# Patient Record
Sex: FEMALE | Race: WHITE | Employment: FULL TIME | URBAN - METROPOLITAN AREA
[De-identification: names, ages, dates, MRNs, and addresses within clinical notes are randomized per-mention and may not be internally consistent; named-entity substitution may affect disease eponyms.]

---

## 2017-03-04 ENCOUNTER — OFFICE VISIT (OUTPATIENT)
Dept: FAMILY MEDICINE CLINIC | Age: 67
End: 2017-03-04

## 2017-03-04 VITALS
HEART RATE: 76 BPM | BODY MASS INDEX: 22.66 KG/M2 | TEMPERATURE: 97.8 F | RESPIRATION RATE: 16 BRPM | HEIGHT: 61 IN | WEIGHT: 120 LBS | DIASTOLIC BLOOD PRESSURE: 78 MMHG | SYSTOLIC BLOOD PRESSURE: 126 MMHG

## 2017-03-04 DIAGNOSIS — I10 ESSENTIAL HYPERTENSION: Primary | ICD-10-CM

## 2017-03-04 DIAGNOSIS — E78.00 PURE HYPERCHOLESTEROLEMIA: ICD-10-CM

## 2017-03-04 DIAGNOSIS — Z12.31 ENCOUNTER FOR SCREENING MAMMOGRAM FOR BREAST CANCER: ICD-10-CM

## 2017-03-04 PROCEDURE — 99213 OFFICE O/P EST LOW 20 MIN: CPT | Performed by: FAMILY MEDICINE

## 2017-03-04 ASSESSMENT — PATIENT HEALTH QUESTIONNAIRE - PHQ9
SUM OF ALL RESPONSES TO PHQ9 QUESTIONS 1 & 2: 0
SUM OF ALL RESPONSES TO PHQ QUESTIONS 1-9: 0
2. FEELING DOWN, DEPRESSED OR HOPELESS: 0
1. LITTLE INTEREST OR PLEASURE IN DOING THINGS: 0

## 2017-04-08 ENCOUNTER — HOSPITAL ENCOUNTER (OUTPATIENT)
Dept: WOMENS IMAGING | Age: 67
Discharge: HOME | End: 2017-04-08
Payer: MEDICARE

## 2017-04-08 DIAGNOSIS — Z12.31 ENCOUNTER FOR SCREENING MAMMOGRAM FOR BREAST CANCER: ICD-10-CM

## 2017-04-08 PROCEDURE — G0202 SCR MAMMO BI INCL CAD: HCPCS

## 2017-06-10 DIAGNOSIS — M81.0 OSTEOPOROSIS: ICD-10-CM

## 2017-06-10 DIAGNOSIS — E78.00 PURE HYPERCHOLESTEROLEMIA: ICD-10-CM

## 2017-06-10 DIAGNOSIS — I10 ESSENTIAL HYPERTENSION: ICD-10-CM

## 2017-06-10 LAB
ALBUMIN SERPL-MCNC: 4 G/DL (ref 3.9–4.9)
ALP BLD-CCNC: 76 U/L (ref 40–130)
ALT SERPL-CCNC: 24 U/L (ref 0–33)
ANION GAP SERPL CALCULATED.3IONS-SCNC: 12 MEQ/L (ref 7–13)
AST SERPL-CCNC: 24 U/L (ref 0–35)
BASOPHILS ABSOLUTE: 0.1 K/UL (ref 0–0.2)
BASOPHILS RELATIVE PERCENT: 1.2 %
BILIRUB SERPL-MCNC: 0.3 MG/DL (ref 0–1.2)
BUN BLDV-MCNC: 8 MG/DL (ref 8–23)
CALCIUM SERPL-MCNC: 8.9 MG/DL (ref 8.6–10.2)
CHLORIDE BLD-SCNC: 90 MEQ/L (ref 98–107)
CHOLESTEROL, TOTAL: 194 MG/DL (ref 0–199)
CO2: 28 MEQ/L (ref 22–29)
CREAT SERPL-MCNC: 0.64 MG/DL (ref 0.5–0.9)
EOSINOPHILS ABSOLUTE: 0.1 K/UL (ref 0–0.7)
EOSINOPHILS RELATIVE PERCENT: 1.5 %
GFR AFRICAN AMERICAN: >60
GFR NON-AFRICAN AMERICAN: >60
GLOBULIN: 2.4 G/DL (ref 2.3–3.5)
GLUCOSE BLD-MCNC: 84 MG/DL (ref 74–109)
HCT VFR BLD CALC: 43.9 % (ref 37–47)
HDLC SERPL-MCNC: 65 MG/DL (ref 40–59)
HEMOGLOBIN: 14.9 G/DL (ref 12–16)
LDL CHOLESTEROL CALCULATED: 93 MG/DL (ref 0–129)
LYMPHOCYTES ABSOLUTE: 1.3 K/UL (ref 1–4.8)
LYMPHOCYTES RELATIVE PERCENT: 29.2 %
MCH RBC QN AUTO: 35.1 PG (ref 27–31.3)
MCHC RBC AUTO-ENTMCNC: 34 % (ref 33–37)
MCV RBC AUTO: 103.2 FL (ref 82–100)
MONOCYTES ABSOLUTE: 0.6 K/UL (ref 0.2–0.8)
MONOCYTES RELATIVE PERCENT: 12.8 %
NEUTROPHILS ABSOLUTE: 2.4 K/UL (ref 1.4–6.5)
NEUTROPHILS RELATIVE PERCENT: 55.3 %
PDW BLD-RTO: 13.1 % (ref 11.5–14.5)
PLATELET # BLD: 371 K/UL (ref 130–400)
POTASSIUM SERPL-SCNC: 4.4 MEQ/L (ref 3.5–5.1)
RBC # BLD: 4.25 M/UL (ref 4.2–5.4)
SODIUM BLD-SCNC: 130 MEQ/L (ref 132–144)
TOTAL PROTEIN: 6.4 G/DL (ref 6.4–8.1)
TRIGL SERPL-MCNC: 181 MG/DL (ref 0–200)
VITAMIN D 25-HYDROXY: 42.9 NG/ML (ref 30–100)
WBC # BLD: 4.4 K/UL (ref 4.8–10.8)

## 2017-06-18 DIAGNOSIS — I10 ESSENTIAL HYPERTENSION: ICD-10-CM

## 2017-06-18 RX ORDER — LISINOPRIL AND HYDROCHLOROTHIAZIDE 25; 20 MG/1; MG/1
TABLET ORAL
Qty: 90 TABLET | Refills: 3 | Status: SHIPPED | OUTPATIENT
Start: 2017-06-18 | End: 2018-06-15 | Stop reason: SDUPTHER

## 2017-07-03 ENCOUNTER — OFFICE VISIT (OUTPATIENT)
Dept: FAMILY MEDICINE CLINIC | Age: 67
End: 2017-07-03

## 2017-07-03 VITALS
RESPIRATION RATE: 12 BRPM | HEIGHT: 61 IN | BODY MASS INDEX: 22.84 KG/M2 | TEMPERATURE: 98.1 F | SYSTOLIC BLOOD PRESSURE: 124 MMHG | HEART RATE: 84 BPM | DIASTOLIC BLOOD PRESSURE: 72 MMHG | WEIGHT: 121 LBS

## 2017-07-03 DIAGNOSIS — M81.0 OSTEOPOROSIS: ICD-10-CM

## 2017-07-03 DIAGNOSIS — I10 ESSENTIAL HYPERTENSION: Primary | ICD-10-CM

## 2017-07-03 DIAGNOSIS — Z23 NEED FOR VACCINATION WITH 13-POLYVALENT PNEUMOCOCCAL CONJUGATE VACCINE: ICD-10-CM

## 2017-07-03 DIAGNOSIS — E78.00 PURE HYPERCHOLESTEROLEMIA: ICD-10-CM

## 2017-07-03 DIAGNOSIS — Z12.11 SCREEN FOR COLON CANCER: ICD-10-CM

## 2017-07-03 PROCEDURE — 90471 IMMUNIZATION ADMIN: CPT | Performed by: FAMILY MEDICINE

## 2017-07-03 PROCEDURE — 90670 PCV13 VACCINE IM: CPT | Performed by: FAMILY MEDICINE

## 2017-07-03 PROCEDURE — 99213 OFFICE O/P EST LOW 20 MIN: CPT | Performed by: FAMILY MEDICINE

## 2017-07-22 ENCOUNTER — OFFICE VISIT (OUTPATIENT)
Dept: FAMILY MEDICINE CLINIC | Age: 67
End: 2017-07-22

## 2017-07-22 VITALS
WEIGHT: 123.25 LBS | TEMPERATURE: 97.2 F | BODY MASS INDEX: 23.27 KG/M2 | OXYGEN SATURATION: 92 % | HEIGHT: 61 IN | HEART RATE: 112 BPM | RESPIRATION RATE: 12 BRPM | SYSTOLIC BLOOD PRESSURE: 124 MMHG | DIASTOLIC BLOOD PRESSURE: 80 MMHG

## 2017-07-22 DIAGNOSIS — L23.7 POISON IVY DERMATITIS: Primary | ICD-10-CM

## 2017-07-22 PROCEDURE — 99212 OFFICE O/P EST SF 10 MIN: CPT | Performed by: NURSE PRACTITIONER

## 2017-07-22 PROCEDURE — 96372 THER/PROPH/DIAG INJ SC/IM: CPT | Performed by: NURSE PRACTITIONER

## 2017-07-22 RX ORDER — TRIAMCINOLONE ACETONIDE 40 MG/ML
80 INJECTION, SUSPENSION INTRA-ARTICULAR; INTRAMUSCULAR ONCE
Status: COMPLETED | OUTPATIENT
Start: 2017-07-22 | End: 2017-07-22

## 2017-07-22 RX ADMIN — TRIAMCINOLONE ACETONIDE 80 MG: 40 INJECTION, SUSPENSION INTRA-ARTICULAR; INTRAMUSCULAR at 14:37

## 2017-07-22 ASSESSMENT — ENCOUNTER SYMPTOMS
VOMITING: 0
SORE THROAT: 0
DIARRHEA: 0
RHINORRHEA: 0
NAIL CHANGES: 0
EYE PAIN: 0
COUGH: 0
SHORTNESS OF BREATH: 0

## 2017-12-06 DIAGNOSIS — Z12.11 SCREEN FOR COLON CANCER: ICD-10-CM

## 2017-12-06 LAB
CONTROL: NORMAL
HEMOCCULT STL QL: NEGATIVE

## 2017-12-06 PROCEDURE — 82274 ASSAY TEST FOR BLOOD FECAL: CPT | Performed by: FAMILY MEDICINE

## 2018-01-06 DIAGNOSIS — E78.00 PURE HYPERCHOLESTEROLEMIA: ICD-10-CM

## 2018-01-06 DIAGNOSIS — M81.0 OSTEOPOROSIS: ICD-10-CM

## 2018-01-06 DIAGNOSIS — I10 ESSENTIAL HYPERTENSION: ICD-10-CM

## 2018-01-06 LAB
ALBUMIN SERPL-MCNC: 4.4 G/DL (ref 3.9–4.9)
ALP BLD-CCNC: 70 U/L (ref 40–130)
ALT SERPL-CCNC: 47 U/L (ref 0–33)
ANION GAP SERPL CALCULATED.3IONS-SCNC: 16 MEQ/L (ref 7–13)
ANISOCYTOSIS: ABNORMAL
AST SERPL-CCNC: 79 U/L (ref 0–35)
ATYPICAL LYMPHOCYTE RELATIVE PERCENT: 6 %
BASOPHILS ABSOLUTE: 0 K/UL (ref 0–0.2)
BASOPHILS RELATIVE PERCENT: 1.1 %
BILIRUB SERPL-MCNC: 0.6 MG/DL (ref 0–1.2)
BUN BLDV-MCNC: 8 MG/DL (ref 8–23)
BURR CELLS: ABNORMAL
CALCIUM SERPL-MCNC: 9.1 MG/DL (ref 8.6–10.2)
CHLORIDE BLD-SCNC: 90 MEQ/L (ref 98–107)
CHOLESTEROL, TOTAL: 277 MG/DL (ref 0–199)
CO2: 29 MEQ/L (ref 22–29)
CREAT SERPL-MCNC: 0.53 MG/DL (ref 0.5–0.9)
EOSINOPHILS ABSOLUTE: 0.2 K/UL (ref 0–0.7)
EOSINOPHILS RELATIVE PERCENT: 5 %
GFR AFRICAN AMERICAN: >60
GFR NON-AFRICAN AMERICAN: >60
GLOBULIN: 2.5 G/DL (ref 2.3–3.5)
GLUCOSE BLD-MCNC: 74 MG/DL (ref 74–109)
HCT VFR BLD CALC: 49.8 % (ref 37–47)
HDLC SERPL-MCNC: 169 MG/DL (ref 40–59)
HEMOGLOBIN: 16.5 G/DL (ref 12–16)
LDL CHOLESTEROL CALCULATED: 88 MG/DL (ref 0–129)
LYMPHOCYTES ABSOLUTE: 1.3 K/UL (ref 1–4.8)
LYMPHOCYTES RELATIVE PERCENT: 28 %
MACROCYTES: ABNORMAL
MCH RBC QN AUTO: 36.7 PG (ref 27–31.3)
MCHC RBC AUTO-ENTMCNC: 33.2 % (ref 33–37)
MCV RBC AUTO: 110.5 FL (ref 82–100)
MONOCYTES ABSOLUTE: 0.2 K/UL (ref 0.2–0.8)
MONOCYTES RELATIVE PERCENT: 5.8 %
NEUTROPHILS ABSOLUTE: 2.1 K/UL (ref 1.4–6.5)
NEUTROPHILS RELATIVE PERCENT: 55 %
PDW BLD-RTO: 14.1 % (ref 11.5–14.5)
PLATELET # BLD: 237 K/UL (ref 130–400)
POTASSIUM SERPL-SCNC: 4 MEQ/L (ref 3.5–5.1)
RBC # BLD: 4.51 M/UL (ref 4.2–5.4)
SMUDGE CELLS: 6.8
SODIUM BLD-SCNC: 135 MEQ/L (ref 132–144)
TOTAL PROTEIN: 6.9 G/DL (ref 6.4–8.1)
TRIGL SERPL-MCNC: 98 MG/DL (ref 0–200)
VITAMIN D 25-HYDROXY: 41.2 NG/ML (ref 30–100)
WBC # BLD: 3.8 K/UL (ref 4.8–10.8)

## 2018-01-15 ENCOUNTER — OFFICE VISIT (OUTPATIENT)
Dept: FAMILY MEDICINE CLINIC | Age: 68
End: 2018-01-15

## 2018-01-15 VITALS
DIASTOLIC BLOOD PRESSURE: 82 MMHG | BODY MASS INDEX: 21.9 KG/M2 | SYSTOLIC BLOOD PRESSURE: 136 MMHG | RESPIRATION RATE: 12 BRPM | HEART RATE: 102 BPM | HEIGHT: 61 IN | TEMPERATURE: 98.6 F | WEIGHT: 116 LBS

## 2018-01-15 DIAGNOSIS — E78.00 PURE HYPERCHOLESTEROLEMIA: ICD-10-CM

## 2018-01-15 DIAGNOSIS — D75.1 POLYCYTHEMIA: ICD-10-CM

## 2018-01-15 DIAGNOSIS — M81.0 AGE-RELATED OSTEOPOROSIS WITHOUT CURRENT PATHOLOGICAL FRACTURE: ICD-10-CM

## 2018-01-15 DIAGNOSIS — I10 ESSENTIAL HYPERTENSION: Primary | ICD-10-CM

## 2018-01-15 DIAGNOSIS — F17.210 NICOTINE DEPENDENCE, CIGARETTES, UNCOMPLICATED: ICD-10-CM

## 2018-01-15 PROCEDURE — G0296 VISIT TO DETERM LDCT ELIG: HCPCS | Performed by: FAMILY MEDICINE

## 2018-01-15 PROCEDURE — 99214 OFFICE O/P EST MOD 30 MIN: CPT | Performed by: FAMILY MEDICINE

## 2018-01-15 NOTE — PROGRESS NOTES
Subjective:   Marshall Mccrary is here for follow-up of elevated blood pressure. She is exercising and is not adherent to a low-salt diet. Blood pressure is well controlled at home. History of target organ damage: none. Hypercholesterolemia has been well-controlled although the HDL went from the low 60s to 169 in the last 6 months if lab is to be believed. Polycythemia is present and worse than it was last time. She states she feels wobbly sometimes. This is a reaction to the lisinopril and is not too bad and it comes and goes. Social history, patient has a 45+ pack year smoking history. ROS: This patient reports no chest pains or pressure. There is no shortness of breath or chest wall soreness. The patient reports no nausea or vomiting. There is no heartburn or indigestion. There is no diarrhea or constipation. No black, bloody, mucusy or tarry stool noticed. The patient reports no bloating and no change in appetite. EXAM:  Constitutional Blood pressure 136/82, pulse 102, temperature 98.6 °F (37 °C), temperature source Temporal, resp. rate 12, height 5' 1\" (1.549 m), weight 116 lb (52.6 kg), not currently breastfeeding. Physical Exam   Constitutional: She is oriented to person, place, and time. She appears well-developed and well-nourished. Neck: Normal range of motion. Carotid bruit is not present. No thyromegaly present. Cardiovascular: Normal rate, regular rhythm and normal heart sounds. Pulmonary/Chest: Effort normal and breath sounds normal.   Abdominal: Soft. There is no tenderness. There is no rebound and no guarding. Musculoskeletal:   There is no costovertebral angle tenderness. Lumbar spine and sacroiliac joints are non tender. There is no edema in the four extremities. Pulses palpable at both posterior tibial and radial arteries. Neurological: She is alert and oriented to person, place, and time. Psychiatric: She has a normal mood and affect.  Her behavior is normal.     DIAGNOSIS:

## 2018-01-15 NOTE — PATIENT INSTRUCTIONS
Take the lisinopril-hydrochlorthiazide 1/2 tablet twice a day. What is lung cancer screening? Lung cancer screening is a way in which doctors check the lungs for early signs of cancer in people who have no symptoms of lung cancer. A low-dose CT scan uses much less radiation than a normal CT scan and shows a more detailed image of the lungs than a standard X-ray. The goal of lung cancer screening is to find cancer early, before it has a chance to grow, spread, or cause problems. One large study found that smokers who were screened with low-dose CT scans were less likely to die of lung cancer than those who were screened with standard X-ray. Below is a summary of the things you need to know regarding screening for lung cancer with low-dose computed tomography (LDCT). This is a screening program that involves routine annual screening with LDCT studies of the lung. The LDCTs are done using low-dose radiation that is not thought to increase your cancer risk. If you have other serious medical conditions (other cancers, congestive heart failure) that limit your life expectancy to less than 10 years, you should not undergo lung cancer screening with LDCT. The chance is 20%-60% that the LDCT result will show abnormalities. This would require additional testing which could include repeat imaging or even invasive procedures. Most (about 95%) of \"abnormal\" LDCT results are false in the sense that no lung cancer is ultimately found. Additionally, some (about 10%) of the cancers found would not affect your life expectancy, even if undetected and untreated. If you are still smoking, the single most important thing that you can do to reduce your risk of dying of lung cancer is to quit. For this screening to be covered by Medicare and most other insurers, strict criteria must be met.   If you do not meet these criteria, but still wish to undergo LDCT testing, you will be required to sign a waiver indicating your willingness to pay for the scan.

## 2018-06-15 DIAGNOSIS — I10 ESSENTIAL HYPERTENSION: ICD-10-CM

## 2018-06-18 RX ORDER — LISINOPRIL AND HYDROCHLOROTHIAZIDE 25; 20 MG/1; MG/1
TABLET ORAL
Qty: 90 TABLET | Refills: 3 | Status: SHIPPED | OUTPATIENT
Start: 2018-06-18

## 2018-07-14 DIAGNOSIS — I10 ESSENTIAL HYPERTENSION: ICD-10-CM

## 2018-07-14 DIAGNOSIS — D75.1 POLYCYTHEMIA: ICD-10-CM

## 2018-07-14 DIAGNOSIS — E78.00 PURE HYPERCHOLESTEROLEMIA: ICD-10-CM

## 2018-07-14 LAB
ALBUMIN SERPL-MCNC: 4.1 G/DL (ref 3.9–4.9)
ALP BLD-CCNC: 64 U/L (ref 40–130)
ALT SERPL-CCNC: 29 U/L (ref 0–33)
ANION GAP SERPL CALCULATED.3IONS-SCNC: 16 MEQ/L (ref 7–13)
AST SERPL-CCNC: 45 U/L (ref 0–35)
BASOPHILS ABSOLUTE: 0.1 K/UL (ref 0–0.2)
BASOPHILS RELATIVE PERCENT: 1.8 %
BILIRUB SERPL-MCNC: 0.3 MG/DL (ref 0–1.2)
BUN BLDV-MCNC: 10 MG/DL (ref 8–23)
CALCIUM SERPL-MCNC: 9.1 MG/DL (ref 8.6–10.2)
CHLORIDE BLD-SCNC: 95 MEQ/L (ref 98–107)
CHOLESTEROL, TOTAL: 228 MG/DL (ref 0–199)
CO2: 25 MEQ/L (ref 22–29)
CREAT SERPL-MCNC: 0.58 MG/DL (ref 0.5–0.9)
EOSINOPHILS ABSOLUTE: 0.1 K/UL (ref 0–0.7)
EOSINOPHILS RELATIVE PERCENT: 1.9 %
GFR AFRICAN AMERICAN: >60
GFR NON-AFRICAN AMERICAN: >60
GLOBULIN: 2.5 G/DL (ref 2.3–3.5)
GLUCOSE BLD-MCNC: 66 MG/DL (ref 74–109)
HCT VFR BLD CALC: 37.3 % (ref 37–47)
HDLC SERPL-MCNC: 108 MG/DL (ref 40–59)
HEMOGLOBIN: 12.9 G/DL (ref 12–16)
LDL CHOLESTEROL CALCULATED: 97 MG/DL (ref 0–129)
LYMPHOCYTES ABSOLUTE: 1.2 K/UL (ref 1–4.8)
LYMPHOCYTES RELATIVE PERCENT: 29.1 %
MACROCYTES: ABNORMAL
MCH RBC QN AUTO: 36.6 PG (ref 27–31.3)
MCHC RBC AUTO-ENTMCNC: 34.5 % (ref 33–37)
MCV RBC AUTO: 106.2 FL (ref 82–100)
MONOCYTES ABSOLUTE: 0.4 K/UL (ref 0.2–0.8)
MONOCYTES RELATIVE PERCENT: 8.9 %
NEUTROPHILS ABSOLUTE: 2.3 K/UL (ref 1.4–6.5)
NEUTROPHILS RELATIVE PERCENT: 58.3 %
PDW BLD-RTO: 13.5 % (ref 11.5–14.5)
PLATELET # BLD: 284 K/UL (ref 130–400)
POTASSIUM SERPL-SCNC: 3.8 MEQ/L (ref 3.5–5.1)
RBC # BLD: 3.51 M/UL (ref 4.2–5.4)
SLIDE REVIEW: ABNORMAL
SODIUM BLD-SCNC: 136 MEQ/L (ref 132–144)
TOTAL PROTEIN: 6.6 G/DL (ref 6.4–8.1)
TRIGL SERPL-MCNC: 113 MG/DL (ref 0–200)
WBC # BLD: 4 K/UL (ref 4.8–10.8)

## 2019-01-31 ENCOUNTER — OFFICE VISIT (OUTPATIENT)
Dept: FAMILY MEDICINE CLINIC | Age: 69
End: 2019-01-31
Payer: COMMERCIAL

## 2019-01-31 VITALS
HEART RATE: 123 BPM | RESPIRATION RATE: 14 BRPM | DIASTOLIC BLOOD PRESSURE: 86 MMHG | SYSTOLIC BLOOD PRESSURE: 150 MMHG | WEIGHT: 120.2 LBS | TEMPERATURE: 100.8 F | BODY MASS INDEX: 22.69 KG/M2 | HEIGHT: 61 IN | OXYGEN SATURATION: 97 %

## 2019-01-31 DIAGNOSIS — J20.8 ACUTE BACTERIAL BRONCHITIS: ICD-10-CM

## 2019-01-31 DIAGNOSIS — R68.89 FLU-LIKE SYMPTOMS: Primary | ICD-10-CM

## 2019-01-31 DIAGNOSIS — B96.89 ACUTE BACTERIAL BRONCHITIS: ICD-10-CM

## 2019-01-31 DIAGNOSIS — I10 ESSENTIAL HYPERTENSION: ICD-10-CM

## 2019-01-31 LAB
INFLUENZA A ANTIBODY: NEGATIVE
INFLUENZA B ANTIBODY: NEGATIVE

## 2019-01-31 PROCEDURE — 87804 INFLUENZA ASSAY W/OPTIC: CPT | Performed by: FAMILY MEDICINE

## 2019-01-31 PROCEDURE — 99213 OFFICE O/P EST LOW 20 MIN: CPT | Performed by: FAMILY MEDICINE

## 2019-01-31 RX ORDER — AZITHROMYCIN 250 MG/1
TABLET, FILM COATED ORAL
Qty: 6 TABLET | Refills: 0 | Status: SHIPPED | OUTPATIENT
Start: 2019-01-31 | End: 2019-02-10

## 2019-01-31 ASSESSMENT — PATIENT HEALTH QUESTIONNAIRE - PHQ9
SUM OF ALL RESPONSES TO PHQ9 QUESTIONS 1 & 2: 0
2. FEELING DOWN, DEPRESSED OR HOPELESS: 0
SUM OF ALL RESPONSES TO PHQ QUESTIONS 1-9: 0
SUM OF ALL RESPONSES TO PHQ QUESTIONS 1-9: 0
1. LITTLE INTEREST OR PLEASURE IN DOING THINGS: 0

## 2019-02-21 ENCOUNTER — TELEPHONE (OUTPATIENT)
Dept: FAMILY MEDICINE CLINIC | Age: 69
End: 2019-02-21

## 2019-02-22 ENCOUNTER — TELEPHONE (OUTPATIENT)
Dept: FAMILY MEDICINE CLINIC | Age: 69
End: 2019-02-22

## 2019-03-13 ENCOUNTER — OFFICE VISIT (OUTPATIENT)
Dept: FAMILY MEDICINE CLINIC | Age: 69
End: 2019-03-13
Payer: COMMERCIAL

## 2019-03-13 VITALS
SYSTOLIC BLOOD PRESSURE: 142 MMHG | OXYGEN SATURATION: 96 % | HEART RATE: 77 BPM | HEIGHT: 61 IN | BODY MASS INDEX: 23.3 KG/M2 | DIASTOLIC BLOOD PRESSURE: 80 MMHG | WEIGHT: 123.4 LBS | TEMPERATURE: 98.7 F | RESPIRATION RATE: 14 BRPM

## 2019-03-13 DIAGNOSIS — Z00.00 ROUTINE GENERAL MEDICAL EXAMINATION AT A HEALTH CARE FACILITY: Primary | ICD-10-CM

## 2019-03-13 DIAGNOSIS — I10 ESSENTIAL HYPERTENSION: ICD-10-CM

## 2019-03-13 PROCEDURE — G0438 PPPS, INITIAL VISIT: HCPCS | Performed by: FAMILY MEDICINE

## 2019-03-13 ASSESSMENT — LIFESTYLE VARIABLES
AUDIT-C TOTAL SCORE: 4
HOW OFTEN DURING THE LAST YEAR HAVE YOU FOUND THAT YOU WERE NOT ABLE TO STOP DRINKING ONCE YOU HAD STARTED: 0
HOW OFTEN DO YOU HAVE A DRINK CONTAINING ALCOHOL: 4
HOW OFTEN DURING THE LAST YEAR HAVE YOU BEEN UNABLE TO REMEMBER WHAT HAPPENED THE NIGHT BEFORE BECAUSE YOU HAD BEEN DRINKING: 0
HAVE YOU OR SOMEONE ELSE BEEN INJURED AS A RESULT OF YOUR DRINKING: 0
HOW OFTEN DO YOU HAVE SIX OR MORE DRINKS ON ONE OCCASION: 0
HOW OFTEN DURING THE LAST YEAR HAVE YOU NEEDED AN ALCOHOLIC DRINK FIRST THING IN THE MORNING TO GET YOURSELF GOING AFTER A NIGHT OF HEAVY DRINKING: 0
HOW OFTEN DURING THE LAST YEAR HAVE YOU FAILED TO DO WHAT WAS NORMALLY EXPECTED FROM YOU BECAUSE OF DRINKING: 0
HOW MANY STANDARD DRINKS CONTAINING ALCOHOL DO YOU HAVE ON A TYPICAL DAY: 0
HOW OFTEN DURING THE LAST YEAR HAVE YOU HAD A FEELING OF GUILT OR REMORSE AFTER DRINKING: 0
HAS A RELATIVE, FRIEND, DOCTOR, OR ANOTHER HEALTH PROFESSIONAL EXPRESSED CONCERN ABOUT YOUR DRINKING OR SUGGESTED YOU CUT DOWN: 0

## 2019-03-13 ASSESSMENT — PATIENT HEALTH QUESTIONNAIRE - PHQ9
SUM OF ALL RESPONSES TO PHQ QUESTIONS 1-9: 1
SUM OF ALL RESPONSES TO PHQ QUESTIONS 1-9: 1

## 2019-03-13 ASSESSMENT — ANXIETY QUESTIONNAIRES: GAD7 TOTAL SCORE: 2

## 2019-03-18 ENCOUNTER — TELEPHONE (OUTPATIENT)
Dept: FAMILY MEDICINE CLINIC | Age: 69
End: 2019-03-18

## 2019-03-25 ENCOUNTER — TELEPHONE (OUTPATIENT)
Dept: FAMILY MEDICINE CLINIC | Age: 69
End: 2019-03-25

## 2023-07-26 LAB
ALANINE AMINOTRANSFERASE (SGPT) (U/L) IN SER/PLAS: 67 U/L (ref 7–45)
ALBUMIN (G/DL) IN SER/PLAS: 4.2 G/DL (ref 3.4–5)
ALKALINE PHOSPHATASE (U/L) IN SER/PLAS: 109 U/L (ref 33–136)
ANION GAP IN SER/PLAS: 12 MMOL/L (ref 10–20)
ASPARTATE AMINOTRANSFERASE (SGOT) (U/L) IN SER/PLAS: 92 U/L (ref 9–39)
BASOPHILS (10*3/UL) IN BLOOD BY AUTOMATED COUNT: 0.06 X10E9/L (ref 0–0.1)
BASOPHILS/100 LEUKOCYTES IN BLOOD BY AUTOMATED COUNT: 0.8 % (ref 0–2)
BILIRUBIN TOTAL (MG/DL) IN SER/PLAS: 0.6 MG/DL (ref 0–1.2)
CALCIUM (MG/DL) IN SER/PLAS: 9.4 MG/DL (ref 8.6–10.3)
CARBON DIOXIDE, TOTAL (MMOL/L) IN SER/PLAS: 30 MMOL/L (ref 21–32)
CHLORIDE (MMOL/L) IN SER/PLAS: 99 MMOL/L (ref 98–107)
CHOLESTEROL (MG/DL) IN SER/PLAS: 182 MG/DL (ref 0–199)
CHOLESTEROL IN HDL (MG/DL) IN SER/PLAS: 100 MG/DL
CHOLESTEROL/HDL RATIO: 1.8
CREATININE (MG/DL) IN SER/PLAS: 0.67 MG/DL (ref 0.5–1.05)
EOSINOPHILS (10*3/UL) IN BLOOD BY AUTOMATED COUNT: 0.07 X10E9/L (ref 0–0.4)
EOSINOPHILS/100 LEUKOCYTES IN BLOOD BY AUTOMATED COUNT: 0.9 % (ref 0–6)
ERYTHROCYTE DISTRIBUTION WIDTH (RATIO) BY AUTOMATED COUNT: 13.2 % (ref 11.5–14.5)
ERYTHROCYTE MEAN CORPUSCULAR HEMOGLOBIN CONCENTRATION (G/DL) BY AUTOMATED: 33.3 G/DL (ref 32–36)
ERYTHROCYTE MEAN CORPUSCULAR VOLUME (FL) BY AUTOMATED COUNT: 102 FL (ref 80–100)
ERYTHROCYTES (10*6/UL) IN BLOOD BY AUTOMATED COUNT: 4.07 X10E12/L (ref 4–5.2)
GFR FEMALE: >90 ML/MIN/1.73M2
GLUCOSE (MG/DL) IN SER/PLAS: 85 MG/DL (ref 74–99)
HEMATOCRIT (%) IN BLOOD BY AUTOMATED COUNT: 41.5 % (ref 36–46)
HEMOGLOBIN (G/DL) IN BLOOD: 13.8 G/DL (ref 12–16)
IMMATURE GRANULOCYTES/100 LEUKOCYTES IN BLOOD BY AUTOMATED COUNT: 0.9 % (ref 0–0.9)
LDL: 67 MG/DL (ref 0–99)
LEUKOCYTES (10*3/UL) IN BLOOD BY AUTOMATED COUNT: 7.6 X10E9/L (ref 4.4–11.3)
LYMPHOCYTES (10*3/UL) IN BLOOD BY AUTOMATED COUNT: 1.61 X10E9/L (ref 0.8–3)
LYMPHOCYTES/100 LEUKOCYTES IN BLOOD BY AUTOMATED COUNT: 21.1 % (ref 13–44)
MONOCYTES (10*3/UL) IN BLOOD BY AUTOMATED COUNT: 0.56 X10E9/L (ref 0.05–0.8)
MONOCYTES/100 LEUKOCYTES IN BLOOD BY AUTOMATED COUNT: 7.3 % (ref 2–10)
NEUTROPHILS (10*3/UL) IN BLOOD BY AUTOMATED COUNT: 5.27 X10E9/L (ref 1.6–5.5)
NEUTROPHILS/100 LEUKOCYTES IN BLOOD BY AUTOMATED COUNT: 69 % (ref 40–80)
PLATELETS (10*3/UL) IN BLOOD AUTOMATED COUNT: 240 X10E9/L (ref 150–450)
POTASSIUM (MMOL/L) IN SER/PLAS: 4.4 MMOL/L (ref 3.5–5.3)
PROTEIN TOTAL: 6.9 G/DL (ref 6.4–8.2)
SODIUM (MMOL/L) IN SER/PLAS: 137 MMOL/L (ref 136–145)
THYROTROPIN (MIU/L) IN SER/PLAS BY DETECTION LIMIT <= 0.05 MIU/L: 1.45 MIU/L (ref 0.44–3.98)
TRIGLYCERIDE (MG/DL) IN SER/PLAS: 75 MG/DL (ref 0–149)
UREA NITROGEN (MG/DL) IN SER/PLAS: 8 MG/DL (ref 6–23)
VLDL: 15 MG/DL (ref 0–40)

## 2023-07-31 PROBLEM — T17.908A ASPIRATION INTO AIRWAY: Status: RESOLVED | Noted: 2023-07-31 | Resolved: 2023-07-31

## 2023-07-31 PROBLEM — E03.9 ACQUIRED HYPOTHYROIDISM: Status: ACTIVE | Noted: 2023-07-31

## 2023-07-31 PROBLEM — J18.9 PNEUMONIA OF BOTH LUNGS DUE TO INFECTIOUS ORGANISM: Status: RESOLVED | Noted: 2023-07-31 | Resolved: 2023-07-31

## 2023-07-31 PROBLEM — E78.5 HYPERLIPIDEMIA: Status: ACTIVE | Noted: 2023-07-31

## 2023-07-31 PROBLEM — R60.0 PERIPHERAL EDEMA: Status: ACTIVE | Noted: 2023-07-31

## 2023-07-31 PROBLEM — R60.9 PERIPHERAL EDEMA: Status: ACTIVE | Noted: 2023-07-31

## 2023-07-31 PROBLEM — D64.9 ANEMIA: Status: ACTIVE | Noted: 2023-07-31

## 2023-07-31 PROBLEM — K22.89: Status: ACTIVE | Noted: 2023-07-31

## 2023-07-31 PROBLEM — I10 HTN (HYPERTENSION), BENIGN: Status: ACTIVE | Noted: 2023-07-31

## 2023-07-31 PROBLEM — E04.1 THYROID NODULE: Status: ACTIVE | Noted: 2023-07-31

## 2023-07-31 RX ORDER — LEVOTHYROXINE SODIUM 25 UG/1
25 TABLET ORAL DAILY
COMMUNITY
Start: 2022-08-02 | End: 2023-08-01 | Stop reason: SINTOL

## 2023-07-31 RX ORDER — ATORVASTATIN CALCIUM 80 MG/1
80 TABLET, FILM COATED ORAL
COMMUNITY
Start: 2021-01-23 | End: 2023-08-01 | Stop reason: SDUPTHER

## 2023-07-31 RX ORDER — METOPROLOL TARTRATE 25 MG/1
25 TABLET, FILM COATED ORAL 2 TIMES DAILY
COMMUNITY
Start: 2021-02-01 | End: 2023-08-01 | Stop reason: SDUPTHER

## 2023-07-31 NOTE — PROGRESS NOTES
Subjective   Patient ID: Annette Fernandez is a 73 y.o. female who presents for Follow-up, Hypertension, Hyperlipidemia, Hypothyroidism, Anemia, and Medicare Annual Wellness Visit Subsequent.    The patients living will is active. Her POA is daughter Daisy, back-up POA is Unknown at this time.  The patients current code status is FULL CODE. The patient does not want to linger on machines. .    Hypertension: The patient is here for follow-up of elevated blood pressure. She is adherent to a low salt diet.  The patient is compliant with medications. Patient denies any side effects to the medications.     Hyperlipidemia: The patient is present today for a follow up of hyperlipidemia. She denies having any leg cramping in particular. She is trying to follow a low-fat diet. The patient is compliant with medications, which they are currently taking the following Atorvastatin 80 mg QHS. Patient denies any side effects to the medications.     Hypothyroidism: Patient presents for a follow up of their thyroid function. Energy wise that patient is well. The patient is compliant with their medications, which are currently levothyroxine (Synthroid). Patient denies any side effects to the medications. [place] thyroid is not covered by insurance and would like a medication like Unithroid      Anemia: Patient presents for presents evaluation of anemia. The patient has not been hospitalized for this in the last 6 months. The patient is compliant with medications. Patient denies any side effects to the medications. She denies having any symptoms pertaining to anemia including abdominal pain, dizziness/lightheadedness, and fatigue.     The patient denies having the following symptoms: chest pain, chest pressure, fever, chills, N/V/D, constipation, dizziness, headaches, SOB.      Objective   Vitals:  /66   Pulse 86   Temp 36.3 °C (97.3 °F)   Resp 16   Ht 1.524 m (5')   Wt 54.8 kg (120 lb 12.8 oz)   SpO2 99%   BMI 23.59 kg/m²      Physical Exam  Vitals reviewed.   Constitutional:       Appearance: Normal appearance.   Neck:      Vascular: No carotid bruit.   Cardiovascular:      Rate and Rhythm: Normal rate and regular rhythm.      Pulses: Normal pulses.      Heart sounds: Normal heart sounds.   Pulmonary:      Effort: Pulmonary effort is normal. No respiratory distress.      Breath sounds: Normal breath sounds. No wheezing.   Abdominal:      General: There is no distension.      Palpations: Abdomen is soft. There is no mass.      Tenderness: There is no abdominal tenderness. There is no right CVA tenderness, left CVA tenderness, guarding or rebound.   Musculoskeletal:      Cervical back: Normal range of motion and neck supple. No rigidity.      Right lower leg: No edema.      Left lower leg: No edema.   Lymphadenopathy:      Cervical: No cervical adenopathy.   Neurological:      Mental Status: She is alert.          Labs reviewed from : 07/26/2023 CMP, CBC, Lipid,  WNL.     Assessment/Plan   Problem List Items Addressed This Visit       HTN (hypertension), benign      Is stable, continue with current treatment.           Relevant Medications    metoprolol tartrate (Lopressor) 25 mg tablet    Other Relevant Orders    CBC and Auto Differential    Comprehensive Metabolic Panel    Magnesium    Hyperlipidemia     Is clinically stable so we will continue with current medications and lab work to confirm status ordered.          Relevant Medications    atorvastatin (Lipitor) 80 mg tablet    Other Relevant Orders    Lipid Panel    Acquired hypothyroidism     Is clinically stable so we will continue with current medications and lab work to confirm status ordered.          Relevant Medications    levothyroxine (Synthroid, Levoxyl) 25 mcg tablet    Other Relevant Orders    Follow Up In [facility] - PCP - Established    TSH with reflex to Free T4 if abnormal    Anemia     Is clinically stable so we will continue with current medications  and lab work to confirm status ordered.          Encounter for subsequent annual wellness visit (AWV) in Medicare patient - Primary     Medicare wellness visit done, patient is in satisfactory living circumstances where the patient's needs are met.  This patient is advised to develop or update their living will and provide us a copy for the chart.            Other Visit Diagnoses       Routine general medical examination at health care facility                Follow up in: 6 months or sooner if needed with labs prior.     Scribe Attestation  By signing my name below, I, Pablo Monetmayor   attest that this documentation has been prepared under the direction and in the presence of Parminder Russell MD.

## 2023-08-01 ENCOUNTER — OFFICE VISIT (OUTPATIENT)
Dept: PRIMARY CARE | Facility: CLINIC | Age: 73
End: 2023-08-01
Payer: MEDICARE

## 2023-08-01 VITALS
OXYGEN SATURATION: 99 % | SYSTOLIC BLOOD PRESSURE: 114 MMHG | TEMPERATURE: 97.3 F | RESPIRATION RATE: 16 BRPM | BODY MASS INDEX: 23.71 KG/M2 | WEIGHT: 120.8 LBS | HEIGHT: 60 IN | HEART RATE: 86 BPM | DIASTOLIC BLOOD PRESSURE: 66 MMHG

## 2023-08-01 DIAGNOSIS — E03.9 ACQUIRED HYPOTHYROIDISM: ICD-10-CM

## 2023-08-01 DIAGNOSIS — I10 HTN (HYPERTENSION), BENIGN: ICD-10-CM

## 2023-08-01 DIAGNOSIS — Z00.00 ROUTINE GENERAL MEDICAL EXAMINATION AT HEALTH CARE FACILITY: ICD-10-CM

## 2023-08-01 DIAGNOSIS — Z00.00 ENCOUNTER FOR SUBSEQUENT ANNUAL WELLNESS VISIT (AWV) IN MEDICARE PATIENT: Primary | ICD-10-CM

## 2023-08-01 DIAGNOSIS — E78.2 MIXED HYPERLIPIDEMIA: ICD-10-CM

## 2023-08-01 DIAGNOSIS — D50.8 OTHER IRON DEFICIENCY ANEMIA: ICD-10-CM

## 2023-08-01 PROCEDURE — 3074F SYST BP LT 130 MM HG: CPT | Performed by: FAMILY MEDICINE

## 2023-08-01 PROCEDURE — 99214 OFFICE O/P EST MOD 30 MIN: CPT | Performed by: FAMILY MEDICINE

## 2023-08-01 PROCEDURE — 1170F FXNL STATUS ASSESSED: CPT | Performed by: FAMILY MEDICINE

## 2023-08-01 PROCEDURE — 3078F DIAST BP <80 MM HG: CPT | Performed by: FAMILY MEDICINE

## 2023-08-01 PROCEDURE — 1159F MED LIST DOCD IN RCRD: CPT | Performed by: FAMILY MEDICINE

## 2023-08-01 PROCEDURE — G0439 PPPS, SUBSEQ VISIT: HCPCS | Performed by: FAMILY MEDICINE

## 2023-08-01 PROCEDURE — 1160F RVW MEDS BY RX/DR IN RCRD: CPT | Performed by: FAMILY MEDICINE

## 2023-08-01 RX ORDER — THYROID, PORCINE 15 MG/1
1 TABLET ORAL DAILY
COMMUNITY
Start: 2023-07-11 | End: 2023-08-01

## 2023-08-01 RX ORDER — ATORVASTATIN CALCIUM 80 MG/1
80 TABLET, FILM COATED ORAL
Qty: 90 TABLET | Refills: 3 | Status: SHIPPED | OUTPATIENT
Start: 2023-08-01

## 2023-08-01 RX ORDER — METOPROLOL TARTRATE 25 MG/1
25 TABLET, FILM COATED ORAL 2 TIMES DAILY
Qty: 90 TABLET | Refills: 3 | Status: SHIPPED | OUTPATIENT
Start: 2023-08-01 | End: 2024-02-13

## 2023-08-01 RX ORDER — LEVOTHYROXINE SODIUM 25 UG/1
25 TABLET ORAL DAILY
Qty: 90 TABLET | Refills: 3 | Status: SHIPPED | OUTPATIENT
Start: 2023-08-01 | End: 2023-09-12 | Stop reason: SINTOL

## 2023-08-01 ASSESSMENT — ACTIVITIES OF DAILY LIVING (ADL)
MANAGING_FINANCES: INDEPENDENT
DRESSING: INDEPENDENT
BATHING: INDEPENDENT
GROCERY_SHOPPING: INDEPENDENT
DOING_HOUSEWORK: INDEPENDENT
TAKING_MEDICATION: INDEPENDENT

## 2023-08-01 ASSESSMENT — ENCOUNTER SYMPTOMS
DEPRESSION: 0
LOSS OF SENSATION IN FEET: 0
OCCASIONAL FEELINGS OF UNSTEADINESS: 1

## 2023-08-01 ASSESSMENT — PATIENT HEALTH QUESTIONNAIRE - PHQ9
2. FEELING DOWN, DEPRESSED OR HOPELESS: NOT AT ALL
SUM OF ALL RESPONSES TO PHQ9 QUESTIONS 1 AND 2: 0
1. LITTLE INTEREST OR PLEASURE IN DOING THINGS: NOT AT ALL

## 2023-08-01 NOTE — ASSESSMENT & PLAN NOTE
Is clinically stable so we will continue with current medications and lab work to confirm status ordered.    99

## 2023-08-10 DIAGNOSIS — E03.9 HYPOTHYROIDISM, UNSPECIFIED: ICD-10-CM

## 2023-08-10 RX ORDER — THYROID, PORCINE 15 MG/1
1 TABLET ORAL DAILY
Qty: 90 TABLET | Refills: 3 | Status: SHIPPED | OUTPATIENT
Start: 2023-08-10 | End: 2023-09-12 | Stop reason: SDUPTHER

## 2023-09-11 ENCOUNTER — TELEPHONE (OUTPATIENT)
Dept: PRIMARY CARE | Facility: CLINIC | Age: 73
End: 2023-09-11
Payer: MEDICARE

## 2023-09-11 DIAGNOSIS — E03.9 HYPOTHYROIDISM, UNSPECIFIED: ICD-10-CM

## 2023-09-11 NOTE — TELEPHONE ENCOUNTER
Patient called stating [facility], patient stated she is having a reaction to levothyroxine.   Please advise   [facility] in [place]

## 2023-09-12 RX ORDER — THYROID 15 MG/1
1 TABLET ORAL DAILY
Qty: 90 TABLET | Refills: 3 | Status: SHIPPED | OUTPATIENT
Start: 2023-09-12

## 2024-01-23 RX ORDER — MOMETASONE FUROATE 1 MG/G
CREAM TOPICAL
COMMUNITY
Start: 2003-12-31

## 2024-01-25 ENCOUNTER — LAB (OUTPATIENT)
Dept: LAB | Facility: LAB | Age: 74
End: 2024-01-25
Payer: MEDICARE

## 2024-01-25 DIAGNOSIS — E03.9 ACQUIRED HYPOTHYROIDISM: ICD-10-CM

## 2024-01-25 DIAGNOSIS — E78.2 MIXED HYPERLIPIDEMIA: ICD-10-CM

## 2024-01-25 DIAGNOSIS — I10 HTN (HYPERTENSION), BENIGN: ICD-10-CM

## 2024-01-25 LAB
ALBUMIN SERPL BCP-MCNC: 3.8 G/DL (ref 3.4–5)
ALP SERPL-CCNC: 131 U/L (ref 33–136)
ALT SERPL W P-5'-P-CCNC: 75 U/L (ref 7–45)
ANION GAP SERPL CALC-SCNC: 15 MMOL/L (ref 10–20)
AST SERPL W P-5'-P-CCNC: 118 U/L (ref 9–39)
BASOPHILS # BLD AUTO: 0.04 X10*3/UL (ref 0–0.1)
BASOPHILS NFR BLD AUTO: 0.7 %
BILIRUB SERPL-MCNC: 0.5 MG/DL (ref 0–1.2)
BUN SERPL-MCNC: 4 MG/DL (ref 6–23)
CALCIUM SERPL-MCNC: 8.6 MG/DL (ref 8.6–10.3)
CHLORIDE SERPL-SCNC: 96 MMOL/L (ref 98–107)
CHOLEST SERPL-MCNC: 166 MG/DL (ref 0–199)
CHOLESTEROL/HDL RATIO: 1.6
CO2 SERPL-SCNC: 29 MMOL/L (ref 21–32)
CREAT SERPL-MCNC: 0.59 MG/DL (ref 0.5–1.05)
EGFRCR SERPLBLD CKD-EPI 2021: >90 ML/MIN/1.73M*2
EOSINOPHIL # BLD AUTO: 0.05 X10*3/UL (ref 0–0.4)
EOSINOPHIL NFR BLD AUTO: 0.8 %
ERYTHROCYTE [DISTWIDTH] IN BLOOD BY AUTOMATED COUNT: 13.5 % (ref 11.5–14.5)
GLUCOSE SERPL-MCNC: 76 MG/DL (ref 74–99)
HCT VFR BLD AUTO: 38.6 % (ref 36–46)
HDLC SERPL-MCNC: 105.5 MG/DL
HGB BLD-MCNC: 13.2 G/DL (ref 12–16)
IMM GRANULOCYTES # BLD AUTO: 0.01 X10*3/UL (ref 0–0.5)
IMM GRANULOCYTES NFR BLD AUTO: 0.2 % (ref 0–0.9)
LDLC SERPL CALC-MCNC: 41 MG/DL
LYMPHOCYTES # BLD AUTO: 1.3 X10*3/UL (ref 0.8–3)
LYMPHOCYTES NFR BLD AUTO: 22 %
MAGNESIUM SERPL-MCNC: 1.46 MG/DL (ref 1.6–2.4)
MCH RBC QN AUTO: 35.6 PG (ref 26–34)
MCHC RBC AUTO-ENTMCNC: 34.2 G/DL (ref 32–36)
MCV RBC AUTO: 104 FL (ref 80–100)
MONOCYTES # BLD AUTO: 0.47 X10*3/UL (ref 0.05–0.8)
MONOCYTES NFR BLD AUTO: 8 %
NEUTROPHILS # BLD AUTO: 4.03 X10*3/UL (ref 1.6–5.5)
NEUTROPHILS NFR BLD AUTO: 68.3 %
NON HDL CHOLESTEROL: 61 MG/DL (ref 0–149)
NRBC BLD-RTO: 0 /100 WBCS (ref 0–0)
PLATELET # BLD AUTO: 217 X10*3/UL (ref 150–450)
POTASSIUM SERPL-SCNC: 4 MMOL/L (ref 3.5–5.3)
PROT SERPL-MCNC: 6.1 G/DL (ref 6.4–8.2)
RBC # BLD AUTO: 3.71 X10*6/UL (ref 4–5.2)
SODIUM SERPL-SCNC: 136 MMOL/L (ref 136–145)
TRIGL SERPL-MCNC: 98 MG/DL (ref 0–149)
TSH SERPL-ACNC: 1.2 MIU/L (ref 0.44–3.98)
VLDL: 20 MG/DL (ref 0–40)
WBC # BLD AUTO: 5.9 X10*3/UL (ref 4.4–11.3)

## 2024-01-25 PROCEDURE — 83735 ASSAY OF MAGNESIUM: CPT

## 2024-01-25 PROCEDURE — 36415 COLL VENOUS BLD VENIPUNCTURE: CPT

## 2024-01-25 PROCEDURE — 84443 ASSAY THYROID STIM HORMONE: CPT

## 2024-01-25 PROCEDURE — 85025 COMPLETE CBC W/AUTO DIFF WBC: CPT

## 2024-01-25 PROCEDURE — 80061 LIPID PANEL: CPT

## 2024-01-25 PROCEDURE — 80053 COMPREHEN METABOLIC PANEL: CPT

## 2024-01-29 ASSESSMENT — ENCOUNTER SYMPTOMS
PND: 0
HYPERTENSION: 1
BLURRED VISION: 0
NECK PAIN: 0
PALPITATIONS: 0
ORTHOPNEA: 0
HEADACHES: 0

## 2024-01-30 ENCOUNTER — OFFICE VISIT (OUTPATIENT)
Dept: PRIMARY CARE | Facility: CLINIC | Age: 74
End: 2024-01-30
Payer: MEDICARE

## 2024-01-30 VITALS
HEIGHT: 60 IN | BODY MASS INDEX: 22.54 KG/M2 | TEMPERATURE: 96.9 F | OXYGEN SATURATION: 98 % | SYSTOLIC BLOOD PRESSURE: 132 MMHG | HEART RATE: 93 BPM | DIASTOLIC BLOOD PRESSURE: 72 MMHG | RESPIRATION RATE: 18 BRPM | WEIGHT: 114.8 LBS

## 2024-01-30 DIAGNOSIS — E03.9 ACQUIRED HYPOTHYROIDISM: ICD-10-CM

## 2024-01-30 DIAGNOSIS — I10 HTN (HYPERTENSION), BENIGN: Primary | ICD-10-CM

## 2024-01-30 DIAGNOSIS — E78.2 MIXED HYPERLIPIDEMIA: ICD-10-CM

## 2024-01-30 PROCEDURE — 3078F DIAST BP <80 MM HG: CPT | Performed by: FAMILY MEDICINE

## 2024-01-30 PROCEDURE — 1159F MED LIST DOCD IN RCRD: CPT | Performed by: FAMILY MEDICINE

## 2024-01-30 PROCEDURE — 1160F RVW MEDS BY RX/DR IN RCRD: CPT | Performed by: FAMILY MEDICINE

## 2024-01-30 PROCEDURE — 99214 OFFICE O/P EST MOD 30 MIN: CPT | Performed by: FAMILY MEDICINE

## 2024-01-30 PROCEDURE — 3075F SYST BP GE 130 - 139MM HG: CPT | Performed by: FAMILY MEDICINE

## 2024-01-30 NOTE — PROGRESS NOTES
Subjective    Patient ID: Annette Fernandez is a 73 y.o. female who presents for Hypertension, Hyperlipidemia, and Hypothyroidism.     Hypertension: The patient is here for follow-up of elevated blood pressure. She is adherent to a low salt diet. Blood pressure is well controlled at home. No new myalgias or GI upset on diet control.    Hyperlipidemia: The patient is present today for a follow up of hyperlipidemia. She denies having any leg cramping in particular. She is trying to follow a low-fat diet.     Hypothyroidism: Patient presents for a follow up of their thyroid function. Energy wise that patient is well.     Elevated liver enzymes: Patient is present today for a follow up of elevated liver enzymes.  Patient reports that she drinks 2-3 glasses of wine a night to help her sleep.     The patient denies having the following symptoms: chest pain, chest pressure, fever, chills, N/V/D, constipation, dizziness, headaches, SOB.    Objective   Vitals:  /72   Pulse 93   Temp 36.1 °C (96.9 °F)   Resp 18   Ht 1.524 m (5')   Wt 52.1 kg (114 lb 12.8 oz)   SpO2 98%   BMI 22.42 kg/m²     Physical Exam  Vitals reviewed.   Constitutional:       Appearance: Normal appearance.   Neck:      Vascular: No carotid bruit.   Cardiovascular:      Rate and Rhythm: Normal rate and regular rhythm.      Pulses: Normal pulses.      Heart sounds: Normal heart sounds.   Pulmonary:      Effort: Pulmonary effort is normal. No respiratory distress.      Breath sounds: Normal breath sounds. No wheezing.   Abdominal:      General: There is no distension.      Palpations: Abdomen is soft. There is no mass.      Tenderness: There is no abdominal tenderness. There is no right CVA tenderness, left CVA tenderness, guarding or rebound.   Musculoskeletal:      Cervical back: Normal range of motion and neck supple. No rigidity.      Right lower leg: No edema.      Left lower leg: No edema.   Lymphadenopathy:      Cervical: No cervical  adenopathy.   Neurological:      Mental Status: She is alert.            Labs reviewed from :01/25/24 CMP, CBC, Lipid,  WNL.       Assessment/Plan   Problem List Items Addressed This Visit       HTN (hypertension), benign - Primary      Is stable, continue with current treatment.           Relevant Orders    Follow Up In [facility] - PCP - Medicare Annual    CBC and Auto Differential    Comprehensive Metabolic Panel    Magnesium    Hyperlipidemia     Is clinically stable so we will continue with current medications and lab work to confirm status ordered.           Relevant Orders    Lipid Panel    Acquired hypothyroidism     Is clinically stable so we will continue with current medications and lab work to confirm status ordered.           Relevant Orders    TSH with reflex to Free T4 if abnormal       Follow up in: 6 month(s) + AWV or sooner if needed with labs prior.     Scribe Attestation  By signing my name below, I, Pablo Montemayor   attest that this documentation has been prepared under the direction and in the presence of Parminder Russell MD.

## 2024-02-12 DIAGNOSIS — I10 HTN (HYPERTENSION), BENIGN: Primary | ICD-10-CM

## 2024-02-12 NOTE — TELEPHONE ENCOUNTER
Recent Visits  Date Type Provider Dept   01/30/24 Office Visit Parminder Russell MD Do [id number]   08/01/23 Office Visit Parminder Russell MD Do [id number]   Showing recent visits within past 540 days and meeting all other requirements  Future Appointments  Date Type Provider Dept   07/30/24 Appointment Parminder Russell MD Do [id number]   Showing future appointments within next 180 days and meeting all other requirements

## 2024-02-13 RX ORDER — METOPROLOL TARTRATE 25 MG/1
25 TABLET, FILM COATED ORAL 2 TIMES DAILY
Qty: 90 TABLET | Refills: 3 | Status: SHIPPED | OUTPATIENT
Start: 2024-02-13

## 2024-05-17 ENCOUNTER — TELEPHONE (OUTPATIENT)
Dept: PRIMARY CARE | Facility: CLINIC | Age: 74
End: 2024-05-17
Payer: MEDICARE

## 2024-05-17 NOTE — TELEPHONE ENCOUNTER
Annette called in, asked if Dr. Russell could call her in medication to [facility]. She stated she is in the beginning of having a UTI. Please advise.

## 2024-05-20 NOTE — TELEPHONE ENCOUNTER
Patient called in for an update. She is asking for a call back regarding. Patient was advised of ERIC and declined  Please Advise

## 2024-06-09 ENCOUNTER — LAB REQUISITION (OUTPATIENT)
Dept: LAB | Facility: HOSPITAL | Age: 74
End: 2024-06-09
Payer: MEDICARE

## 2024-06-09 DIAGNOSIS — R30.0 DYSURIA: ICD-10-CM

## 2024-06-09 PROCEDURE — 87086 URINE CULTURE/COLONY COUNT: CPT

## 2024-06-11 LAB — BACTERIA UR CULT: NORMAL

## 2024-07-29 ENCOUNTER — LAB (OUTPATIENT)
Dept: LAB | Facility: LAB | Age: 74
End: 2024-07-29
Payer: MEDICARE

## 2024-07-29 DIAGNOSIS — I10 HTN (HYPERTENSION), BENIGN: ICD-10-CM

## 2024-07-29 DIAGNOSIS — E78.2 MIXED HYPERLIPIDEMIA: ICD-10-CM

## 2024-07-29 DIAGNOSIS — E03.9 ACQUIRED HYPOTHYROIDISM: ICD-10-CM

## 2024-07-29 LAB
ALBUMIN SERPL BCP-MCNC: 4.1 G/DL (ref 3.4–5)
ALP SERPL-CCNC: 106 U/L (ref 33–136)
ALT SERPL W P-5'-P-CCNC: 26 U/L (ref 7–45)
ANION GAP SERPL CALC-SCNC: 14 MMOL/L (ref 10–20)
AST SERPL W P-5'-P-CCNC: 43 U/L (ref 9–39)
BASOPHILS # BLD AUTO: 0.04 X10*3/UL (ref 0–0.1)
BASOPHILS NFR BLD AUTO: 0.4 %
BILIRUB SERPL-MCNC: 0.5 MG/DL (ref 0–1.2)
BUN SERPL-MCNC: 7 MG/DL (ref 6–23)
CALCIUM SERPL-MCNC: 9.1 MG/DL (ref 8.6–10.3)
CHLORIDE SERPL-SCNC: 97 MMOL/L (ref 98–107)
CHOLEST SERPL-MCNC: 170 MG/DL (ref 0–199)
CHOLESTEROL/HDL RATIO: 2.4
CO2 SERPL-SCNC: 28 MMOL/L (ref 21–32)
CREAT SERPL-MCNC: 0.6 MG/DL (ref 0.5–1.05)
EGFRCR SERPLBLD CKD-EPI 2021: >90 ML/MIN/1.73M*2
EOSINOPHIL # BLD AUTO: 0.06 X10*3/UL (ref 0–0.4)
EOSINOPHIL NFR BLD AUTO: 0.6 %
ERYTHROCYTE [DISTWIDTH] IN BLOOD BY AUTOMATED COUNT: 13.8 % (ref 11.5–14.5)
GLUCOSE SERPL-MCNC: 83 MG/DL (ref 74–99)
HCT VFR BLD AUTO: 39.6 % (ref 36–46)
HDLC SERPL-MCNC: 71.7 MG/DL
HGB BLD-MCNC: 13.3 G/DL (ref 12–16)
IMM GRANULOCYTES # BLD AUTO: 0.04 X10*3/UL (ref 0–0.5)
IMM GRANULOCYTES NFR BLD AUTO: 0.4 % (ref 0–0.9)
LDLC SERPL CALC-MCNC: 57 MG/DL
LYMPHOCYTES # BLD AUTO: 2.05 X10*3/UL (ref 0.8–3)
LYMPHOCYTES NFR BLD AUTO: 19 %
MAGNESIUM SERPL-MCNC: 1.54 MG/DL (ref 1.6–2.4)
MCH RBC QN AUTO: 35.2 PG (ref 26–34)
MCHC RBC AUTO-ENTMCNC: 33.6 G/DL (ref 32–36)
MCV RBC AUTO: 105 FL (ref 80–100)
MONOCYTES # BLD AUTO: 0.66 X10*3/UL (ref 0.05–0.8)
MONOCYTES NFR BLD AUTO: 6.1 %
NEUTROPHILS # BLD AUTO: 7.95 X10*3/UL (ref 1.6–5.5)
NEUTROPHILS NFR BLD AUTO: 73.5 %
NON HDL CHOLESTEROL: 98 MG/DL (ref 0–149)
NRBC BLD-RTO: 0 /100 WBCS (ref 0–0)
PLATELET # BLD AUTO: 284 X10*3/UL (ref 150–450)
POTASSIUM SERPL-SCNC: 4.4 MMOL/L (ref 3.5–5.3)
PROT SERPL-MCNC: 6.6 G/DL (ref 6.4–8.2)
RBC # BLD AUTO: 3.78 X10*6/UL (ref 4–5.2)
SODIUM SERPL-SCNC: 135 MMOL/L (ref 136–145)
TRIGL SERPL-MCNC: 207 MG/DL (ref 0–149)
TSH SERPL-ACNC: 1.58 MIU/L (ref 0.44–3.98)
VLDL: 41 MG/DL (ref 0–40)
WBC # BLD AUTO: 10.8 X10*3/UL (ref 4.4–11.3)

## 2024-07-29 PROCEDURE — 83735 ASSAY OF MAGNESIUM: CPT

## 2024-07-29 PROCEDURE — 80053 COMPREHEN METABOLIC PANEL: CPT

## 2024-07-29 PROCEDURE — 36415 COLL VENOUS BLD VENIPUNCTURE: CPT

## 2024-07-29 PROCEDURE — 85025 COMPLETE CBC W/AUTO DIFF WBC: CPT

## 2024-07-29 PROCEDURE — 84443 ASSAY THYROID STIM HORMONE: CPT

## 2024-07-29 PROCEDURE — 80061 LIPID PANEL: CPT

## 2024-07-30 ENCOUNTER — APPOINTMENT (OUTPATIENT)
Dept: PRIMARY CARE | Facility: CLINIC | Age: 74
End: 2024-07-30
Payer: MEDICARE

## 2024-07-30 VITALS
DIASTOLIC BLOOD PRESSURE: 70 MMHG | HEIGHT: 60 IN | BODY MASS INDEX: 21.71 KG/M2 | OXYGEN SATURATION: 98 % | HEART RATE: 75 BPM | WEIGHT: 110.6 LBS | SYSTOLIC BLOOD PRESSURE: 112 MMHG | RESPIRATION RATE: 16 BRPM | TEMPERATURE: 97.5 F

## 2024-07-30 DIAGNOSIS — E78.2 MIXED HYPERLIPIDEMIA: ICD-10-CM

## 2024-07-30 DIAGNOSIS — I10 HTN (HYPERTENSION), BENIGN: Primary | ICD-10-CM

## 2024-07-30 DIAGNOSIS — Z78.0 MENOPAUSE: ICD-10-CM

## 2024-07-30 DIAGNOSIS — L25.9 CONTACT DERMATITIS AND ECZEMA: ICD-10-CM

## 2024-07-30 DIAGNOSIS — E03.9 ACQUIRED HYPOTHYROIDISM: ICD-10-CM

## 2024-07-30 PROCEDURE — 1160F RVW MEDS BY RX/DR IN RCRD: CPT | Performed by: FAMILY MEDICINE

## 2024-07-30 PROCEDURE — 1159F MED LIST DOCD IN RCRD: CPT | Performed by: FAMILY MEDICINE

## 2024-07-30 PROCEDURE — 1158F ADVNC CARE PLAN TLK DOCD: CPT | Performed by: FAMILY MEDICINE

## 2024-07-30 PROCEDURE — 1123F ACP DISCUSS/DSCN MKR DOCD: CPT | Performed by: FAMILY MEDICINE

## 2024-07-30 PROCEDURE — 3074F SYST BP LT 130 MM HG: CPT | Performed by: FAMILY MEDICINE

## 2024-07-30 PROCEDURE — 99214 OFFICE O/P EST MOD 30 MIN: CPT | Performed by: FAMILY MEDICINE

## 2024-07-30 PROCEDURE — 3008F BODY MASS INDEX DOCD: CPT | Performed by: FAMILY MEDICINE

## 2024-07-30 PROCEDURE — 3078F DIAST BP <80 MM HG: CPT | Performed by: FAMILY MEDICINE

## 2024-07-30 RX ORDER — MOMETASONE FUROATE 1 MG/G
CREAM TOPICAL DAILY
Qty: 45 G | Refills: 1 | Status: SHIPPED | OUTPATIENT
Start: 2024-07-30

## 2024-07-30 ASSESSMENT — ENCOUNTER SYMPTOMS
LOSS OF SENSATION IN FEET: 0
DEPRESSION: 0
OCCASIONAL FEELINGS OF UNSTEADINESS: 0

## 2024-07-30 ASSESSMENT — PATIENT HEALTH QUESTIONNAIRE - PHQ9
1. LITTLE INTEREST OR PLEASURE IN DOING THINGS: NOT AT ALL
SUM OF ALL RESPONSES TO PHQ9 QUESTIONS 1 AND 2: 0
2. FEELING DOWN, DEPRESSED OR HOPELESS: NOT AT ALL

## 2024-07-30 NOTE — PROGRESS NOTES
Subjective   Patient ID: Annette Fernandez is an 74 y.o. female who is presenting today for a Hypertension, Hyperlipidemia, Hypothyroidism, Ear Fullness, and Sore Throat (Left side) .    Hypertension: The patient is here for follow-up of elevated blood pressure. She is adherent to a low salt diet. Blood pressure is well controlled at home. No new myalgias or GI upset on diet control. She is taking Lopressor 25 mg twice daily.    Hyperlipidemia: The patient is present today for a follow up of hyperlipidemia. She denies having any leg cramping in particular. She is trying to follow a low-fat diet. She is taking atorvastatin 80 mg daily.     Hypothyroidism: Patient presents for a follow up of their thyroid function. Energy wise that patient is well. She is taking [place] Thyroid 15 mg daily.     Patient has an active living will. Primary POA is daughter. Secondary POA is grandchild. She is FULL CODE. She does not want to linger on machines.           The patient denies having the following symptoms: chest pain, chest pressure, fever, chills, N/V/D, constipation, dizziness, headaches, SOB.    Objective   Vitals:  /70   Pulse 75   Temp 36.4 °C (97.5 °F)   Resp 16   Ht 1.524 m (5')   Wt 50.2 kg (110 lb 9.6 oz)   SpO2 98%   BMI 21.60 kg/m²       Physical Exam  Vitals reviewed.   Constitutional:       Appearance: Normal appearance.   HENT:      Right Ear: Tympanic membrane and ear canal normal.      Left Ear: Tympanic membrane and ear canal normal.   Neck:      Vascular: No carotid bruit.   Cardiovascular:      Rate and Rhythm: Normal rate and regular rhythm.      Pulses: Normal pulses.      Heart sounds: Normal heart sounds.   Pulmonary:      Effort: Pulmonary effort is normal. No respiratory distress.      Breath sounds: Normal breath sounds. No wheezing.   Abdominal:      General: There is no distension.      Palpations: Abdomen is soft. There is no mass.      Tenderness: There is no abdominal tenderness.  There is no right CVA tenderness, left CVA tenderness, guarding or rebound.   Musculoskeletal:      Cervical back: Normal range of motion and neck supple. No rigidity.      Right lower leg: No edema.      Left lower leg: No edema.   Lymphadenopathy:      Cervical: No cervical adenopathy.   Neurological:      Mental Status: She is alert.         Labs reviewed from : 7-              CMP, CBC, Lipid    Assessment/Plan   Problem List Items Addressed This Visit       HTN (hypertension), benign - Primary    Current Assessment & Plan      Is well controlled, continue with current medications.           Relevant Orders    Follow Up In [facility] - PCP - Medicare Annual    CBC and Auto Differential    Comprehensive Metabolic Panel    Magnesium    Hyperlipidemia    Current Assessment & Plan      Is well controlled, continue with current medications.           Relevant Orders    Lipid Panel    Acquired hypothyroidism    Current Assessment & Plan      Is stable, continue with current treatment.           Relevant Orders    TSH with reflex to Free T4 if abnormal    Contact dermatitis and eczema    Relevant Medications    mometasone (Elocon) 0.1 % cream     Other Visit Diagnoses       Menopause        Relevant Orders    XR DEXA bone density            Follow up in: 6 month(s) or sooner if needed with labs prior.    Scribe Attestation  By signing my name below, IUyen , Scribe   attest that this documentation has been prepared under the direction and in the presence of Parminder Russell MD.

## 2024-08-04 DIAGNOSIS — E78.2 MIXED HYPERLIPIDEMIA: Primary | ICD-10-CM

## 2024-08-05 RX ORDER — ATORVASTATIN CALCIUM 80 MG/1
80 TABLET, FILM COATED ORAL
Qty: 90 TABLET | Refills: 3 | Status: SHIPPED | OUTPATIENT
Start: 2024-08-05

## 2024-08-14 ENCOUNTER — HOSPITAL ENCOUNTER (OUTPATIENT)
Dept: RADIOLOGY | Facility: HOSPITAL | Age: 74
Discharge: HOME | End: 2024-08-14
Payer: MEDICARE

## 2024-08-14 DIAGNOSIS — Z78.0 MENOPAUSE: ICD-10-CM

## 2024-08-14 PROCEDURE — 77080 DXA BONE DENSITY AXIAL: CPT

## 2024-08-14 PROCEDURE — 77080 DXA BONE DENSITY AXIAL: CPT | Performed by: RADIOLOGY

## 2024-08-14 ASSESSMENT — LIFESTYLE VARIABLES
CURRENT_SMOKER: Y
3_OR_MORE_DRINKS_PER_DAY: Y

## 2024-09-06 DIAGNOSIS — E03.9 HYPOTHYROIDISM, UNSPECIFIED: Primary | ICD-10-CM

## 2024-09-06 RX ORDER — THYROID 15 MG/1
15 TABLET ORAL DAILY
Qty: 90 TABLET | Refills: 3 | Status: SHIPPED | OUTPATIENT
Start: 2024-09-06

## 2024-09-06 NOTE — TELEPHONE ENCOUNTER
Rx Refill Request Telephone Encounter    Name:  Annette Fernandez  :  [id number]  Medication Name:  thyroid, pork, ([place] Thyroid) 15 mg tablet     Specific Pharmacy location:  [facility]  Date of last appointment:  24  Date of next appointment:  25  Best number to reach patient:  [phone]

## 2024-10-23 DIAGNOSIS — I10 HTN (HYPERTENSION), BENIGN: ICD-10-CM

## 2024-10-23 RX ORDER — METOPROLOL TARTRATE 25 MG/1
25 TABLET, FILM COATED ORAL 2 TIMES DAILY
Qty: 90 TABLET | Refills: 3 | Status: SHIPPED | OUTPATIENT
Start: 2024-10-23

## 2024-12-24 ENCOUNTER — APPOINTMENT (OUTPATIENT)
Dept: LAB | Facility: LAB | Age: 74
End: 2024-12-24
Payer: MEDICARE

## 2024-12-24 ENCOUNTER — APPOINTMENT (OUTPATIENT)
Dept: PRIMARY CARE | Facility: CLINIC | Age: 74
End: 2024-12-24
Payer: MEDICARE

## 2024-12-24 VITALS
DIASTOLIC BLOOD PRESSURE: 62 MMHG | OXYGEN SATURATION: 97 % | HEIGHT: 60 IN | BODY MASS INDEX: 20.14 KG/M2 | HEART RATE: 81 BPM | WEIGHT: 102.6 LBS | SYSTOLIC BLOOD PRESSURE: 124 MMHG | TEMPERATURE: 98.8 F | RESPIRATION RATE: 12 BRPM

## 2024-12-24 DIAGNOSIS — Z12.31 SCREENING MAMMOGRAM FOR BREAST CANCER: ICD-10-CM

## 2024-12-24 DIAGNOSIS — E03.9 ACQUIRED HYPOTHYROIDISM: ICD-10-CM

## 2024-12-24 DIAGNOSIS — R13.12 OROPHARYNGEAL DYSPHAGIA: ICD-10-CM

## 2024-12-24 DIAGNOSIS — E78.2 MIXED HYPERLIPIDEMIA: ICD-10-CM

## 2024-12-24 DIAGNOSIS — Z00.00 ROUTINE GENERAL MEDICAL EXAMINATION AT HEALTH CARE FACILITY: ICD-10-CM

## 2024-12-24 DIAGNOSIS — Z00.00 ENCOUNTER FOR SUBSEQUENT ANNUAL WELLNESS VISIT (AWV) IN MEDICARE PATIENT: Primary | ICD-10-CM

## 2024-12-24 DIAGNOSIS — I10 HTN (HYPERTENSION), BENIGN: ICD-10-CM

## 2024-12-24 LAB
ALBUMIN SERPL BCP-MCNC: 3.8 G/DL (ref 3.4–5)
ALP SERPL-CCNC: 84 U/L (ref 33–136)
ALT SERPL W P-5'-P-CCNC: 18 U/L (ref 7–45)
ANION GAP SERPL CALC-SCNC: 14 MMOL/L (ref 10–20)
AST SERPL W P-5'-P-CCNC: 25 U/L (ref 9–39)
BASOPHILS # BLD AUTO: 0.03 X10*3/UL (ref 0–0.1)
BASOPHILS NFR BLD AUTO: 0.3 %
BILIRUB SERPL-MCNC: 0.4 MG/DL (ref 0–1.2)
BUN SERPL-MCNC: 13 MG/DL (ref 6–23)
CALCIUM SERPL-MCNC: 9 MG/DL (ref 8.6–10.3)
CHLORIDE SERPL-SCNC: 99 MMOL/L (ref 98–107)
CHOLEST SERPL-MCNC: 148 MG/DL (ref 0–199)
CHOLESTEROL/HDL RATIO: 2.3
CO2 SERPL-SCNC: 28 MMOL/L (ref 21–32)
CREAT SERPL-MCNC: 0.73 MG/DL (ref 0.5–1.05)
EGFRCR SERPLBLD CKD-EPI 2021: 87 ML/MIN/1.73M*2
EOSINOPHIL # BLD AUTO: 0.08 X10*3/UL (ref 0–0.4)
EOSINOPHIL NFR BLD AUTO: 0.9 %
ERYTHROCYTE [DISTWIDTH] IN BLOOD BY AUTOMATED COUNT: 12.5 % (ref 11.5–14.5)
GLUCOSE SERPL-MCNC: 91 MG/DL (ref 74–99)
HCT VFR BLD AUTO: 39.6 % (ref 36–46)
HDLC SERPL-MCNC: 65.6 MG/DL
HGB BLD-MCNC: 13.1 G/DL (ref 12–16)
IMM GRANULOCYTES # BLD AUTO: 0.05 X10*3/UL (ref 0–0.5)
IMM GRANULOCYTES NFR BLD AUTO: 0.5 % (ref 0–0.9)
LDLC SERPL CALC-MCNC: 62 MG/DL
LYMPHOCYTES # BLD AUTO: 1.62 X10*3/UL (ref 0.8–3)
LYMPHOCYTES NFR BLD AUTO: 17.2 %
MAGNESIUM SERPL-MCNC: 1.66 MG/DL (ref 1.6–2.4)
MCH RBC QN AUTO: 33.3 PG (ref 26–34)
MCHC RBC AUTO-ENTMCNC: 33.1 G/DL (ref 32–36)
MCV RBC AUTO: 101 FL (ref 80–100)
MONOCYTES # BLD AUTO: 0.7 X10*3/UL (ref 0.05–0.8)
MONOCYTES NFR BLD AUTO: 7.4 %
NEUTROPHILS # BLD AUTO: 6.93 X10*3/UL (ref 1.6–5.5)
NEUTROPHILS NFR BLD AUTO: 73.7 %
NON HDL CHOLESTEROL: 82 MG/DL (ref 0–149)
NRBC BLD-RTO: 0 /100 WBCS (ref 0–0)
PLATELET # BLD AUTO: 337 X10*3/UL (ref 150–450)
POTASSIUM SERPL-SCNC: 4.1 MMOL/L (ref 3.5–5.3)
PROT SERPL-MCNC: 6.4 G/DL (ref 6.4–8.2)
RBC # BLD AUTO: 3.93 X10*6/UL (ref 4–5.2)
SODIUM SERPL-SCNC: 137 MMOL/L (ref 136–145)
TRIGL SERPL-MCNC: 103 MG/DL (ref 0–149)
TSH SERPL-ACNC: 1.02 MIU/L (ref 0.44–3.98)
VLDL: 21 MG/DL (ref 0–40)
WBC # BLD AUTO: 9.4 X10*3/UL (ref 4.4–11.3)

## 2024-12-24 PROCEDURE — 99214 OFFICE O/P EST MOD 30 MIN: CPT | Performed by: FAMILY MEDICINE

## 2024-12-24 PROCEDURE — 84443 ASSAY THYROID STIM HORMONE: CPT

## 2024-12-24 PROCEDURE — 85025 COMPLETE CBC W/AUTO DIFF WBC: CPT

## 2024-12-24 PROCEDURE — 1170F FXNL STATUS ASSESSED: CPT | Performed by: FAMILY MEDICINE

## 2024-12-24 PROCEDURE — 3074F SYST BP LT 130 MM HG: CPT | Performed by: FAMILY MEDICINE

## 2024-12-24 PROCEDURE — 3078F DIAST BP <80 MM HG: CPT | Performed by: FAMILY MEDICINE

## 2024-12-24 PROCEDURE — G0439 PPPS, SUBSEQ VISIT: HCPCS | Performed by: FAMILY MEDICINE

## 2024-12-24 PROCEDURE — 80061 LIPID PANEL: CPT

## 2024-12-24 PROCEDURE — 1159F MED LIST DOCD IN RCRD: CPT | Performed by: FAMILY MEDICINE

## 2024-12-24 PROCEDURE — 80053 COMPREHEN METABOLIC PANEL: CPT

## 2024-12-24 PROCEDURE — 1124F ACP DISCUSS-NO DSCNMKR DOCD: CPT | Performed by: FAMILY MEDICINE

## 2024-12-24 PROCEDURE — 83735 ASSAY OF MAGNESIUM: CPT

## 2024-12-24 PROCEDURE — 3008F BODY MASS INDEX DOCD: CPT | Performed by: FAMILY MEDICINE

## 2024-12-24 RX ORDER — AZITHROMYCIN 250 MG/1
TABLET, FILM COATED ORAL
Qty: 6 TABLET | Refills: 0 | Status: SHIPPED | OUTPATIENT
Start: 2024-12-24 | End: 2024-12-29

## 2024-12-24 RX ORDER — PANTOPRAZOLE SODIUM 40 MG/1
40 TABLET, DELAYED RELEASE ORAL DAILY
Qty: 30 TABLET | Refills: 1 | Status: SHIPPED | OUTPATIENT
Start: 2024-12-24 | End: 2025-02-22

## 2024-12-24 ASSESSMENT — ACTIVITIES OF DAILY LIVING (ADL)
MANAGING_FINANCES: INDEPENDENT
DRESSING: INDEPENDENT
BATHING: INDEPENDENT
DOING_HOUSEWORK: INDEPENDENT
TAKING_MEDICATION: INDEPENDENT
GROCERY_SHOPPING: INDEPENDENT

## 2024-12-24 ASSESSMENT — ENCOUNTER SYMPTOMS
DEPRESSION: 0
OCCASIONAL FEELINGS OF UNSTEADINESS: 0
LOSS OF SENSATION IN FEET: 0

## 2024-12-24 NOTE — ASSESSMENT & PLAN NOTE
Medicare wellness visit done, patient is in satisfactory living circumstances where the patient's needs are met.  This patient is advised to develop or update their living will and provide us a copy for the chart.

## 2024-12-24 NOTE — PROGRESS NOTES
Subjective   Patient ID:  Annette Fernandez is a 74 y.o. female patient who presents today for Medicare Annual Wellness Visit Subsequent, Sore Throat (Hard to swallow), Earache (Feeling of fullness comes and goes, other times both ears aches with the left ear being worse), and Cough (Took otc robitussin and cough drops).    Ear problem: This patient complains of ear pain both sides.  She has seen your doctor and found nothing wrong with the ears themselves.  She has painful swallowing.    Sore throat: Is present in the mouth is dry.  This has been going on for several days and getting worse.  The ear problem and pain with swallowing has been going on for months and getting worse.    Cough: Present and nonproductive.    Hypertension, reports no side effects to prescribed medication. The patient reports good compliance with the medication. The patient is trying to follow a low-salt diet.  Hyperlipidemia: Reports taking medication as advised and without side effects.  The patient is reporting no leg cramping in particular. The patient is trying to follow a low-fat diet.  Hypothyroidism: This patient is here today for review of hypothyroidism. Lab work is reviewed. The patient is taking thyroid replacement hormone without side effects.    The patients living will is active. Her POA is daughter Daisy Barajas, back-up POA is Unknown at this time.  The patients current code status is FULL CODE. The patient does not want to linger on machines. .      Objective   Vitals:  /62   Pulse 81   Temp 37.1 °C (98.8 °F)   Resp 12   Ht 1.524 m (5')   Wt 46.5 kg (102 lb 9.6 oz)   SpO2 97%   BMI 20.04 kg/m²       Physical Exam  Vitals reviewed.   Constitutional:       Appearance: Normal appearance.   HENT:      Right Ear: Tympanic membrane and ear canal normal.      Left Ear: Tympanic membrane and ear canal normal.      Nose: Congestion present.      Mouth/Throat:      Mouth: Mucous membranes are dry.      Comments: The  tongue is black.  Cardiovascular:      Rate and Rhythm: Normal rate and regular rhythm.      Pulses: Normal pulses.      Heart sounds: Normal heart sounds.   Pulmonary:      Effort: Pulmonary effort is normal.      Breath sounds: Normal breath sounds.   Abdominal:      General: Abdomen is flat.      Palpations: Abdomen is soft.   Musculoskeletal:      Cervical back: Normal range of motion and neck supple. Tenderness (Anterior cervical lymph node chain area.) present.   Neurological:      Mental Status: She is alert.         Labs reviewed from 7/29/2024:              CMP, CBC, Lipid, tsh      Assessment/Plan   Problem List Items Addressed This Visit       HTN (hypertension), benign    Hyperlipidemia    Acquired hypothyroidism    Encounter for subsequent annual wellness visit (AWV) in Medicare patient - Primary    Current Assessment & Plan     Medicare wellness visit done, patient is in satisfactory living circumstances where the patient's needs are met.  This patient is advised to develop or update their living will and provide us a copy for the chart.          Other Visit Diagnoses       Oropharyngeal dysphagia        Question etiology, check CT scan soft tissue neck.  Treat with PPI.    Relevant Medications    azithromycin (Zithromax) 250 mg tablet    pantoprazole (ProtoNix) 40 mg EC tablet    Other Relevant Orders    CT soft tissue neck w and wo IV contrast    Screening mammogram for breast cancer        Relevant Orders    BI mammo bilateral screening tomosynthesis    Routine general medical examination at health care facility        Relevant Orders    1 Year Follow Up In [facility] - PCP - Wellness Exam        The above diagnoses are stable or well-controlled and we will continue with the current treatments unless noted above.      Follow up in:  January 30, 2025 or sooner if needed with labs today.       Scribe Attestation  By signing my name below, Mela REZA , Scribmaricarmen attest that this  documentation has been prepared under the direction and in the presence of Parminder Russell MD.

## 2024-12-24 NOTE — PROGRESS NOTES
Subjective   Reason for Visit: Annette Fernandez is an 74 y.o. female here for a Medicare Wellness visit.     Past Medical, Surgical, and Family History reviewed and updated in chart.    Reviewed all medications by prescribing practitioner or clinical pharmacist (such as prescriptions, OTCs, herbal therapies and supplements) and documented in the medical record.    HPI    Patient Care Team:  Parminder Russell MD as PCP - General  Parminder Russell MD as PCP [place] [facility]       Objective   Vitals:  /62   Pulse 81   Temp 37.1 °C (98.8 °F)   Resp 12   Ht 1.524 m (5')   Wt 46.5 kg (102 lb 9.6 oz)   SpO2 97%   BMI 20.04 kg/m²       Physical Exam  Vitals reviewed.   Constitutional:       Appearance: Normal appearance. She is normal weight.   HENT:      Head: Normocephalic and atraumatic.      Right Ear: Tympanic membrane and ear canal normal.      Left Ear: Tympanic membrane and ear canal normal.      Mouth/Throat:      Mouth: Mucous membranes are dry.      Comments: Black tongue present  Musculoskeletal:      Cervical back: Tenderness (ant cerv lymph nodes bilat) present.      Right lower leg: No edema.      Left lower leg: No edema.   Skin:     General: Skin is warm and dry.      Comments: Thinning hair on the scalp,     Neurological:      Mental Status: She is alert and oriented to person, place, and time.   Psychiatric:         Mood and Affect: Mood normal.         Behavior: Behavior normal.         Thought Content: Thought content normal.         Judgment: Judgment normal.         Assessment & Plan  HTN (hypertension), benign    Orders:    Magnesium    Comprehensive Metabolic Panel    CBC and Auto Differential    Mixed hyperlipidemia    Orders:    Lipid Panel    Acquired hypothyroidism    Orders:    TSH with reflex to Free T4 if abnormal    Oropharyngeal dysphagia    Orders:    azithromycin (Zithromax) 250 mg tablet; Take 2 tablets (500 mg) by mouth once daily for 1 day, THEN 1 tablet (250  mg) once daily for 4 days.    pantoprazole (ProtoNix) 40 mg EC tablet; Take 1 tablet (40 mg) by mouth once daily. Do not crush, chew, or split.    CT soft tissue neck w and wo IV contrast; Future    Screening mammogram for breast cancer    Orders:    BI mammo bilateral screening tomosynthesis; Future    Routine general medical examination at [facility]    Orders:    1 Year Follow Up In [facility] - PCP - Wellness Exam; Future    Encounter for subsequent annual wellness visit in Medicare patient [Z00.00]         Encounter for subsequent annual wellness visit (AWV) in Medicare patient  Medicare wellness visit done, patient is in satisfactory living circumstances where the patient's needs are met.  This patient is advised to develop or update their living will and provide us a copy for the chart.              {AWV Counseling (Optional):13705}

## 2024-12-30 DIAGNOSIS — R13.12 OROPHARYNGEAL DYSPHAGIA: Primary | ICD-10-CM

## 2024-12-30 RX ORDER — AZITHROMYCIN 250 MG/1
TABLET, FILM COATED ORAL
Qty: 6 TABLET | Refills: 0 | Status: SHIPPED | OUTPATIENT
Start: 2024-12-30 | End: 2025-01-03

## 2024-12-30 NOTE — TELEPHONE ENCOUNTER
Rx Refill Request Telephone Encounter    Name:  Annette Fernandez  :  [id number]  Medication Name:  azithromycin (Zithromax) 250 mg   Specific Pharmacy location:  [facility] in [place]  Date of last appointment:    Date of next appointment:  2025  Best number to reach patient:  [phone]           Patient stated this medication helped but she still has a cough. She is requesting a call back

## 2025-01-07 ENCOUNTER — HOSPITAL ENCOUNTER (OUTPATIENT)
Dept: RADIOLOGY | Facility: HOSPITAL | Age: 75
Discharge: HOME | End: 2025-01-07
Payer: MEDICARE

## 2025-01-07 DIAGNOSIS — R13.12 OROPHARYNGEAL DYSPHAGIA: ICD-10-CM

## 2025-01-07 PROCEDURE — 2550000001 HC RX 255 CONTRASTS

## 2025-01-07 PROCEDURE — 70491 CT SOFT TISSUE NECK W/DYE: CPT | Performed by: RADIOLOGY

## 2025-01-07 PROCEDURE — 70491 CT SOFT TISSUE NECK W/DYE: CPT

## 2025-01-07 RX ADMIN — IOHEXOL 75 ML: 350 INJECTION, SOLUTION INTRAVENOUS at 14:24

## 2025-01-09 ENCOUNTER — TELEPHONE (OUTPATIENT)
Dept: PRIMARY CARE | Facility: CLINIC | Age: 75
End: 2025-01-09
Payer: MEDICARE

## 2025-01-09 NOTE — TELEPHONE ENCOUNTER
----- Message from Parminder Russell sent at 1/9/2025 10:38 AM EST -----  This patient needs an appointment ASAP.

## 2025-01-16 ENCOUNTER — APPOINTMENT (OUTPATIENT)
Dept: PRIMARY CARE | Facility: CLINIC | Age: 75
End: 2025-01-16
Payer: MEDICARE

## 2025-01-16 VITALS
TEMPERATURE: 97.5 F | WEIGHT: 102.4 LBS | RESPIRATION RATE: 12 BRPM | OXYGEN SATURATION: 97 % | DIASTOLIC BLOOD PRESSURE: 70 MMHG | HEIGHT: 60 IN | HEART RATE: 95 BPM | BODY MASS INDEX: 20.1 KG/M2 | SYSTOLIC BLOOD PRESSURE: 122 MMHG

## 2025-01-16 DIAGNOSIS — I10 HTN (HYPERTENSION), BENIGN: ICD-10-CM

## 2025-01-16 DIAGNOSIS — K14.8 MASS OF TONGUE: Primary | ICD-10-CM

## 2025-01-16 PROCEDURE — 1123F ACP DISCUSS/DSCN MKR DOCD: CPT | Performed by: FAMILY MEDICINE

## 2025-01-16 PROCEDURE — 3008F BODY MASS INDEX DOCD: CPT | Performed by: FAMILY MEDICINE

## 2025-01-16 PROCEDURE — 1160F RVW MEDS BY RX/DR IN RCRD: CPT | Performed by: FAMILY MEDICINE

## 2025-01-16 PROCEDURE — 3078F DIAST BP <80 MM HG: CPT | Performed by: FAMILY MEDICINE

## 2025-01-16 PROCEDURE — 1159F MED LIST DOCD IN RCRD: CPT | Performed by: FAMILY MEDICINE

## 2025-01-16 PROCEDURE — 3074F SYST BP LT 130 MM HG: CPT | Performed by: FAMILY MEDICINE

## 2025-01-16 PROCEDURE — G2211 COMPLEX E/M VISIT ADD ON: HCPCS | Performed by: FAMILY MEDICINE

## 2025-01-16 PROCEDURE — 99213 OFFICE O/P EST LOW 20 MIN: CPT | Performed by: FAMILY MEDICINE

## 2025-01-16 ASSESSMENT — ENCOUNTER SYMPTOMS
DEPRESSION: 0
LOSS OF SENSATION IN FEET: 0
OCCASIONAL FEELINGS OF UNSTEADINESS: 0

## 2025-01-16 NOTE — PROGRESS NOTES
Subjective   Patient ID:  Annette Fernandez is a 74 y.o. female patient who presents today for Abnormal CT Scan (IMPRESSION:/Irregular, heterogeneous, and possibly ulcerating mass centered at/the base of tongue with involvement of extrinsic tongue musculature/and lingual surface of the epiglottis; possible involvement of the/body of the hyoid. Oropharyngeal neoplasm is of high suspicion./Subspecialty consultation/direct visualization is advised./).    Abnormal CT scan: CT scan reveals a mass at the base of the tongue which appears to extend anteriorly into the geniohyoid/genioglossus musculature, but also involve the lingual surface of the epiglottis, concerning of cancer. Will refer to an otolaryngologist.       Objective   Vitals:  /70   Pulse 95   Temp 36.4 °C (97.5 °F)   Resp 12   Ht 1.524 m (5')   Wt 46.4 kg (102 lb 6.4 oz)   SpO2 97%   BMI 20.00 kg/m²       Physical Exam  Vitals reviewed.   Constitutional:       Appearance: Normal appearance.   Cardiovascular:      Rate and Rhythm: Normal rate and regular rhythm.      Pulses: Normal pulses.      Heart sounds: Normal heart sounds.   Pulmonary:      Effort: Pulmonary effort is normal.      Breath sounds: Normal breath sounds.   Abdominal:      General: Abdomen is flat.      Palpations: Abdomen is soft.   Musculoskeletal:      Cervical back: Normal range of motion and neck supple.   Neurological:      Mental Status: She is alert.           Assessment/Plan   Problem List Items Addressed This Visit          Cardiac and Vasculature    HTN (hypertension), benign     Other Visit Diagnoses       Mass of tongue    -  Primary    Relevant Orders    Referral to ENT    Follow Up In [facility] - PCP - Established            Follow up in: 6-8 week(s) or sooner if needed without labs prior.       Scribe Attestation  By signing my name below, Mela REZA , Scribe attest that this documentation has been prepared under the direction and in the  presence of Parminder Russell MD.

## 2025-01-30 ENCOUNTER — APPOINTMENT (OUTPATIENT)
Dept: PRIMARY CARE | Facility: CLINIC | Age: 75
End: 2025-01-30
Payer: MEDICARE

## 2025-02-03 NOTE — PROGRESS NOTES
History of Present Illness    Annette Fernandez is a 74 y.o. female who is seen at the request of Dr. Parminder Russell for the evaluation of a base of tongue mass.  This patient has been complaining of discomfort in her throat since probably August 2024.  More recently the discomfort has become much worse.  She does have some discomfort in the left ear.  She also has some limitations in swallowing.  She has lost more than 20 pounds over the past year.  She had a CT scan of her neck which was done on January 7, 2025 that I personally reviewed.  It does show this lesion involving the base of the tongue.  I cannot appreciate any worrisome adenopathies.      Past Medical History    Her past medical history review of system is limited to hypothyroidism as well as high blood pressure.  Her medications are documented in the chart.  She does not have any known allergies to medicine.  She has been a smoker all of her life.  She smoked at some point a pack and a half a day.  Now she is down to half a pack a day.  She also has a few glasses of wine or beer at night.  She is here today with her daughter and granddaughter.  She is retired.    Physical Exam    The patient is alert and oriented. Examination of the external ears, ear canals, and eardrums, is within normal limits. Examination of the anterior and external nose is negative. Examination of the oral cavity and oropharynx is normal. There is no evidence of any mucosal lesions. There is good mobility of the tongue and palate. There is good mandibular excursion. Palpation of the parotid, neck, and thyroid field fails to show any worrisome masses or adenopathies.    A flexible laryngoscopy was carried out. Under topical Xylocaine and Tacho-Synephrine the scope was introduced through the nostril. The nasopharynx, base of tongue, hypopharynx, and larynx are visualized.  The vocal cords are normally mobile.  There is a fungating lesion involving the base of tongue and extending on  the lateral pharyngeal wall on the left.  That lesion also involves the lingual aspect of the epiglottis.  The airway is not compromised.    Assessment and Plan    Base of tongue lesion which is very suspicious for cancer.  The patient will be brought to the operating room for a direct laryngoscopy, flexible bronchoscopy, flexible esophagoscopy and possible insertion of a PEG tube.  She wishes to proceed.  I encouraged her to try to stop smoking and drinking.    I will see her at the time of surgery.

## 2025-02-04 ENCOUNTER — OFFICE VISIT (OUTPATIENT)
Dept: OTOLARYNGOLOGY | Facility: HOSPITAL | Age: 75
End: 2025-02-04
Payer: MEDICARE

## 2025-02-04 VITALS — TEMPERATURE: 97.3 F | HEIGHT: 60 IN | BODY MASS INDEX: 19.26 KG/M2 | WEIGHT: 98.1 LBS

## 2025-02-04 DIAGNOSIS — J39.2 OROPHARYNGEAL LESION: Primary | ICD-10-CM

## 2025-02-04 DIAGNOSIS — E03.9 ACQUIRED HYPOTHYROIDISM: ICD-10-CM

## 2025-02-04 DIAGNOSIS — K14.8 MASS OF TONGUE: ICD-10-CM

## 2025-02-04 DIAGNOSIS — F17.200 TOBACCO USE DISORDER: ICD-10-CM

## 2025-02-04 PROCEDURE — 99214 OFFICE O/P EST MOD 30 MIN: CPT | Mod: 25 | Performed by: OTOLARYNGOLOGY

## 2025-02-04 PROCEDURE — 1125F AMNT PAIN NOTED PAIN PRSNT: CPT | Performed by: OTOLARYNGOLOGY

## 2025-02-04 PROCEDURE — 99204 OFFICE O/P NEW MOD 45 MIN: CPT | Performed by: OTOLARYNGOLOGY

## 2025-02-04 PROCEDURE — 3008F BODY MASS INDEX DOCD: CPT | Performed by: OTOLARYNGOLOGY

## 2025-02-04 PROCEDURE — 1123F ACP DISCUSS/DSCN MKR DOCD: CPT | Performed by: OTOLARYNGOLOGY

## 2025-02-04 PROCEDURE — 1160F RVW MEDS BY RX/DR IN RCRD: CPT | Performed by: OTOLARYNGOLOGY

## 2025-02-04 PROCEDURE — 31575 DIAGNOSTIC LARYNGOSCOPY: CPT | Performed by: OTOLARYNGOLOGY

## 2025-02-04 PROCEDURE — 1159F MED LIST DOCD IN RCRD: CPT | Performed by: OTOLARYNGOLOGY

## 2025-02-04 ASSESSMENT — PAIN SCALES - GENERAL: PAINLEVEL_OUTOF10: 6

## 2025-02-04 ASSESSMENT — PATIENT HEALTH QUESTIONNAIRE - PHQ9
2. FEELING DOWN, DEPRESSED OR HOPELESS: NOT AT ALL
SUM OF ALL RESPONSES TO PHQ9 QUESTIONS 1 & 2: 0
1. LITTLE INTEREST OR PLEASURE IN DOING THINGS: NOT AT ALL

## 2025-02-06 ENCOUNTER — HOSPITAL ENCOUNTER (OUTPATIENT)
Dept: RADIOLOGY | Facility: HOSPITAL | Age: 75
Discharge: HOME | End: 2025-02-06
Payer: MEDICARE

## 2025-02-06 VITALS — WEIGHT: 102 LBS | HEIGHT: 60 IN | BODY MASS INDEX: 20.03 KG/M2

## 2025-02-06 DIAGNOSIS — Z12.31 SCREENING MAMMOGRAM FOR BREAST CANCER: ICD-10-CM

## 2025-02-06 PROCEDURE — 77063 BREAST TOMOSYNTHESIS BI: CPT | Performed by: RADIOLOGY

## 2025-02-06 PROCEDURE — 77067 SCR MAMMO BI INCL CAD: CPT

## 2025-02-06 PROCEDURE — 77067 SCR MAMMO BI INCL CAD: CPT | Performed by: RADIOLOGY

## 2025-02-18 ENCOUNTER — PRE-ADMISSION TESTING (OUTPATIENT)
Dept: PREADMISSION TESTING | Facility: HOSPITAL | Age: 75
DRG: 147 | End: 2025-02-18
Payer: MEDICARE

## 2025-02-18 ENCOUNTER — ANESTHESIA EVENT (OUTPATIENT)
Dept: OPERATING ROOM | Facility: HOSPITAL | Age: 75
End: 2025-02-18
Payer: MEDICARE

## 2025-02-18 VITALS
SYSTOLIC BLOOD PRESSURE: 144 MMHG | HEART RATE: 87 BPM | HEIGHT: 60 IN | BODY MASS INDEX: 19.14 KG/M2 | RESPIRATION RATE: 18 BRPM | DIASTOLIC BLOOD PRESSURE: 78 MMHG | OXYGEN SATURATION: 97 % | WEIGHT: 97.5 LBS | TEMPERATURE: 97.7 F

## 2025-02-18 DIAGNOSIS — Z01.811 PREOPERATIVE RESPIRATORY EXAMINATION: Primary | ICD-10-CM

## 2025-02-18 DIAGNOSIS — Z01.810 PREOPERATIVE CARDIOVASCULAR EXAMINATION: ICD-10-CM

## 2025-02-18 DIAGNOSIS — E03.9 ACQUIRED HYPOTHYROIDISM: ICD-10-CM

## 2025-02-18 DIAGNOSIS — Z41.9 SURGERY, ELECTIVE: ICD-10-CM

## 2025-02-18 DIAGNOSIS — J39.2 OROPHARYNGEAL LESION: ICD-10-CM

## 2025-02-18 DIAGNOSIS — Z01.818 PREOPERATIVE CLEARANCE: ICD-10-CM

## 2025-02-18 DIAGNOSIS — K14.8 MASS OF TONGUE: ICD-10-CM

## 2025-02-18 LAB
ANION GAP SERPL CALC-SCNC: 15 MMOL/L (ref 10–20)
BUN SERPL-MCNC: 10 MG/DL (ref 6–23)
CALCIUM SERPL-MCNC: 9.4 MG/DL (ref 8.6–10.6)
CHLORIDE SERPL-SCNC: 99 MMOL/L (ref 98–107)
CO2 SERPL-SCNC: 28 MMOL/L (ref 21–32)
CREAT SERPL-MCNC: 0.6 MG/DL (ref 0.5–1.05)
EGFRCR SERPLBLD CKD-EPI 2021: >90 ML/MIN/1.73M*2
ERYTHROCYTE [DISTWIDTH] IN BLOOD BY AUTOMATED COUNT: 12.5 % (ref 11.5–14.5)
GLUCOSE SERPL-MCNC: 91 MG/DL (ref 74–99)
HCT VFR BLD AUTO: 43.1 % (ref 36–46)
HGB BLD-MCNC: 14 G/DL (ref 12–16)
MCH RBC QN AUTO: 32.5 PG (ref 26–34)
MCHC RBC AUTO-ENTMCNC: 32.5 G/DL (ref 32–36)
MCV RBC AUTO: 100 FL (ref 80–100)
NRBC BLD-RTO: 0 /100 WBCS (ref 0–0)
PLATELET # BLD AUTO: 396 X10*3/UL (ref 150–450)
POTASSIUM SERPL-SCNC: 3.9 MMOL/L (ref 3.5–5.3)
RBC # BLD AUTO: 4.31 X10*6/UL (ref 4–5.2)
SODIUM SERPL-SCNC: 138 MMOL/L (ref 136–145)
TSH SERPL-ACNC: 0.83 MIU/L (ref 0.44–3.98)
WBC # BLD AUTO: 8.8 X10*3/UL (ref 4.4–11.3)

## 2025-02-18 PROCEDURE — 82374 ASSAY BLOOD CARBON DIOXIDE: CPT

## 2025-02-18 PROCEDURE — 36415 COLL VENOUS BLD VENIPUNCTURE: CPT

## 2025-02-18 PROCEDURE — 87081 CULTURE SCREEN ONLY: CPT

## 2025-02-18 PROCEDURE — 99204 OFFICE O/P NEW MOD 45 MIN: CPT | Performed by: ANESTHESIOLOGY

## 2025-02-18 PROCEDURE — 85027 COMPLETE CBC AUTOMATED: CPT

## 2025-02-18 PROCEDURE — 84443 ASSAY THYROID STIM HORMONE: CPT

## 2025-02-18 RX ORDER — CHLORHEXIDINE GLUCONATE 40 MG/ML
1 SOLUTION TOPICAL DAILY
Qty: 25 ML | Refills: 0 | Status: SHIPPED | OUTPATIENT
Start: 2025-02-18 | End: 2025-02-25 | Stop reason: HOSPADM

## 2025-02-18 RX ORDER — BISMUTH SUBSALICYLATE 262 MG
1 TABLET,CHEWABLE ORAL DAILY
COMMUNITY

## 2025-02-18 RX ORDER — CHLORHEXIDINE GLUCONATE ORAL RINSE 1.2 MG/ML
15 SOLUTION DENTAL DAILY
Qty: 30 ML | Refills: 0 | Status: SHIPPED | OUTPATIENT
Start: 2025-02-18 | End: 2025-02-25 | Stop reason: HOSPADM

## 2025-02-18 RX ORDER — ACETAMINOPHEN 500 MG
500 TABLET ORAL EVERY 6 HOURS PRN
COMMUNITY

## 2025-02-18 ASSESSMENT — DUKE ACTIVITY SCORE INDEX (DASI)
CAN YOU DO HEAVY WORK AROUND THE HOUSE LIKE SCRUBBING FLOORS OR LIFTING AND MOVING HEAVY FURNITURE: YES
DASI METS SCORE: 9.9
CAN YOU DO YARD WORK LIKE RAKING LEAVES, WEEDING OR PUSHING A MOWER: YES
CAN YOU PARTICIPATE IN STRENOUS SPORTS LIKE SWIMMING, SINGLES TENNIS, FOOTBALL, BASKETBALL, OR SKIING: YES
CAN YOU DO LIGHT WORK AROUND THE HOUSE LIKE DUSTING OR WASHING DISHES: YES
CAN YOU RUN A SHORT DISTANCE: YES
TOTAL_SCORE: 58.2
CAN YOU HAVE SEXUAL RELATIONS: YES
CAN YOU WALK A BLOCK OR TWO ON LEVEL GROUND: YES
CAN YOU DO MODERATE WORK AROUND THE HOUSE LIKE VACUUMING, SWEEPING FLOORS OR CARRYING GROCERIES: YES
CAN YOU PARTICIPATE IN MODERATE RECREATIONAL ACTIVITIES LIKE GOLF, BOWLING, DANCING, DOUBLES TENNIS OR THROWING A BASEBALL OR FOOTBALL: YES
CAN YOU TAKE CARE OF YOURSELF (EAT, DRESS, BATHE, OR USE TOILET): YES
CAN YOU CLIMB A FLIGHT OF STAIRS OR WALK UP A HILL: YES
CAN YOU WALK INDOORS, SUCH AS AROUND YOUR HOUSE: YES

## 2025-02-18 ASSESSMENT — ENCOUNTER SYMPTOMS
RESPIRATORY NEGATIVE: 1
NECK NEGATIVE: 1
CARDIOVASCULAR NEGATIVE: 1
ROS GI COMMENTS: DYSPHAGIA
EYES NEGATIVE: 1

## 2025-02-18 ASSESSMENT — LIFESTYLE VARIABLES: SMOKING_STATUS: SMOKER

## 2025-02-18 NOTE — PREPROCEDURE INSTRUCTIONS
Thank you for visiting [facility] ([facility]) today for your pre-procedure evaluation, you were seen by Luis M Gtz MD [phone].    This summary includes instructions and information to aid you during your perioperative period.  Please read carefully. If you have any questions about your visit today, please call the number listed above.  If you become ill or have any changes to your health before your surgery, please contact your primary care provider and alert your surgeon.    Preparing for your Surgery       Exercises  Preoperative Deep Breathing Exercises  Why it is important to do deep breathing exercises before my surgery?  Deep breathing exercises strengthen your breathing muscles.  This helps you to recover after your surgery and decreases the chance of breathing complications.  How are the deep breathing exercises done?  Sit straight with your back supported.  Breathe in deeply and slowly through your nose. Your lower rib cage should expand and your abdomen may move forward.  Hold that breath for 3 to 5 seconds.  Breathe out through pursed lips, slowly and completely.  Rest and repeat 10 times every hour while awake.  Rest longer if you become dizzy or lightheaded.       Incentive Spirometer   You were provided with an incentive spirometer in [facility], please follow the below instructions.   You were not provided an incentive spirometer in [facility], please disregard the incentive spirometer instructions  What is an incentive spirometer?  An incentive spirometer is a device used before and after surgery to “exercise” your lungs.  It helps you to take deeper breaths to expand your lungs.  Below is an example of a basic incentive spirometer.  The device you receive may differ slightly but they all function the same.    Why do I need to use an incentive spirometer?  Using your incentive spirometer prepares your lungs for surgery and helps prevent lung problems after surgery.  How do  I use my incentive spirometer?  When you're using your incentive spirometer, make sure to breathe through your mouth. If you breathe through your nose, the incentive spirometer won't work properly. You can hold your nose if you have trouble.  If you feel dizzy at any time, stop and rest. Try again at a later time.  Follow the steps below:  Set up your incentive spirometer, expand the flexible tubing and connect to the outlet.  Sit upright in a chair or bed. Hold the incentive spirometer at eye level.   Put the mouthpiece in your mouth and close your lips tightly around it. Slowly breathe out (exhale) completely.  Breathe in (inhale) slowly through your mouth as deeply as you can. As you take a breath, you will see the piston rise inside the large column. While the piston rises, the indicator should move upwards. It should stay in between the 2 arrows (see Figure).  Try to get the piston as high as you can, while keeping the indicator between the arrows.   If the indicator doesn't stay between the arrows, you're breathing either too fast or too slow.  When you get it as high as you can, hold your breath for 10 seconds, or as long as possible. While you're holding your breath, the piston will slowly fall to the base of the spirometer.  Once the piston reaches the bottom of the spirometer, breathe out slowly through your mouth. Rest for a few seconds.  Repeat 10 times. Try to get the piston to the same level with each breath.  Repeat every hour while awake  You can carefully clean the outside of the mouthpiece with an alcohol wipe or soap and water.      Preoperative Brain Exercises    What are brain exercises?  A brain exercise is any activity that engages your thinking (cognitive) skills.    What types of activities are considered brain exercises?  Jigsaw puzzles, crossword puzzles, word jumble, memory games, word search, and many more.  Many can be found free online or on your phone via a mobile dayami.    Why should I  do brain exercises before my surgery?  More recent research has shown brain exercise before surgery can lower the risk of postoperative delirium (confusion) which can be especially important for older adults.  Patients who did brain exercises for 5 to 10 hours the days before surgery, cut their risk of postoperative delirium in half up to 1 week after surgery.    Sit-to-Stand Exercise    What is the sit-to-stand exercise?  The sit-to-stand exercise strengthens the muscles of your lower body and muscles in the center of your body (core muscles for stability) helping to maintain and improve your strength and mobility.  How do I do the sit-to-stand exercise?  The goal is to do this exercise without using your arms or hands.  If this is too difficult, use your arms and hands or a chair with armrests to help slowly push yourself to the standing position and lower yourself back to the sitting position. As the movement becomes easier use your arms and hands less.    Steps to the sit-to-stand exercise  Sit up tall in a sturdy chair, knees bent, feet flat on the floor shoulder-width apart.  Shift your hips/pelvis forward in the chair to correctly position yourself for the next movement.  Lean forward at your hips.  Stand up straight putting equal weight on both feet.  Check to be sure you are properly aligned with the chair, in a slow controlled movement sit back down.  Repeat this exercise 10-15 times.  If needed you can do it fewer times until your strength improves.  Rest for 1 minute.  Do another 10-15 sit-to-stand exercises.  Try to do this in the morning and evening.        Instructions    Preoperative Fasting Guidelines    Why must I stop eating and drinking near surgery time?  With sedation, food or liquid in your stomach can enter your lungs causing serious complications  Food can increase nausea and vomiting  When do I need to stop eating and drinking before my surgery?      Do not eat any food after midnight the  night before your surgery/procedure. You may have up to 13.5 ounces of clear liquid until TWO hours before your instructed arrival time to the hospital.  This includes water, black tea/coffee, (no milk or cream) apple juice, and electrolyte drinks (Gatorade). You may chew gum until TWO hours before your surgery/procedure            Simple things you can do to help prevent blood clots     Blood clots are blockages that can form in the body's veins. When a blood clot forms in your deep veins, it may be called a deep vein thrombosis, or DVT for short. Blood clots can happen in any part of the body where blood flows, but they are most common in the arms and legs. If a piece of a blood clot breaks free and travels to the lungs, it is called a pulmonary embolus (PE). A PE can be a very serious problem.         Being in the hospital or having surgery can raise your chances of getting a blood clot because you may not be well enough to move around as much as you normally do.         Ways you can help prevent blood clots in the hospital       Wearing SCDs  SCDs stands for Sequential Compression Devices.   SCDs are special sleeves that wrap around your legs. They attach to a pump that fills them with air to gently squeeze your legs every few minutes.  This helps return the blood in your legs to your heart.   SCDs should only be taken off when walking or bathing. SCDs may not be comfortable, but they can help save your life.              Pump SCD leg sleeves  Wearing compression stockings - if your doctor orders them. These special snug-fitting stockings gently squeeze your legs to help blood flow.       Walking. Walking helps move the blood in your legs.   If your doctor says it is ok, try walking the halls at least   5 times a day. Ask us to help you get up, so you don't fall.      Taking any blood-thinning medicines your doctor orders.              Ways you can help prevent blood clots at home         Wearing compression  stockings - if your doctor orders them.   Walking - to help move the blood in your legs.    Taking any blood-thinning medicines your doctor orders.      Signs of a blood clot or PE    Tell your doctor or nurse right away if you have any of the problems listed below.         If you are at home, seek medical care right away. Call 911 for chest pain or problems breathing.            Signs of a blood clot (DVT) - such as pain, swelling, redness, or warmth in your arm or legs.  Signs of a pulmonary embolism (PE) - such as chest pain or feeling short of breath      Tobacco and Alcohol;  Do not drink alcohol or smoke within 24 hours of surgery.  It is best to quit smoking for as long as possible before any surgery or procedure.    Quitting Smoking; Recognizing Dangerous Situations:  1-Alcohol use during the first month after quitting, 2-Being around smoke or someone who smokes 3-Times situation routinely smoked  4-Triggers-car, breaks, coffee, when awakening, social events  Coping Skills: 1-Learning new ways to manage stress 2-Exercising 3-Relaxation breathing   4-Change routines 5-Distraction techniques    Websites:  Smoke-Free - offers free text messages and an dayami to help you quit. Info includes eating and mood issues that may come with quitting. There is a Live Helpline to talk to an expert. Go to smokefree.gov; Become an Ex-Smoker - the free EX Plan is based on scientific research and useful advice from ex-smokers. It isn't just about quitting smoking.  It's about re-learning life without cigarettes using a 3-step program.  Go to becomeanex.org   Centers for Disease Control offer many suggestions for helping you quit. Includes a Quit Guide and real-life stories. There are sections for specific groups such as LGBT, , different ethnic groups, and pregnant women.  Go to cdc.gov/tobacco/campaign/tips    Other Resources:  [place] Tobacco Quit Line - call 1-800-QUIT-NOW or 1-945.279.3764.  [facility] 2-1-1 - to find  local programs and resources. Call 211 or go to 211.HiringSolved.  [facility] Tobacco Cessation Program - call 147-279-7167.  American Lung Association offers classes for quitting smoking. Some places may charge a fee. For a list of classes, go to lung.org or call 2-597-LUNG-[facility].     Some things to think about:; The health benefits of quitting smoking can help most of the major parts of your body. There is no safe amount of cigarette smoke. Quitting smoking can add years to your life. When you quit, you'll also protect your loved ones from dangerous secondhand smoke. Make a plan, join a support group, and talk to your physician to assist in quitting smoking.                                                                                                              , Quitting Alcohol; Things to think about: Alcohol use disorder is a health condition that can improve with treatment. Each person is unique. A treatment that is good for one person may not be a good fit for someone else. Simply knowing the different options can be an important first step. Treatment can be inpatient, where you stay at a facility, or outpatient, where you stay at home. Your insurance plan may cover some treatment costs.   Resources:    NIAAA Alcohol Treatment Navigator - from the National [place] on Alcohol Abuse and  Alcoholism. Offers an online tool to help you find the right treatment for you - near you. Go to alcoholtreatment.niaaa.nih.gov.  [facility] National Hotline  - from the Substance Abuse and Mental Health Services Administration. A free, confidential 24-hour treatment referral and information service for anyone facing mental and/or substance use disorders. Go to findtreatment.gov or call 7-019-557-HELP (3514).    Alcoholics Anonymous (AA) - offers group meetings and a 12-step program to anyone who has a drinking problem. Go to aa.org or call 117-064-4745    [facility] 2-1-1 - to find local programs and resources. Call 211 or go  "to 211.org    Other people you can talk to about treatment options include your primary care doctor, health insurance plan, local health department, or employee assistance program. You can also ask to talk to a hospital .          Other Instructions  Why did I have my nose, under my arms, and groin swabbed? The purpose of the swab is to identify Staphylococcus aureus inside your nose or on your skin.  The swab was sent to the laboratory for culture.  A positive swab/culture for Staphylococcus aureus is called colonization or carriage.   What is Staphylococcus aureus? Staphylococcus aureus, also known as \"staph\", is a germ found on the skin or in the nose of healthy people.  Sometimes Staphylococcus aureus can get into the body and cause an infection.  This can be minor (such as pimples, boils, or other skin problems).  It might also be serious (such as a blood infection, pneumonia, or a surgical site infection). What is Staphylococcus aureus colonization or carriage? Colonization or carriage means that a person has the germ but is not sick from it.  These bacteria can be spread on the hands or when breathing or sneezing. How is Staphylococcus aureus spread? It is most often spread by close contact with a person or item that carries it. What happens if my culture is positive for Staphylococcus aureus? Your doctor/medical team will use this information to guide any antibiotic treatment which may be necessary.  Regardless of the culture results, we will clean the inside of your nose with a betadine swab just before you have your surgery. Will I get an infection if I have Staphylococcus aureus in my nose or on my skin? Anyone can get an infection with Staphylococcus aureus.  However, the best way to reduce your risk of infection is to follow the instructions provided to you for the use of your CHG soap and dental rinse.  , Body Wash; What is a home preoperative antibacterial shower? This shower is a way of " cleaning the skin with a germ-killing solution before surgery.  The solution contains chlorhexidine, commonly known as CHG.  CHG is a skin cleanser with germ-killing ability.  Let your doctor know if you are allergic to chlorhexidine. Why do I need to take a preoperative antibacterial shower? Skin is not sterile.  It is best to try to make your skin as free of germs as possible before surgery.  Proper cleansing with a germ-killing soap before surgery can lower the number of germs on your skin.  This helps to reduce the risk of infection at the surgical site.  Following the instructions listed below will help you prepare your skin for surgery.   How do I use the solution? Steps:  Begin using your CHG soap 5 days before your scheduled surgery on ___________.   First, wash and rinse your hair using the CHG soap. Keep CHG soap away from ear canals and eyes.  Rinse completely, do not condition.  Hair extensions should be removed. , Oral/Dental Rinse: What is oral/dental rinse?  It is a mouthwash. It is a way of cleaning the mouth with a germ-killing solution before your surgery.  The solution contains chlorhexidine, commonly known as CHG. It is used inside the mouth to kill a bacteria known as Staphylococcus aureus.  Let your doctor know if you are allergic to Chlorhexidine. Why do I need to use CHG oral/dental rinse? The CHG oral/dental rinse helps to kill a bacteria in your mouth known as Staphylococcus aureus.  This reduces the risk of infection at the surgical site.  Using your CHG oral/dental rinse STEPS: Use your CHG oral/dental rinse after you brush your teeth the night before (at bedtime) and the morning of your surgery.  Follow all directions on your prescription label.  Use the cap on the container to measure 15 ml.  Swish (gargle if you can) the mouthwash in your mouth for at least 30 seconds, (do not swallow) and spit out.  After you use your CHG rinse, do not rinse your mouth with water, drink or eat.   Please refer to the prescription label for the appropriate time to resume oral intake What side effects might I have using the CHG oral/dental rinse? CHG rinse will stick to plaque on the teeth.  Brush and floss just before use.  Teeth brushing will help avoid staining of plaque during use.       The Week before Surgery        Seven days before Surgery  Check your [facility] medication instructions  Do the exercises provided to you by [facility]   Arrange for a responsible, adult licensed  to take you home after surgery and stay with you for 24 hours.  You will not be permitted to drive yourself home if you have received any anesthetic/sedation  Six days before surgery  Check your [facility] medication instructions  Do the exercises provided to you by [facility]   Start using Chlorhexidene (CHG) body wash if prescribed  Five days before surgery  Check your [facility] medication instructions  Do the exercises provided to you by [facility]   Continue to use CHG body wash if prescribed  Three days before surgery  Check your [facility] medication instructions  Do the exercises provided to you by [facility]   Continue to use CHG body wash if prescribed  Two days before surgery  Check your [facility] medication instructions  Do the exercises provided to you by [facility]   Continue to use CHG body wash if prescribed      The Day before Surgery       Check your [facility] medication and all other [facility] instructions including when to stop eating and drinking  You will be called with your arrival time for surgery in the late afternoon.  If you do not receive a call please reach out to your surgeon's office.  Do not smoke or drink 24 hours before surgery  Prepare items to bring with you to the hospital  Shower with your chlorhexidine wash if prescribed  Brush your teeth and use your chlorhexidine dental rinse if prescribed    The Day of Surgery       Check your [facility] medication instructions  Ensure you follow the instructions for when to stop eating and drinking  Shower, if prescribed use CHG.  Do  not apply any lotions, creams, moisturizers, perfume or deodorant  Brush your teeth and use your CHG dental rinse if prescribed  Wear loose comfortable clothing  Avoid make-up  Remove  jewelry and piercings, consider professional piercing removal with a plastic spacer if needed  Bring photo ID and Insurance card  Bring an accurate medication list that includes medication dose, frequency and allergies  Bring a copy of your advanced directives (will, health care power of )  Bring any devices and controllers as well as medical devices you have been provided with for surgery (CPAP, slings, braces, etc.)  Dentures, eyeglasses, and contacts will be removed before surgery, please bring cases for contacts or glasses

## 2025-02-18 NOTE — CPM/PAT H&P
CPM/PAT Evaluation       Name: Annette Fernandez (Annette Fernandez)  /Age: 1950/74 y.o.     Visit Type:   In-Person       Chief Complaint: DL with Biopsy     HPI    A 73 y/o F scheduled for DL with Biopsy on 2025 with Dr. Gamboa.    PMHx includes:  - Base of tongue mass  - HTN  - HLD   - Tobacco use disorder   - Alcohol use disorder   - Arthralgia     Presents to CPM today for perioperative risk stratification and optimization.      Past Medical History:   Diagnosis Date    Aspiration into airway 2023    Other specified health status     No pertinent past medical history    Stiffness of unspecified hand, not elsewhere classified     Joint stiffness of hand       Past Surgical History:   Procedure Laterality Date    OTHER SURGICAL HISTORY  2019    Ovarian cystectomy    OTHER SURGICAL HISTORY  2019    Cyst excision       Patient  has no history on file for sexual activity.    Family History   Problem Relation Name Age of Onset    Heart attack Father         No Known Allergies    Prior to Admission medications    Medication Sig Start Date End Date Taking? Authorizing Provider   atorvastatin (Lipitor) 80 mg tablet Take 1 tablet (80 mg) by mouth once daily. 24   Parminder Russell MD   metoprolol tartrate (Lopressor) 25 mg tablet Take 1 tablet (25 mg) by mouth 2 times a day. 10/23/24   Parminder Russell MD   mometasone (Elocon) 0.1 % cream Apply topically once daily. 24   Parminder Russell MD   pantoprazole (ProtoNix) 40 mg EC tablet Take 1 tablet (40 mg) by mouth once daily. Do not crush, chew, or split. 24  Parminder Russell MD   thyroid, pork, ([place] Thyroid) 15 mg tablet Take 1 tablet (15 mg) by mouth once daily. 24   Parminder Russell MD        PAT ROS:   Constitutional:   Neuro/Psych:   Eyes:   neg    Ears:   neg    Nose:   Mouth:   Throat:   Neck:   neg    Cardio:   neg    Respiratory:   neg    Endocrine:   GI:    Dysphagia   :   neg    Musculoskeletal:    Hematologic:   neg    Skin:      Physical Exam  Vitals and nursing note reviewed.   Constitutional:       Comments: Very thin   HENT:      Mouth/Throat:      Comments: Black Tongue     Cardiovascular:      Pulses: Normal pulses.   Pulmonary:      Effort: Pulmonary effort is normal. No respiratory distress.      Breath sounds: Normal breath sounds.   Abdominal:      General: Abdomen is flat. Bowel sounds are normal. There is no distension.      Palpations: Abdomen is soft.      Tenderness: There is no abdominal tenderness.   Skin:     General: Skin is warm and dry.   Neurological:      Mental Status: She is alert and oriented to person, place, and time. Mental status is at baseline.   Psychiatric:         Thought Content: Thought content normal.          PAT AIRWAY:   Airway:     Mallampati::  IV    Neck ROM::  Full   Black Tongue   normal        Testing/Diagnostic:     Patient Specialist/PCP:     Visit Vitals  /78   Pulse 87   Temp 36.5 °C (97.7 °F) (Oral)   Resp 18   Ht 1.524 m (5')   Wt (!) 44.2 kg (97 lb 8 oz)   SpO2 97%   BMI 19.04 kg/m²   OB Status Postmenopausal   Smoking Status Every Day   BSA 1.37 m²       DASI Risk Score      Flowsheet Row Pre-Admission Testing from 2/18/2025 in [facility]   Can you take care of yourself (eat, dress, bathe, or use toilet)?  2.75 filed at 02/18/2025 0841   Can you walk indoors, such as around your house? 1.75 filed at 02/18/2025 0841   Can you walk a block or two on level ground?  2.75 filed at 02/18/2025 0841   Can you climb a flight of stairs or walk up a hill? 5.5 filed at 02/18/2025 0841   Can you run a short distance? 8 filed at 02/18/2025 0841   Can you do light work around the house like dusting or washing dishes? 2.7 filed at 02/18/2025 0841   Can you do moderate work around the house like vacuuming, sweeping floors or carrying groceries? 3.5 filed at 02/18/2025 0841   Can you do heavy work around the house like scrubbing floors or lifting  and moving heavy furniture?  8 filed at 02/18/2025 0841   Can you do yard work like raking leaves, weeding or pushing a mower? 4.5 filed at 02/18/2025 0841   Can you have sexual relations? 5.25 filed at 02/18/2025 0841   Can you participate in moderate recreational activities like golf, bowling, dancing, doubles tennis or throwing a baseball or football? 6 filed at 02/18/2025 0841   Can you participate in strenous sports like swimming, singles tennis, football, basketball, or skiing? 7.5 filed at 02/18/2025 0841   DASI SCORE 58.2 filed at 02/18/2025 0841   METS Score (Will be calculated only when all the questions are answered) 9.9 filed at 02/18/2025 0841          Caprini DVT Assessment      Flowsheet Row Pre-Admission Testing from 2/18/2025 in [facility]   DVT Score (IF A SCORE IS NOT CALCULATING, MUST SELECT A BMI TO COMPLETE) 4 filed at 02/18/2025 0939   Surgical Factors Minor surgery planned filed at 02/18/2025 0939   BMI (BMI MUST BE CHOSEN) 30 or less filed at 02/18/2025 0939          Modified Frailty Index      Flowsheet Row Pre-Admission Testing from 2/18/2025 in [facility]   Non-independent functional status (problems with dressing, bathing, personal grooming, or cooking) 0 filed at 02/18/2025 0940   History of diabetes mellitus  0 filed at 02/18/2025 0940   History of COPD 0 filed at 02/18/2025 0940   History of CHF No filed at 02/18/2025 0940   History of MI 0 filed at 02/18/2025 0940   History of Percutaneous Coronary Intervention, Cardiac Surgery, or Angina No filed at 02/18/2025 0940   Hypertension requiring the use of medication  0.0909 filed at 02/18/2025 0940   Peripheral vascular disease 0 filed at 02/18/2025 0940   Impaired sensorium (cognitive impairement or loss, clouding, or delirium) 0 filed at 02/18/2025 0940   TIA or CVA withouy residual deficit 0 filed at 02/18/2025 0940   Cerebrovascular accident with deficit 0 filed at 02/18/2025 0940   Modified Frailty  Index Calculator .0909 filed at 02/18/2025 0940          CHADS2 Stroke Risk  Current as of 22 minutes ago        N/A 3 to 100%: High Risk   2 to < 3%: Medium Risk   0 to < 2%: Low Risk     Last Change: N/A          This score determines the patient's risk of having a stroke if the patient has atrial fibrillation.        This score is not applicable to this patient. Components are not calculated.          Revised Cardiac Risk Index      Flowsheet Row Pre-Admission Testing from 2/18/2025 in [facility]   High-Risk Surgery (Intraperitoneal, Intrathoracic,Suprainguinal vascular) 0 filed at 02/18/2025 0940   History of ischemic heart disease (History of MI, History of positive exercuse test, Current chest paint considered due to myocardial ischemia, Use of nitrate therapy, ECG with pathological Q Waves) 0 filed at 02/18/2025 0940   History of congestive heart failure (pulmonary edemia, bilateral rales or S3 gallop, Paroxysmal nocturnal dyspnea, CXR showing pulmonary vascular redistribution) 0 filed at 02/18/2025 0940   History of cerebrovascular disease (Prior TIA or stroke) 0 filed at 02/18/2025 0940   Pre-operative insulin treatment 0 filed at 02/18/2025 0940   Pre-operative creatinine>2 mg/dl 0 filed at 02/18/2025 0940   Revised Cardiac Risk Calculator 0 filed at 02/18/2025 0940          Apfel Simplified Score      Flowsheet Row Pre-Admission Testing from 2/18/2025 in [facility]   Smoking status 0 filed at 02/18/2025 0940   History of motion sickness or PONV  0 filed at 02/18/2025 0940   Use of postoperative opioids 0 filed at 02/18/2025 0940   Gender - Female 1=Yes filed at 02/18/2025 0940   Apfel Simplified Score Calculator 1 filed at 02/18/2025 0940          Risk Analysis Index Results This Encounter    No data found in the last 10 encounters.       Stop Bang Score      Flowsheet Row Pre-Admission Testing from 2/18/2025 in [facility]   Do you snore loudly? 1 filed  at 02/18/2025 0840   Do you often feel tired or fatigued after your sleep? 1 filed at 02/18/2025 0840   Has anyone ever observed you stop breathing in your sleep? 0 filed at 02/18/2025 0840   Do you have or are you being treated for high blood pressure? 1 filed at 02/18/2025 0840   Recent BMI (Calculated) 19.8 filed at 02/18/2025 0840   Is BMI greater than 35 kg/m2? 0=No filed at 02/18/2025 0840   Age older than 50 years old? 1=Yes filed at 02/18/2025 0840   Is your neck circumference greater than 17 inches (Male) or 16 inches (Female)? 0 filed at 02/18/2025 0840   Gender - Male 0=No filed at 02/18/2025 0840   STOP-BANG Total Score 4 filed at 02/18/2025 0840          Prodigy: High Risk  Total Score: 12              Prodigy Age Score           ARISCAT Score for Postoperative Pulmonary Complications      Flowsheet Row Pre-Admission Testing from 2/18/2025 in [facility]   Age Calculated Score 3 filed at 02/18/2025 0940   Preoperative SpO2 0 filed at 02/18/2025 0940   Respiratory infection in the last month Either upper or lower (i.e., URI, bronchitis, pneumonia), with fever and antibiotic treatment 0 filed at 02/18/2025 0940   Preoperative anemia (Hgb less than 10 g/dl) 0 filed at 02/18/2025 0940   Surgical incision  0 filed at 02/18/2025 0940   Duration of surgery  0 filed at 02/18/2025 0940   Emergency Procedure  0 filed at 02/18/2025 0940   ARISCAT Total Score  3 filed at 02/18/2025 0940          Gonzales Perioperative Risk for Myocardial Infarction or Cardiac Arrest (BRAYDEN)      Flowsheet Row Pre-Admission Testing from 2/18/2025 in [facility]   Calculated Age Score 1.46 filed at 02/18/2025 0940   Functional Status  0 filed at 02/18/2025 0940   ASA Class  -3.29 filed at 02/18/2025 0940   Creatinine 0.61 filed at 02/18/2025 0940   Type of Procedure  0.71 filed at 02/18/2025 0940   BRAYDEN Total Score  -5.76 filed at 02/18/2025 0940   BRAYDEN % 0.31 filed at 02/18/2025 0940            Assessment  and Plan:     No results found for this or any previous visit (from the past 24 hours).     Assessment and Plan:    A 73 y/o F scheduled for DL with Biopsy on 2/20/2025 with Dr. Gamboa.    PMHx includes:  - Base of tongue mass  - HTN  - HLD   - Tobacco use disorder   - Alcohol use disorder   - Arthralgia     Presents to CPM today for perioperative risk stratification and optimization.    Neuro:  No neurologic diagnosis, however, the patient is at increased risk for perioperative delirium secondary to  age, excessive alcohol use, type and duration of surgery  Patient is at increased risk for perioperative CVA secondary to  HTN, increased age    HEENT:  - Base of tongue mass, scheduled for DL with biopsy    Cardiovascular:  - HTN  - HLD   METS: 9.9  RCRI: 0 points, 3.9%  risk for postoperative MACE   BRAYDEN: 0.31% risk for 30 day postoperative MACE    - Echo 1/2021:   1. The left ventricular systolic function is normal with a 60-65% estimated ejection fraction.   2. Spectral Doppler shows an impaired relaxation pattern of left ventricular diastolic filling.      Pulmonary:  - Tobacco use disorder. 10 cigarettes a day   No pulmonary diagnosis, however patient is at increased risk of perioperative complications secondary to  age > 60, Tobacco abuse, site of surgery  Stop Bang score is 4 placing patient at risk for MODESTO  ARISCAT: <26 points, 1.6% risk of in-hospital postoperative pulmonary complication  PRODIGY: High risk for opioid induced respiratory depression  Smoking cessation discussed with patient, patient also provided cessation education/hotline handout, [facility] toilet education discussed, patient also provided deep breathing exercises and incentive spirometry educational handout    Renal:   No renal diagnosis, however patient is at increase risk for perioperative renal complications secondary to  Age equal to or greater than 56, HTN    Endocrine:  No endocrine diagnosis or significant findings on chart review or  clinical presentation and evaluation. No further testing or intervention is indicated at this time.    Hematologic:  No hematologic diagnosis, however patient is at an increased risk for DVT  Caprini Score 4, patient at Moderate risk for perioperative DVT.  Patient provided with VTE education/handout.    Gastrointestinal:   - Alcohol use disorder   Eat-10 score 15  Apfel 1    Infectious disease:   No infectious diagnosis or significant findings on chart review or clinical presentation and evaluation.   Prescription provided for dental rinse. CHG use instructions reviewed and provided to patient.  Staph screen collected    Musculoskeletal:   No diagnosis or significant findings on chart review or clinical presentation and evaluation.     Anesthesia/Airway:  No anesthesia complications    Medication instructions and NPO guidelines reviewed with the patient.  All questions or concerns discussed and addressed.      Patient seen and staffed with Dr. Ritter

## 2025-02-20 ENCOUNTER — HOSPITAL ENCOUNTER (INPATIENT)
Facility: HOSPITAL | Age: 75
DRG: 147 | End: 2025-02-20
Attending: OTOLARYNGOLOGY | Admitting: OTOLARYNGOLOGY
Payer: MEDICARE

## 2025-02-20 ENCOUNTER — ANESTHESIA (OUTPATIENT)
Dept: OPERATING ROOM | Facility: HOSPITAL | Age: 75
End: 2025-02-20
Payer: MEDICARE

## 2025-02-20 DIAGNOSIS — J39.2 OROPHARYNGEAL LESION: ICD-10-CM

## 2025-02-20 DIAGNOSIS — K14.8 MASS OF TONGUE: Primary | ICD-10-CM

## 2025-02-20 DIAGNOSIS — T40.2X5A CONSTIPATION DUE TO OPIOID THERAPY: ICD-10-CM

## 2025-02-20 DIAGNOSIS — G89.18 POST-OP PAIN: ICD-10-CM

## 2025-02-20 DIAGNOSIS — K59.03 CONSTIPATION DUE TO OPIOID THERAPY: ICD-10-CM

## 2025-02-20 LAB
ALBUMIN SERPL BCP-MCNC: 3.5 G/DL (ref 3.4–5)
ANION GAP SERPL CALC-SCNC: 15 MMOL/L (ref 10–20)
BUN SERPL-MCNC: 9 MG/DL (ref 6–23)
CALCIUM SERPL-MCNC: 9 MG/DL (ref 8.6–10.6)
CHLORIDE SERPL-SCNC: 101 MMOL/L (ref 98–107)
CO2 SERPL-SCNC: 26 MMOL/L (ref 21–32)
CREAT SERPL-MCNC: 0.7 MG/DL (ref 0.5–1.05)
EGFRCR SERPLBLD CKD-EPI 2021: >90 ML/MIN/1.73M*2
ERYTHROCYTE [DISTWIDTH] IN BLOOD BY AUTOMATED COUNT: 12.5 % (ref 11.5–14.5)
GLUCOSE SERPL-MCNC: 152 MG/DL (ref 74–99)
HCT VFR BLD AUTO: 39.9 % (ref 36–46)
HGB BLD-MCNC: 12.9 G/DL (ref 12–16)
MAGNESIUM SERPL-MCNC: 1.64 MG/DL (ref 1.6–2.4)
MCH RBC QN AUTO: 32.2 PG (ref 26–34)
MCHC RBC AUTO-ENTMCNC: 32.3 G/DL (ref 32–36)
MCV RBC AUTO: 100 FL (ref 80–100)
NRBC BLD-RTO: 0 /100 WBCS (ref 0–0)
PHOSPHATE SERPL-MCNC: 3.6 MG/DL (ref 2.5–4.9)
PLATELET # BLD AUTO: 379 X10*3/UL (ref 150–450)
POTASSIUM SERPL-SCNC: 4 MMOL/L (ref 3.5–5.3)
RBC # BLD AUTO: 4.01 X10*6/UL (ref 4–5.2)
SODIUM SERPL-SCNC: 138 MMOL/L (ref 136–145)
STAPHYLOCOCCUS SPEC CULT: NORMAL
WBC # BLD AUTO: 9.6 X10*3/UL (ref 4.4–11.3)

## 2025-02-20 PROCEDURE — 0CBM8ZX EXCISION OF PHARYNX, VIA NATURAL OR ARTIFICIAL OPENING ENDOSCOPIC, DIAGNOSTIC: ICD-10-PCS | Performed by: OTOLARYNGOLOGY

## 2025-02-20 PROCEDURE — 83735 ASSAY OF MAGNESIUM: CPT | Performed by: STUDENT IN AN ORGANIZED HEALTH CARE EDUCATION/TRAINING PROGRAM

## 2025-02-20 PROCEDURE — 3600000003 HC OR TIME - INITIAL BASE CHARGE - PROCEDURE LEVEL THREE: Performed by: OTOLARYNGOLOGY

## 2025-02-20 PROCEDURE — 2500000005 HC RX 250 GENERAL PHARMACY W/O HCPCS: Performed by: OTOLARYNGOLOGY

## 2025-02-20 PROCEDURE — 2500000004 HC RX 250 GENERAL PHARMACY W/ HCPCS (ALT 636 FOR OP/ED): Performed by: STUDENT IN AN ORGANIZED HEALTH CARE EDUCATION/TRAINING PROGRAM

## 2025-02-20 PROCEDURE — 85027 COMPLETE CBC AUTOMATED: CPT | Performed by: STUDENT IN AN ORGANIZED HEALTH CARE EDUCATION/TRAINING PROGRAM

## 2025-02-20 PROCEDURE — 2780000003 HC OR 278 NO HCPCS: Performed by: OTOLARYNGOLOGY

## 2025-02-20 PROCEDURE — 7100000011 HC EXTENDED STAY RECOVERY HOURLY - NURSING UNIT

## 2025-02-20 PROCEDURE — 7100000002 HC RECOVERY ROOM TIME - EACH INCREMENTAL 1 MINUTE: Performed by: OTOLARYNGOLOGY

## 2025-02-20 PROCEDURE — 80069 RENAL FUNCTION PANEL: CPT | Performed by: STUDENT IN AN ORGANIZED HEALTH CARE EDUCATION/TRAINING PROGRAM

## 2025-02-20 PROCEDURE — 88342 IMHCHEM/IMCYTCHM 1ST ANTB: CPT | Performed by: PATHOLOGY

## 2025-02-20 PROCEDURE — 2500000004 HC RX 250 GENERAL PHARMACY W/ HCPCS (ALT 636 FOR OP/ED): Performed by: NURSE ANESTHETIST, CERTIFIED REGISTERED

## 2025-02-20 PROCEDURE — 36415 COLL VENOUS BLD VENIPUNCTURE: CPT | Performed by: STUDENT IN AN ORGANIZED HEALTH CARE EDUCATION/TRAINING PROGRAM

## 2025-02-20 PROCEDURE — 2500000001 HC RX 250 WO HCPCS SELF ADMINISTERED DRUGS (ALT 637 FOR MEDICARE OP): Performed by: STUDENT IN AN ORGANIZED HEALTH CARE EDUCATION/TRAINING PROGRAM

## 2025-02-20 PROCEDURE — 3700000002 HC GENERAL ANESTHESIA TIME - EACH INCREMENTAL 1 MINUTE: Performed by: OTOLARYNGOLOGY

## 2025-02-20 PROCEDURE — 43246 EGD PLACE GASTROSTOMY TUBE: CPT | Performed by: OTOLARYNGOLOGY

## 2025-02-20 PROCEDURE — 88305 TISSUE EXAM BY PATHOLOGIST: CPT | Mod: TC,SUR | Performed by: OTOLARYNGOLOGY

## 2025-02-20 PROCEDURE — 88305 TISSUE EXAM BY PATHOLOGIST: CPT | Performed by: PATHOLOGY

## 2025-02-20 PROCEDURE — 31535 LARYNGOSCOPY W/BIOPSY: CPT | Performed by: OTOLARYNGOLOGY

## 2025-02-20 PROCEDURE — 3600000008 HC OR TIME - EACH INCREMENTAL 1 MINUTE - PROCEDURE LEVEL THREE: Performed by: OTOLARYNGOLOGY

## 2025-02-20 PROCEDURE — 96372 THER/PROPH/DIAG INJ SC/IM: CPT | Performed by: STUDENT IN AN ORGANIZED HEALTH CARE EDUCATION/TRAINING PROGRAM

## 2025-02-20 PROCEDURE — 7100000001 HC RECOVERY ROOM TIME - INITIAL BASE CHARGE: Performed by: OTOLARYNGOLOGY

## 2025-02-20 PROCEDURE — 0DH64UZ INSERTION OF FEEDING DEVICE INTO STOMACH, PERCUTANEOUS ENDOSCOPIC APPROACH: ICD-10-PCS | Performed by: OTOLARYNGOLOGY

## 2025-02-20 PROCEDURE — 0BJ08ZZ INSPECTION OF TRACHEOBRONCHIAL TREE, VIA NATURAL OR ARTIFICIAL OPENING ENDOSCOPIC: ICD-10-PCS | Performed by: OTOLARYNGOLOGY

## 2025-02-20 PROCEDURE — 3E0G76Z INTRODUCTION OF NUTRITIONAL SUBSTANCE INTO UPPER GI, VIA NATURAL OR ARTIFICIAL OPENING: ICD-10-PCS | Performed by: OTOLARYNGOLOGY

## 2025-02-20 PROCEDURE — 3700000001 HC GENERAL ANESTHESIA TIME - INITIAL BASE CHARGE: Performed by: OTOLARYNGOLOGY

## 2025-02-20 PROCEDURE — 1170000001 HC PRIVATE ONCOLOGY ROOM DAILY

## 2025-02-20 PROCEDURE — 31622 DX BRONCHOSCOPE/WASH: CPT | Performed by: OTOLARYNGOLOGY

## 2025-02-20 PROCEDURE — 88331 PATH CONSLTJ SURG 1 BLK 1SPC: CPT | Mod: TC,SUR | Performed by: PATHOLOGY

## 2025-02-20 RX ORDER — OXYCODONE HCL 5 MG/5 ML
10 SOLUTION, ORAL ORAL EVERY 4 HOURS PRN
Status: DISCONTINUED | OUTPATIENT
Start: 2025-02-20 | End: 2025-02-25 | Stop reason: HOSPADM

## 2025-02-20 RX ORDER — OXYCODONE HCL 5 MG/5 ML
5 SOLUTION, ORAL ORAL EVERY 4 HOURS PRN
Status: DISCONTINUED | OUTPATIENT
Start: 2025-02-20 | End: 2025-02-20

## 2025-02-20 RX ORDER — LIDOCAINE HYDROCHLORIDE 10 MG/ML
0.1 INJECTION, SOLUTION INFILTRATION; PERINEURAL ONCE
Status: DISCONTINUED | OUTPATIENT
Start: 2025-02-20 | End: 2025-02-20 | Stop reason: HOSPADM

## 2025-02-20 RX ORDER — OXYCODONE HCL 5 MG/5 ML
5 SOLUTION, ORAL ORAL EVERY 4 HOURS PRN
Status: DISCONTINUED | OUTPATIENT
Start: 2025-02-20 | End: 2025-02-25 | Stop reason: HOSPADM

## 2025-02-20 RX ORDER — SENNOSIDES 8.8 MG/5ML
10 LIQUID ORAL 2 TIMES DAILY
Status: DISCONTINUED | OUTPATIENT
Start: 2025-02-20 | End: 2025-02-25 | Stop reason: HOSPADM

## 2025-02-20 RX ORDER — CEFAZOLIN 1 G/1
INJECTION, POWDER, FOR SOLUTION INTRAVENOUS AS NEEDED
Status: DISCONTINUED | OUTPATIENT
Start: 2025-02-20 | End: 2025-02-20

## 2025-02-20 RX ORDER — ROCURONIUM BROMIDE 10 MG/ML
INJECTION, SOLUTION INTRAVENOUS AS NEEDED
Status: DISCONTINUED | OUTPATIENT
Start: 2025-02-20 | End: 2025-02-20

## 2025-02-20 RX ORDER — SUCCINYLCHOLINE CHLORIDE 20 MG/ML
INJECTION INTRAMUSCULAR; INTRAVENOUS AS NEEDED
Status: DISCONTINUED | OUTPATIENT
Start: 2025-02-20 | End: 2025-02-20

## 2025-02-20 RX ORDER — ONDANSETRON HYDROCHLORIDE 2 MG/ML
4 INJECTION, SOLUTION INTRAVENOUS EVERY 8 HOURS PRN
Status: DISCONTINUED | OUTPATIENT
Start: 2025-02-20 | End: 2025-02-25 | Stop reason: HOSPADM

## 2025-02-20 RX ORDER — LIDOCAINE HCL/PF 100 MG/5ML
SYRINGE (ML) INTRAVENOUS AS NEEDED
Status: DISCONTINUED | OUTPATIENT
Start: 2025-02-20 | End: 2025-02-20

## 2025-02-20 RX ORDER — DROPERIDOL 2.5 MG/ML
0.62 INJECTION, SOLUTION INTRAMUSCULAR; INTRAVENOUS ONCE AS NEEDED
Status: DISCONTINUED | OUTPATIENT
Start: 2025-02-20 | End: 2025-02-20 | Stop reason: HOSPADM

## 2025-02-20 RX ORDER — MIDAZOLAM HYDROCHLORIDE 1 MG/ML
1 INJECTION INTRAMUSCULAR; INTRAVENOUS ONCE AS NEEDED
Status: DISCONTINUED | OUTPATIENT
Start: 2025-02-20 | End: 2025-02-20 | Stop reason: HOSPADM

## 2025-02-20 RX ORDER — ONDANSETRON HYDROCHLORIDE 2 MG/ML
4 INJECTION, SOLUTION INTRAVENOUS ONCE AS NEEDED
Status: DISCONTINUED | OUTPATIENT
Start: 2025-02-20 | End: 2025-02-20 | Stop reason: HOSPADM

## 2025-02-20 RX ORDER — HEPARIN SODIUM 5000 [USP'U]/ML
5000 INJECTION, SOLUTION INTRAVENOUS; SUBCUTANEOUS EVERY 8 HOURS
Status: DISCONTINUED | OUTPATIENT
Start: 2025-02-20 | End: 2025-02-25 | Stop reason: HOSPADM

## 2025-02-20 RX ORDER — PROPOFOL 10 MG/ML
INJECTION, EMULSION INTRAVENOUS AS NEEDED
Status: DISCONTINUED | OUTPATIENT
Start: 2025-02-20 | End: 2025-02-20

## 2025-02-20 RX ORDER — FENTANYL CITRATE 50 UG/ML
INJECTION, SOLUTION INTRAMUSCULAR; INTRAVENOUS AS NEEDED
Status: DISCONTINUED | OUTPATIENT
Start: 2025-02-20 | End: 2025-02-20

## 2025-02-20 RX ORDER — LABETALOL HYDROCHLORIDE 5 MG/ML
5 INJECTION, SOLUTION INTRAVENOUS ONCE AS NEEDED
Status: DISCONTINUED | OUTPATIENT
Start: 2025-02-20 | End: 2025-02-20 | Stop reason: HOSPADM

## 2025-02-20 RX ORDER — SODIUM CHLORIDE 9 MG/ML
50 INJECTION, SOLUTION INTRAVENOUS CONTINUOUS
Status: DISCONTINUED | OUTPATIENT
Start: 2025-02-20 | End: 2025-02-21

## 2025-02-20 RX ORDER — METOPROLOL TARTRATE 25 MG/1
25 TABLET, FILM COATED ORAL 2 TIMES DAILY
Status: CANCELLED | OUTPATIENT
Start: 2025-02-20

## 2025-02-20 RX ORDER — ACETAMINOPHEN 160 MG/5ML
1000 SOLUTION ORAL EVERY 8 HOURS SCHEDULED
Status: DISCONTINUED | OUTPATIENT
Start: 2025-02-20 | End: 2025-02-25 | Stop reason: HOSPADM

## 2025-02-20 RX ORDER — ESOMEPRAZOLE MAGNESIUM 40 MG/1
40 GRANULE, DELAYED RELEASE ORAL
Status: DISCONTINUED | OUTPATIENT
Start: 2025-02-21 | End: 2025-02-25 | Stop reason: HOSPADM

## 2025-02-20 RX ORDER — SODIUM CHLORIDE, SODIUM LACTATE, POTASSIUM CHLORIDE, CALCIUM CHLORIDE 600; 310; 30; 20 MG/100ML; MG/100ML; MG/100ML; MG/100ML
75 INJECTION, SOLUTION INTRAVENOUS CONTINUOUS
Status: DISCONTINUED | OUTPATIENT
Start: 2025-02-20 | End: 2025-02-20 | Stop reason: HOSPADM

## 2025-02-20 RX ORDER — ATORVASTATIN CALCIUM 40 MG/1
40 TABLET, FILM COATED ORAL
Status: CANCELLED | OUTPATIENT
Start: 2025-02-20

## 2025-02-20 RX ORDER — MEPERIDINE HYDROCHLORIDE 25 MG/ML
12.5 INJECTION INTRAMUSCULAR; INTRAVENOUS; SUBCUTANEOUS EVERY 10 MIN PRN
Status: DISCONTINUED | OUTPATIENT
Start: 2025-02-20 | End: 2025-02-20 | Stop reason: HOSPADM

## 2025-02-20 RX ORDER — HYDROMORPHONE HYDROCHLORIDE 0.2 MG/ML
0.2 INJECTION INTRAMUSCULAR; INTRAVENOUS; SUBCUTANEOUS EVERY 5 MIN PRN
Status: DISCONTINUED | OUTPATIENT
Start: 2025-02-20 | End: 2025-02-20 | Stop reason: HOSPADM

## 2025-02-20 RX ORDER — ACETAMINOPHEN 325 MG/1
650 TABLET ORAL ONCE
Status: DISCONTINUED | OUTPATIENT
Start: 2025-02-20 | End: 2025-02-20 | Stop reason: HOSPADM

## 2025-02-20 RX ORDER — ALBUTEROL SULFATE 0.83 MG/ML
2.5 SOLUTION RESPIRATORY (INHALATION) ONCE AS NEEDED
Status: DISCONTINUED | OUTPATIENT
Start: 2025-02-20 | End: 2025-02-20 | Stop reason: HOSPADM

## 2025-02-20 RX ORDER — POLYETHYLENE GLYCOL 3350 17 G/17G
17 POWDER, FOR SOLUTION ORAL DAILY
Status: DISCONTINUED | OUTPATIENT
Start: 2025-02-20 | End: 2025-02-25 | Stop reason: HOSPADM

## 2025-02-20 RX ORDER — WATER 1 ML/ML
IRRIGANT IRRIGATION AS NEEDED
Status: DISCONTINUED | OUTPATIENT
Start: 2025-02-20 | End: 2025-02-20 | Stop reason: HOSPADM

## 2025-02-20 RX ORDER — NALOXONE HYDROCHLORIDE 0.4 MG/ML
0.2 INJECTION, SOLUTION INTRAMUSCULAR; INTRAVENOUS; SUBCUTANEOUS EVERY 5 MIN PRN
Status: DISCONTINUED | OUTPATIENT
Start: 2025-02-20 | End: 2025-02-25 | Stop reason: HOSPADM

## 2025-02-20 RX ORDER — ONDANSETRON HYDROCHLORIDE 2 MG/ML
INJECTION, SOLUTION INTRAVENOUS AS NEEDED
Status: DISCONTINUED | OUTPATIENT
Start: 2025-02-20 | End: 2025-02-20

## 2025-02-20 RX ORDER — OXYCODONE HYDROCHLORIDE 5 MG/1
5 TABLET ORAL EVERY 4 HOURS PRN
Status: DISCONTINUED | OUTPATIENT
Start: 2025-02-20 | End: 2025-02-20 | Stop reason: HOSPADM

## 2025-02-20 RX ADMIN — PROPOFOL 40 MG: 10 INJECTION, EMULSION INTRAVENOUS at 12:11

## 2025-02-20 RX ADMIN — SODIUM CHLORIDE, SODIUM LACTATE, POTASSIUM CHLORIDE, AND CALCIUM CHLORIDE: 600; 310; 30; 20 INJECTION, SOLUTION INTRAVENOUS at 12:07

## 2025-02-20 RX ADMIN — PROPOFOL 100 MG: 10 INJECTION, EMULSION INTRAVENOUS at 12:07

## 2025-02-20 RX ADMIN — SODIUM CHLORIDE 50 ML/HR: 9 INJECTION, SOLUTION INTRAVENOUS at 16:56

## 2025-02-20 RX ADMIN — PROPOFOL 30 MG: 10 INJECTION, EMULSION INTRAVENOUS at 12:21

## 2025-02-20 RX ADMIN — DEXAMETHASONE SODIUM PHOSPHATE 4 MG: 4 INJECTION INTRA-ARTICULAR; INTRALESIONAL; INTRAMUSCULAR; INTRAVENOUS; SOFT TISSUE at 12:16

## 2025-02-20 RX ADMIN — ONDANSETRON 4 MG: 2 INJECTION, SOLUTION INTRAMUSCULAR; INTRAVENOUS at 12:43

## 2025-02-20 RX ADMIN — HEPARIN SODIUM 5000 UNITS: 5000 INJECTION, SOLUTION INTRAVENOUS; SUBCUTANEOUS at 22:27

## 2025-02-20 RX ADMIN — SUCCINYLCHOLINE CHLORIDE 100 MG: 20 INJECTION, SOLUTION INTRAMUSCULAR; INTRAVENOUS at 12:07

## 2025-02-20 RX ADMIN — ROCURONIUM BROMIDE 5 MG: 10 INJECTION INTRAVENOUS at 12:07

## 2025-02-20 RX ADMIN — ACETAMINOPHEN 1000 MG: 160 SOLUTION ORAL at 16:55

## 2025-02-20 RX ADMIN — PROPOFOL 30 MG: 10 INJECTION, EMULSION INTRAVENOUS at 12:19

## 2025-02-20 RX ADMIN — ROCURONIUM BROMIDE 25 MG: 10 INJECTION INTRAVENOUS at 12:11

## 2025-02-20 RX ADMIN — SUGAMMADEX 100 MG: 100 INJECTION, SOLUTION INTRAVENOUS at 12:43

## 2025-02-20 RX ADMIN — CEFAZOLIN 1 G: 1 INJECTION, POWDER, FOR SOLUTION INTRAMUSCULAR; INTRAVENOUS at 12:13

## 2025-02-20 RX ADMIN — HEPARIN SODIUM 5000 UNITS: 5000 INJECTION, SOLUTION INTRAVENOUS; SUBCUTANEOUS at 16:55

## 2025-02-20 RX ADMIN — FENTANYL CITRATE 25 MCG: 50 INJECTION, SOLUTION INTRAMUSCULAR; INTRAVENOUS at 12:07

## 2025-02-20 RX ADMIN — ACETAMINOPHEN 1000 MG: 160 SOLUTION ORAL at 22:27

## 2025-02-20 RX ADMIN — FENTANYL CITRATE 75 MCG: 50 INJECTION, SOLUTION INTRAMUSCULAR; INTRAVENOUS at 12:15

## 2025-02-20 RX ADMIN — LIDOCAINE HYDROCHLORIDE 20 MG: 20 INJECTION INTRAVENOUS at 12:07

## 2025-02-20 SDOH — HEALTH STABILITY: MENTAL HEALTH: HOW OFTEN DO YOU HAVE SIX OR MORE DRINKS ON ONE OCCASION?: PATIENT DECLINED

## 2025-02-20 SDOH — ECONOMIC STABILITY: HOUSING INSECURITY: DO YOU FEEL UNSAFE GOING BACK TO THE PLACE WHERE YOU LIVE?: NO

## 2025-02-20 SDOH — SOCIAL STABILITY: SOCIAL INSECURITY: ARE THERE ANY APPARENT SIGNS OF INJURIES/BEHAVIORS THAT COULD BE RELATED TO ABUSE/NEGLECT?: NO

## 2025-02-20 SDOH — SOCIAL STABILITY: SOCIAL INSECURITY: ABUSE: ADULT

## 2025-02-20 SDOH — ECONOMIC STABILITY: INCOME INSECURITY: IN THE PAST 12 MONTHS HAS THE ELECTRIC, GAS, OIL, OR WATER COMPANY THREATENED TO SHUT OFF SERVICES IN YOUR HOME?: NO

## 2025-02-20 SDOH — SOCIAL STABILITY: SOCIAL INSECURITY: HAVE YOU HAD ANY THOUGHTS OF HARMING ANYONE ELSE?: NO

## 2025-02-20 SDOH — HEALTH STABILITY: MENTAL HEALTH: CURRENT SMOKER: 1

## 2025-02-20 SDOH — SOCIAL STABILITY: SOCIAL INSECURITY: ARE YOU OR HAVE YOU BEEN THREATENED OR ABUSED PHYSICALLY, EMOTIONALLY, OR SEXUALLY BY ANYONE?: NO

## 2025-02-20 SDOH — SOCIAL STABILITY: SOCIAL INSECURITY: WERE YOU ABLE TO COMPLETE ALL THE BEHAVIORAL HEALTH SCREENINGS?: YES

## 2025-02-20 SDOH — SOCIAL STABILITY: SOCIAL INSECURITY: HAS ANYONE EVER THREATENED TO HURT YOUR FAMILY OR YOUR PETS?: NO

## 2025-02-20 SDOH — HEALTH STABILITY: MENTAL HEALTH: HOW OFTEN DO YOU HAVE A DRINK CONTAINING ALCOHOL?: 4 OR MORE TIMES A WEEK

## 2025-02-20 SDOH — ECONOMIC STABILITY: FOOD INSECURITY: WITHIN THE PAST 12 MONTHS, YOU WORRIED THAT YOUR FOOD WOULD RUN OUT BEFORE YOU GOT THE MONEY TO BUY MORE.: NEVER TRUE

## 2025-02-20 SDOH — SOCIAL STABILITY: SOCIAL INSECURITY: DO YOU FEEL UNSAFE GOING BACK TO THE PLACE WHERE YOU ARE LIVING?: NO

## 2025-02-20 SDOH — SOCIAL STABILITY: SOCIAL INSECURITY: HAVE YOU HAD THOUGHTS OF HARMING ANYONE ELSE?: NO

## 2025-02-20 SDOH — SOCIAL STABILITY: SOCIAL INSECURITY
WITHIN THE LAST YEAR, HAVE YOU BEEN RAPED OR FORCED TO HAVE ANY KIND OF SEXUAL ACTIVITY BY YOUR PARTNER OR EX-PARTNER?: NO

## 2025-02-20 SDOH — SOCIAL STABILITY: SOCIAL INSECURITY: DOES ANYONE TRY TO KEEP YOU FROM HAVING/CONTACTING OTHER FRIENDS OR DOING THINGS OUTSIDE YOUR HOME?: NO

## 2025-02-20 SDOH — SOCIAL STABILITY: SOCIAL INSECURITY
WITHIN THE LAST YEAR, HAVE YOU BEEN KICKED, HIT, SLAPPED, OR OTHERWISE PHYSICALLY HURT BY YOUR PARTNER OR EX-PARTNER?: NO

## 2025-02-20 SDOH — SOCIAL STABILITY: SOCIAL INSECURITY: WITHIN THE LAST YEAR, HAVE YOU BEEN AFRAID OF YOUR PARTNER OR EX-PARTNER?: NO

## 2025-02-20 SDOH — ECONOMIC STABILITY: FOOD INSECURITY: WITHIN THE PAST 12 MONTHS, THE FOOD YOU BOUGHT JUST DIDN'T LAST AND YOU DIDN'T HAVE MONEY TO GET MORE.: NEVER TRUE

## 2025-02-20 SDOH — HEALTH STABILITY: MENTAL HEALTH: HOW MANY DRINKS CONTAINING ALCOHOL DO YOU HAVE ON A TYPICAL DAY WHEN YOU ARE DRINKING?: 1 OR 2

## 2025-02-20 SDOH — SOCIAL STABILITY: SOCIAL INSECURITY: DO YOU FEEL ANYONE HAS EXPLOITED OR TAKEN ADVANTAGE OF YOU FINANCIALLY OR OF YOUR PERSONAL PROPERTY?: NO

## 2025-02-20 SDOH — SOCIAL STABILITY: SOCIAL INSECURITY: WITHIN THE LAST YEAR, HAVE YOU BEEN HUMILIATED OR EMOTIONALLY ABUSED IN OTHER WAYS BY YOUR PARTNER OR EX-PARTNER?: NO

## 2025-02-20 ASSESSMENT — ACTIVITIES OF DAILY LIVING (ADL)
LACK_OF_TRANSPORTATION: NO
WALKS IN HOME: INDEPENDENT
ADEQUATE_TO_COMPLETE_ADL: YES
JUDGMENT_ADEQUATE_SAFELY_COMPLETE_DAILY_ACTIVITIES: YES
HEARING - RIGHT EAR: DIFFICULTY WITH NOISE
HEARING - LEFT EAR: DIFFICULTY WITH NOISE
FEEDING YOURSELF: INDEPENDENT
DRESSING YOURSELF: INDEPENDENT
GROOMING: INDEPENDENT
BATHING: INDEPENDENT
GROOMING: INDEPENDENT
HEARING - LEFT EAR: DIFFICULTY WITH NOISE
WALKS IN HOME: INDEPENDENT
DRESSING YOURSELF: INDEPENDENT
JUDGMENT_ADEQUATE_SAFELY_COMPLETE_DAILY_ACTIVITIES: YES
PATIENT'S MEMORY ADEQUATE TO SAFELY COMPLETE DAILY ACTIVITIES?: YES
ADEQUATE_TO_COMPLETE_ADL: YES
TOILETING: INDEPENDENT
FEEDING YOURSELF: INDEPENDENT
HEARING - RIGHT EAR: DIFFICULTY WITH NOISE
BATHING: INDEPENDENT
PATIENT'S MEMORY ADEQUATE TO SAFELY COMPLETE DAILY ACTIVITIES?: YES

## 2025-02-20 ASSESSMENT — LIFESTYLE VARIABLES
AUDIT-C TOTAL SCORE: -1
HOW OFTEN DO YOU HAVE A DRINK CONTAINING ALCOHOL: 4 OR MORE TIMES A WEEK
AUDIT-C TOTAL SCORE: -1
SKIP TO QUESTIONS 9-10: 0
HOW MANY STANDARD DRINKS CONTAINING ALCOHOL DO YOU HAVE ON A TYPICAL DAY: 1 OR 2
HOW OFTEN DO YOU HAVE 6 OR MORE DRINKS ON ONE OCCASION: PATIENT DECLINED
HOW MANY STANDARD DRINKS CONTAINING ALCOHOL DO YOU HAVE ON A TYPICAL DAY: 1 OR 2
SKIP TO QUESTIONS 9-10: 0
AUDIT-C TOTAL SCORE: -1
AUDIT-C TOTAL SCORE: -1
SKIP TO QUESTIONS 9-10: 0
HOW OFTEN DO YOU HAVE 6 OR MORE DRINKS ON ONE OCCASION: PATIENT DECLINED
HOW OFTEN DO YOU HAVE A DRINK CONTAINING ALCOHOL: 4 OR MORE TIMES A WEEK
AUDIT-C TOTAL SCORE: -1

## 2025-02-20 ASSESSMENT — COGNITIVE AND FUNCTIONAL STATUS - GENERAL
PATIENT BASELINE BEDBOUND: NO
CLIMB 3 TO 5 STEPS WITH RAILING: A LITTLE
EATING MEALS: A LOT
DAILY ACTIVITIY SCORE: 22
MOBILITY SCORE: 23

## 2025-02-20 ASSESSMENT — PATIENT HEALTH QUESTIONNAIRE - PHQ9
1. LITTLE INTEREST OR PLEASURE IN DOING THINGS: NOT AT ALL
1. LITTLE INTEREST OR PLEASURE IN DOING THINGS: NOT AT ALL
SUM OF ALL RESPONSES TO PHQ9 QUESTIONS 1 & 2: 0
SUM OF ALL RESPONSES TO PHQ9 QUESTIONS 1 & 2: 0
2. FEELING DOWN, DEPRESSED OR HOPELESS: NOT AT ALL
2. FEELING DOWN, DEPRESSED OR HOPELESS: NOT AT ALL

## 2025-02-20 ASSESSMENT — PAIN - FUNCTIONAL ASSESSMENT
PAIN_FUNCTIONAL_ASSESSMENT: 0-10

## 2025-02-20 ASSESSMENT — PAIN SCALES - GENERAL
PAINLEVEL_OUTOF10: 0 - NO PAIN
PAINLEVEL_OUTOF10: 3
PAINLEVEL_OUTOF10: 0 - NO PAIN
PAINLEVEL_OUTOF10: 0 - NO PAIN
PAINLEVEL_OUTOF10: 2
PAINLEVEL_OUTOF10: 0 - NO PAIN
PAINLEVEL_OUTOF10: 2

## 2025-02-20 ASSESSMENT — COLUMBIA-SUICIDE SEVERITY RATING SCALE - C-SSRS
1. IN THE PAST MONTH, HAVE YOU WISHED YOU WERE DEAD OR WISHED YOU COULD GO TO SLEEP AND NOT WAKE UP?: NO
6. HAVE YOU EVER DONE ANYTHING, STARTED TO DO ANYTHING, OR PREPARED TO DO ANYTHING TO END YOUR LIFE?: NO
2. HAVE YOU ACTUALLY HAD ANY THOUGHTS OF KILLING YOURSELF?: NO

## 2025-02-20 NOTE — ANESTHESIA PROCEDURE NOTES
Airway  Date/Time: 2/20/2025 12:10 PM  Urgency: elective    Difficult airway    Staffing  Performed: CRNA and attending   Authorized by: Elgin Chavarria MD    Performed by: ELLYN Quach-ANDRÉS  Patient location during procedure: OR    Indications and Patient Condition  Indications for airway management: anesthesia and airway protection  Spontaneous Ventilation: absent  Sedation level: deep  Preoxygenated: yes  Patient position: sniffing  Mask difficulty assessment: 2 - vent by mask + OA or adjuvant +/- NMBA  Planned trial extubation    Final Airway Details  Final airway type: endotracheal airway      Successful airway: ETT  Cuffed: yes   Successful intubation technique: video laryngoscopy  Facilitating devices/methods: intubating stylet  ETT size (mm): 6.0  Cormack-Lehane Classification: grade IIa - partial view of glottis  Placement verified by: chest auscultation and capnometry   Placement verification comments: (VL)  Number of attempts at approach: 1  Ventilation between attempts: none

## 2025-02-20 NOTE — NURSING NOTE
RN gave patient first lesson on PEG tube. Pt just listened, as this is her first lesson. Pt will need more teaching when tube feeds are ordered. RN only administered meds through the PEG tube. MALAIKA Rosen

## 2025-02-20 NOTE — ANESTHESIA PREPROCEDURE EVALUATION
Patient: Annette Fernandez    Procedure Information       Date/Time: 02/20/25 1131    Procedures:       direct laryngoscopy with biopsy - direct laryngoscopy with biopsy      ESOPHAGOSCOPY      BRONCHOSCOPY      EGD, WITH PEG TUBE INSERTION - possible placement feeding tube    Location: [facility] OR 03 / [facility]    Surgeons: Kirt Gamboa MD            Relevant Problems   Anesthesia (within normal limits)      Cardiac   (+) HTN (hypertension), benign   (+) Hyperlipidemia      Endocrine   (+) Acquired hypothyroidism      Hematology   (+) Anemia       Clinical information reviewed:   Tobacco  Allergies  Meds   Med Hx  Surg Hx   Fam Hx  Soc Hx        NPO Detail:  No data recorded     Physical Exam    Airway  Mallampati: II  TM distance: >3 FB  Neck ROM: full  Comments: Anterior airway.     Cardiovascular    Dental   Comments: Very poor dentition.  Diffuse blackening of dental remnants noted.  Nicotine-stained tongue present.   Pulmonary    Abdominal            Anesthesia Plan    History of general anesthesia?: yes  History of complications of general anesthesia?: no    ASA 3     general     The patient is a current smoker.    intravenous induction   Postoperative administration of opioids is intended.  Trial extubation is planned.  Anesthetic plan and risks discussed with patient.  Use of blood products discussed with patient who consented to blood products.    Plan discussed with CRNA.    Plan general endotracheal anesthesia with peripheral IV placement and ASA standard noninvasive monitors.  The possibility of blood product transfusion was also described in detail.  Risks, benefits, alternatives of this plan were described in detail to the patient, who indicated understanding and agreed to proceed.  Plan videolaryngoscopy.    Elgin Chavarria MD

## 2025-02-20 NOTE — BRIEF OP NOTE
Date: 2025  OR Location: [facility] OR    Name: Annette Fernandez, : 1950, Age: 74 y.o., MRN: [id number], Sex: female    Diagnosis  Pre-op Diagnosis      * Mass of tongue [K14.8]     * Oropharyngeal lesion [J39.2] Post-op Diagnosis     * Mass of tongue [K14.8]     * Oropharyngeal lesion [J39.2]     Procedures  direct laryngoscopy with biopsy  02752 - [place] LARYNGOSCOPY DIRECT OPERATIVE W/BIOPSY    ESOPHAGOSCOPY  78487 - [place] ESOPHAGOSCOPY FLEXIBLE TRANSORAL DIAGNOSTIC    BRONCHOSCOPY  07873 - [place] [facility] INCL FLUOR GDNCE DX W/CELL WASHG SPX    EGD, WITH PEG TUBE INSERTION  55741 - [place] EGD PERCUTANEOUS PLACEMENT GASTROSTOMY TUBE      Surgeons      * Kirt Gamboa - Primary    Resident/Fellow/Other Assistant:  Surgeons and Role:     * Trey Walter DO - Resident - Assisting     * Lala Wyman PA-C - [facility]IE[facility] First Assist    Staff:   Circulator: Minerva Ireland Person: Chidi  Circulator: Li Jeffrey Circulator: Ingrid    Anesthesia Staff: Anesthesiologist: Elgin Chavarria MD  CRNA: ELLYN Quach-CRNA    Procedure Summary  Anesthesia: General  ASA: III  Estimated Blood Loss: 5mL  Intra-op Medications:   Administrations occurring from 1131 to 1311 on 25:   Medication Name Total Dose   surgical lubricant gel 1 Application   sterile water irrigation solution 500 mL   ceFAZolin (Ancef) vial 1 g 1 g   dexAMETHasone (Decadron) injection 4 mg/mL 4 mg   fentaNYL (Sublimaze) injection 50 mcg/mL 100 mcg   LR bolus Cannot be calculated   lidocaine (cardiac) injection 2% prefilled syringe 20 mg   ondansetron (Zofran) 2 mg/mL injection 4 mg   propofol (Diprivan) injection 10 mg/mL 200 mg   rocuronium (ZeMuron) 50 mg/5 mL injection 30 mg   succinylcholine (Anectine) 20 mg/mL injection 100 mg   sugammadex (Bridion) 200 mg/2 mL injection 100 mg              Anesthesia Record               Intraprocedure I/O Totals          Intake    LR bolus 500.00 mL    Total Intake 500 mL          Specimen:   ID Type Source Tests  Collected by Time   1 : BASE OF TONGUE Tissue TONGUE DORSUM BIOPSY SURGICAL PATHOLOGY EXAM Kirt Gamboa MD 2/20/2025 1216                  Findings: Large erosive, endophytic base of tongue mass extending from right base of tongue to left with partial extension onto lingual surface of epiglottis with frozen biopsies revealing squamous cell carcinoma; normal bronchoscopy, normal esophagoscopy, gastric tube placed under endoscopic guidance    Complications:  None; patient tolerated the procedure well.     Disposition: PACU - hemodynamically stable.  Condition: stable  Specimens Collected:   ID Type Source Tests Collected by Time   1 : BASE OF TONGUE Tissue TONGUE DORSUM BIOPSY SURGICAL PATHOLOGY EXAM Kirt Gamboa MD 2/20/2025 1213       Trey Walter DO, PGY4    Attending Attestation: I was present and scrubbed for the entire procedure.    Kirt Gamboa  Phone Number: [phone]

## 2025-02-20 NOTE — CARE PLAN
Problem: Pain - Adult  Goal: Verbalizes/displays adequate comfort level or baseline comfort level  2/20/2025 1618 by Jessika Ramirez RN  Outcome: Progressing  2/20/2025 1618 by Jessika Ramirez RN  Outcome: Progressing     Problem: Safety - Adult  Goal: Free from fall injury  2/20/2025 1618 by Jessika Ramirez RN  Outcome: Progressing  2/20/2025 1618 by Jessika Ramirez RN  Outcome: Progressing     Problem: Discharge Planning  Goal: Discharge to home or other facility with appropriate resources  2/20/2025 1618 by Jessika Ramirez RN  Outcome: Progressing  2/20/2025 1618 by Jessika Ramirez RN  Outcome: Progressing     Problem: Chronic Conditions and Co-morbidities  Goal: Patient's chronic conditions and co-morbidity symptoms are monitored and maintained or improved  2/20/2025 1618 by Jessika Ramirez RN  Outcome: Progressing  2/20/2025 1618 by Jessika Ramirez RN  Outcome: Progressing     Problem: Nutrition  Goal: Nutrient intake appropriate for maintaining nutritional needs  2/20/2025 1618 by Jessika Ramirez RN  Outcome: Progressing  2/20/2025 1618 by Jessika Ramirez RN  Outcome: Progressing   The patient's goals for the shift include  rest and recovery    The clinical goals for the shift include patient will remain safe

## 2025-02-20 NOTE — ANESTHESIA POSTPROCEDURE EVALUATION
Patient: Annette Fernandez    Procedure Summary       Date: 02/20/25 Room / Location: [facility] OR 03 / [facility] OR    Anesthesia Start: 1202 Anesthesia Stop: 1300    Procedures:       direct laryngoscopy with biopsy      ESOPHAGOSCOPY      BRONCHOSCOPY      EGD, WITH PEG TUBE INSERTION Diagnosis:       Mass of tongue      Oropharyngeal lesion      (Mass of tongue [K14.8])      (Oropharyngeal lesion [J39.2])    Surgeons: Kirt Gamboa MD Responsible Provider: Elgin Chavarria MD    Anesthesia Type: general ASA Status: 3            Anesthesia Type: general    Vitals Value Taken Time   /69 02/20/25 1330   Temp See chart 02/20/25 1350   Pulse 84 02/20/25 1346   Resp 18 02/20/25 1346   SpO2 95 % 02/20/25 1346   Vitals shown include unfiled device data.    Anesthesia Post Evaluation    Patient location during evaluation: bedside  Patient participation: complete - patient participated  Level of consciousness: sleepy but conscious  Pain management: adequate  Airway patency: patent  Cardiovascular status: acceptable  Respiratory status: acceptable  Hydration status: acceptable  Postoperative Nausea and Vomiting: none  Comments: Patient is somnolent but arousable.  No current complaints;  PACU nurse at bedside.  Pain reasonably controlled.  Normothermic.  Euvolemic and hemodynamically stable.  Stable respiratory status;  no current nausea or emesis.  Indicated PACU care given.    Elgin Chavarria MD        There were no known notable events for this encounter.

## 2025-02-20 NOTE — OP NOTE
[facility] - Operative Report  Name: Annette Fernandez   MRN: [id number]  Date of Procedure: 02/20/25  Location: [facility]    Attending Surgeon: Kirt Gamboa MD     Resident/Fellow: Lala Kelly    Preoperative Diagnosis:  Base of tongue cancer, dysphagia    Postoperative Diagnosis:  Same    Operative indications:  This patient has had issues with pain in her throat for quite some time.  She was recently found on the CT scan to have a suspicious lesion in the area of the base of tongue.  She is here today to undergo panendoscopy biopsy and insertion of a feeding tube (PEG)    Procedure:  Direct laryngoscopy and biopsy of the base of tongue, flexible bronchoscopy, gastro esophagoscopy and insertion of a feeding tube (PEG)  Operative procedure:  With the patient under general anesthesia the Dedo laryngoscope was introduced.  Careful examination of the oral cavity, oropharynx, hypopharynx, and larynx is carried out.  Everything is negative except for this base of tongue lesion the extent of this lesion is somewhat difficult to appreciate but seems to involve the base of tongue from 1 side to the other.  Biopsies were obtained.  The laryngoscope was then suspended over the larynx.  The bronchoscope was inserted through laryngoscope into the tracheobronchial tree.  All the different bronchial segments are visualized and are felt to be within normal limits.  The scopes are pulled.  Palpation of the base of tongue reveals a very large crater.  The gastroscope was then introduced all the way down to the stomach.  The stomach was inflated.  The abdomen was prepped and draped in usual sterile fashion.  The area to place the PEG was determined by manual pressure and transillumination.  A needle was inserted with negative pressure and no air was obtained until the needle was seen in the stomach.  The tract was injected with Xylocaine 1% to 100,000 epinephrine.  A small cutaneous incision was  made.  The trocar and catheter were inserted.  The catheter was retrieved endoscopically with a snare.  The guidewire was then inserted retrieved with a snare and pulled into the oral cavity.  The PEG tube was attached to the guidewire and pulled to its proper position on the abdominal wall where was secured with the provided bumper.  Light dressing with antibiotic ointment was applied around the PEG site and the tube was secured to the abdomen.  The gastroscope was then utilized to examine the entirety of the stomach from the gastroesophageal junction to the pylorus was found to be negative.  The PEG site is benign in appearance.  The entire length of the esophagus is also negative.  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  The procedure was terminated.  The patient was sent to recovery room in fair condition.  This is dictated on 2/28/2025.  I was present for the entire procedure    Kirt Gamboa MD

## 2025-02-20 NOTE — HOSPITAL COURSE
Annette Fernandez is a 74 y.o. female with Mass of tongue, who presented for triple endoscopy with biopsy, PEG tube placement by Dr Gamboa on 2/21/2025. Patient had an uncomplicated surgical course. Patient recovered in PACU and was transferred to [address] for post-operative care.    Patient post-operative course was uncomplicated. Nutrition was consulted; tube feeds were initiated and advanced until at goal rate, at which point she was transitioned to bolus feeds which she tolerated well. Speech therapy was consulted for swallow evaluation, with recommendations for ***. On day of discharge, post-operative pain was well controlled with enteral pain medication, breathing on room air, voiding spontaneously ambulating well, and was tolerating a diet. Follow-up arranged.

## 2025-02-21 ENCOUNTER — APPOINTMENT (OUTPATIENT)
Dept: RADIOLOGY | Facility: HOSPITAL | Age: 75
DRG: 147 | End: 2025-02-21
Payer: MEDICARE

## 2025-02-21 ENCOUNTER — HOME HEALTH ADMISSION (OUTPATIENT)
Dept: HOME HEALTH SERVICES | Facility: HOME HEALTH | Age: 75
End: 2025-02-21
Payer: MEDICARE

## 2025-02-21 DIAGNOSIS — J39.2 OROPHARYNGEAL LESION: ICD-10-CM

## 2025-02-21 LAB
ALBUMIN SERPL BCP-MCNC: 3.2 G/DL (ref 3.4–5)
ANION GAP SERPL CALC-SCNC: 12 MMOL/L (ref 10–20)
BUN SERPL-MCNC: 11 MG/DL (ref 6–23)
CALCIUM SERPL-MCNC: 9.2 MG/DL (ref 8.6–10.6)
CHLORIDE SERPL-SCNC: 104 MMOL/L (ref 98–107)
CO2 SERPL-SCNC: 29 MMOL/L (ref 21–32)
CREAT SERPL-MCNC: 0.58 MG/DL (ref 0.5–1.05)
EGFRCR SERPLBLD CKD-EPI 2021: >90 ML/MIN/1.73M*2
ERYTHROCYTE [DISTWIDTH] IN BLOOD BY AUTOMATED COUNT: 12.6 % (ref 11.5–14.5)
GLUCOSE SERPL-MCNC: 96 MG/DL (ref 74–99)
HCT VFR BLD AUTO: 40 % (ref 36–46)
HGB BLD-MCNC: 12.6 G/DL (ref 12–16)
MAGNESIUM SERPL-MCNC: 1.61 MG/DL (ref 1.6–2.4)
MCH RBC QN AUTO: 31.7 PG (ref 26–34)
MCHC RBC AUTO-ENTMCNC: 31.5 G/DL (ref 32–36)
MCV RBC AUTO: 101 FL (ref 80–100)
NRBC BLD-RTO: 0 /100 WBCS (ref 0–0)
PHOSPHATE SERPL-MCNC: 3.7 MG/DL (ref 2.5–4.9)
PLATELET # BLD AUTO: 374 X10*3/UL (ref 150–450)
POTASSIUM SERPL-SCNC: 4.5 MMOL/L (ref 3.5–5.3)
RBC # BLD AUTO: 3.98 X10*6/UL (ref 4–5.2)
SODIUM SERPL-SCNC: 140 MMOL/L (ref 136–145)
WBC # BLD AUTO: 15.7 X10*3/UL (ref 4.4–11.3)

## 2025-02-21 PROCEDURE — 99232 SBSQ HOSP IP/OBS MODERATE 35: CPT | Performed by: NURSE PRACTITIONER

## 2025-02-21 PROCEDURE — 92526 ORAL FUNCTION THERAPY: CPT | Mod: GN

## 2025-02-21 PROCEDURE — 96372 THER/PROPH/DIAG INJ SC/IM: CPT | Performed by: STUDENT IN AN ORGANIZED HEALTH CARE EDUCATION/TRAINING PROGRAM

## 2025-02-21 PROCEDURE — 83735 ASSAY OF MAGNESIUM: CPT | Performed by: STUDENT IN AN ORGANIZED HEALTH CARE EDUCATION/TRAINING PROGRAM

## 2025-02-21 PROCEDURE — 74230 X-RAY XM SWLNG FUNCJ C+: CPT | Performed by: RADIOLOGY

## 2025-02-21 PROCEDURE — 2500000001 HC RX 250 WO HCPCS SELF ADMINISTERED DRUGS (ALT 637 FOR MEDICARE OP): Performed by: STUDENT IN AN ORGANIZED HEALTH CARE EDUCATION/TRAINING PROGRAM

## 2025-02-21 PROCEDURE — 97161 PT EVAL LOW COMPLEX 20 MIN: CPT | Mod: GP

## 2025-02-21 PROCEDURE — 2500000005 HC RX 250 GENERAL PHARMACY W/O HCPCS: Performed by: OTOLARYNGOLOGY

## 2025-02-21 PROCEDURE — 2500000001 HC RX 250 WO HCPCS SELF ADMINISTERED DRUGS (ALT 637 FOR MEDICARE OP)

## 2025-02-21 PROCEDURE — 85027 COMPLETE CBC AUTOMATED: CPT | Performed by: STUDENT IN AN ORGANIZED HEALTH CARE EDUCATION/TRAINING PROGRAM

## 2025-02-21 PROCEDURE — 2500000004 HC RX 250 GENERAL PHARMACY W/ HCPCS (ALT 636 FOR OP/ED): Performed by: STUDENT IN AN ORGANIZED HEALTH CARE EDUCATION/TRAINING PROGRAM

## 2025-02-21 PROCEDURE — 74230 X-RAY XM SWLNG FUNCJ C+: CPT

## 2025-02-21 PROCEDURE — 92611 MOTION FLUOROSCOPY/SWALLOW: CPT | Mod: GN

## 2025-02-21 PROCEDURE — 7100000011 HC EXTENDED STAY RECOVERY HOURLY - NURSING UNIT

## 2025-02-21 PROCEDURE — 36415 COLL VENOUS BLD VENIPUNCTURE: CPT | Performed by: STUDENT IN AN ORGANIZED HEALTH CARE EDUCATION/TRAINING PROGRAM

## 2025-02-21 PROCEDURE — 2500000005 HC RX 250 GENERAL PHARMACY W/O HCPCS: Performed by: NURSE PRACTITIONER

## 2025-02-21 PROCEDURE — 1170000001 HC PRIVATE ONCOLOGY ROOM DAILY

## 2025-02-21 PROCEDURE — 92610 EVALUATE SWALLOWING FUNCTION: CPT | Mod: GN

## 2025-02-21 PROCEDURE — 80069 RENAL FUNCTION PANEL: CPT | Performed by: STUDENT IN AN ORGANIZED HEALTH CARE EDUCATION/TRAINING PROGRAM

## 2025-02-21 RX ORDER — LIDOCAINE 560 MG/1
1 PATCH PERCUTANEOUS; TOPICAL; TRANSDERMAL EVERY 24 HOURS
Status: DISCONTINUED | OUTPATIENT
Start: 2025-02-21 | End: 2025-02-25 | Stop reason: HOSPADM

## 2025-02-21 RX ADMIN — LIDOCAINE 4% 1 PATCH: 40 PATCH TOPICAL at 10:00

## 2025-02-21 RX ADMIN — BARIUM SULFATE 100 ML: 0.81 POWDER, FOR SUSPENSION ORAL at 14:16

## 2025-02-21 RX ADMIN — ACETAMINOPHEN 1000 MG: 160 SOLUTION ORAL at 13:49

## 2025-02-21 RX ADMIN — OXYCODONE HYDROCHLORIDE 5 MG: 5 SOLUTION ORAL at 04:35

## 2025-02-21 RX ADMIN — ACETAMINOPHEN 1000 MG: 160 SOLUTION ORAL at 08:39

## 2025-02-21 RX ADMIN — HEPARIN SODIUM 5000 UNITS: 5000 INJECTION, SOLUTION INTRAVENOUS; SUBCUTANEOUS at 17:00

## 2025-02-21 RX ADMIN — ESOMEPRAZOLE MAGNESIUM 40 MG: 40 FOR SUSPENSION ORAL at 08:39

## 2025-02-21 RX ADMIN — HEPARIN SODIUM 5000 UNITS: 5000 INJECTION, SOLUTION INTRAVENOUS; SUBCUTANEOUS at 08:39

## 2025-02-21 RX ADMIN — ONDANSETRON 4 MG: 2 INJECTION INTRAMUSCULAR; INTRAVENOUS at 17:00

## 2025-02-21 RX ADMIN — SENNOSIDES 10 ML: 8.8 LIQUID ORAL at 20:25

## 2025-02-21 RX ADMIN — OXYCODONE HYDROCHLORIDE 5 MG: 5 SOLUTION ORAL at 20:25

## 2025-02-21 RX ADMIN — BARIUM SULFATE 20 ML: 400 SUSPENSION ORAL at 14:17

## 2025-02-21 RX ADMIN — OXYCODONE HYDROCHLORIDE 10 MG: 5 SOLUTION ORAL at 17:09

## 2025-02-21 RX ADMIN — ACETAMINOPHEN 1000 MG: 160 SOLUTION ORAL at 20:25

## 2025-02-21 RX ADMIN — OXYCODONE HYDROCHLORIDE 5 MG: 5 SOLUTION ORAL at 08:39

## 2025-02-21 RX ADMIN — BARIUM SULFATE 20 ML: 400 PASTE ORAL at 14:17

## 2025-02-21 RX ADMIN — SENNOSIDES 10 ML: 8.8 LIQUID ORAL at 08:39

## 2025-02-21 ASSESSMENT — COGNITIVE AND FUNCTIONAL STATUS - GENERAL
MOBILITY SCORE: 24
CLIMB 3 TO 5 STEPS WITH RAILING: A LITTLE
DAILY ACTIVITIY SCORE: 24
EATING MEALS: A LITTLE
MOBILITY SCORE: 23
DAILY ACTIVITIY SCORE: 23
MOBILITY SCORE: 24

## 2025-02-21 ASSESSMENT — PAIN - FUNCTIONAL ASSESSMENT
PAIN_FUNCTIONAL_ASSESSMENT: 0-10

## 2025-02-21 ASSESSMENT — PAIN SCALES - GENERAL
PAINLEVEL_OUTOF10: 8
PAINLEVEL_OUTOF10: 7
PAINLEVEL_OUTOF10: 4
PAINLEVEL_OUTOF10: 3
PAINLEVEL_OUTOF10: 7
PAINLEVEL_OUTOF10: 5 - MODERATE PAIN

## 2025-02-21 ASSESSMENT — ACTIVITIES OF DAILY LIVING (ADL): ADL_ASSISTANCE: INDEPENDENT

## 2025-02-21 NOTE — PROGRESS NOTES
"Speech-Language Pathology    SLP Adult Inpatient Speech-Language Pathology Clinical Swallow Evaluation    Patient Name: Annette Fernandez  MRN: [id number]  Today's Date: 2/21/2025    Time In: 0932  Time Out: 0955  Time Calculation: 23 min       Assessment:  Possible pharyngeal dysphagia impacted by base of tongue mass        Recommendations:  NPO w/ completion of MBSS for further assessment of oropharyngeal swallow and diet recommendations.     Frequent, aggressive oral care as tolerated to improve infection control    OK for small amounts of ice chips/sips of water (one at a time, 10x/hour) for oral comfort and to prevent swallow disuse atrophy, but only after aggressive oral care to avoid colonization of bacteria within the oral cavity.        Plan:  SLP Plan: Skilled SLP  SLP Frequency: 2x per week  Duration: 2-3 weeks  Discussed POC: Yes     Goals:  Pt will participate in a Modified Barium Swallow Study for further assessment of oropharyngeal swallow and recall pertinent results/recommendations with > 80% accuracy.    Date initiated: 2/21/25   Status: Goal initiated    Pt will tolerate least restrictive diet with adequate oral phase and no s/s of aspiration.    Date initiated: 2/21/25   Status: Goal initiated    Pt/family will indicate understanding of dysphagia education: risk for aspiration, recommendations, and POC with > 80% accuracy via teach back method.   Date initiated: 2/21/25   Status: Goal initiated     Subjective   Subjective: Pt identified at bedside.  Awake and alert.  No guests present.  Room air.  No c/o pain.     Hx: 74 year old female with a 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 Gamboa.      Dysphagia hx/baseline diet: Pt endorses difficulty swallowing solids > liquids + odynophagia.  Per otolaryngology notes, pt has had \"discomfort in her throat since probably August 2024\" and \"has lost more than 20 pounds over the past year\".       "   Objective     Cognition:  Command Following: WFL  Attention: WFL  Orientation: WFL      Oral/Motor Assessment:  Oral Hygiene: Green discoloration on tongue body  Dentition: Present, adequate  Oral Motor: WFL  Voice (Perceptually): WFL        Consistencies Trialed:  Ice chips, thin liquids via straw.  No other consistencies assessed given risk for aspiration.         Clinical Observations:  Patient Positioning: Upright in Bed  Management of Oral Secretions: WFL  Was The 3 oz Swallow Protocol Completed: Attempted, but not completed      Clinical bedside swallow evaluation completed.  Pt presented with small, single ice chips. Adequate bolus containment and manipulation.  No s/s of aspiration.  Pt also participated in trials of single sips of thin liquid via straw.  Functional oral phase and no s/s of aspiration.  Pt then completed the 3 oz Swallow Protocol, but was unable to take consecutive sips w/o the need for pausing between sips.  ? Silent aspiration vs difficulty coordinating breathing/swallowing vs impact of BOT mass.  PO trials ceased due to concern for pharyngeal dysphagia/aspiration.  Pt reported that she is to begin CxRT tx for BOT SCC.  Recommend completion of MBSS to assess oropharyngeal swallow for diet recommendations during inpatient stay, and to establish baseline before CxRT begins.     The 3 oz Swallow Protocol (sequential drinks of thin liquid water) was utilized as a reliable, evidence based test to rule out silent aspiration and determine need for additional testing, such as an MBS or FEES, if the test is equivocal, incomplete or pt shows s/s of aspiration, prior to recommending a oral diet.     Education:  Education provided to patient and regarding recommendation for NPO status w/ ice chips until completion of MBSS.  Pt also educated on purpose/procedure of MBSS and overall dysphagia plan of care.  Understanding indicated.  Discussed pt status w/ RN.      Deyanira Lopez, clinical supervisor,  present for duration of assessment.

## 2025-02-21 NOTE — PROGRESS NOTES
Physical Therapy    Physical Therapy Evaluation    Patient Name: Annette Fernandez  MRN: [id number]  Today's Date: 2/21/2025   Room: [address] [place], [place] [address]  Time Calculation  Start Time: 1051  Stop Time: 1103  Time Calculation (min): 12 min    Assessment/Plan   PT Assessment  Rehab Prognosis: Excellent  Barriers to Discharge Home: No anticipated barriers  Evaluation/Treatment Tolerance: Patient tolerated treatment well  Medical Staff Made Aware: Yes  Strengths: Attitude of self  Barriers to Participation: Comorbidities  End of Session Communication: Bedside nurse  Assessment Comment: 74 year old female s/p post triple endoscopy & PEG tube placement on 2/20/2025. Pt completing all functional mobility grossly independent at this time. No acute PT needs identifed. Recommend home with NN.  End of Session Patient Position: Bed, 3 rail up, Alarm off, caregiver present  IP OR SWING BED PT PLAN  Inpatient or Swing Bed: Inpatient  PT Plan  PT Plan: PT Eval only  PT Eval Only Reason: No acute PT needs identified  PT Frequency: PT eval only  PT Discharge Recommendations: No further acute PT, No PT needed after discharge  PT Recommended Transfer Status: Independent  PT - OK to Discharge: Yes (PT eval complete and DC rec made)    Subjective   General Visit Information:  Reason for Referral: 74 year old female s/p post triple endoscopy & PEG tube placement on 2/20/2025  Past Medical History Relevant to Rehab: Base of Tongue SCC, HTN, HLD, active tobacco smoker  Patient Position Received: Bed, 3 rail up, Alarm off, not on at start of session  General Comment: Pt supine in bed upon entry to room. Pt pleasant, cooperative and willing to work with PT. Noted PEG and PIV.   Home Living:  Home Living  Type of Home: House  Lives With:  (cat)  Home Adaptive Equipment:  (shower chair)  Home Layout: One level  Home Access: Level entry  Bathroom Shower/Tub: Walk-in shower, Tub/shower unit  Bathroom Equipment: Shower chair with back, Grab bars in  shower  Prior Level of Function:  Prior Function Per Pt/Caregiver Report  Level of [place]: Independent with ADLs and functional transfers, Independent with homemaking with ambulation  ADL Assistance: Independent  Homemaking Assistance: Independent  Ambulatory Assistance: Independent  Leisure: pt enjoys watching tv and reading  Prior Function Comments: pt denies any falls over the last 6 months    Objective   Lines/Tubes/Drains:  NG/OG/Feeding Tube Gastric (Active)   Number of days: 0     Pain:  Pain Assessment  Pain Assessment: 0-10  0-10 (Numeric) Pain Score:  (unrated abdominal pain)  Cognition:  Cognition  Overall Cognitive Status: Within Functional Limits  Orientation Level: Oriented X4    Extremity/Trunk Assessments:  Strength:    RLE   RLE : Within Functional Limits  LLE   LLE : Within Functional Limits    General Assessments:    Activity Tolerance  Endurance: Endurance does not limit participation in activity  Sensation  Light Touch: No apparent deficits     Static Sitting Balance  Static Sitting-Level of Assistance: Independent  Dynamic Sitting Balance  Dynamic Sitting-Level of Assistance: Independent  Static Standing Balance  Static Standing-Level of Assistance: Independent  Dynamic Standing Balance  Dynamic Standing-Level of Assistance: Independent    Functional Assessments:  Bed Mobility  Bed Mobility: Yes  Bed Mobility 1  Bed Mobility 1: Supine to sitting, Sitting to supine  Level of Assistance 1: Modified independent  Bed Mobility Comments 1: HOB partially elevated  Transfers  Transfer: Yes  Transfer 1  Technique 1: Sit to stand, Stand to sit  Transfer Device 1:  (no AD)  Transfer Level of Assistance 1: Independent  Trials/Comments 1: x2 trials  Ambulation/Gait Training  Ambulation/Gait Training Performed: Yes  Ambulation/Gait Training 1  Surface 1: Level tile  Device 1: No device  Assistance 1: Independent  Comments/Distance (ft) 1: 20ft    Outcome Measures:  [facility] Basic Mobility  Turning from  your back to your side while in a flat bed without using bedrails: None  Moving from lying on your back to sitting on the side of a flat bed without using bedrails: None  Moving to and from bed to chair (including a wheelchair): None  Standing up from a chair using your arms (e.g. wheelchair or bedside chair): None  To walk in hospital room: None  Climbing 3-5 steps with railing: None  Basic Mobility - Total Score: 24    Education Documentation  Mobility Training, taught by Sue Geuvara PT at 2/21/2025 12:28 PM.  Learner: Patient  Readiness: Acceptance  Method: Explanation  Response: Verbalizes Understanding  Comment: importance of functional mobility    Education Comments  No comments found.      02/21/25 at 12:29 PM   Sue Guevara PT   Rehab Office: [phone]

## 2025-02-21 NOTE — CARE PLAN
Problem: Pain - Adult  Goal: Verbalizes/displays adequate comfort level or baseline comfort level  2/21/2025 0706 by Jessika Ramirez RN  Outcome: Progressing  2/20/2025 2010 by Jessika Ramirez RN  Outcome: Progressing     Problem: Safety - Adult  Goal: Free from fall injury  2/21/2025 0706 by Jessika Ramirez RN  Outcome: Progressing  2/20/2025 2010 by Jessika Ramirez RN  Outcome: Progressing     Problem: Discharge Planning  Goal: Discharge to home or other facility with appropriate resources  2/21/2025 0706 by Jessika Ramirez RN  Outcome: Progressing  2/20/2025 2010 by Jessika Ramirez RN  Outcome: Progressing     Problem: Chronic Conditions and Co-morbidities  Goal: Patient's chronic conditions and co-morbidity symptoms are monitored and maintained or improved  2/21/2025 0706 by Jessika Ramirez RN  Outcome: Progressing  2/20/2025 2010 by Jessika Ramirez RN  Outcome: Progressing     Problem: Nutrition  Goal: Nutrient intake appropriate for maintaining nutritional needs  2/21/2025 0706 by Jessika Ramirez RN  Outcome: Progressing  2/20/2025 2010 by Jessika Ramirez RN  Outcome: Progressing   The patient's goals for the shift include  pain control    The clinical goals for the shift include patient will remain safe

## 2025-02-21 NOTE — PROGRESS NOTES
02/21/25 1300   Discharge Planning   Living Arrangements Alone   Support Systems Children   Assistance Needed none   Type of Residence Private residence   Do you have animals or pets at home? Yes   Type of Animals or Pets 1 cat   Who is requesting discharge planning? Provider   Home or Post Acute Services In home services   Type of Home Care Services Home nursing visits   Expected Discharge Disposition Home H   Does the patient need discharge transport arranged? No     74 year old female with a 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 Gamboa. ADOD 2/22 evening, [facility], Tfs.     Met with patient at bedside. Introduced self as care coordinator and role in discharge planning. Demographics and contacts verified. Patient lives at home with her cat. No DME or home O2 requirements. Not active with home care. TCC discussed home care for SN related to new peg and TF's. Patient agreeable to [facility]. Medical team updated and will place order. TF orders sent to [facility], awaiting response. Per medical team, patient may be ready to discharge as early as 2/22 evening. Daughter will transport patient home. Insurance: Medicare and Medical [place]. PCP: Parminder Russell MD. Pharmacy: [facility]. TCC to continue to follow for discharge planning. Faith Oquendo RN TCC

## 2025-02-21 NOTE — CONSULTS
Nutrition Initial Assessment:   Nutrition Assessment    Reason for Assessment: Enteral assessment/recommendation (TF)    Patient is a 74 y.o. female presenting with diagnosis of BOT SCC s/p triple endoscopy and PEG tube placement (2/20/25)     Past Medical History:   Diagnosis Date    Aspiration into airway 07/31/2023    HLD (hyperlipidemia)     HTN (hypertension)     Mass of tongue     Other specified health status     No pertinent past medical history    Stiffness of unspecified hand, not elsewhere classified     Joint stiffness of hand     Past Surgical History:   Procedure Laterality Date    OTHER SURGICAL HISTORY  06/14/2019    Ovarian cystectomy    OTHER SURGICAL HISTORY  06/14/2019    Cyst excision           Nutrition History:  Energy Intake: Fair 50-75 %  Food and Nutrient History: Met with patient this morning. Pt reports PTA eating 2-3 x per day, intake depended on the day. Pt reports 1-2 ONS a day (ensure or premier protein). Pt reports swallowing/chewing difficulty is off and on. Pt reports stomach pain around the PEG site and some nausea. Pt reports BM is usually regular.  Food Allergy:  (none)       Anthropometrics:  Height: 152.4 cm (5')   Weight: (!) 44 kg (97 lb)   BMI (Calculated): 18.94  IBW/kg (Dietitian Calculated): 45.5 kg  Percent of IBW: 97 %       Weight History:   Wt Readings from Last 15 Encounters:   02/20/25 (!) 44 kg (97 lb)   02/18/25 (!) 44.2 kg (97 lb 8 oz)   02/06/25 46.3 kg (102 lb)   02/04/25 (!) 44.5 kg (98 lb 1.6 oz)   01/16/25 46.4 kg (102 lb 6.4 oz) (5% wt loss x 1 month)- significant    12/24/24 46.5 kg (102 lb 9.6 oz)   07/30/24 50.2 kg (110 lb 9.6 oz)   06/09/24 49 kg (108 lb 0.4 oz)   01/30/24 52.1 kg (114 lb 12.8 oz) (11% wt loss x ~ 1 year to 1/16/25)- not significant    08/01/23 54.8 kg (120 lb 12.8 oz)   02/01/23 57.7 kg (127 lb 4 oz)   08/02/22 58.7 kg (129 lb 6 oz)   02/08/22 58.5 kg (129 lb)   08/11/21 55.3 kg (122 lb)   04/06/21 53.3 kg (117 lb 6.4 oz)       Weight  "Change %:  Weight History / % Weight Change: Pt reports wt loss, unsure of amount  Significant Weight Loss: Yes  Interpretation of Weight Loss: 5% in 1 month    Nutrition Focused Physical Exam Findings:  Suspect may be severe however d/t advanced age indicated moderate muscle wasting and fast loss  Subcutaneous Fat Loss:   Orbital Fat Pads: Mild-Moderate (slight dark circles and slight hollowing)  Buccal Fat Pads: Mild-Moderate (flat cheeks, minimal bounce)  Triceps: Mild-Moderate (less than ample fat tissue)  Muscle Wasting:  Temporalis: Mild-Moderate (slight depression)  Pectoralis (Clavicular Region): Mild-Moderate (some protrusion of clavicle)  Deltoid/Trapezius: Mild-Moderate (slight protrusion of acromion process)  Interosseous: Mild-Moderate (slightly depressed area between thumb and forefinger)  Trapezius/Infraspinatus/Supraspinatus (Scapular Region): Defer  Quadriceps: Defer  Gastrocnemius: Defer  Edema:  Edema: none  Physical Findings:  Skin: Positive (PEG site)    Nutrition Significant Labs:  CBC Trend:   Results from last 7 days   Lab Units 02/21/25  0759 02/20/25 1930 02/18/25  0925   WBC AUTO x10*3/uL 15.7* 9.6 8.8   RBC AUTO x10*6/uL 3.98* 4.01 4.31   HEMOGLOBIN g/dL 12.6 12.9 14.0   HEMATOCRIT % 40.0 39.9 43.1   MCV fL 101* 100 100   PLATELETS AUTO x10*3/uL 374 379 396    , BMP Trend:   Results from last 7 days   Lab Units 02/21/25  0759 02/20/25 1930 02/18/25  0925   GLUCOSE mg/dL 96 152* 91   CALCIUM mg/dL 9.2 9.0 9.4   SODIUM mmol/L 140 138 138   POTASSIUM mmol/L 4.5 4.0 3.9   CO2 mmol/L 29 26 28   CHLORIDE mmol/L 104 101 99   BUN mg/dL 11 9 10   CREATININE mg/dL 0.58 0.70 0.60    , A1C:No results found for: \"HGBA1C\", BG POCT trend:    , Vit D: No results found for: \"VITD25\" , Vit B12:   Lab Results   Component Value Date    [id number] 279 01/15/2021        Medications:  Scheduled medications  acetaminophen, 1,000 mg, g-tube, q8h MIRANDA  esomeprazole, 40 mg, g-tube, Daily before breakfast  heparin " (porcine), 5,000 Units, subcutaneous, q8h  lidocaine, 1 patch, transdermal, q24h  polyethylene glycol, 17 g, g-tube, Daily  senna, 10 mL, g-tube, BID      Continuous medications  sodium chloride 0.9%, 50 mL/hr, Last Rate: 50 mL/hr (02/21/25 0918)      I/O:    ;      Dietary Orders (From admission, onward)       Start     Ordered    02/21/25 0926  Enteral feeding with NPO Isosource 1.5; PEG (percutaneous endoscopic gastric); 10  Diet effective now        Question Answer Comment   Tube feeding formula: Isosource 1.5    Feeding route: PEG (percutaneous endoscopic gastric)    Tube feeding continuous rate (mL/hr): 10        02/21/25 0925 02/20/25 1553  May Not Participate in Room Service  ( ROOM SERVICE MAY NOT PARTICIPATE)  Once        Question:  .  Answer:  Yes    02/20/25 1552                     Estimated Needs:   Total Energy Estimated Needs in 24 hours (kCal):  (6627-9779)  Method for Estimating Needs: ABW x 30-35  Total Protein Estimated Needs in 24 Hours (g):  (57+)  Method for Estimating 24 Hour Protein Needs: ABW x 1.3  Total Fluid Estimated Needs in 24 Hours (mL):  (1400)           Nutrition Diagnosis   Malnutrition Diagnosis  Patient has Malnutrition Diagnosis: Yes  Diagnosis Status: New  Malnutrition Diagnosis: Moderate malnutrition related to chronic disease or condition  As Evidenced by: <75% of estimated energy needs for >= 1 month; 5% wt loss x 1 month            Nutrition Interventions/Recommendations   Nutrition prescription for oral nutrition, Nutrition prescription for enteral nutrition    Nutrition Recommendations:  Individualized Nutrition Prescription Provided for : :  Check Vitamin D and supplement as needed  Continue trickle feeds for Isosource 1.5   Initiate Isosource 1.5 @ 10ml/hr and increase by 10ml/hr every 8 hours as tolerated to a goal rate of 40ml/hr   FWF: 180ml 4x/day or per MD   Provides: 960 ml total volume, 1440kcal, 65g PRO, 733 ml free water   Monitor RFP + Mg for s/s  refeeding; hold TF at rate and replete lytes, if low, prior to advancing  When tolerating continuous EN consider advancing to bolus feeds   Isosource 1.5 bolus @ 240mL 4x/day  FWF: 60mL pre/post feed with additional 95mL BID    Pend SLP evaluation: at this time TF provides 100% of needs, pending results continue to encourage PO.   Strongly recommend outpatient RDN referral to adjust EN as needed to promote nutritional optimization     Nutrition Interventions/Goals:   Interventions: Meals and snacks, Enteral intake  Enteral Intake: Management of volume of enteral nutrition  Coordination of Care with Providers: Provider    Nutrition Monitoring and Evaluation   Food/Nutrient Related History Monitoring  Monitoring and Evaluation Plan: Enteral and parenteral nutrition intake determination, Estimated Energy Intake  Estimated Energy Intake: Energy intake 50 -75% of estimated energy needs  Enteral and Parenteral Nutrition Intake Determination: Enteral nutrition intake - Prevent refeeding    Anthropometric Measurements  Monitoring and Evaluation Plan: Body weight  Body Weight: Body weight - Maintain stable weight, Body weight - Promote weight restoration    Biochemical Data, Medical Tests and Procedures  Monitoring and Evaluation Plan: Electrolyte/renal panel, Glucose/endocrine profile  Electrolyte and Renal Panel: Electrolytes within normal limits  Glucose/Endocrine Profile: Glucose within normal limits ( mg/dL)    Goal Status: New goal(s) identified    Time Spent (min): 60 minutes

## 2025-02-21 NOTE — PROGRESS NOTES
Dr. Gamboa left me a message asking that Ms. Fernandez be added to the head and neck tumor board.    I added her to the 2/28/25 tumor board.

## 2025-02-21 NOTE — PROGRESS NOTES
Communication Note    Patient Name: Annette Fernandez  MRN: [id number]  Today's Date: 2/21/2025   Room: [address] [place] [address]A    Discipline: Occupational Therapy      Missed Visit Reason: Other (Comment) (OT Screen: Per chart review and communication with PT and patient at 1136, no acute care OT needs identified. Please reconsult if new functional deficits arise.)      02/21/25 at 12:49 PM   Yuliana Barraza, OT   Rehab Office: [phone]

## 2025-02-21 NOTE — PROCEDURES
Speech-Language Pathology    SLP Adult Inpatient MBSS    Patient Name: Annette Fernandez  MRN: [id number]  Today's Date: 2/21/2025   Time In: 1150  Time Out: 1223  Time Calculation (min): 33 min     Modified Barium Swallow Study completed. Informed verbal consent obtained prior to completion of exam. The study was completed per protocol with various liquid barium consistencies, pudding, and solids. A 1.9 cm or .75 inch (outer diameter) ring was placed on the chin in the lateral view and on the lateral, left side of the neck in the AP view in order to complete objective measurements during swallowing. The anatomic structures and function of the oropharynx, larynx, hypopharynx and cervical esophagus were evaluated.    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 Gamboa.  Pt reports dysphagia solids > liquids + odynophagia.   Respiratory Status: Room air  Current diet: NPO w/ ice chips after aggressive and frequent oral care per bedside swallow assessment; pt reports that she was tolerating a regular diet w/ thin liquids prior to admission    Pain:  Pain Scale: 0-10  Rating: No pain reported    RECOMMENDATIONS:   Solid Diet Recommendations: Regular solids  Liquid Diet Recommendations: Thin liquids  Medications: Per pt preference; pt quarters/halves pills at baseline     Safe Swallow/Compensatory Strategies:  Upright positioning   Slow rate/small boluses   Alternate solids and liquids    SLP PLAN:  Skilled SLP Services: Skilled SLP intervention for dysphagia is warranted.  SLP Frequency: 2x per week  Duration: 2 weeks  Treatment/Interventions:   - Compensatory strategy training  - Patient/caregiver education    Discussed POC: Patient  Discussed  Risks/Benefits: Yes  Patient/Caregiver Agreeable: Yes      Short term goals established 02/21/25:   - Patient will independently implement safe swallowing strategies to reduce risk of aspiration with use of compensatory strategies during 90% of therapeutic trials.   Status: goal initiated  - Patient/family will indicate understanding of dysphagia education: risk for aspiration, recommendations, and POC with > 80% accuracy via teach back method.   Status: goal initiated     Long term goals 02/21/25:   Patient will tolerate the least restrictive diet without overt difficulty or further pulmonary compromise by time of discharge.    Therapy Provided: SLP provided extensive education re: results and recommendations following MBSS. Discussed anatomy/physiology of swallow function, establishment of regular/thin diet, and the use of compensatory swallow strategies to promote pt safety upon PO intake.  Pt indicated understanding via teach back method with > 90% accuracy.        Mechanics of the Swallow Summary:  Oral Motor Assessment:  Oral Hygiene: green discoloration on body of tongue  Dentition: Present, adequate  Oral Motor: WFL  Facial Symmetry: WFL  Vocal Quality (Perceptually): WFL    ORAL PHASE:  Lip Closure - Escape progressing to mid-chin ; only during consecutive sips of thin via straw  Tongue Control During Bolus Hold - Escape to lateral buccal cavity and/or floor of mouth   Bolus prep/mastication - Slow prolonged mastication with complete re-collection necessary   Bolus transport/lingual motion - Brisk tongue motion for A-P movement of the bolus   Oral residue - Residue collection on oral structure     PHARYNGEAL PHASE:  Initiation of pharyngeal swallow - Bolus head at pit of pyriforms (w/ liquids)  Soft palate elevation - No bolus between soft palate/pharyngeal wall   Laryngeal elevation - Partial superior movement of thyroid cartilage and/or partial approximation of arytenoids to epiglottic petiole   Anterior  hyoid excursion - Partial anterior movement   Epiglottic movement - Complete inversion    Laryngeal vestibule closure - Incomplete - narrow column of air/contrast in laryngeal vestibule   Pharyngeal stripping wave - Present, however, diminished   Pharyngeal contraction (A/P view) - Complete  Pharyngoesophageal segment opening - Partial distension/partial duration with partial obstruction of flow of bolus   Tongue base retraction - Narrow column of contrast or air between tongue base and pharyngeal wall   Pharyngeal residue - Collection of residue within or on the pharyngeal structures     ESOPHAGEAL PHASE:  Esophageal clearance - Esophageal retention with retrograde flow below the pharyngoesophageal segment w/ puree; cleared with sip of thin liquids      SLP Impressions with Severity Rating:   Pt presents with mild oropharyngeal dysphagia upon completion of modified barium swallow study this date. Swallowing physiology is detailed above. Impairments most impacting swallowing safety and efficiency include incomplete laryngeal vestibule elevation and closure. Trace penetration observed on 40ml presentation of thin liquids and 5ml mildly thick liquids; penetration cleared with completion of swallow.  Pharyngeal residue noted at the valleculae/BOT (increased w/ thicker consistencies), which may be impacted by known BOT mass.  Cleared with sips of liquids/independent subsequent swallows.     *Of note: The A-P bolus follow-through is not intended to be utilized as a diagnostic assessment of the esophagus, rather a tool to observe the biomechanical aspects of the swallow continuum, and to inform the need for further evaluation by medical specialists, as applicable.       Eating Assessment Tool (EAT-10)   0=No problem, 1=Mild problem, 2=Mild to moderate problem, 3=Moderate problem, 4=Severe problem       My swallowing problem has caused me to lose weight.   2   My swallowing problem interferes with my ability to go out for  meals.  2   Swallowing liquids takes extra effort.  2   Swallowing solids takes extra effort. 3   Swallowing pills takes extra effort.  3   Swallowing is painful.  3   The pleasure of eating is affected by my swallowing.  3   When I swallow food sticks in my throat.  3   I cough when I eat.  2   Swallowing is stressful.  3                                                                                                                                       TOTAL 26      EAT-10 TOTAL: 26  A total score of 3 or above may indicate difficulty with swallowing safety and/or efficiency       Rosenbek's Penetration Aspiration Scale  Thin Liquids: 2. PENETRATION that CLEARS - contrast enter airway, above vocal cords, no residue  Nectar Thick Liquids: 2. PENETRATION that CLEARS - contrast enter airway, above vocal cords, no residue  Puree: 1. NO ASPIRATION & NO PENETRATION - no aspiration, contrast does not enter airway  Solids: 1. NO ASPIRATION & NO PENETRATION - no aspiration, contrast does not enter airway     Deyanira Lopez, clinical supervisor, present for duration of assessment.

## 2025-02-21 NOTE — PROGRESS NOTES
Ear Nose & Throat Progress Note        Annette Fernandez is a 74 y.o. female on day 1 of admission presenting with Mass of tongue.     Subjective   Patient doing well. No acute evetns overnight. Plans to start enteral feeds and SLP to see for swallow evaluation. Patient did have some complaints of left arm pain and becoming diaphoretic. Nursing was asked to do an EKG that showed NSR. After that episode patient said things are improving.     Objective   Physical Exam  Vitals reviewed in EMR  Gen: NAD, AOx3, resting comfortably in bed  Eyes: EOMI, sclera clear, PERRL  Ears: Normal external ears bilaterally  Nose: No rhinorrhea; anterior nares clear  Oral Cavity: MMM  Head: normocephalic, atraumatic  Neck: Soft, no skin injury, No LAD  Resp: Breathing comfortably on room air  Cards: No clubbing/cyanosis/edema of hands  Gastro: Soft, non-distended, PEG tube in place  : voids  MSK: Moves all extremities  Psych: Appropriate mood and affect    Last Recorded Vitals  Blood pressure 134/54, pulse 75, temperature 36.8 °C (98.2 °F), temperature source Temporal, resp. rate 16, height 1.524 m (5'), weight (!) 44 kg (97 lb), SpO2 97%.  Intake/Output last 3 Shifts:  I/O last 3 completed shifts:  In: 1333.3 (30.3 mL/kg) [I.V.:653.3 (14.8 mL/kg); NG/GT:180; IV Piggyback:500]  Out: 0 (0 mL/kg)   Weight: 44 kg     Relevant Results  Results for orders placed or performed during the hospital encounter of 02/20/25 (from the past 24 hours)   Renal Function Panel   Result Value Ref Range    Glucose 152 (H) 74 - 99 mg/dL    Sodium 138 136 - 145 mmol/L    Potassium 4.0 3.5 - 5.3 mmol/L    Chloride 101 98 - 107 mmol/L    Bicarbonate 26 21 - 32 mmol/L    Anion Gap 15 10 - 20 mmol/L    Urea Nitrogen 9 6 - 23 mg/dL    Creatinine 0.70 0.50 - 1.05 mg/dL    eGFR >90 >60 mL/min/1.73m*2    Calcium 9.0 8.6 - 10.6 mg/dL    Phosphorus 3.6 2.5 - 4.9 mg/dL    Albumin 3.5 3.4 - 5.0 g/dL   CBC   Result Value Ref Range    WBC 9.6 4.4 - 11.3 x10*3/uL     nRBC 0.0 0.0 - 0.0 /100 WBCs    RBC 4.01 4.00 - 5.20 x10*6/uL    Hemoglobin 12.9 12.0 - 16.0 g/dL    Hematocrit 39.9 36.0 - 46.0 %     80 - 100 fL    MCH 32.2 26.0 - 34.0 pg    MCHC 32.3 32.0 - 36.0 g/dL    RDW 12.5 11.5 - 14.5 %    Platelets 379 150 - 450 x10*3/uL   Magnesium   Result Value Ref Range    Magnesium 1.64 1.60 - 2.40 mg/dL      No results found.     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 Gamboa.    Active Issues:  Base of Tongue SCC  Underweight per BMI  Hypomagnesia  Dysphagia      Plan:  -Analgesia: Scheduled Acetaminophen prn Oxycodone  -FEN: mIVFs,trickle ; monitor and replete electrolytes as needed  -Pulm: wean oxygen to room air, Incentive Spirometer  -ID: No current interventions  -Cardiac: Vitals Q4hr   -Endo: No current interventions  -GI: Bowel regimen, PPI,  and PRN anti-emetic  -: Voids  -Steroids/Special: SLP consulted for Swallow eval->MBS ordered  -Embolic PPx: SQH, SCDs while in bed  -Dispo: Discharge planning for POD 2 vs 3 home with home care vs skilled nursing facility    All plans discussed with attendings after morning rounds.         I spent 35 minutes in the professional and overall care of this patient.      Gem Gustafson APRN, CNP  Certified Family Nurse Practitioner  Nurse Practitioner III  Department of Otolaryngology: Head & Neck Surgery  Personal  Pager [phone]  ENT Team  Head and Neck Phone: [phone]

## 2025-02-22 ENCOUNTER — APPOINTMENT (OUTPATIENT)
Dept: RADIOLOGY | Facility: HOSPITAL | Age: 75
DRG: 147 | End: 2025-02-22
Payer: MEDICARE

## 2025-02-22 LAB
ALBUMIN SERPL BCP-MCNC: 3.1 G/DL (ref 3.4–5)
ANION GAP SERPL CALC-SCNC: 10 MMOL/L (ref 10–20)
BUN SERPL-MCNC: 10 MG/DL (ref 6–23)
CALCIUM SERPL-MCNC: 9.1 MG/DL (ref 8.6–10.6)
CHLORIDE SERPL-SCNC: 101 MMOL/L (ref 98–107)
CO2 SERPL-SCNC: 33 MMOL/L (ref 21–32)
CREAT SERPL-MCNC: 0.54 MG/DL (ref 0.5–1.05)
EGFRCR SERPLBLD CKD-EPI 2021: >90 ML/MIN/1.73M*2
ERYTHROCYTE [DISTWIDTH] IN BLOOD BY AUTOMATED COUNT: 12.9 % (ref 11.5–14.5)
GLUCOSE SERPL-MCNC: 70 MG/DL (ref 74–99)
HCT VFR BLD AUTO: 39.4 % (ref 36–46)
HGB BLD-MCNC: 12.7 G/DL (ref 12–16)
MAGNESIUM SERPL-MCNC: 1.64 MG/DL (ref 1.6–2.4)
MCH RBC QN AUTO: 33 PG (ref 26–34)
MCHC RBC AUTO-ENTMCNC: 32.2 G/DL (ref 32–36)
MCV RBC AUTO: 102 FL (ref 80–100)
NRBC BLD-RTO: 0 /100 WBCS (ref 0–0)
PHOSPHATE SERPL-MCNC: 3.5 MG/DL (ref 2.5–4.9)
PLATELET # BLD AUTO: 338 X10*3/UL (ref 150–450)
POTASSIUM SERPL-SCNC: 3.3 MMOL/L (ref 3.5–5.3)
RBC # BLD AUTO: 3.85 X10*6/UL (ref 4–5.2)
SODIUM SERPL-SCNC: 141 MMOL/L (ref 136–145)
WBC # BLD AUTO: 17.6 X10*3/UL (ref 4.4–11.3)

## 2025-02-22 PROCEDURE — 80069 RENAL FUNCTION PANEL: CPT | Performed by: STUDENT IN AN ORGANIZED HEALTH CARE EDUCATION/TRAINING PROGRAM

## 2025-02-22 PROCEDURE — 2500000004 HC RX 250 GENERAL PHARMACY W/ HCPCS (ALT 636 FOR OP/ED): Performed by: STUDENT IN AN ORGANIZED HEALTH CARE EDUCATION/TRAINING PROGRAM

## 2025-02-22 PROCEDURE — 2500000001 HC RX 250 WO HCPCS SELF ADMINISTERED DRUGS (ALT 637 FOR MEDICARE OP): Performed by: STUDENT IN AN ORGANIZED HEALTH CARE EDUCATION/TRAINING PROGRAM

## 2025-02-22 PROCEDURE — 2500000001 HC RX 250 WO HCPCS SELF ADMINISTERED DRUGS (ALT 637 FOR MEDICARE OP)

## 2025-02-22 PROCEDURE — 2500000004 HC RX 250 GENERAL PHARMACY W/ HCPCS (ALT 636 FOR OP/ED)

## 2025-02-22 PROCEDURE — 2500000005 HC RX 250 GENERAL PHARMACY W/O HCPCS: Performed by: NURSE PRACTITIONER

## 2025-02-22 PROCEDURE — 85027 COMPLETE CBC AUTOMATED: CPT | Performed by: STUDENT IN AN ORGANIZED HEALTH CARE EDUCATION/TRAINING PROGRAM

## 2025-02-22 PROCEDURE — 71045 X-RAY EXAM CHEST 1 VIEW: CPT

## 2025-02-22 PROCEDURE — 71045 X-RAY EXAM CHEST 1 VIEW: CPT | Performed by: RADIOLOGY

## 2025-02-22 PROCEDURE — 36415 COLL VENOUS BLD VENIPUNCTURE: CPT | Performed by: STUDENT IN AN ORGANIZED HEALTH CARE EDUCATION/TRAINING PROGRAM

## 2025-02-22 PROCEDURE — 96372 THER/PROPH/DIAG INJ SC/IM: CPT | Performed by: STUDENT IN AN ORGANIZED HEALTH CARE EDUCATION/TRAINING PROGRAM

## 2025-02-22 PROCEDURE — 83735 ASSAY OF MAGNESIUM: CPT | Performed by: STUDENT IN AN ORGANIZED HEALTH CARE EDUCATION/TRAINING PROGRAM

## 2025-02-22 PROCEDURE — 1200000003 HC ONCOLOGY  ROOM WITH TELEMETRY DAILY

## 2025-02-22 RX ORDER — THYROID 30 MG/1
15 TABLET ORAL DAILY
Status: DISCONTINUED | OUTPATIENT
Start: 2025-02-22 | End: 2025-02-25 | Stop reason: HOSPADM

## 2025-02-22 RX ORDER — ATORVASTATIN CALCIUM 80 MG/1
80 TABLET, FILM COATED ORAL DAILY
Status: DISCONTINUED | OUTPATIENT
Start: 2025-02-22 | End: 2025-02-25 | Stop reason: HOSPADM

## 2025-02-22 RX ORDER — LANOLIN ALCOHOL/MO/W.PET/CERES
400 CREAM (GRAM) TOPICAL DAILY
Status: DISCONTINUED | OUTPATIENT
Start: 2025-02-22 | End: 2025-02-25 | Stop reason: HOSPADM

## 2025-02-22 RX ORDER — POTASSIUM CHLORIDE 1.5 G/1.58G
40 POWDER, FOR SOLUTION ORAL ONCE
Status: COMPLETED | OUTPATIENT
Start: 2025-02-22 | End: 2025-02-22

## 2025-02-22 RX ORDER — METOPROLOL TARTRATE 25 MG/1
12.5 TABLET, FILM COATED ORAL 2 TIMES DAILY
Status: DISCONTINUED | OUTPATIENT
Start: 2025-02-22 | End: 2025-02-25 | Stop reason: HOSPADM

## 2025-02-22 RX ORDER — METOPROLOL TARTRATE 25 MG/1
25 TABLET, FILM COATED ORAL 2 TIMES DAILY
Status: DISCONTINUED | OUTPATIENT
Start: 2025-02-22 | End: 2025-02-22

## 2025-02-22 RX ADMIN — ATORVASTATIN CALCIUM 80 MG: 80 TABLET, FILM COATED ORAL at 09:52

## 2025-02-22 RX ADMIN — LIDOCAINE 4% 1 PATCH: 40 PATCH TOPICAL at 09:09

## 2025-02-22 RX ADMIN — POTASSIUM CHLORIDE 40 MEQ: 1.5 POWDER, FOR SOLUTION ORAL at 10:42

## 2025-02-22 RX ADMIN — METOPROLOL TARTRATE 25 MG: 25 TABLET, FILM COATED ORAL at 09:53

## 2025-02-22 RX ADMIN — ESOMEPRAZOLE MAGNESIUM 40 MG: 40 FOR SUSPENSION ORAL at 05:35

## 2025-02-22 RX ADMIN — SENNOSIDES 10 ML: 8.8 LIQUID ORAL at 20:06

## 2025-02-22 RX ADMIN — SENNOSIDES 10 ML: 8.8 LIQUID ORAL at 09:09

## 2025-02-22 RX ADMIN — HEPARIN SODIUM 5000 UNITS: 5000 INJECTION, SOLUTION INTRAVENOUS; SUBCUTANEOUS at 16:19

## 2025-02-22 RX ADMIN — OXYCODONE HYDROCHLORIDE 10 MG: 5 SOLUTION ORAL at 20:06

## 2025-02-22 RX ADMIN — HEPARIN SODIUM 5000 UNITS: 5000 INJECTION, SOLUTION INTRAVENOUS; SUBCUTANEOUS at 00:34

## 2025-02-22 RX ADMIN — MAGNESIUM OXIDE TAB 400 MG (241.3 MG ELEMENTAL MG) 400 MG: 400 (241.3 MG) TAB at 10:42

## 2025-02-22 RX ADMIN — ACETAMINOPHEN 1000 MG: 160 SOLUTION ORAL at 05:35

## 2025-02-22 RX ADMIN — HEPARIN SODIUM 5000 UNITS: 5000 INJECTION, SOLUTION INTRAVENOUS; SUBCUTANEOUS at 09:00

## 2025-02-22 RX ADMIN — THYROID, PORCINE 15 MG: 30 TABLET ORAL at 10:43

## 2025-02-22 RX ADMIN — ACETAMINOPHEN 1000 MG: 160 SOLUTION ORAL at 13:48

## 2025-02-22 RX ADMIN — OXYCODONE HYDROCHLORIDE 10 MG: 5 SOLUTION ORAL at 00:46

## 2025-02-22 RX ADMIN — ACETAMINOPHEN 1000 MG: 160 SOLUTION ORAL at 22:35

## 2025-02-22 RX ADMIN — METOPROLOL TARTRATE 12.5 MG: 25 TABLET, FILM COATED ORAL at 20:07

## 2025-02-22 ASSESSMENT — COGNITIVE AND FUNCTIONAL STATUS - GENERAL
CLIMB 3 TO 5 STEPS WITH RAILING: A LITTLE
MOBILITY SCORE: 23
DAILY ACTIVITIY SCORE: 24

## 2025-02-22 ASSESSMENT — PAIN SCALES - GENERAL
PAINLEVEL_OUTOF10: 8
PAINLEVEL_OUTOF10: 8
PAINLEVEL_OUTOF10: 4

## 2025-02-22 NOTE — PROGRESS NOTES
Ear Nose & Throat Progress Note        Annette Fernandez is a 74 y.o. female on day 1 of admission presenting with Mass of tongue.     Subjective   Patient doing well. No acute events overnight. Some nausea, so tube feeds were paused, but tolerating PO intake  This AM, notified by RN of HR to 110, placed on 2L NC.      Objective   Physical Exam  Vitals reviewed in EMR  Gen: NAD, AOx3, resting comfortably in bed  Eyes: EOMI, sclera clear, PERRL  Ears: Normal external ears bilaterally  Nose: No rhinorrhea; anterior nares clear  Oral Cavity: MMM  Head: normocephalic, atraumatic  Neck: Soft, no skin injury, No LAD  Resp: Breathing comfortably on room air  Cards: No clubbing/cyanosis/edema of hands  Gastro: Soft, non-distended, PEG tube in place, no signs of infection  : voids  MSK: Moves all extremities  Psych: Appropriate mood and affect    Last Recorded Vitals  Blood pressure 134/74, pulse (!) 112, temperature 36.9 °C (98.4 °F), temperature source Temporal, resp. rate 16, height 1.524 m (5'), weight (!) 44.4 kg (97 lb 12.8 oz), SpO2 94%.  Intake/Output last 3 Shifts:  I/O last 3 completed shifts:  In: 1820.5 (41 mL/kg) [I.V.:972.5 (21.9 mL/kg); NG/GT:848]  Out: 9 (0.2 mL/kg) [Urine:9 (0 mL/kg/hr)]  Weight: 44.4 kg     Relevant Results  Results for orders placed or performed during the hospital encounter of 02/20/25 (from the past 24 hours)   CBC   Result Value Ref Range    WBC 17.6 (H) 4.4 - 11.3 x10*3/uL    nRBC 0.0 0.0 - 0.0 /100 WBCs    RBC 3.85 (L) 4.00 - 5.20 x10*6/uL    Hemoglobin 12.7 12.0 - 16.0 g/dL    Hematocrit 39.4 36.0 - 46.0 %     (H) 80 - 100 fL    MCH 33.0 26.0 - 34.0 pg    MCHC 32.2 32.0 - 36.0 g/dL    RDW 12.9 11.5 - 14.5 %    Platelets 338 150 - 450 x10*3/uL      FL modified barium swallow study    Result Date: 2/21/2025  Interpreted By:  Deyanira Lopez, STUDY: FL MODIFIED BARIUM SWALLOW STUDY;; 2/21/2025 2:13 pm   INDICATION: Signs/Symptoms:decreased po intake over several months/dysphagia.      COMPARISON: None.   ACCESSION NUMBER(S): [id number]   ORDERING CLINICIAN: ANJELICA GAMBOA   TECHNIQUE: Modified Barium Swallow Study completed. Informed verbal consent obtained prior to completion of exam. The study was completed per protocol with various liquid barium consistencies, pudding, and solids. A 1.9 cm or .75 inch (outer diameter) ring was placed on the chin in the lateral view and on the lateral, left side of the neck in the AP view in order to complete objective measurements during swallowing. The anatomic structures and function of the oropharynx, larynx, hypopharynx and cervical esophagus were evaluated.   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 Gamboa.  Pt reports dysphagia solids > liquids + odynophagia. Respiratory Status: Room air Current diet: NPO w/ ice chips after aggressive and frequent oral care per bedside swallow assessment; pt reports that she was tolerating a regular diet w/ thin liquids prior to admission   Pain: Pain Scale: 0-10 Rating: No pain reported   RECOMMENDATIONS: Solid Diet Recommendations: Regular solids Liquid Diet Recommendations: Thin liquids Medications: Per pt preference; pt quarters/halves pills at baseline   Safe Swallow/Compensatory Strategies: Upright positioning Slow rate/small boluses Alternate solids and liquids   SLP PLAN: Skilled SLP Services: Skilled SLP intervention for dysphagia is warranted. SLP Frequency: 2x per week Duration: 2 weeks Treatment/Interventions: - Compensatory strategy training - Patient/caregiver education   Discussed POC: Patient Discussed Risks/Benefits: Yes Patient/Caregiver Agreeable: Yes     Short term goals  established 02/21/25: - Patient will independently implement safe swallowing strategies to reduce risk of aspiration with use of compensatory strategies during 90% of therapeutic trials. Status: goal initiated - Patient/family will indicate understanding of dysphagia education: risk for aspiration, recommendations, and POC with > 80% accuracy via teach back method. Status: goal initiated   Long term goals 02/21/25: Patient will tolerate the least restrictive diet without overt difficulty or further pulmonary compromise by time of discharge.   Therapy Provided: SLP provided extensive education re: results and recommendations following MBSS. Discussed anatomy/physiology of swallow function, establishment of regular/thin diet, and the use of compensatory swallow strategies to promote pt safety upon PO intake. Pt indicated understanding via teach back method with > 90% accuracy.     Mechanics of the Swallow Summary: Oral Motor Assessment: Oral Hygiene: green discoloration on body of tongue Dentition: Present, adequate Oral Motor: WFL Facial Symmetry: WFL Vocal Quality (Perceptually): WFL   ORAL PHASE: Lip Closure - Escape progressing to mid-chin ; only during consecutive sips of thin via straw Tongue Control During Bolus Hold - Escape to lateral buccal cavity and/or floor of mouth Bolus prep/mastication - Slow prolonged mastication with complete re-collection necessary Bolus transport/lingual motion - Brisk tongue motion for A-P movement of the bolus Oral residue - Residue collection on oral structure   PHARYNGEAL PHASE: Initiation of pharyngeal swallow - Bolus head at pit of pyriforms (w/ liquids) Soft palate elevation - No bolus between soft palate/pharyngeal wall Laryngeal elevation - Partial superior movement of thyroid cartilage and/or partial approximation of arytenoids to epiglottic petiole Anterior hyoid excursion - Partial anterior movement Epiglottic movement - Complete inversion Laryngeal vestibule closure -  Incomplete - narrow column of air/contrast in laryngeal vestibule Pharyngeal stripping wave - Present, however, diminished Pharyngeal contraction (A/P view) - Complete Pharyngoesophageal segment opening - Partial distension/partial duration with partial obstruction of flow of bolus Tongue base retraction - Narrow column of contrast or air between tongue base and pharyngeal wall Pharyngeal residue - Collection of residue within or on the pharyngeal structures   ESOPHAGEAL PHASE: Esophageal clearance - Esophageal retention with retrograde flow below the pharyngoesophageal segment w/ puree; cleared with sip of thin liquids     SLP Impressions with Severity Rating: Pt presents with mild oropharyngeal dysphagia upon completion of modified barium swallow study this date. Swallowing physiology is detailed above. Impairments most impacting swallowing safety and efficiency include incomplete laryngeal vestibule elevation and closure. Trace penetration observed on 40ml presentation of thin liquids and 5ml mildly thick liquids; penetration cleared with completion of swallow.  Pharyngeal residue noted at the valleculae/BOT (increased w/ thicker consistencies), which may be impacted by known BOT mass.  Cleared with sips of liquids/independent subsequent swallows.   *Of note: The A-P bolus follow-through is not intended to be utilized as a diagnostic assessment of the esophagus, rather a tool to observe the biomechanical aspects of the swallow continuum, and to inform the need for further evaluation by medical specialists, as applicable.     Eating Assessment Tool (EAT-10) 0=No problem, 1=Mild problem, 2=Mild to moderate problem, 3=Moderate problem, 4=Severe problem     My swallowing problem has caused me to lose weight. 2 My swallowing problem interferes with my ability to go out for meals. 2 Swallowing liquids takes extra effort. 2 Swallowing solids takes extra effort. 3 Swallowing pills takes extra effort. 3 Swallowing is  painful. 3 The pleasure of eating is affected by my swallowing. 3 When I swallow food sticks in my throat. 3 I cough when I eat. 2 Swallowing is stressful. 3 TOTAL 26   EAT-10 TOTAL: 26 A total score of 3 or above may indicate difficulty with swallowing safety and/or efficiency     Rosenbek's Penetration Aspiration Scale Thin Liquids: 2. PENETRATION that CLEARS - contrast enter airway, above vocal cords, no residue Nectar Thick Liquids: 2. PENETRATION that CLEARS - contrast enter airway, above vocal cords, no residue Puree: 1. NO ASPIRATION & NO PENETRATION - no aspiration, contrast does not enter airway Solids: 1. NO ASPIRATION & NO PENETRATION - no aspiration, contrast does not enter airway Deyanira Lopez, clinical supervisor, present for duration of assessment.   Speech Therapy section of this report signed by Deyanira Lopez MS CCC/SLP on 2/21/2025 at 5:11 pm.   RADIOLOGY FINDINGS: Degenerative changes of the visualized lower spine. Mandibular and maxillary radiopaque teeth fillings.   Radiology section of this report signed by Dr. Manzano.       Speech pathology findings as above.   MACRO: None     Dictation workstation:   [id number]      Scheduled medications  acetaminophen, 1,000 mg, g-tube, q8h MIRANDA  atorvastatin, 80 mg, oral, Daily  esomeprazole, 40 mg, g-tube, Daily before breakfast  heparin (porcine), 5,000 Units, subcutaneous, q8h  lidocaine, 1 patch, transdermal, q24h  metoprolol tartrate, 25 mg, oral, BID  polyethylene glycol, 17 g, g-tube, Daily  senna, 10 mL, g-tube, BID  thyroid (pork), 15 mg, oral, Daily      Continuous medications       PRN medications  PRN medications: naloxone, ondansetron, oxyCODONE, oxyCODONE                        Assessment/Plan   Assessment & Plan  Mass of tongue  Ube I nplace  Oropharyngeal lesion    Assessment:  74 year old female with a 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 Gamboa.    Active Issues:  Base of Tongue  SCC  Underweight per BMI  Hypomagnesia  Dysphagia      Plan:  -Analgesia: Scheduled Acetaminophen prn Oxycodone  -FEN: mIVFs,trickle ; monitor and replete electrolytes as needed  -Pulm: wean oxygen to room air, Incentive Spirometer  -ID: No current interventions  -Cardiac: Vitals Q4hr, restart home metoprolol  -Endo: restart home thyroid hormone  -GI: Bowel regimen, PPI,  and PRN anti-emetic  -: Voids  -Steroids/Special: SLP consulted for Swallow eval->passed MBS, cleared for regular diet  -Embolic PPx: SQH, SCDs while in bed  -Dispo: Discharge planning for POD 2 vs 3 home with home care vs skilled nursing facility    Patient seen and discussed with attending Dr. Pretty.       Clarence Jacobsen MD - PGY1  Otolaryngology - Head & Neck Surgery  [facility]    ENT Consult pager: [phone]  ENT Peds pager: [phone]  ENT Head & Neck Surgery Phone: [phone]  ENT subspecialty team: Roberto individual resident who wrote today's note  ENT Outpatient scheduling number: [phone]  Please Page [phone] or call [phone] if Urgent

## 2025-02-22 NOTE — CARE PLAN
Problem: Pain - Adult  Goal: Verbalizes/displays adequate comfort level or baseline comfort level  Outcome: Progressing     Problem: Safety - Adult  Goal: Free from fall injury  Outcome: Progressing     Problem: Discharge Planning  Goal: Discharge to home or other facility with appropriate resources  Outcome: Progressing     Problem: Chronic Conditions and Co-morbidities  Goal: Patient's chronic conditions and co-morbidity symptoms are monitored and maintained or improved  Outcome: Progressing     Problem: Nutrition  Goal: Nutrient intake appropriate for maintaining nutritional needs  Outcome: Progressing   The patient's goals for the shift include      The clinical goals for the shift include Pt will report adequate pain control throughout shift

## 2025-02-23 VITALS
RESPIRATION RATE: 18 BRPM | TEMPERATURE: 97.7 F | HEART RATE: 106 BPM | BODY MASS INDEX: 19.2 KG/M2 | HEIGHT: 60 IN | WEIGHT: 97.8 LBS | DIASTOLIC BLOOD PRESSURE: 63 MMHG | OXYGEN SATURATION: 93 % | SYSTOLIC BLOOD PRESSURE: 135 MMHG

## 2025-02-23 LAB
ALBUMIN SERPL BCP-MCNC: 2.8 G/DL (ref 3.4–5)
ANION GAP SERPL CALC-SCNC: 10 MMOL/L (ref 10–20)
BUN SERPL-MCNC: 12 MG/DL (ref 6–23)
CALCIUM SERPL-MCNC: 9.2 MG/DL (ref 8.6–10.6)
CHLORIDE SERPL-SCNC: 99 MMOL/L (ref 98–107)
CO2 SERPL-SCNC: 30 MMOL/L (ref 21–32)
CREAT SERPL-MCNC: 0.55 MG/DL (ref 0.5–1.05)
EGFRCR SERPLBLD CKD-EPI 2021: >90 ML/MIN/1.73M*2
ERYTHROCYTE [DISTWIDTH] IN BLOOD BY AUTOMATED COUNT: 13 % (ref 11.5–14.5)
GLUCOSE SERPL-MCNC: 105 MG/DL (ref 74–99)
HCT VFR BLD AUTO: 37.2 % (ref 36–46)
HGB BLD-MCNC: 11.4 G/DL (ref 12–16)
MAGNESIUM SERPL-MCNC: 1.55 MG/DL (ref 1.6–2.4)
MCH RBC QN AUTO: 32.1 PG (ref 26–34)
MCHC RBC AUTO-ENTMCNC: 30.6 G/DL (ref 32–36)
MCV RBC AUTO: 105 FL (ref 80–100)
NRBC BLD-RTO: 0 /100 WBCS (ref 0–0)
PHOSPHATE SERPL-MCNC: 2.6 MG/DL (ref 2.5–4.9)
PLATELET # BLD AUTO: 312 X10*3/UL (ref 150–450)
POTASSIUM SERPL-SCNC: 4.2 MMOL/L (ref 3.5–5.3)
RBC # BLD AUTO: 3.55 X10*6/UL (ref 4–5.2)
SODIUM SERPL-SCNC: 135 MMOL/L (ref 136–145)
WBC # BLD AUTO: 16.7 X10*3/UL (ref 4.4–11.3)

## 2025-02-23 PROCEDURE — 83735 ASSAY OF MAGNESIUM: CPT | Performed by: STUDENT IN AN ORGANIZED HEALTH CARE EDUCATION/TRAINING PROGRAM

## 2025-02-23 PROCEDURE — 2500000004 HC RX 250 GENERAL PHARMACY W/ HCPCS (ALT 636 FOR OP/ED): Performed by: STUDENT IN AN ORGANIZED HEALTH CARE EDUCATION/TRAINING PROGRAM

## 2025-02-23 PROCEDURE — 2500000001 HC RX 250 WO HCPCS SELF ADMINISTERED DRUGS (ALT 637 FOR MEDICARE OP)

## 2025-02-23 PROCEDURE — 2500000004 HC RX 250 GENERAL PHARMACY W/ HCPCS (ALT 636 FOR OP/ED)

## 2025-02-23 PROCEDURE — 85027 COMPLETE CBC AUTOMATED: CPT | Performed by: STUDENT IN AN ORGANIZED HEALTH CARE EDUCATION/TRAINING PROGRAM

## 2025-02-23 PROCEDURE — 99232 SBSQ HOSP IP/OBS MODERATE 35: CPT | Performed by: OTOLARYNGOLOGY

## 2025-02-23 PROCEDURE — 2500000001 HC RX 250 WO HCPCS SELF ADMINISTERED DRUGS (ALT 637 FOR MEDICARE OP): Performed by: STUDENT IN AN ORGANIZED HEALTH CARE EDUCATION/TRAINING PROGRAM

## 2025-02-23 PROCEDURE — 36415 COLL VENOUS BLD VENIPUNCTURE: CPT | Performed by: STUDENT IN AN ORGANIZED HEALTH CARE EDUCATION/TRAINING PROGRAM

## 2025-02-23 PROCEDURE — 1200000003 HC ONCOLOGY  ROOM WITH TELEMETRY DAILY

## 2025-02-23 PROCEDURE — 80069 RENAL FUNCTION PANEL: CPT | Performed by: STUDENT IN AN ORGANIZED HEALTH CARE EDUCATION/TRAINING PROGRAM

## 2025-02-23 PROCEDURE — 2500000005 HC RX 250 GENERAL PHARMACY W/O HCPCS: Performed by: NURSE PRACTITIONER

## 2025-02-23 RX ORDER — MAGNESIUM SULFATE HEPTAHYDRATE 40 MG/ML
2 INJECTION, SOLUTION INTRAVENOUS ONCE
Status: COMPLETED | OUTPATIENT
Start: 2025-02-23 | End: 2025-02-23

## 2025-02-23 RX ADMIN — ATORVASTATIN CALCIUM 80 MG: 80 TABLET, FILM COATED ORAL at 08:35

## 2025-02-23 RX ADMIN — METOPROLOL TARTRATE 12.5 MG: 25 TABLET, FILM COATED ORAL at 20:05

## 2025-02-23 RX ADMIN — LIDOCAINE 4% 1 PATCH: 40 PATCH TOPICAL at 08:35

## 2025-02-23 RX ADMIN — MAGNESIUM OXIDE TAB 400 MG (241.3 MG ELEMENTAL MG) 400 MG: 400 (241.3 MG) TAB at 08:35

## 2025-02-23 RX ADMIN — ESOMEPRAZOLE MAGNESIUM 40 MG: 40 FOR SUSPENSION ORAL at 06:00

## 2025-02-23 RX ADMIN — METOPROLOL TARTRATE 12.5 MG: 25 TABLET, FILM COATED ORAL at 08:35

## 2025-02-23 RX ADMIN — HEPARIN SODIUM 5000 UNITS: 5000 INJECTION, SOLUTION INTRAVENOUS; SUBCUTANEOUS at 00:39

## 2025-02-23 RX ADMIN — POLYETHYLENE GLYCOL 3350 17 G: 17 POWDER, FOR SOLUTION ORAL at 08:35

## 2025-02-23 RX ADMIN — OXYCODONE HYDROCHLORIDE 5 MG: 5 SOLUTION ORAL at 22:02

## 2025-02-23 RX ADMIN — HEPARIN SODIUM 5000 UNITS: 5000 INJECTION, SOLUTION INTRAVENOUS; SUBCUTANEOUS at 08:35

## 2025-02-23 RX ADMIN — HEPARIN SODIUM 5000 UNITS: 5000 INJECTION, SOLUTION INTRAVENOUS; SUBCUTANEOUS at 17:42

## 2025-02-23 RX ADMIN — MAGNESIUM SULFATE HEPTAHYDRATE 2 G: 2 INJECTION, SOLUTION INTRAVENOUS at 09:04

## 2025-02-23 RX ADMIN — ACETAMINOPHEN 1000 MG: 160 SOLUTION ORAL at 13:09

## 2025-02-23 RX ADMIN — THYROID, PORCINE 15 MG: 30 TABLET ORAL at 08:39

## 2025-02-23 RX ADMIN — SENNOSIDES 10 ML: 8.8 LIQUID ORAL at 08:35

## 2025-02-23 RX ADMIN — ACETAMINOPHEN 1000 MG: 160 SOLUTION ORAL at 22:02

## 2025-02-23 RX ADMIN — ACETAMINOPHEN 1000 MG: 160 SOLUTION ORAL at 06:00

## 2025-02-23 ASSESSMENT — PAIN SCALES - GENERAL
PAINLEVEL_OUTOF10: 4
PAINLEVEL_OUTOF10: 5 - MODERATE PAIN

## 2025-02-23 ASSESSMENT — PAIN DESCRIPTION - LOCATION: LOCATION: ABDOMEN

## 2025-02-23 NOTE — PROGRESS NOTES
Ear Nose & Throat Progress Note        Annette Fernandez is a 74 y.o. female on day 2 of admission presenting with Mass of tongue.     Subjective   Patient doing well. No acute events overnight.  There were concerns about elevated heart rate yesterday and metoprolol was restarted and patient has not required any additional O2 and heart rates have improved    Objective   Physical Exam  Vitals reviewed in EMR  Gen: NAD, AOx3, resting comfortably in bed  Eyes: EOMI, sclera clear, PERRL  Ears: Normal external ears bilaterally  Nose: No rhinorrhea; anterior nares clear  Oral Cavity: MMM  Head: normocephalic, atraumatic  Neck: Soft, no skin injury, No LAD  Resp: Breathing comfortably on room air  Cards: No clubbing/cyanosis/edema of hands  Gastro: Soft, non-distended, PEG tube in place, no signs of infection  : voids  MSK: Moves all extremities  Psych: Appropriate mood and affect    Last Recorded Vitals  Blood pressure 125/67, pulse 88, temperature 36.4 °C (97.5 °F), temperature source Temporal, resp. rate 18, height 1.524 m (5'), weight (!) 44.4 kg (97 lb 12.8 oz), SpO2 91%.  Intake/Output last 3 Shifts:  I/O last 3 completed shifts:  In: 1700 (38.3 mL/kg) [P.O.:700; NG/GT:1000]  Out: - (0 mL/kg)   Weight: 44.4 kg     Relevant Results  No results found for this or any previous visit (from the past 24 hours).     XR chest 1 view    Result Date: 2/22/2025  Interpreted By:  Gorge Pozo, STUDY: XR CHEST 1 VIEW;  2/22/2025 9:49 am   INDICATION: Signs/Symptoms:increased O2 requirement.     COMPARISON: Exam dated 02/05/2021 and 01/16/2021   ACCESSION NUMBER(S): [id number]   ORDERING CLINICIAN: ANJELICA RATLIFF   FINDINGS: AP radiograph of the chest was provided.       CARDIOMEDIASTINAL SILHOUETTE: Cardiomediastinal silhouette is normal in size and configuration.   LUNGS: Linear bibasilar opacities and blunting of the costophrenic angles. No definite pneumothorax.   ABDOMEN: Residual contrast material opacifies rugal folds  of the stomach. Small volume pneumoperitoneum underlying the right hemidiaphragm.   BONES: No acute osseous changes.       1.  Bibasilar atelectasis with possible trace bilateral pleural effusions. 2. Small volume pneumoperitoneum underlying the right hemidiaphragm likely related to recent PEG tube insertion. Recommend continued attention on follow-up.       MACRO: None   Signed by: Gorge Pozo 2/22/2025 1:41 PM Dictation workstation:   [id number]    FL modified barium swallow study    Result Date: 2/21/2025  Interpreted By:  Deyanira Lopez, STUDY: FL MODIFIED BARIUM SWALLOW STUDY;; 2/21/2025 2:13 pm   INDICATION: Signs/Symptoms:decreased po intake over several months/dysphagia.     COMPARISON: None.   ACCESSION NUMBER(S): [id number]   ORDERING CLINICIAN: ANJELICA RATLIFF   TECHNIQUE: Modified Barium Swallow Study completed. Informed verbal consent obtained prior to completion of exam. The study was completed per protocol with various liquid barium consistencies, pudding, and solids. A 1.9 cm or .75 inch (outer diameter) ring was placed on the chin in the lateral view and on the lateral, left side of the neck in the AP view in order to complete objective measurements during swallowing. The anatomic structures and function of the oropharynx, larynx, hypopharynx and cervical esophagus were evaluated.   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 Ratliff.  Pt reports dysphagia solids > liquids + odynophagia. Respiratory Status: Room air Current diet: NPO w/ ice chips after aggressive and frequent oral care per bedside swallow assessment; pt reports that she was tolerating a regular diet w/ thin liquids prior  to admission   Pain: Pain Scale: 0-10 Rating: No pain reported   RECOMMENDATIONS: Solid Diet Recommendations: Regular solids Liquid Diet Recommendations: Thin liquids Medications: Per pt preference; pt quarters/halves pills at baseline   Safe Swallow/Compensatory Strategies: Upright positioning Slow rate/small boluses Alternate solids and liquids   SLP PLAN: Skilled SLP Services: Skilled SLP intervention for dysphagia is warranted. SLP Frequency: 2x per week Duration: 2 weeks Treatment/Interventions: - Compensatory strategy training - Patient/caregiver education   Discussed POC: Patient Discussed Risks/Benefits: Yes Patient/Caregiver Agreeable: Yes     Short term goals established 02/21/25: - Patient will independently implement safe swallowing strategies to reduce risk of aspiration with use of compensatory strategies during 90% of therapeutic trials. Status: goal initiated - Patient/family will indicate understanding of dysphagia education: risk for aspiration, recommendations, and POC with > 80% accuracy via teach back method. Status: goal initiated   Long term goals 02/21/25: Patient will tolerate the least restrictive diet without overt difficulty or further pulmonary compromise by time of discharge.   Therapy Provided: SLP provided extensive education re: results and recommendations following MBSS. Discussed anatomy/physiology of swallow function, establishment of regular/thin diet, and the use of compensatory swallow strategies to promote pt safety upon PO intake. Pt indicated understanding via teach back method with > 90% accuracy.     Mechanics of the Swallow Summary: Oral Motor Assessment: Oral Hygiene: green discoloration on body of tongue Dentition: Present, adequate Oral Motor: WFL Facial Symmetry: WFL Vocal Quality (Perceptually): WFL   ORAL PHASE: Lip Closure - Escape progressing to mid-chin ; only during consecutive sips of thin via straw Tongue Control During Bolus Hold - Escape to lateral buccal  cavity and/or floor of mouth Bolus prep/mastication - Slow prolonged mastication with complete re-collection necessary Bolus transport/lingual motion - Brisk tongue motion for A-P movement of the bolus Oral residue - Residue collection on oral structure   PHARYNGEAL PHASE: Initiation of pharyngeal swallow - Bolus head at pit of pyriforms (w/ liquids) Soft palate elevation - No bolus between soft palate/pharyngeal wall Laryngeal elevation - Partial superior movement of thyroid cartilage and/or partial approximation of arytenoids to epiglottic petiole Anterior hyoid excursion - Partial anterior movement Epiglottic movement - Complete inversion Laryngeal vestibule closure - Incomplete - narrow column of air/contrast in laryngeal vestibule Pharyngeal stripping wave - Present, however, diminished Pharyngeal contraction (A/P view) - Complete Pharyngoesophageal segment opening - Partial distension/partial duration with partial obstruction of flow of bolus Tongue base retraction - Narrow column of contrast or air between tongue base and pharyngeal wall Pharyngeal residue - Collection of residue within or on the pharyngeal structures   ESOPHAGEAL PHASE: Esophageal clearance - Esophageal retention with retrograde flow below the pharyngoesophageal segment w/ puree; cleared with sip of thin liquids     SLP Impressions with Severity Rating: Pt presents with mild oropharyngeal dysphagia upon completion of modified barium swallow study this date. Swallowing physiology is detailed above. Impairments most impacting swallowing safety and efficiency include incomplete laryngeal vestibule elevation and closure. Trace penetration observed on 40ml presentation of thin liquids and 5ml mildly thick liquids; penetration cleared with completion of swallow.  Pharyngeal residue noted at the valleculae/BOT (increased w/ thicker consistencies), which may be impacted by known BOT mass.  Cleared with sips of liquids/independent subsequent  swallows.   *Of note: The A-P bolus follow-through is not intended to be utilized as a diagnostic assessment of the esophagus, rather a tool to observe the biomechanical aspects of the swallow continuum, and to inform the need for further evaluation by medical specialists, as applicable.     Eating Assessment Tool (EAT-10) 0=No problem, 1=Mild problem, 2=Mild to moderate problem, 3=Moderate problem, 4=Severe problem     My swallowing problem has caused me to lose weight. 2 My swallowing problem interferes with my ability to go out for meals. 2 Swallowing liquids takes extra effort. 2 Swallowing solids takes extra effort. 3 Swallowing pills takes extra effort. 3 Swallowing is painful. 3 The pleasure of eating is affected by my swallowing. 3 When I swallow food sticks in my throat. 3 I cough when I eat. 2 Swallowing is stressful. 3 TOTAL 26   EAT-10 TOTAL: 26 A total score of 3 or above may indicate difficulty with swallowing safety and/or efficiency     Rosenbek's Penetration Aspiration Scale Thin Liquids: 2. PENETRATION that CLEARS - contrast enter airway, above vocal cords, no residue Nectar Thick Liquids: 2. PENETRATION that CLEARS - contrast enter airway, above vocal cords, no residue Puree: 1. NO ASPIRATION & NO PENETRATION - no aspiration, contrast does not enter airway Solids: 1. NO ASPIRATION & NO PENETRATION - no aspiration, contrast does not enter airway Deyanira Lopez, clinical supervisor, present for duration of assessment.   Speech Therapy section of this report signed by Deyanira Lopez MS CCC/SLP on 2/21/2025 at 5:11 pm.   RADIOLOGY FINDINGS: Degenerative changes of the visualized lower spine. Mandibular and maxillary radiopaque teeth fillings.   Radiology section of this report signed by Dr. Manzano.       Speech pathology findings as above.   MACRO: None     Dictation workstation:   [id number]      Scheduled medications  acetaminophen, 1,000 mg, g-tube, q8h MIRANDA  atorvastatin, 80 mg, oral,  Daily  esomeprazole, 40 mg, g-tube, Daily before breakfast  heparin (porcine), 5,000 Units, subcutaneous, q8h  lidocaine, 1 patch, transdermal, q24h  magnesium oxide, 400 mg, oral, Daily  metoprolol tartrate, 12.5 mg, oral, BID  polyethylene glycol, 17 g, g-tube, Daily  senna, 10 mL, g-tube, BID  thyroid (pork), 15 mg, oral, Daily      Continuous medications       PRN medications  PRN medications: naloxone, ondansetron, oxyCODONE, oxyCODONE                        Assessment/Plan   Assessment & Plan  Mass of tongue  Ube I nplace  Oropharyngeal lesion    Assessment:  74 year old female with a 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 Gamboa.    Active Issues:  Base of Tongue SCC  Underweight per BMI  Hypomagnesia  Dysphagia      Plan:  -Analgesia: Scheduled Acetaminophen prn Oxycodone  -FEN: mIVFs,trickle ; monitor and replete electrolytes as needed  -Pulm: wean oxygen to room air, Incentive Spirometer, if there are concerns for increased O2 requirement, may acquire CT PE  -ID: No current interventions  -Cardiac: Vitals Q4hr,   -Endo: restart home thyroid hormone  -GI: Bowel regimen, PPI,  and PRN anti-emetic  -: Voids  -Steroids/Special: SLP consulted for Swallow eval->passed MBS, cleared for regular diet  -Embolic PPx: SQH, SCDs while in bed  -Dispo: Discharge planning for POD 2 vs 3 home with home care vs skilled nursing facility    Angel Collins MD - PGY2  Otolaryngology - Head & Neck Surgery  [facility]    ENT Consult pager: [phone]  ENT Peds pager: [phone]  ENT Head & Neck Surgery Phone: [phone]  ENT subspecialty team: Roberto individual resident who wrote today's note  ENT Outpatient scheduling number: [phone]  Please Page if Urgent

## 2025-02-23 NOTE — SIGNIFICANT EVENT
PAST SURGICAL HISTORY:  H/O right knee surgery      Tele strip completed w/ outgoing nurse, batteries replaced.

## 2025-02-23 NOTE — Clinical Note
.Heart Rate:  []   Temp:  [36.4 °C (97.5 °F)-37.1 °C (98.8 °F)]   Resp:  [16-18]   BP: (108-134)/(66-74)   SpO2:  [88 %-95 %]   .  Intake/Output Summary (Last 24 hours) at 2/23/2025 0543  Last data filed at 2/22/2025 2015  Gross per 24 hour   Intake 1400 ml   Output --   Net 1400 ml

## 2025-02-23 NOTE — PROGRESS NOTES
Per chart plan was DC home with [facility] and tube feed; information out to [facility].   2/21/25 Passed speech eval and has been tolerating diet .    Updates sent to [facility]; need to clarify if coverage still available with diet .    Per Speech continued SLP intervention warranted; will need outpatient orders for SLP.     Daughter will transport patient home. Insurance: Medicare and [employer]. PCP: Parminder Russell MD. Pharmacy: [facility]. TCC to continue to follow for discharge planning.

## 2025-02-24 ENCOUNTER — APPOINTMENT (OUTPATIENT)
Dept: RADIOLOGY | Facility: HOSPITAL | Age: 75
DRG: 147 | End: 2025-02-24
Payer: MEDICARE

## 2025-02-24 LAB
ALBUMIN SERPL BCP-MCNC: 3.1 G/DL (ref 3.4–5)
ANION GAP SERPL CALC-SCNC: 13 MMOL/L (ref 10–20)
APPEARANCE UR: ABNORMAL
BACTERIA #/AREA URNS AUTO: ABNORMAL /HPF
BILIRUB UR STRIP.AUTO-MCNC: NEGATIVE MG/DL
BUN SERPL-MCNC: 11 MG/DL (ref 6–23)
CALCIUM SERPL-MCNC: 8.9 MG/DL (ref 8.6–10.6)
CHLORIDE SERPL-SCNC: 95 MMOL/L (ref 98–107)
CO2 SERPL-SCNC: 31 MMOL/L (ref 21–32)
COLOR UR: YELLOW
CREAT SERPL-MCNC: 0.51 MG/DL (ref 0.5–1.05)
EGFRCR SERPLBLD CKD-EPI 2021: >90 ML/MIN/1.73M*2
ERYTHROCYTE [DISTWIDTH] IN BLOOD BY AUTOMATED COUNT: 13 % (ref 11.5–14.5)
FOLATE SERPL-MCNC: 4.9 NG/ML
GLUCOSE SERPL-MCNC: 107 MG/DL (ref 74–99)
GLUCOSE UR STRIP.AUTO-MCNC: NORMAL MG/DL
HCT VFR BLD AUTO: 38.8 % (ref 36–46)
HGB BLD-MCNC: 12.2 G/DL (ref 12–16)
HOLD SPECIMEN: NORMAL
KETONES UR STRIP.AUTO-MCNC: NEGATIVE MG/DL
LEUKOCYTE ESTERASE UR QL STRIP.AUTO: NEGATIVE
MAGNESIUM SERPL-MCNC: 1.89 MG/DL (ref 1.6–2.4)
MCH RBC QN AUTO: 32.2 PG (ref 26–34)
MCHC RBC AUTO-ENTMCNC: 31.4 G/DL (ref 32–36)
MCV RBC AUTO: 102 FL (ref 80–100)
MUCOUS THREADS #/AREA URNS AUTO: ABNORMAL /LPF
NITRITE UR QL STRIP.AUTO: NEGATIVE
NRBC BLD-RTO: 0 /100 WBCS (ref 0–0)
PH UR STRIP.AUTO: 7 [PH]
PHOSPHATE SERPL-MCNC: 2.6 MG/DL (ref 2.5–4.9)
PLATELET # BLD AUTO: 373 X10*3/UL (ref 150–450)
POTASSIUM SERPL-SCNC: 4.2 MMOL/L (ref 3.5–5.3)
PROT UR STRIP.AUTO-MCNC: ABNORMAL MG/DL
RBC # BLD AUTO: 3.79 X10*6/UL (ref 4–5.2)
RBC # UR STRIP.AUTO: NEGATIVE MG/DL
RBC #/AREA URNS AUTO: ABNORMAL /HPF
SODIUM SERPL-SCNC: 135 MMOL/L (ref 136–145)
SP GR UR STRIP.AUTO: 1.02
SQUAMOUS #/AREA URNS AUTO: ABNORMAL /HPF
UROBILINOGEN UR STRIP.AUTO-MCNC: NORMAL MG/DL
WBC # BLD AUTO: 13.5 X10*3/UL (ref 4.4–11.3)
WBC #/AREA URNS AUTO: ABNORMAL /HPF

## 2025-02-24 PROCEDURE — 74018 RADEX ABDOMEN 1 VIEW: CPT | Performed by: RADIOLOGY

## 2025-02-24 PROCEDURE — 2500000004 HC RX 250 GENERAL PHARMACY W/ HCPCS (ALT 636 FOR OP/ED): Performed by: STUDENT IN AN ORGANIZED HEALTH CARE EDUCATION/TRAINING PROGRAM

## 2025-02-24 PROCEDURE — 85027 COMPLETE CBC AUTOMATED: CPT | Performed by: STUDENT IN AN ORGANIZED HEALTH CARE EDUCATION/TRAINING PROGRAM

## 2025-02-24 PROCEDURE — 80069 RENAL FUNCTION PANEL: CPT | Performed by: STUDENT IN AN ORGANIZED HEALTH CARE EDUCATION/TRAINING PROGRAM

## 2025-02-24 PROCEDURE — 2500000004 HC RX 250 GENERAL PHARMACY W/ HCPCS (ALT 636 FOR OP/ED)

## 2025-02-24 PROCEDURE — 1200000003 HC ONCOLOGY  ROOM WITH TELEMETRY DAILY

## 2025-02-24 PROCEDURE — 36415 COLL VENOUS BLD VENIPUNCTURE: CPT | Performed by: STUDENT IN AN ORGANIZED HEALTH CARE EDUCATION/TRAINING PROGRAM

## 2025-02-24 PROCEDURE — 83735 ASSAY OF MAGNESIUM: CPT | Performed by: STUDENT IN AN ORGANIZED HEALTH CARE EDUCATION/TRAINING PROGRAM

## 2025-02-24 PROCEDURE — 2500000001 HC RX 250 WO HCPCS SELF ADMINISTERED DRUGS (ALT 637 FOR MEDICARE OP): Performed by: STUDENT IN AN ORGANIZED HEALTH CARE EDUCATION/TRAINING PROGRAM

## 2025-02-24 PROCEDURE — 74018 RADEX ABDOMEN 1 VIEW: CPT

## 2025-02-24 PROCEDURE — 81001 URINALYSIS AUTO W/SCOPE: CPT | Performed by: STUDENT IN AN ORGANIZED HEALTH CARE EDUCATION/TRAINING PROGRAM

## 2025-02-24 PROCEDURE — 99232 SBSQ HOSP IP/OBS MODERATE 35: CPT | Performed by: OTOLARYNGOLOGY

## 2025-02-24 PROCEDURE — 2500000005 HC RX 250 GENERAL PHARMACY W/O HCPCS: Performed by: NURSE PRACTITIONER

## 2025-02-24 PROCEDURE — 83921 ORGANIC ACID SINGLE QUANT: CPT | Performed by: STUDENT IN AN ORGANIZED HEALTH CARE EDUCATION/TRAINING PROGRAM

## 2025-02-24 PROCEDURE — 82746 ASSAY OF FOLIC ACID SERUM: CPT | Performed by: STUDENT IN AN ORGANIZED HEALTH CARE EDUCATION/TRAINING PROGRAM

## 2025-02-24 RX ADMIN — SENNOSIDES 10 ML: 8.8 LIQUID ORAL at 21:09

## 2025-02-24 RX ADMIN — ACETAMINOPHEN 1000 MG: 160 SOLUTION ORAL at 21:09

## 2025-02-24 RX ADMIN — LIDOCAINE 4% 1 PATCH: 40 PATCH TOPICAL at 08:55

## 2025-02-24 RX ADMIN — METOPROLOL TARTRATE 12.5 MG: 25 TABLET, FILM COATED ORAL at 08:53

## 2025-02-24 RX ADMIN — THYROID, PORCINE 15 MG: 30 TABLET ORAL at 08:53

## 2025-02-24 RX ADMIN — HEPARIN SODIUM 5000 UNITS: 5000 INJECTION, SOLUTION INTRAVENOUS; SUBCUTANEOUS at 08:53

## 2025-02-24 RX ADMIN — POLYETHYLENE GLYCOL 3350 17 G: 17 POWDER, FOR SOLUTION ORAL at 08:53

## 2025-02-24 RX ADMIN — HEPARIN SODIUM 5000 UNITS: 5000 INJECTION, SOLUTION INTRAVENOUS; SUBCUTANEOUS at 00:12

## 2025-02-24 RX ADMIN — HEPARIN SODIUM 5000 UNITS: 5000 INJECTION, SOLUTION INTRAVENOUS; SUBCUTANEOUS at 23:41

## 2025-02-24 RX ADMIN — METOPROLOL TARTRATE 12.5 MG: 25 TABLET, FILM COATED ORAL at 21:09

## 2025-02-24 RX ADMIN — ONDANSETRON 4 MG: 2 INJECTION INTRAMUSCULAR; INTRAVENOUS at 06:25

## 2025-02-24 RX ADMIN — ACETAMINOPHEN 1000 MG: 160 SOLUTION ORAL at 06:12

## 2025-02-24 RX ADMIN — HEPARIN SODIUM 5000 UNITS: 5000 INJECTION, SOLUTION INTRAVENOUS; SUBCUTANEOUS at 16:22

## 2025-02-24 RX ADMIN — MAGNESIUM OXIDE TAB 400 MG (241.3 MG ELEMENTAL MG) 400 MG: 400 (241.3 MG) TAB at 08:53

## 2025-02-24 RX ADMIN — SENNOSIDES 10 ML: 8.8 LIQUID ORAL at 08:53

## 2025-02-24 RX ADMIN — ACETAMINOPHEN 1000 MG: 160 SOLUTION ORAL at 14:05

## 2025-02-24 RX ADMIN — ATORVASTATIN CALCIUM 80 MG: 80 TABLET, FILM COATED ORAL at 08:53

## 2025-02-24 ASSESSMENT — PAIN DESCRIPTION - ORIENTATION: ORIENTATION: LEFT

## 2025-02-24 ASSESSMENT — COGNITIVE AND FUNCTIONAL STATUS - GENERAL
TURNING FROM BACK TO SIDE WHILE IN FLAT BAD: A LITTLE
DAILY ACTIVITIY SCORE: 24
MOBILITY SCORE: 18
MOVING FROM LYING ON BACK TO SITTING ON SIDE OF FLAT BED WITH BEDRAILS: A LITTLE
CLIMB 3 TO 5 STEPS WITH RAILING: A LITTLE
DAILY ACTIVITIY SCORE: 18
DRESSING REGULAR UPPER BODY CLOTHING: A LITTLE
PERSONAL GROOMING: A LITTLE
TOILETING: A LITTLE
MOBILITY SCORE: 24
EATING MEALS: A LITTLE
DRESSING REGULAR LOWER BODY CLOTHING: A LITTLE
HELP NEEDED FOR BATHING: A LITTLE
WALKING IN HOSPITAL ROOM: A LITTLE
MOVING TO AND FROM BED TO CHAIR: A LITTLE
STANDING UP FROM CHAIR USING ARMS: A LITTLE

## 2025-02-24 ASSESSMENT — PAIN SCALES - GENERAL
PAINLEVEL_OUTOF10: 3
PAINLEVEL_OUTOF10: 4
PAINLEVEL_OUTOF10: 2

## 2025-02-24 ASSESSMENT — PAIN DESCRIPTION - LOCATION: LOCATION: ABDOMEN

## 2025-02-24 NOTE — PROGRESS NOTES
Annette Fernandez is a 74 y.o. female on day 3 of admission presenting with Mass of tongue.     Subjective   Overnight, nausea and emesis. Tube feeds were stopped ~0300 by nursing for emesis. PEG now set to gravity.      Objective     Last Recorded Vitals  Blood pressure 148/80, pulse 87, temperature 36.2 °C (97.2 °F), temperature source Temporal, resp. rate 18, height 1.524 m (5'), weight (!) 44.4 kg (97 lb 12.8 oz), SpO2 92%.  Intake/Output last 3 Shifts:  I/O last 3 completed shifts:  In: 1240 (28 mL/kg) [P.O.:450; NG/GT:790]  Out: - (0 mL/kg)   Weight: 44.4 kg     Vitals reviewed in EMR  Gen: NAD, AOx3, resting comfortably in bed  Eyes: EOMI, sclera clear, PERRL  Ears: Normal external ears bilaterally  Nose: No rhinorrhea; anterior nares clear  Oral Cavity: MMM  Head: normocephalic, atraumatic  Neck: Soft, no skin injury, No LAD  Resp: Breathing comfortably on room air  Cards: No clubbing/cyanosis/edema of hands  Gastro: Soft, non-distended, PEG tube in place, no signs of infection  : voids  MSK: Moves all extremities  Psych: Appropriate mood and affect    Relevant Results  No results found for this or any previous visit (from the past 24 hours).      Scheduled medications  acetaminophen, 1,000 mg, g-tube, q8h MIRANDA  atorvastatin, 80 mg, oral, Daily  esomeprazole, 40 mg, g-tube, Daily before breakfast  heparin (porcine), 5,000 Units, subcutaneous, q8h  lidocaine, 1 patch, transdermal, q24h  magnesium oxide, 400 mg, oral, Daily  metoprolol tartrate, 12.5 mg, oral, BID  polyethylene glycol, 17 g, g-tube, Daily  senna, 10 mL, g-tube, BID  thyroid (pork), 15 mg, oral, Daily      Continuous medications     PRN medications  PRN medications: naloxone, ondansetron, oxyCODONE, oxyCODONE         Malnutrition Diagnosis Status: New  Malnutrition Diagnosis: Moderate malnutrition related to chronic disease or condition     As Evidenced by: <75% of estimated energy needs for >= 1 month; 5% wt loss x 1 month  I agree with the  dietitian's malnutrition diagnosis.    Assessment/Plan   Assessment & Plan  Mass of tongue  Ube I nplace  Oropharyngeal lesion    Assessment:  74 year old female with a 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 Gamboa.     Active Issues:  Base of Tongue SCC  Underweight per BMI  Hypomagnesia  Dysphagia     Plan:  -Analgesia: Scheduled Acetaminophen prn Oxycodone  -FEN: mIVFs,trickle ; monitor and replete electrolytes as needed  -Pulm: wean oxygen to room air, Incentive Spirometer  -ID: No current interventions  -Cardiac: Vitals Q4hr,   -Endo: restart home thyroid hormone  -GI: Bowel regimen, PPI,  and PRN anti-emetic  -: Voids  -Steroids/Special: SLP consulted for Swallow eval->passed MBS, cleared for regular diet  -Embolic PPx: SQH, SCDs while in bed  -Dispo: Discharge planning for POD 2 vs 3 home with home care vs skilled nursing facility

## 2025-02-24 NOTE — CARE PLAN
The patient's goals for the shift include      The clinical goals for the shift include pt will report adequate pain control throughout shift    Over the shift, the patient did not make progress toward the following goals. Barriers to progression include. Recommendations to address these barriers include.    Problem: Pain - Adult  Goal: Verbalizes/displays adequate comfort level or baseline comfort level  Outcome: Progressing     Problem: Safety - Adult  Goal: Free from fall injury  Outcome: Progressing     Problem: Discharge Planning  Goal: Discharge to home or other facility with appropriate resources  Outcome: Progressing     Problem: Chronic Conditions and Co-morbidities  Goal: Patient's chronic conditions and co-morbidity symptoms are monitored and maintained or improved  Outcome: Progressing     Problem: Nutrition  Goal: Nutrient intake appropriate for maintaining nutritional needs  Outcome: Progressing

## 2025-02-24 NOTE — PROGRESS NOTES
Nutrition Follow Up Assessment:   Nutrition Assessment    The patient is a 74 y.o. female who is hospital day #3.  Pt with BOT SCC status post triple endoscopy & PEG tube placement. Pt evaluated by SLP and diet advanced to regular diet w/ thin liquids.     PMHx:     Reason for Assessment: Provider consult order (Patient has mixed regular diet and tube feeds. Would like to assess to determine how much TF she would need while also maintaining her swallow)    Nutrition History:  Food and Nutrient History: TF paused at time of visit. Pt unsure when it was stopped. Thinks she was tolerating the regimen well. Does note an episode of emesis but unsure if it was d/t TF or while TF running. PO intake remains low. Thinks it was better at home. Food here does not taste the best. Endorses food tasting metallic - this is new. Breakfast tray at bedside - pt reports she will try to eat 2 more bites of the cream of wheat. This would be <25%of the tray consumed. Pt reports sheis trying to eat as much as she can but PO intake is low. Flowsheets indicate pt consumingn 25% of meals>> 02/23 breakfast= 110 kcal and 1gpro and 02/22 breakfast  = 80 kcal and 1g pro. Pt only getting about 2 meals a day since diet advanced. No D/C.  Per flowsheets, TF reached goal rate of 40 ml/hr on 02/22 PM. Stopped 02/23 AM and restarted at 6pm. Emesis of 150 ml recorded on 02/24 AM - unsure if TF was stopped before or after this.       Anthropometrics:  Start of admission anthropometrics:  Height: 152.4 cm (5')  Weight: (!) 44 kg (97 lb)  BMI (Calculated): 18.94  IBW/kg (Dietitian Calculated): 45.5 kg  Percent of IBW: 97 %    Weight History / % Weight Change: No new wt to assess.    Nutrition Focused Physical Exam Findings:  NFPE done 02/21    Edema  Edema: none  Physical Findings   Skin: Positive (PEG site)      Last BM Date: 02/23/25    Nutrition Significant Labs:    Results from last 7 days   Lab Units 02/24/25  0608 02/23/25  0636 02/22/25  0604  "02/21/25  0759   HEMOGLOBIN g/dL 12.2 11.4* 12.7 12.6   MCV fL 102* 105* 102* 101*   GLUCOSE mg/dL 107* 105* 70* 96   POTASSIUM mmol/L 4.2 4.2 3.3* 4.5   SODIUM mmol/L 135* 135* 141 140   PHOSPHORUS mg/dL 2.6 2.6 3.5 3.7   MAGNESIUM mg/dL 1.89 1.55* 1.64 1.61   CREATININE mg/dL 0.51 0.55 0.54 0.58   BUN mg/dL 11 12 10 11     Lab Results   Component Value Date    [id number] 279 01/15/2021     Lab Results   Component Value Date    FOLATE 4.2 (A) 01/15/2021     No results found for: \"MMAREF\"        Nutrition Specific Medications:  atorvastatin, 80 mg, oral, Daily  esomeprazole, 40 mg, g-tube, Daily before breakfast  magnesium oxide, 400 mg, oral, Daily  polyethylene glycol, 17 g, g-tube, Daily  senna, 10 mL, g-tube, BID  thyroid (pork), 15 mg, oral, Daily    I/O:   I/O last 2 completed shifts:  In: 1030 (23.2 mL/kg) [P.O.:450; NG/GT:580]  Out: - (0 mL/kg)   Weight: 44.4 kg     Dietary Orders (From admission, onward)       Start     Ordered    02/23/25 1047  Enteral feeding WITH diet order Diet type: Regular; Fluid consistency: Thin 0; Tube feeding formula: Isosource 1.5; 40  Continuous        Comments: Initiate Isosource 1.5 @ 40ml/hr   i. FWF: 180ml 4x/day or per MD    Run from 6p-6am   Question Answer Comment   Diet type Regular    Fluid consistency Thin 0    Tube feeding formula: Isosource 1.5    Tube feeding continuous rate (mL/hr): 40        02/23/25 1047    02/20/25 1553  May Not Participate in Room Service  ( ROOM SERVICE MAY NOT PARTICIPATE)  Once        Question:  .  Answer:  Yes    02/20/25 1552                     Estimated Needs:   Total Energy Estimated Needs in 24 hours (kCal):  (4695-4166)  Method for Estimating Needs: ABW x 30-35    Total Protein Estimated Needs in 24 Hours (g):  (57+)  Method for Estimating 24 Hour Protein Needs: ABW x 1.3    Total Fluid Estimated Needs in 24 Hours (mL):  (1400)          Nutrition Diagnosis   Malnutrition Diagnosis  Patient has Malnutrition Diagnosis: Yes  Diagnosis " Status: Active  Malnutrition Diagnosis: Moderate malnutrition related to chronic disease or condition  As Evidenced by: <75% of estimated energy needs for >= 1 month; 5% wt loss x 1 month            Nutrition Interventions/Recommendations   Nutrition prescription for oral nutrition, Nutrition prescription for enteral nutrition    Nutrition Recommendations:  Pt with minimal PO intake at the moment - meeting <20% of kcal/pro needs. Plan for TF to meet 100% of nutrient needs and be primary source of nutrition.   PO intake will be considered extra/for pleasure.   Bolus  TF regimen:   Isosource 1.5 bolus @250 ml x4/d (4 cartons/d)   FWF: 60 ml pre/post feeds w/ additional per team.   This regimen provides: 1000 ml, 1500 kcal, 68g protein, 1244 ml free water.   > If pt unable to tolerate bolus feed via syringe method consider administering feeds using pump (@400 ml/hr x40 min). If pt tolerates slower infusion then she will need discharged with gravity bags.    Recommend outpatient RDN referral to adjust EN as needed to promote nutritional optimization.   Check vitamin B12 and folic acid levels as MCV>100.    Nutrition Interventions/Goals:   Enteral Intake: Management of delivery rate of enteral nutrition, Management of schedule of enteral nutrition  Goal: Pt tolerates transition to bolus feeds.    Nutrition Education:Discussed MNT for dysgeusia/metallic taste.        Nutrition Monitoring and Evaluation   Monitoring and Evaluation Plan: Enteral and parenteral nutrition intake determination, Estimated Energy Intake  Estimated Energy Intake: Energy intake 50 -75% of estimated energy needs  Enteral and Parenteral Nutrition Intake Determination: Enteral nutrition intake - Prevent refeeding    Monitoring and Evaluation Plan: Body weight  Body Weight: Body weight - Maintain stable weight, Body weight - Promote weight restoration    Monitoring and Evaluation Plan: Electrolyte/renal panel, Glucose/endocrine profile  Electrolyte  and Renal Panel: Electrolytes within normal limits  Glucose/Endocrine Profile: Glucose within normal limits ( mg/dL)         Some progress toward goal(s)         Time Spent (min): 45 minutes

## 2025-02-25 ENCOUNTER — DOCUMENTATION (OUTPATIENT)
Dept: HOME HEALTH SERVICES | Facility: HOME HEALTH | Age: 75
End: 2025-02-25

## 2025-02-25 ENCOUNTER — PHARMACY VISIT (OUTPATIENT)
Dept: PHARMACY | Facility: CLINIC | Age: 75
End: 2025-02-25
Payer: COMMERCIAL

## 2025-02-25 ENCOUNTER — APPOINTMENT (OUTPATIENT)
Facility: CLINIC | Age: 75
End: 2025-02-25
Payer: MEDICARE

## 2025-02-25 VITALS
HEIGHT: 60 IN | WEIGHT: 97.8 LBS | OXYGEN SATURATION: 94 % | DIASTOLIC BLOOD PRESSURE: 77 MMHG | HEART RATE: 97 BPM | SYSTOLIC BLOOD PRESSURE: 159 MMHG | BODY MASS INDEX: 19.2 KG/M2 | RESPIRATION RATE: 18 BRPM | TEMPERATURE: 97.3 F

## 2025-02-25 LAB
ALBUMIN SERPL BCP-MCNC: 3 G/DL (ref 3.4–5)
ANION GAP SERPL CALC-SCNC: 16 MMOL/L (ref 10–20)
BUN SERPL-MCNC: 13 MG/DL (ref 6–23)
CALCIUM SERPL-MCNC: 8.9 MG/DL (ref 8.6–10.6)
CHLORIDE SERPL-SCNC: 97 MMOL/L (ref 98–107)
CO2 SERPL-SCNC: 28 MMOL/L (ref 21–32)
CREAT SERPL-MCNC: 0.68 MG/DL (ref 0.5–1.05)
EGFRCR SERPLBLD CKD-EPI 2021: >90 ML/MIN/1.73M*2
ERYTHROCYTE [DISTWIDTH] IN BLOOD BY AUTOMATED COUNT: 12.8 % (ref 11.5–14.5)
GLUCOSE SERPL-MCNC: 131 MG/DL (ref 74–99)
HCT VFR BLD AUTO: 37 % (ref 36–46)
HGB BLD-MCNC: 12.4 G/DL (ref 12–16)
MAGNESIUM SERPL-MCNC: 1.86 MG/DL (ref 1.6–2.4)
MCH RBC QN AUTO: 32.9 PG (ref 26–34)
MCHC RBC AUTO-ENTMCNC: 33.5 G/DL (ref 32–36)
MCV RBC AUTO: 98 FL (ref 80–100)
NRBC BLD-RTO: 0 /100 WBCS (ref 0–0)
PHOSPHATE SERPL-MCNC: 2.5 MG/DL (ref 2.5–4.9)
PLATELET # BLD AUTO: 389 X10*3/UL (ref 150–450)
POTASSIUM SERPL-SCNC: 4.5 MMOL/L (ref 3.5–5.3)
RBC # BLD AUTO: 3.77 X10*6/UL (ref 4–5.2)
SODIUM SERPL-SCNC: 136 MMOL/L (ref 136–145)
WBC # BLD AUTO: 13.3 X10*3/UL (ref 4.4–11.3)

## 2025-02-25 PROCEDURE — 2500000005 HC RX 250 GENERAL PHARMACY W/O HCPCS: Performed by: NURSE PRACTITIONER

## 2025-02-25 PROCEDURE — RXMED WILLOW AMBULATORY MEDICATION CHARGE

## 2025-02-25 PROCEDURE — 2500000001 HC RX 250 WO HCPCS SELF ADMINISTERED DRUGS (ALT 637 FOR MEDICARE OP): Performed by: STUDENT IN AN ORGANIZED HEALTH CARE EDUCATION/TRAINING PROGRAM

## 2025-02-25 PROCEDURE — 2500000004 HC RX 250 GENERAL PHARMACY W/ HCPCS (ALT 636 FOR OP/ED)

## 2025-02-25 PROCEDURE — 2500000004 HC RX 250 GENERAL PHARMACY W/ HCPCS (ALT 636 FOR OP/ED): Performed by: STUDENT IN AN ORGANIZED HEALTH CARE EDUCATION/TRAINING PROGRAM

## 2025-02-25 PROCEDURE — 36415 COLL VENOUS BLD VENIPUNCTURE: CPT | Performed by: STUDENT IN AN ORGANIZED HEALTH CARE EDUCATION/TRAINING PROGRAM

## 2025-02-25 PROCEDURE — 85027 COMPLETE CBC AUTOMATED: CPT | Performed by: STUDENT IN AN ORGANIZED HEALTH CARE EDUCATION/TRAINING PROGRAM

## 2025-02-25 PROCEDURE — 83735 ASSAY OF MAGNESIUM: CPT | Performed by: STUDENT IN AN ORGANIZED HEALTH CARE EDUCATION/TRAINING PROGRAM

## 2025-02-25 PROCEDURE — 84100 ASSAY OF PHOSPHORUS: CPT | Performed by: STUDENT IN AN ORGANIZED HEALTH CARE EDUCATION/TRAINING PROGRAM

## 2025-02-25 PROCEDURE — 92526 ORAL FUNCTION THERAPY: CPT | Mod: GN

## 2025-02-25 RX ORDER — POLYETHYLENE GLYCOL 3350 17 G/17G
17 POWDER, FOR SOLUTION ORAL DAILY
Qty: 14 EACH | Refills: 0 | Status: SHIPPED | OUTPATIENT
Start: 2025-02-25

## 2025-02-25 RX ORDER — OXYCODONE HCL 5 MG/5 ML
5 SOLUTION, ORAL ORAL EVERY 4 HOURS PRN
Qty: 90 ML | Refills: 0 | Status: SHIPPED | OUTPATIENT
Start: 2025-02-25 | End: 2025-02-28

## 2025-02-25 RX ADMIN — MAGNESIUM OXIDE TAB 400 MG (241.3 MG ELEMENTAL MG) 400 MG: 400 (241.3 MG) TAB at 09:49

## 2025-02-25 RX ADMIN — LIDOCAINE 4% 1 PATCH: 40 PATCH TOPICAL at 09:50

## 2025-02-25 RX ADMIN — ESOMEPRAZOLE MAGNESIUM 40 MG: 40 FOR SUSPENSION ORAL at 06:09

## 2025-02-25 RX ADMIN — METOPROLOL TARTRATE 12.5 MG: 25 TABLET, FILM COATED ORAL at 09:49

## 2025-02-25 RX ADMIN — ATORVASTATIN CALCIUM 80 MG: 80 TABLET, FILM COATED ORAL at 09:49

## 2025-02-25 RX ADMIN — ACETAMINOPHEN 1000 MG: 160 SOLUTION ORAL at 05:27

## 2025-02-25 RX ADMIN — HEPARIN SODIUM 5000 UNITS: 5000 INJECTION, SOLUTION INTRAVENOUS; SUBCUTANEOUS at 09:49

## 2025-02-25 RX ADMIN — SENNOSIDES 10 ML: 8.8 LIQUID ORAL at 09:49

## 2025-02-25 RX ADMIN — THYROID, PORCINE 15 MG: 30 TABLET ORAL at 10:00

## 2025-02-25 RX ADMIN — POLYETHYLENE GLYCOL 3350 17 G: 17 POWDER, FOR SOLUTION ORAL at 09:50

## 2025-02-25 ASSESSMENT — PAIN SCALES - GENERAL: PAINLEVEL_OUTOF10: 5 - MODERATE PAIN

## 2025-02-25 ASSESSMENT — COGNITIVE AND FUNCTIONAL STATUS - GENERAL
EATING MEALS: A LITTLE
DAILY ACTIVITIY SCORE: 23
MOBILITY SCORE: 24

## 2025-02-25 NOTE — HH CARE COORDINATION
Home Care received a Referral for Nursing, Physical Therapy, and Occupational Therapy. We have processed the referral for a Start of Care on 2/26/25.     If you have any questions or concerns, please feel free to contact us at [phone]. Follow the prompts, enter your five digit zip code, and you will be directed to your care team on [facility].

## 2025-02-25 NOTE — NURSING NOTE
Pt discharge complete pt teaching completed and return demonstration observed. Pt supplies delivered Iv removed from right arm tolerated well. After visit summary reviewed and all questions answered. Pt transported to [facility] for .

## 2025-02-25 NOTE — CARE PLAN
The patient's goals for the shift include      The clinical goals for the shift include remain safe and free from injury      Problem: Pain - Adult  Goal: Verbalizes/displays adequate comfort level or baseline comfort level  Outcome: Progressing     Problem: Safety - Adult  Goal: Free from fall injury  Outcome: Progressing     Problem: Discharge Planning  Goal: Discharge to home or other facility with appropriate resources  Outcome: Progressing     Problem: Chronic Conditions and Co-morbidities  Goal: Patient's chronic conditions and co-morbidity symptoms are monitored and maintained or improved  Outcome: Progressing

## 2025-02-25 NOTE — DISCHARGE SUMMARY
Discharge Diagnosis  Mass of tongue    Issues Requiring Follow-Up  Base of Tongue SCC  Malnutrition  Underweight per BMI  Hypomagnesia  Dysphagia    Test Results Pending At Discharge  Pending Labs       Order Current Status    Methylmalonic acid, serum In process    Surgical Pathology Exam In process            Hospital Course  Annette Fernandez is a 74 y.o. female with Mass of tongue, who presented for triple endoscopy with biopsy, PEG tube placement by Dr Gamboa on 2/21/2025. Patient had an uncomplicated surgical course. Patient recovered in PACU and was transferred to [address] for post-operative care.    Patient post-operative course was complicated by poor po intake. Nutrition was consulted; tube feeds were initiated and advanced until at goal rate, at which point she was transitioned to bolus feeds which she tolerated well. Electrolytes were replaced as needed. Speech therapy was consulted for swallow evaluation, with recommendations for regular solids and thin liquids. Patient also had episodes of tachycardia and desats post op with normal rhythm on EKG. CXR with b/l atelectasis with trace bilateral pleural effusions, small volume pneumoperitoneum likely 2/2 PEG. Got patient up, OOB, and using IS more frequently and episodes resolved.     On day of discharge, post-operative pain was well controlled with enteral pain medication, breathing on room air, voiding spontaneously, ambulating well. tolerating tube feeds. Follow-up arranged.      Pertinent Physical Exam At Time of Discharge  Gen: NAD, AOx3, resting comfortably in bed  Eyes: EOMI, sclera clear, PERRL  Ears: Normal external ears bilaterally  Nose: No rhinorrhea; anterior nares clear  Oral Cavity: MMM  Head: normocephalic, atraumatic  Neck: Soft, no skin injury, No LAD  Resp: Breathing comfortably on room air  Cards: No clubbing/cyanosis/edema of hands  Gastro: Soft, non-distended, PEG tube in place, no signs of infection  : voids  MSK: Moves all  extremities  Psych: Appropriate mood and affect    Home Medications     Medication List      START taking these medications     oxyCODONE 5 mg/5 mL solution; Commonly known as: Roxicodone; 5 mL (5 mg)   by g-tube route every 4 hours if needed for moderate pain (4 - 6) or   severe pain (7 - 10) for up to 3 days.   polyethylene glycol 17 gram packet; Commonly known as: Glycolax,   Miralax; 17 g by g-tube route once daily.     CONTINUE taking these medications     acetaminophen 500 mg tablet; Commonly known as: Tylenol   atorvastatin 80 mg tablet; Commonly known as: Lipitor; Take 1 tablet (80   mg) by mouth once daily.   metoprolol tartrate 25 mg tablet; Commonly known as: Lopressor; Take 1   tablet (25 mg) by mouth 2 times a day.   multivitamin tablet   NON FORMULARY   pantoprazole 40 mg EC tablet; Commonly known as: ProtoNix; Take 1 tablet   (40 mg) by mouth once daily. Do not crush, chew, or split.   thyroid (pork) 15 mg tablet tablet; Commonly known as: [place] Thyroid;   Take 1 tablet (15 mg) by mouth once daily.     STOP taking these medications     chlorhexidine 0.12 % solution; Commonly known as: Peridex   chlorhexidine 4 % external liquid; Commonly known as: Hibiclens   mometasone 0.1 % cream; Commonly known as: Elocon       Outpatient Follow-Up  Future Appointments   Date Time Provider Department Center   2/26/2025 To Be Determined Silvia Manzo RN [facility]   2/27/2025 To Be Determined Shayne Davila PT [facility]   2/27/2025 To Be Determined Santy Vick OT [facility]   3/5/2025  1:15 PM Parminder Russell MD [id number] [place]   3/11/2025 10:00 AM Kirt Gamboa MD [id number] [place]

## 2025-02-25 NOTE — PROGRESS NOTES
Speech-Language Pathology    SLP Adult Inpatient  Speech-Language Pathology Treatment     Patient Name: Annette Fernandez  MRN: [id number]  Today's Date: 2/25/2025   Time In: 1108  Time Out: 1132  Time Calculation (min): 24 min       SLP Assessment:  Per MBSS on 2/21/25, presents with mild oropharyngeal dysphagia-- appropriate for a regular solids/thin liquids diet.     Pt reported minimal PO intake 2' to decreased appetite and dysgeusia. She reports metallic and/or paper-like tastes while eating/drinking. She is using TF to help meet nutritional needs. Mild oropharyngeal dysphagia is stable.     Tentative plan to begin CxRT at [facility] to target BOT SCC. Completed pre-radiation education, re: side effects, possible anatomical/physiological changes, and prophylactic exercises.        Plan:  SLP Plan: Skilled SLP  SLP Frequency: 2x per week  Duration: 2 weeks  Discussed POC: Patient  Discussed Risks/Benefits: Yes  Patient/Caregiver Agreeable: Yes    Short term goals established 02/21/25:   - Patient will independently implement safe swallowing strategies to reduce risk of aspiration with use of compensatory strategies during 90% of therapeutic trials.              Status: goal initiated    - Patient/family will indicate understanding of dysphagia education: risk for aspiration, recommendations, and POC with > 80% accuracy via teach back method.              Status: goal initiated      Long term goals 02/21/25:   Patient will tolerate the least restrictive diet without overt difficulty or further pulmonary compromise by time of discharge.      Subjective   Pt identified at bedside. Awake and alert. No c/o pain. No guests present. Room air.     Hx: 74 year old female with a 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 Gamboa. MBSS on 2/21/25 recommended regular solid/thin liquid diet.       Objective   Discussed pt's current self-perception of swallow and PO intake. Pt denies  s/sx of aspiration with solids and liquids. She reported that her PO intake has decreased 2' to limited appetite and dysgeusia. She reported that she experiences metallic and/or paper-like tastes. Educated pt on importance of PO intake to prevent atrophy of the swallow mechanism, especially during upcoming CxRT.     Completed pre-xRT education. SLP provided extensive education to pt/pt family regarding anatomy/physiology of swallow function, risk of aspiration/aspiration PNA, diet modifications to consider as needed during XRT.  SLP discussed importance of maintaining PO intake as able throughout radiation. Pathophysiology of muscular changes over time exacerbating dysphagia were discussed in detail with benefits of home program/exercises in an attempt to combat these changes.    SLP provided education re: expected changes from radiation treatment to the mechanics of speech and swallowing function. Dental care and oral hygiene in relation to aspiration discussed, as well as the additional prognostic indicators that can contribute to higher risks of development of aspiration pneumonia. Side effects of xerostomia with thick saliva, reduced appetite, oral sores, pain with swallowing with diminished taste and smell discussed. Handouts provided for referral resources at home. A prophylactic exercise program with handout reviewed and demonstrated with the emphasis of initiating exercises to maintain strength, mobility and endurance of the speech and swallow mechanisms. Written instructions provided as well.      Deyanira Lopez, clinical supervisor, present for duration of session.

## 2025-02-25 NOTE — DISCHARGE SUMMARY
Discharge Diagnosis  Mass of tongue    Issues Requiring Follow-Up  Base of tongue squamous cell carcinoma  Underweight per BMI    Test Results Pending At Discharge  Pending Labs       Order Current Status    Methylmalonic acid, serum In process    Surgical Pathology Exam In process            Hospital Course  Annette Fernandez is a 74 y.o. female with Mass of tongue, who presented for triple endoscopy with biopsy, PEG tube placement by Dr Gamboa on 2/21/2025. Patient had an uncomplicated surgical course. Patient recovered in PACU and was transferred to [address] for post-operative care.    Patient post-operative course was complicated by poor po intake. Nutrition was consulted; tube feeds were initiated and advanced until at goal rate, at which point she was transitioned to bolus feeds which she tolerated well. Electrolytes were replaced as needed. Speech therapy was consulted for swallow evaluation, with recommendations for regular solids and thin liquids. Patient also had episodes of tachycardia and desats post op with normal rhythm on EKG. CXR with b/l atelectasis with trace bilateral pleural effusions, small volume pneumoperitoneum likely 2/2 PEG. Got patient up, OOB, and using IS more frequently and episodes resolved.     On day of discharge, post-operative pain was well controlled with enteral pain medication, breathing on room air, voiding spontaneously ambulating well, and was tolerating a diet. Follow-up arranged.      Pertinent Physical Exam At Time of Discharge  Gen: NAD, AOx3, resting comfortably in bed  Eyes: EOMI, sclera clear, PERRL  Ears: Normal external ears bilaterally  Nose: No rhinorrhea; anterior nares clear  Oral Cavity: MMM  Head: normocephalic, atraumatic  Neck: Soft, no skin injury, No LAD  Resp: Breathing comfortably on room air  Cards: No clubbing/cyanosis/edema of hands  Gastro: Soft, non-distended, PEG tube in place, no signs of infection  : Voiding spontaneously  MSK: Moves all  extremities  Psych: Appropriate mood and affect    Home Medications     Medication List      START taking these medications     oxyCODONE 5 mg/5 mL solution; Commonly known as: Roxicodone; 5 mL (5 mg)   by g-tube route every 4 hours if needed for moderate pain (4 - 6) or   severe pain (7 - 10) for up to 3 days.   polyethylene glycol 17 gram packet; Commonly known as: Glycolax,   Miralax; 17 g by g-tube route once daily.     CONTINUE taking these medications     acetaminophen 500 mg tablet; Commonly known as: Tylenol   atorvastatin 80 mg tablet; Commonly known as: Lipitor; Take 1 tablet (80   mg) by mouth once daily.   metoprolol tartrate 25 mg tablet; Commonly known as: Lopressor; Take 1   tablet (25 mg) by mouth 2 times a day.   multivitamin tablet   NON FORMULARY   pantoprazole 40 mg EC tablet; Commonly known as: ProtoNix; Take 1 tablet   (40 mg) by mouth once daily. Do not crush, chew, or split.   thyroid (pork) 15 mg tablet tablet; Commonly known as: [place] Thyroid;   Take 1 tablet (15 mg) by mouth once daily.     STOP taking these medications     chlorhexidine 0.12 % solution; Commonly known as: Peridex   chlorhexidine 4 % external liquid; Commonly known as: Hibiclens   mometasone 0.1 % cream; Commonly known as: Elocon       Outpatient Follow-Up  Future Appointments   Date Time Provider Department Center   2/26/2025 To Be Determined Silvia Manzo RN [facility]   2/27/2025 To Be Determined Shayne Davila, KAVITA [facility]   2/27/2025 To Be Determined Santy Vick OT [facility]   3/5/2025  1:15 PM Parmnider Russell MD [id number] [place]   3/11/2025 10:00 AM Kirt Gamboa MD [id number] [place]       Lala Sepulveda MD - PGY1  Otolaryngology - Head & Neck Surgery    ENT Consult pager: [phone]  ENT Peds pager: [phone]  ENT Head & Neck Surgery Phone: [phone]  ENT Subspecialty team (otology, rhinology, laryngology, plastics, sleep):   M-F 6am-5pm- EpicChat or page the resident who wrote the daily note   Nights & weekends-  [phone]  ENT Outpatient scheduling number: [phone]  Please Page [phone] or call [phone] if urgent

## 2025-02-26 ENCOUNTER — HOME CARE VISIT (OUTPATIENT)
Dept: HOME HEALTH SERVICES | Facility: HOME HEALTH | Age: 75
End: 2025-02-26
Payer: MEDICARE

## 2025-02-26 ENCOUNTER — PATIENT OUTREACH (OUTPATIENT)
Dept: PRIMARY CARE | Facility: CLINIC | Age: 75
End: 2025-02-26
Payer: MEDICARE

## 2025-02-26 DIAGNOSIS — C01 MALIGNANT NEOPLASM OF BASE OF TONGUE (MULTI): ICD-10-CM

## 2025-02-26 PROCEDURE — 169592 NO-PAY CLAIM PROCEDURE

## 2025-02-26 PROCEDURE — G0299 HHS/HOSPICE OF RN EA 15 MIN: HCPCS | Mod: HHH

## 2025-02-26 NOTE — PROGRESS NOTES
Discharge Facility: [facility]  Discharge Diagnosis: mass of tongue, oropharyngeal lesion  Admission Date: 2/22/25  Discharge Date: 2/25/25    PCP Appointment Date: 3/7/25  Specialist Appointment Date: 3/11/25 ENT  Hospital Encounter and Summary Linked: Yes  Admission (Discharged) with Kirt Gamboa MD (02/20/2025)   See discharge assessment below for further details    Wrap Up  Wrap Up Additional Comments: Successful transition of care outreach with patient. Patient reports doing well at home since discharge. New meds/changes reviewed with patient during outreach. Patient denies CP/SOB/abdominal pain. Patient denies further discharge questions/concerns/needs at time of outreach call. Emphasized that follow up appts are needed after discharge with PCP and reviewed needed follow ups with any specialties to assess response to treatment from hospitalization. Patient aware of my availability for non-emergent concerns. (2/26/2025 11:33 AM)  Call End Time: 1012 (2/26/2025 11:33 AM)    Engagement  Call Start Time: 1006 (2/26/2025 11:33 AM)    Medications  Medications reviewed with patient/caregiver?: Yes (2/26/2025 11:33 AM)  Is the patient having any side effects they believe may be caused by any medication additions or changes?: No (2/26/2025 11:33 AM)  Does the patient have all medications ordered at discharge?: Yes (2/26/2025 11:33 AM)  Care Management Interventions: Provided patient education (2/26/2025 11:33 AM)  Prescription Comments: no issues obtaining medications (2/26/2025 11:33 AM)  Is the patient taking all medications as directed (includes completed medication regime)?: Yes (2/26/2025 11:33 AM)  Care Management Interventions: Provided patient education (2/26/2025 11:33 AM)  Medication Comments: No concerns (2/26/2025 11:33 AM)    Appointments  Does the patient have a primary care provider?: Yes (2/26/2025 11:33 AM)  Care Management Interventions: Verified appointment date/time/provider (2/26/2025 11:33 AM)  Has  the patient kept scheduled appointments due by today?: Yes (2/26/2025 11:33 AM)  Care Management Interventions: Advised patient to keep appointment (2/26/2025 11:33 AM)    Self Management  What is the home health agency?: [facility] (2/26/2025 11:33 AM)  Has home health visited the patient within 72 hours of discharge?: Call prior to 72 hours (2/26/2025 11:33 AM)  What Durable Medical Equipment (DME) was ordered?: PEG supplies and TF supplies (2/26/2025 11:33 AM)  Has all Durable Medical Equipment (DME) been delivered?: Yes (2/26/2025 11:33 AM)    Patient Teaching  Does the patient have access to their discharge instructions?: Yes (2/26/2025 11:33 AM)  Care Management Interventions: Reviewed instructions with patient (2/26/2025 11:33 AM)  What is the patient's perception of their health status since discharge?: Improving (2/26/2025 11:33 AM)  Is the patient/caregiver able to teach back the hierarchy of who to call/visit for symptoms/problems? PCP, Specialist, Home Health nurse, Urgent Care, ED, 911: Yes (2/26/2025 11:33 AM)  Patient/Caregiver Education Comments: Aware to call surgeon with any concerns in regarding signs of infection, issues with PEG, continue to follow with nutrition recommendations (2/26/2025 11:33 AM)

## 2025-02-26 NOTE — TUMOR BOARD NOTE
[facility] HEAD AND NECK TUMOR BOARD NOTE:    Annette Fernandez Is a 74 y.o. female who was presented by Dr. Gamboa at [facility] Head & Neck Tumor Board on 2/28/2025 which included representatives from all Head & Neck disciplines (Medical oncology/Radiation oncology/Otolaryngology/Radiology/Pathology).     Referring Physician: Dr. Parminder Russell    The [facility] Head and Neck Tumor Board considered available treatment options and made the following staging and recommendations:    Staging and Recommendations:    Site: left Oropharyngeal  base of tongue SCC (crossing the midline)  Stage: [id number]  Recommendation:  PET scan for definitive staging; Concurrent chemotherapy with radiation therapy favored over surgery given extent of disease    Clinical Trial Status: Consider HNO9 trial       -----------------------------------------------------------------------------------------------------------------------------------------------------------------------------------------------------------------------    History and Physical in Brief:  74 y.o. female who presented to Dr. Gamboa 2/4/2025 for a base of tongue mass. 8/2024 she first began noticing discomfort in her throat that has since worsened with radiation to left ear in conjunction with dysphagia with at least 20 pound weight loss over the past year. Exam was unremarkable. Clinic flexible laryngoscopy revealed a fungating lesion involving the base of tongue and extending on the lateral pharyngeal wall on the left. That lesion also involves the lingual aspect of the epiglottis. Given her difficulties with swallowing, PEG tube was placed in conjunction with triple endoscopy with biopsy 2/20/2025.    Physical Exam:  General: awake, alert, oriented  Voice: strong, no breathiness or hoarseness  Face: symmetric, no lesions  Nose: no lesions of dorsum, no drainage  Oral cavity: mucous membranes moist, no lesions on lips or tongue, full tongue  movement  Oropharynx: symmetric palate elevation, no masses on inspection or palpation  Neck: soft, full range of motion, no palpable mass or LAD  Respiratory: no increased work of breathing or stridor    Imaging:  CT Neck with Contrast (1/7/2025):   Irregular, heterogeneous, and possibly ulcerating mass centered at  the base of tongue with involvement of extrinsic tongue musculature  and lingual surface of the epiglottis; possible involvement of the  body of the hyoid. Oropharyngeal neoplasm is of high suspicion.  Subspecialty consultation/direct visualization is advised.      No cervical lymphadenopathy by size criteria.      1.8 cm exophytic right posterior thyroid nodule along the right  tracheoesophageal groove containing calcification.    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     P16 by immunohistochemistry:  Negative     National site-specific guidelines were discussed with respect to the case.    Trey Walter DO, PGY4

## 2025-02-26 NOTE — PROGRESS NOTES
Dr. Gamboa asked that referrals be placed for Ms. Fernandez to see [facility] and radonc in [place].  He reports patient and her daughter Daisy is aware.    Orders placed.

## 2025-02-27 ENCOUNTER — HOME CARE VISIT (OUTPATIENT)
Dept: HOME HEALTH SERVICES | Facility: HOME HEALTH | Age: 75
End: 2025-02-27
Payer: MEDICARE

## 2025-02-27 VITALS
OXYGEN SATURATION: 98 % | RESPIRATION RATE: 18 BRPM | DIASTOLIC BLOOD PRESSURE: 80 MMHG | TEMPERATURE: 97.1 F | HEART RATE: 97 BPM | SYSTOLIC BLOOD PRESSURE: 144 MMHG

## 2025-02-27 VITALS — HEART RATE: 89 BPM | OXYGEN SATURATION: 98 %

## 2025-02-27 DIAGNOSIS — C10.9 OROPHARYNX CANCER (MULTI): Primary | ICD-10-CM

## 2025-02-27 LAB
LAB AP ASR DISCLAIMER: NORMAL
LABORATORY COMMENT REPORT: NORMAL
Lab: NORMAL
METHYLMALONATE SERPL-SCNC: 0.31 UMOL/L (ref 0–0.4)
PATH REPORT.FINAL DX SPEC: NORMAL
PATH REPORT.GROSS SPEC: NORMAL
PATH REPORT.RELEVANT HX SPEC: NORMAL
PATH REPORT.TOTAL CANCER: NORMAL

## 2025-02-27 PROCEDURE — G0151 HHCP-SERV OF PT,EA 15 MIN: HCPCS | Mod: HHH

## 2025-02-27 SDOH — HEALTH STABILITY: PHYSICAL HEALTH: EXERCISE TYPE: INSTRUCTED AND DEMONSTRATED SUP INDEP SEATED THER EX PROGRAM, NEW HANDOUT PROVIDED

## 2025-02-27 ASSESSMENT — ENCOUNTER SYMPTOMS
PAIN LOCATION - PAIN SEVERITY: 3/10
PAIN LOCATION - PAIN QUALITY: ACHING
PAIN SEVERITY GOAL: 0/10
MUSCLE WEAKNESS: 1
PERSON REPORTING PAIN: PATIENT
PAIN LOCATION - PAIN FREQUENCY: FREQUENT
HIGHEST PAIN SEVERITY IN PAST 24 HOURS: 4/10
APPETITE LEVEL: POOR
LOWEST PAIN SEVERITY IN PAST 24 HOURS: 0/10
OCCASIONAL FEELINGS OF UNSTEADINESS: 0
LAST BOWEL MOVEMENT: 67262
PAIN LOCATION - PAIN QUALITY: SORE
SUBJECTIVE PAIN PROGRESSION: UNCHANGED
PAIN: 1
PAIN: 1
PAIN LOCATION: ABDOMEN
PAIN LOCATION: THROAT
PAIN LOCATION - PAIN SEVERITY: 2/10
SORE THROAT: 1

## 2025-02-27 ASSESSMENT — ACTIVITIES OF DAILY LIVING (ADL)
AMBULATION_DISTANCE/DURATION_TOLERATED: INSTRUCTED AND DEMONSTRATED INDEP GAIT TRAINING WITHOUT DEVICE OVER 200FT
AMBULATION ASSISTANCE ON FLAT SURFACES: 1
ENTERING_EXITING_HOME: NEEDS ASSISTANCE
OASIS_M1830: 02

## 2025-02-28 ENCOUNTER — HOME CARE VISIT (OUTPATIENT)
Dept: HOME HEALTH SERVICES | Facility: HOME HEALTH | Age: 75
End: 2025-02-28
Payer: MEDICARE

## 2025-02-28 ENCOUNTER — TELEPHONE (OUTPATIENT)
Dept: RADIATION ONCOLOGY | Facility: CLINIC | Age: 75
End: 2025-02-28

## 2025-02-28 ENCOUNTER — APPOINTMENT (OUTPATIENT)
Dept: HEMATOLOGY/ONCOLOGY | Facility: HOSPITAL | Age: 75
End: 2025-02-28
Payer: MEDICARE

## 2025-02-28 ENCOUNTER — HOSPITAL ENCOUNTER (OUTPATIENT)
Dept: RADIOLOGY | Facility: CLINIC | Age: 75
Discharge: HOME | End: 2025-02-28
Payer: MEDICARE

## 2025-02-28 DIAGNOSIS — C10.9 OROPHARYNX CANCER (MULTI): ICD-10-CM

## 2025-02-28 PROCEDURE — 3430000001 HC RX 343 DIAGNOSTIC RADIOPHARMACEUTICALS: Performed by: RADIOLOGY

## 2025-02-28 PROCEDURE — A9552 F18 FDG: HCPCS | Performed by: RADIOLOGY

## 2025-02-28 PROCEDURE — 78815 PET IMAGE W/CT SKULL-THIGH: CPT | Mod: PI

## 2025-02-28 RX ORDER — FLUDEOXYGLUCOSE F 18 200 MCI/ML
11.7 INJECTION, SOLUTION INTRAVENOUS
Status: COMPLETED | OUTPATIENT
Start: 2025-02-28 | End: 2025-02-28

## 2025-02-28 RX ADMIN — FLUDEOXYGLUCOSE F 18 11.7 MILLICURIE: 200 INJECTION, SOLUTION INTRAVENOUS at 07:39

## 2025-03-03 ENCOUNTER — HOSPITAL ENCOUNTER (OUTPATIENT)
Dept: RADIATION ONCOLOGY | Facility: CLINIC | Age: 75
Setting detail: RADIATION/ONCOLOGY SERIES
Discharge: HOME | End: 2025-03-03
Payer: MEDICARE

## 2025-03-03 VITALS
DIASTOLIC BLOOD PRESSURE: 81 MMHG | BODY MASS INDEX: 19.99 KG/M2 | TEMPERATURE: 98.1 F | RESPIRATION RATE: 18 BRPM | HEART RATE: 108 BPM | HEIGHT: 58 IN | SYSTOLIC BLOOD PRESSURE: 152 MMHG | WEIGHT: 95.24 LBS | OXYGEN SATURATION: 97 %

## 2025-03-03 DIAGNOSIS — C01 MALIGNANT NEOPLASM OF BASE OF TONGUE (MULTI): ICD-10-CM

## 2025-03-03 PROCEDURE — 31575 DIAGNOSTIC LARYNGOSCOPY: CPT | Performed by: RADIOLOGY

## 2025-03-03 PROCEDURE — 99215 OFFICE O/P EST HI 40 MIN: CPT | Mod: 25 | Performed by: RADIOLOGY

## 2025-03-03 PROCEDURE — 99205 OFFICE O/P NEW HI 60 MIN: CPT | Performed by: RADIOLOGY

## 2025-03-03 ASSESSMENT — ENCOUNTER SYMPTOMS
NERVOUS/ANXIOUS: 0
CHILLS: 0
WHEEZING: 0
DIARRHEA: 0
DIAPHORESIS: 1
EXTREMITY WEAKNESS: 0
UNEXPECTED WEIGHT CHANGE: 1
RECTAL PAIN: 0
SORE THROAT: 1
SEIZURES: 0
ABDOMINAL DISTENTION: 0
DEPRESSION: 0
MUSCULOSKELETAL NEGATIVE: 1
DECREASED CONCENTRATION: 0
CARDIOVASCULAR NEGATIVE: 1
BRUISES/BLEEDS EASILY: 1
DIZZINESS: 0
CHEST TIGHTNESS: 0
CONFUSION: 0
FEVER: 0
WOUND: 0
HEADACHES: 1
NAUSEA: 1
HEMOPTYSIS: 0
FATIGUE: 1
LIGHT-HEADEDNESS: 0
SLEEP DISTURBANCE: 1
VOICE CHANGE: 1
APPETITE CHANGE: 1
NUMBNESS: 0
SPEECH DIFFICULTY: 0
COUGH: 0
VOMITING: 0
BLOOD IN STOOL: 0
ENDOCRINE NEGATIVE: 1
ADENOPATHY: 0
CONSTIPATION: 0
EYES NEGATIVE: 1
SHORTNESS OF BREATH: 1
ABDOMINAL PAIN: 0
TROUBLE SWALLOWING: 1

## 2025-03-03 ASSESSMENT — PAIN SCALES - GENERAL: PAINLEVEL_OUTOF10: 0-NO PAIN

## 2025-03-03 NOTE — PROGRESS NOTES
"Radiation Oncology Nursing Note    Prior Radiotherapy:  No  No radiation treatments to show. (Treatments may have been administered in another system.)     Current Systemic Treatment:  No     Presence of Pacemaker or ICD:  No    History of Autoimmune or Connective Tissue Disorders:  No    Pain: The patient's current pain level was assessed.  They report currently having a pain of 5 out of 10.  They feel their pain is under control with the use of pain medications. Blanquita PO.  Pain to throat in mornings.  Has oxycodone prescribed though not using.    Distress Score: 5 . NCCN Guidelines Distress Management form sent to [facility]    Nutrition:  Dietician referral placed.    Review of Systems:  Review of Systems   Constitutional:  Positive for appetite change, diaphoresis, fatigue and unexpected weight change. Negative for chills and fever.        Down 25 lbs over the last year. PEG tube doing 3 cartons a day with goal of 4. Solid foods cut up in smaller pieces.   HENT:   Positive for sore throat, trouble swallowing and voice change. Negative for hearing loss, lump/mass, mouth sores, nosebleeds and tinnitus.    Eyes: Negative.    Respiratory:  Positive for shortness of breath. Negative for chest tightness, cough, hemoptysis and wheezing.         SOB on exertion   Cardiovascular: Negative.    Gastrointestinal:  Positive for nausea. Negative for abdominal distention, abdominal pain, blood in stool, constipation, diarrhea, rectal pain and vomiting.   Endocrine: Negative.    Genitourinary: Negative.     Musculoskeletal: Negative.  Negative for gait problem.   Skin:  Negative for itching, rash and wound.        Generalized skin irritation \"raw\"   Neurological:  Positive for headaches. Negative for dizziness, extremity weakness, gait problem, light-headedness, numbness, seizures and speech difficulty.   Hematological:  Negative for adenopathy. Bruises/bleeds easily.   Psychiatric/Behavioral:  Positive for sleep " disturbance. Negative for confusion, decreased concentration, depression and suicidal ideas. The patient is not nervous/anxious.

## 2025-03-03 NOTE — PROGRESS NOTES
Radiation Oncology Outpatient Consult    Patient Name:  Annette Fernandez  MRN:  [id number]  :  1950    Referring Provider: Kirt Gamboa MD  Primary Care Provider: Parminder Russell MD  Care Team: Patient Care Team:  Parminder Russell MD as PCP - General  Parminder Russell MD as PCP - [facility]  ELLYN Corona-CNP as Nurse Practitioner (Otolaryngology)  Kirt Gamboa MD as Surgeon (Otolaryngology)  Stefani Obrien RN as Care Manager (Case Management)  Aurora Reyes MD as Radiation Oncologist (Radiation Oncology)  Faith Denson MD as Consulting Physician (Hematology and Oncology)    Date of Service: 3/3/2025       SUMMARY: 74 y.o. 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 Screening for . PEG tube in place    SUBJECTIVE  History of Present Illness:  Annette Fernandez is a 74 y.o. female who was referred by Kirt Gamboa MD, for a consultation to the [facility] Department of Radiation Oncology.  She is presenting for evaluation and management of head and neck cancer    They initially presented to ENT with complaints of throat discomfort.     Fiberoptic examination revealed fungating lesion involving the base of tongue and extending on the lateral pharyngeal wall on the left. That lesion also involves the lingual aspect of the epiglottis. The airway is not compromised.     Biopsy  demonstrated p16(-) SCC    Staging CT Neck w/ involvement of extrinsic tongue musculature and lingual surface of the epiglottis; possible involvement of the body of the hyoid.    Staging PET/CT demonstrates level 2A node      Dental Evaluation: completed    SLP: completed    Today, the patient reports feeling well overall. The patient reports PEG tube with 3 cans per day. She is utilizing PO.  Endorses hoarseness  and swallowing swallowing problems. Denies fevers, chills.     Prior Radiotherapy:  No  No radiation treatments to  "show. (Treatments may have been administered in another system.)      Current Systemic Treatment:  No     Presence of Pacemaker or ICD:  No     History of Autoimmune or Connective Tissue Disorders:  No     Pain: The patient's current pain level was assessed.  They report currently having a pain of 5 out of 10.  They feel their pain is under control with the use of pain medications. Blanquita PO.  Pain to throat in mornings.  Has oxycodone prescribed though not using.     Review of Systems:  Review of Systems   Constitutional:  Positive for appetite change, diaphoresis, fatigue and unexpected weight change. Negative for chills and fever.        Down 25 lbs over the last year. PEG tube doing 3 cartons a day with goal of 4. Solid foods cut up in smaller pieces.   HENT:   Positive for sore throat, trouble swallowing and voice change. Negative for hearing loss, lump/mass, mouth sores, nosebleeds and tinnitus.    Eyes: Negative.    Respiratory:  Positive for shortness of breath. Negative for chest tightness, cough, hemoptysis and wheezing.         SOB on exertion   Cardiovascular: Negative.    Gastrointestinal:  Positive for nausea. Negative for abdominal distention, abdominal pain, blood in stool, constipation, diarrhea, rectal pain and vomiting.   Endocrine: Negative.    Genitourinary: Negative.     Musculoskeletal: Negative.  Negative for gait problem.   Skin:  Negative for itching, rash and wound.        Generalized skin irritation \"raw\"   Neurological:  Positive for headaches. Negative for dizziness, extremity weakness, gait problem, light-headedness, numbness, seizures and speech difficulty.   Hematological:  Negative for adenopathy. Bruises/bleeds easily.   Psychiatric/Behavioral:  Positive for sleep disturbance. Negative for confusion, decreased concentration, depression and suicidal ideas. The patient is not nervous/anxious.        Past Surgical History:    Past Surgical History:   Procedure Laterality Date    OTHER " "SURGICAL HISTORY  06/14/2019    Ovarian cystectomy    OTHER SURGICAL HISTORY  06/14/2019    Cyst excision        Family History:  Cancer-related family history is not on file.    Social History:    Social History     Tobacco Use    Smoking status: Every Day     Types: Cigarettes    Smokeless tobacco: Former    Tobacco comments:     Formerly smoked e- cigarettes    Vaping Use    Vaping status: Never Used   Substance Use Topics    Alcohol use: Yes     Comment: 1 bottle of beer daily    Drug use: Never       Allergies:  No Known Allergies     Medications:    Current Outpatient Medications:     acetaminophen (Tylenol) 500 mg tablet, Take 1 tablet (500 mg) by mouth every 6 hours if needed for mild pain (1 - 3)., Disp: , Rfl:     atorvastatin (Lipitor) 80 mg tablet, Take 1 tablet (80 mg) by mouth once daily., Disp: 90 tablet, Rfl: 3    metoprolol tartrate (Lopressor) 25 mg tablet, Take 1 tablet (25 mg) by mouth 2 times a day., Disp: 90 tablet, Rfl: 3    multivitamin tablet, Take 1 tablet by mouth once daily., Disp: , Rfl:     NON FORMULARY, Take 1 each by mouth once daily. [id number] formula Lutein and zeaxanthin, Disp: , Rfl:     pantoprazole (ProtoNix) 40 mg EC tablet, Take 1 tablet (40 mg) by mouth once daily. Do not crush, chew, or split., Disp: 30 tablet, Rfl: 1    polyethylene glycol (Glycolax, Miralax) 17 gram packet, 17 g by g-tube route once daily., Disp: 14 each, Rfl: 0    thyroid, pork, ([place] Thyroid) 15 mg tablet, Take 1 tablet (15 mg) by mouth once daily., Disp: 90 tablet, Rfl: 3          Performance Status:  ECOG 1      OBJECTIVE  Physical Exam:  /81 (BP Location: Right arm, Patient Position: Sitting, BP Cuff Size: Small adult)   Pulse 108   Temp 36.7 °C (98.1 °F) (Temporal)   Resp 18   Ht (S) 1.477 m (4' 10.15\")   Wt (!) 43.2 kg (95 lb 3.8 oz)   SpO2 97%   BMI 19.80 kg/m²    Constitutional: Awake, alert and oriented. No acute distress. Appears stated age.  Eyes: Conjunctivae are clear without " exudates or hemorrhage. Eyelids are normal in appearance without swelling or lesions.  ENMT: mucous membranes moist, no apparent injury, no lesions seen  Neck: Neck supple, no apparent injury, trachea midline  Resp/Thorax: Patent airways,  good chest expansion. Thorax symmetric  Cardiovascular: The external chest is normal without lifts, heaves, or thrills. PMI is not visible.  GI: Nondistended, soft, non-tender.  /Gyn: Deferred  MSK: No tenderness noted on palpation of the spine. No ROM limitations.  Extremities: normal extremities, no cyanosis, erythema or edema.  Neurological: alert and oriented x3. Sensory and motor function appear intact. No gait abnormality observed.  Psych: Appropriate mood and behavior.  Skin: Warm and dry, no new lesions or rashes.    Procedure:  PROCEDURE NOTE: Recommended flexible nasal endoscopy. Risks, benefits, personnel and alternatives were explained. The patient wished to proceed. S/he was re-identified.  PROCEDURE: Flexible nasal endoscopy  FINDINGS:      After application of Tacho-Synephrine and lidocaine, the endoscope was advanced  into the nasal cavity. The nasal cavity, nasopharynx, tongue base, vallecula, posterior/lateral pharyngeal walls, pyriform, epiglottis, post cricoid area, and larynx were within normal limits.     The following specific findings should be noted:  No pharyngeal/ laryngeal lesions/masses  Mobile true vocal cords bilaterally  No pooling secretions    The patient tolerated the procedure without difficulty      Laboratory Review:    Lab Results   Component Value Date    WBC 13.3 (H) 02/25/2025    HGB 12.4 02/25/2025    HCT 37.0 02/25/2025     02/25/2025    CHOL 148 12/24/2024    TRIG 103 12/24/2024    HDL 65.6 12/24/2024    ALT 18 12/24/2024    AST 25 12/24/2024     02/25/2025    K 4.5 02/25/2025    CL 97 (L) 02/25/2025    CREATININE 0.68 02/25/2025    BUN 13 02/25/2025    CO2 28 02/25/2025    TSH 0.83 02/18/2025    INR 1.0 01/18/2021          The pertinent lab results were reviewed and discussed with the patient.      Pathology Review:  The pertinent pathology results were reviewed and discussed with the patient.  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     P16 by immunohistochemistry:  Negative     Reference Range:  Negative:  <50% strong nuclear and cytoplasmic invasive tumor cell staining  Equivocal: 50-70% strong nuclear and cytoplasmic invasive tumor cell staining   Positive: >70% strong nuclear and cytoplasmic invasive tumor cell staining       jkw           Imaging:  The pertinent imaging results were reviewed and discussed with the patient.  Notably,       IMPRESSION:  1. Intensely hypermetabolic soft tissue mass centered at the base of  the tongue consistent with a neoplastic process.  2. Moderately hypermetabolic enlarged right level 2A lymph node  consistent with lashell disease. No additional hypermetabolic  lymphadenopathy.  3. No evidence of hypermetabolic distant metastatic disease.  4. A intraperitoneal fluid and gas containing collection along the  inferior medial aspect of the gastrostomy tube is favored to be  postprocedural in nature.  5. A hypodense lesion with central calcification along the posterior  margin of the right thyroid gland is photopenic and favored to be  benign in etiology.       IMPRESSION:  Irregular, heterogeneous, and possibly ulcerating mass centered at  the base of tongue with involvement of extrinsic tongue musculature  and lingual surface of the epiglottis; possible involvement of the  body of the hyoid. Oropharyngeal neoplasm is of high suspicion.  Subspecialty consultation/direct visualization is advised.      No cervical lymphadenopathy by size criteria.      1.8 cm exophytic right posterior thyroid nodule along the right  tracheoesophageal groove containing calcification.    ASSESSMENT:  Annette Fernandez is a 74  y.o. female with Cancer of base of tongue (Multi), Clinical: Stage BRIDGETTE (cT4a, cN1, cM0, p16-).      COUNSELING AND COORDINATION OF CARE:        The natural history of cancer of the oropharynx  was reviewed with the patient. Prognostic factors including Age, smoking, tumor location, stage and pathologic features, P16 status and weight loss were reviewed. Standard treatment modalities such as surgery, radiation +/- chemotherapy were compared and contrasted with regard to outcome and quality of life measures. The indications, benefits, side effects and acute complications of radiotherapy, which include mucositis, loss of taste, xerostomia, thrush, dermatitis, dysphagia, odynophagia; chronic complications including xerostomia, lifelong need for fluoride prophylaxis, risk for dental caries and osteoradionecrosis, dysphagia, loss of swallow function, lymphedema  and failure to control disease have all been discussed in detail with the patient. All questions were answered to the patient´s satisfaction.      PLAN:    Radiation Intent to treat orders have been placed in Epic  Will Screen patient for   Will treat in 33 fractions      NCCN Guidelines were applicable to guide this patients treatment plan.     Rahel Del Angel MD       Future Appointments   Date Time Provider Department Center   3/3/2025  2:30 PM Rahel Del Angel MD [id number] [place]   3/5/2025 To Be Determined Nova Morton RN [facility]   3/7/2025  1:00 PM Parminder Russell MD [id number] [place]   3/11/2025 10:00 AM Kirt Gamboa MD [id number] [place]   3/12/2025 To Be Determined Nova Morton RN [facility]   3/13/2025  3:00 PM Faith Denson MD [id number] [place]

## 2025-03-03 NOTE — PROGRESS NOTES
Learner: family and patient  Educated on: External Beam Radiation to the Head and Neck  Readiness: acceptance  Preferred learning method: preferred: reading and listening  Method used: explanation and handout  Response: needs reinforcement  Barriers: None  Preferred language: English  Resources given: [facility] folder with handouts: External Beam Radiation Therapy: What to Expect, Radiation Step by Step, Side Effects from Radiation to the Neck and Mouth Area: What They are and Ways to Manage, Head and Neck Radiation Treatment and Your Mouth, Neck Exercises, Dry Mouth or Thick Saliva, How a  Can Help You, [facility] at a Glance.

## 2025-03-04 ENCOUNTER — TELEPHONE (OUTPATIENT)
Dept: HEMATOLOGY/ONCOLOGY | Facility: CLINIC | Age: 75
End: 2025-03-04
Payer: MEDICARE

## 2025-03-04 ENCOUNTER — NUTRITION (OUTPATIENT)
Dept: HEMATOLOGY/ONCOLOGY | Facility: CLINIC | Age: 75
End: 2025-03-04
Payer: MEDICARE

## 2025-03-04 VITALS — WEIGHT: 95.24 LBS | HEIGHT: 58 IN | BODY MASS INDEX: 19.99 KG/M2

## 2025-03-04 NOTE — PROGRESS NOTES
"NUTRITION NOTE    Nutrition Assessment     Reason for Visit:  Annette Fernandez is a 74 y.o. female who I receiving a consult for from radiation oncology at [facility] for pt upcoming treatments for diagnosis of base of tongue SCC.  Noted recently had PEG tube placed, on 2/21/2025.      Called patient today discuss nutrition and upcoming tx, and to see how PEG tube feedings are going and if any questions.  Received voicemail and left message.  Will await return phone call, or try to catch pt when she starts tx.    Patient Active Problem List   Diagnosis    HTN (hypertension), benign    Hyperlipidemia    Acquired hypothyroidism    Peripheral edema    Thyroid nodule    Esophageal necrosis    Anemia    Encounter for subsequent annual wellness visit (AWV) in Medicare patient    Tobacco use disorder    Other primary ovarian failure    Contact dermatitis and eczema    Mass of tongue    Oropharyngeal lesion    Cancer of base of tongue (Multi)    Malignant neoplasm of base of tongue (Multi)       Nutrition Significant Labs:  Lab Results   Component Value Date/Time    GLUCOSE 131 (H) 02/25/2025 0611     02/25/2025 0611    K 4.5 02/25/2025 0611    CL 97 (L) 02/25/2025 0611    CO2 28 02/25/2025 0611    ANIONGAP 16 02/25/2025 0611    BUN 13 02/25/2025 0611    CREATININE 0.68 02/25/2025 0611    EGFR >90 02/25/2025 0611    CALCIUM 8.9 02/25/2025 0611    ALBUMIN 3.0 (L) 02/25/2025 0611    ALKPHOS 84 12/24/2024 1020    PROT 6.4 12/24/2024 1020    AST 25 12/24/2024 1020    BILITOT 0.4 12/24/2024 1020    ALT 18 12/24/2024 1020    MG 1.86 02/25/2025 0611    PHOS 2.5 02/25/2025 0611     No results found for: \"VITD25\"      Anthropometrics:  Height: 147.7 cm (4' 10.15\")   Weight: (!) 43.2 kg (95 lb 3.8 oz) (wt from radiation oncology 3/3/25)   BMI (Calculated): 19.8    IBW/kg (Dietitian Calculated): 40.9 kg Percent of IBW: 105 %          Weight History:   Daily Weight  03/04/25 : (!) 43.2 kg (95 lb 3.8 oz)  03/03/25 : (!) 43.2 kg " (95 lb 3.8 oz)  02/22/25 : (!) 44.4 kg (97 lb 12.8 oz)  02/18/25 : (!) 44.2 kg (97 lb 8 oz)  02/06/25 : 46.3 kg (102 lb)  02/04/25 : (!) 44.5 kg (98 lb 1.6 oz)  01/16/25 : 46.4 kg (102 lb 6.4 oz)  12/24/24 : 46.5 kg (102 lb 9.6 oz)  07/30/24 : 50.2 kg (110 lb 9.6 oz)  06/09/24 : 49 kg (108 lb 0.4 oz)    Weight Change %:  Weight History / % Weight Change: was noted from RN that pt has lost 25 lbs over the last year (20%); and 3 lbs (3%) wt loss noted over the last month  Significant Weight Loss: Yes  Interpretation of Weight Loss: 20% in 1 year    Nutrition History:  Food & Nutrition History: Per RN in radiation pt is able to eat PO but takes smaller bites and cuts food up well.  Food Allergies:    Food Intolerances:    Vitamin/mineral intake:       Herbal supplements:    Medication and Complementary/Alternative Medicine Use:    Dentition:    Sleep Habits:      Enteral Nutrition History:   Enteral Nutrition Formula/Solution: Isosource 1.5  Method of TF administration: Bolus enteral nutrition feeding  TF infusion rate: per RN getting in 3 cartons per day (this provides 1125 calories, 51g protein, 573ml FW)  Feeding Tube Flush: 60ml pre/post bolus 4 times per day (provides extra 480ml water)     Diet Recall:  Meal 1:    Snack 1:    Meal 2:    Snack 2:    Meal 3:    Snack 3:    Food Variety:    Oral Nutrition Supplement Use:          Fluid Intake:    Energy Intake: Poor < 50 %    Food Preparation:  Cooking:    Grocery Shopping:    Dining Out:      Medications:  Current Outpatient Medications   Medication Instructions    acetaminophen (TYLENOL) 500 mg, Every 6 hours PRN    atorvastatin (LIPITOR) 80 mg, oral, Daily RT    metoprolol tartrate (LOPRESSOR) 25 mg, oral, 2 times daily    multivitamin tablet 1 tablet, Daily    NON FORMULARY 1 each, Daily    pantoprazole (PROTONIX) 40 mg, oral, Daily, Do not crush, chew, or split.    polyethylene glycol (GLYCOLAX, MIRALAX) 17 g, g-tube, Daily    thyroid (pork) (MATT THYROID) 15  mg, oral, Daily       Nutrition Focused Physical Exam Findings:    Subcutaneous Fat Loss:        Muscle Wasting:       Physical Findings:          Estimated Needs:       Total Energy Estimated Needs in 24 hours (kCal): 1500 kCal  Method for Estimating Needs: 4262-8721 kcals/d (35-40 kcals/kg)  Total Protein Estimated Needs in 24 Hours (g): 52 g  Method for Estimating 24 Hour Protein Needs: 52-65 g protein/day (1.2-1.5g protein/kg)  Total Fluid Estimated Needs in 24 Hours (mL): 1500 mL  Method for Estimating 24 Hour Fluid Needs: or 1ml/kcal             Nutrition Diagnosis                Nutrition Interventions/Recommendations   Nutrition Prescription: Enteral nutrition, Oral nutrition Management of nutrition impact symptoms; PO diet for pleasure feeds; enteral nutrition for sole source of nutrition    Nutrition Interventions:   Food and Nutrient Delivery: Meals & Snacks: Texture-Modified Diet  Goals: po intake as tolerated throughout tx; adjust diet as needed  Enteral Nutrition: Management of schedule of enteral nutrition, Management of volume of enteral nutrition  Goals: Increase TF to goal of 4 cartons per day (4 cartons per day (1500 calories, 68g protein, 764ml FW per day))  Feeding Assistance Management: Mouth care management  Goals: mouth rinses of baking soda, salt, and water as needed  Other:: Hydration  Goals: continue with 60ml pre/post bolus feeds; if only doing 3 feedings will need additional water flush of 380ml either PO of via PEG tube; if 4 bolus feeds additional water flush of 260ml/day.     Coordination of Care: Collaboration and referral of nutrition care: Collaboration and Referral of Nutrition Care  Goals: will follow throughout tx; DME: Nirav     Nutrition Education:   Nutrition Education Content:                      Nutrition Monitoring and Evaluation   Food and Nutrient Intake  Monitoring and Evaluation Plan: Fluid intake, Enternal and parenternal nutrition intake determination  Fluid  Intake: Estimated fluid intake  Criteria: Maintain hydration  Enteral and Parenteral Nutrition Intake Determination: Enteral nutrition formula/solution, Enteral nutrition intake  Criteria: meet 100% of estimated nutritional needs via PEG tube feedings    Anthropometric measurements  Monitoring and Evaluation Plan: Weight  Body Weight: Measured body weight  Criteria: Maintain weight    Biochemical Data, Medical Tests and Procedures  Monitoring and Evaluation Plan: Electrolyte/renal panel              Follow Up: Planned follow up visit: [place]

## 2025-03-04 NOTE — TELEPHONE ENCOUNTER
Distress Thermometer Referral    Distress Score: 5  Distress Concerns: N/A-No concerns were noted  Meeting Location: Phone  Person(s) Present:  (SW) had to leave a message  Impression and Plan: SW left a message on patient's phone.  She identified her name and SW provided phone number if she would need anything.  Interventions Provided:  SW encouraged patient to call SW back and provided phone number.    SW will continue to follow as needed.

## 2025-03-05 ENCOUNTER — APPOINTMENT (OUTPATIENT)
Dept: PRIMARY CARE | Facility: CLINIC | Age: 75
End: 2025-03-05
Payer: MEDICARE

## 2025-03-05 ENCOUNTER — HOME CARE VISIT (OUTPATIENT)
Dept: HOME HEALTH SERVICES | Facility: HOME HEALTH | Age: 75
End: 2025-03-05
Payer: MEDICARE

## 2025-03-05 ENCOUNTER — APPOINTMENT (OUTPATIENT)
Dept: RADIATION ONCOLOGY | Facility: CLINIC | Age: 75
End: 2025-03-05
Payer: MEDICARE

## 2025-03-05 VITALS
TEMPERATURE: 97.3 F | DIASTOLIC BLOOD PRESSURE: 62 MMHG | SYSTOLIC BLOOD PRESSURE: 108 MMHG | OXYGEN SATURATION: 96 % | HEART RATE: 108 BPM | RESPIRATION RATE: 18 BRPM

## 2025-03-05 PROCEDURE — G0299 HHS/HOSPICE OF RN EA 15 MIN: HCPCS | Mod: HHH

## 2025-03-07 ENCOUNTER — APPOINTMENT (OUTPATIENT)
Dept: PRIMARY CARE | Facility: CLINIC | Age: 75
End: 2025-03-07
Payer: MEDICARE

## 2025-03-07 VITALS
SYSTOLIC BLOOD PRESSURE: 122 MMHG | DIASTOLIC BLOOD PRESSURE: 62 MMHG | BODY MASS INDEX: 19.69 KG/M2 | HEART RATE: 98 BPM | TEMPERATURE: 98.4 F | OXYGEN SATURATION: 99 % | HEIGHT: 58 IN | RESPIRATION RATE: 12 BRPM | WEIGHT: 93.8 LBS

## 2025-03-07 DIAGNOSIS — R13.12 OROPHARYNGEAL DYSPHAGIA: ICD-10-CM

## 2025-03-07 DIAGNOSIS — C01 MALIGNANT NEOPLASM OF BASE OF TONGUE (MULTI): Primary | ICD-10-CM

## 2025-03-07 DIAGNOSIS — R63.0 POOR APPETITE: ICD-10-CM

## 2025-03-07 DIAGNOSIS — K14.8 MASS OF TONGUE: ICD-10-CM

## 2025-03-07 PROCEDURE — G2211 COMPLEX E/M VISIT ADD ON: HCPCS | Performed by: FAMILY MEDICINE

## 2025-03-07 PROCEDURE — 1123F ACP DISCUSS/DSCN MKR DOCD: CPT | Performed by: FAMILY MEDICINE

## 2025-03-07 PROCEDURE — 1160F RVW MEDS BY RX/DR IN RCRD: CPT | Performed by: FAMILY MEDICINE

## 2025-03-07 PROCEDURE — 1111F DSCHRG MED/CURRENT MED MERGE: CPT | Performed by: FAMILY MEDICINE

## 2025-03-07 PROCEDURE — 3008F BODY MASS INDEX DOCD: CPT | Performed by: FAMILY MEDICINE

## 2025-03-07 PROCEDURE — 3074F SYST BP LT 130 MM HG: CPT | Performed by: FAMILY MEDICINE

## 2025-03-07 PROCEDURE — 1159F MED LIST DOCD IN RCRD: CPT | Performed by: FAMILY MEDICINE

## 2025-03-07 PROCEDURE — 99214 OFFICE O/P EST MOD 30 MIN: CPT | Performed by: FAMILY MEDICINE

## 2025-03-07 PROCEDURE — 3078F DIAST BP <80 MM HG: CPT | Performed by: FAMILY MEDICINE

## 2025-03-07 RX ORDER — MIRTAZAPINE 15 MG/1
15 TABLET, FILM COATED ORAL NIGHTLY
Qty: 30 TABLET | Refills: 2 | Status: SHIPPED | OUTPATIENT
Start: 2025-03-07 | End: 2025-06-05

## 2025-03-07 RX ORDER — PANTOPRAZOLE SODIUM 40 MG/1
40 TABLET, DELAYED RELEASE ORAL DAILY
Qty: 90 TABLET | Refills: 1 | Status: SHIPPED | OUTPATIENT
Start: 2025-03-07 | End: 2025-09-03

## 2025-03-07 RX ORDER — SODIUM FLUORIDE 1.1 G/100G
GEL ORAL
COMMUNITY
Start: 2025-03-04

## 2025-03-07 ASSESSMENT — ENCOUNTER SYMPTOMS
DEPRESSION: 0
OCCASIONAL FEELINGS OF UNSTEADINESS: 0
LOSS OF SENSATION IN FEET: 0

## 2025-03-07 NOTE — PROGRESS NOTES
"Subjective    Patient ID: Annette Fernandez is a 74 y.o. female who presents for Base of tongue squamous cell carcinoma (Pt had feeding tube placed).     Squamous cell carcinoma at the base of the tongue: Patient recently had a triple endoscopy with biopsy, along with a PEG tube placement on 02/21/2025 and was discharged on 02/25/2025. After her treatment she had issues with nutritional intake and a feeding tube was placed and removed prior to discharge after a passed swallow test, and toleration of diet. She is scheduled to start radiation next week, and her appointment is on Tuesday 03/11. She has been using ilya Asprin for the pain and states it works well to keep the pain under control. She is continuing to have issues with her appetite and is not eating much. She is sleeping well, and feels fatigued quite often.     GERD  The patient presents today with symptoms of GERD. She has been taking Protonix for indigestion and states it has been working well. She would like to continue on the medication at this time.     Patient struggles to take her atorvastatin tablet and will either cut it up to take it or skip the dose.   This patient also has a poor appetite and we will try low-dose mirtazapine to stimulate the appetite.  Certain antidepressants and other family members have caused issues.    The patient denies having the following symptoms: chest pain, chest pressure, fever, chills, N/V/D, constipation, dizziness, headaches, SOB.    Objective   Vitals:  /62   Pulse 98   Temp 36.9 °C (98.4 °F)   Resp 12   Ht 1.473 m (4' 10\")   Wt (!) 42.5 kg (93 lb 12.8 oz)   SpO2 99%   BMI 19.60 kg/m²     Physical Exam  Vitals reviewed.   Constitutional:       Appearance: Normal appearance.   Cardiovascular:      Rate and Rhythm: Normal rate and regular rhythm.      Pulses: Normal pulses.      Heart sounds: Normal heart sounds.   Pulmonary:      Effort: Pulmonary effort is normal.      Breath sounds: Normal breath " sounds.   Abdominal:      General: Abdomen is flat.      Palpations: Abdomen is soft.      Comments: PEG tube in the left upper quadrant.   Musculoskeletal:      Cervical back: Normal range of motion and neck supple.   Neurological:      Mental Status: She is alert.        Labs reviewed from 02/25/2025: CMP, CBC, Lipid.  PET scan from 2/28/2025 reviewed.      Assessment/Plan   Problem List Items Addressed This Visit       Mass of tongue     Squamous cell carcinoma of the tongue as a cause of this mass, patient will undergo radiation treatment.         Malignant neoplasm of base of tongue (Multi) - Primary     Squamous cell cancer at the base of the tongue which has apparently made it to 1 lymph node, radiation treatment to begin.         Relevant Orders    Follow Up In [facility] - PCP - Established    Oropharyngeal dysphagia     The CT scan showed the mass which turns out to be squamous cell cancer of the tongue, patient will undergo radiation treatment.  In the meantime we will try to help with the dysphagia with PPI and cutting or grinding medications that need to be taken.         Relevant Medications    pantoprazole (ProtoNix) 40 mg EC tablet     Other Visit Diagnoses       Poor appetite        Relevant Medications    mirtazapine (Remeron) 15 mg tablet            Follow up in: 2 month(s) or sooner if needed without labs.    Scribe Attestation  By signing my name below, IItzel Scribe   attest that this documentation has been prepared under the direction and in the presence of Parminder Russell MD.

## 2025-03-10 ENCOUNTER — HOSPITAL ENCOUNTER (OUTPATIENT)
Dept: RADIATION ONCOLOGY | Facility: CLINIC | Age: 75
Setting detail: RADIATION/ONCOLOGY SERIES
Discharge: HOME | End: 2025-03-10
Payer: MEDICARE

## 2025-03-10 ENCOUNTER — HOSPITAL ENCOUNTER (OUTPATIENT)
Dept: RADIOLOGY | Facility: EXTERNAL LOCATION | Age: 75
Discharge: HOME | End: 2025-03-10

## 2025-03-10 VITALS
HEART RATE: 98 BPM | DIASTOLIC BLOOD PRESSURE: 72 MMHG | OXYGEN SATURATION: 97 % | RESPIRATION RATE: 16 BRPM | SYSTOLIC BLOOD PRESSURE: 121 MMHG | TEMPERATURE: 97.2 F

## 2025-03-10 DIAGNOSIS — C01 CANCER OF BASE OF TONGUE (MULTI): ICD-10-CM

## 2025-03-10 PROBLEM — J39.2 OROPHARYNGEAL LESION: Status: RESOLVED | Noted: 2025-02-04 | Resolved: 2025-03-10

## 2025-03-10 PROBLEM — R13.12 OROPHARYNGEAL DYSPHAGIA: Status: ACTIVE | Noted: 2025-03-10

## 2025-03-10 PROCEDURE — 77333 RADIATION TREATMENT AID(S): CPT | Mod: 59 | Performed by: RADIOLOGY

## 2025-03-10 PROCEDURE — 77334 RADIATION TREATMENT AID(S): CPT | Performed by: RADIOLOGY

## 2025-03-10 ASSESSMENT — PAIN SCALES - GENERAL: PAINLEVEL_OUTOF10: 0-NO PAIN

## 2025-03-10 NOTE — ASSESSMENT & PLAN NOTE
Squamous cell carcinoma of the tongue as a cause of this mass, patient will undergo radiation treatment.

## 2025-03-10 NOTE — ASSESSMENT & PLAN NOTE
The CT scan showed the mass which turns out to be squamous cell cancer of the tongue, patient will undergo radiation treatment.  In the meantime we will try to help with the dysphagia with PPI and cutting or grinding medications that need to be taken.

## 2025-03-10 NOTE — PROGRESS NOTES
Procedure for CT Simulation with IV Contrast    Safety Checks  Patient identified using 2 identifiers  Allergies reviewed: Yes  Contrast allergy: No    Physician order for IV contrast verified in [place]: Yes  Consent verified: Yes  Fall risk:  Yes    Creatinine and GFR within normal limits: Yes  Lab Results   Component Value Date    CREATININE 0.68 02/25/2025     GFR: >90    IV Contrast screening form completed and reviewed with patient.  Patient verbalized understanding of CT Sim/IV Contrast process and signed the screening form.  Written education material provided.  Emphasized importance to increase water, 8oz/hour, for the remainder of the day to help flush the kidneys.  Patient verbalized understanding of all education/instructions.  Denied further questions, at this time.    IV Start Time:  0925  Angiocath location: [facility]  Angiocath size: 22G  Positive blood return noted    Time contrast administered:  1046  Contrast reaction: No  Mental status: Alert and oriented    IV Contrast Name:  Omnipaque 350mgI/ml  Lot:  [id number]    Patient Disposition  IV site free of signs of infiltration or phlebitis  Patient denies pain at injection site  IV flushed with 10mL 0.9% Normal Saline  Removal Time:  1053  Patient safely discharged from [facility] Radiation Oncology Department    Carried out orders of Dr. Dr. Del Angel, under his supervision    Jessika Schrader RN

## 2025-03-10 NOTE — ASSESSMENT & PLAN NOTE
Squamous cell cancer at the base of the tongue which has apparently made it to 1 lymph node, radiation treatment to begin.

## 2025-03-10 NOTE — PROGRESS NOTES
History of Present Illness    Annette Fernandez is a 74 y.o. female who is seen at the request of Dr. Parminder Russell for the evaluation of a base of tongue mass.  This patient has been complaining of discomfort in her throat since probably August 2024.  More recently the discomfort has become much worse.  She does have some discomfort in the left ear.  She also has some limitations in swallowing.  She has lost more than 20 pounds over the past year.  She had a CT scan of her neck which was done on January 7, 2025 that I personally reviewed.  It did show this lesion involving the base of the tongue.  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  She was presented at our tumor board and the recommendation was for a combination of chemotherapy and radiation.  She already has seen the radiation oncologist.  She will see the medical oncologist in a few days.      Physical Exam    Palpation of the parotid, neck, and thyroid field fails to show any worrisome masses or adenopathies.    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  She does have a fairly large lesion.  She will be treated with a combination of chemotherapy and radiation.  This hopefully will be starting soon.    Significant dysphagia.  The patient takes most of her nutrition through the PEG tube.    I will see her in 2 months.

## 2025-03-11 ENCOUNTER — APPOINTMENT (OUTPATIENT)
Facility: CLINIC | Age: 75
End: 2025-03-11
Payer: MEDICARE

## 2025-03-11 VITALS — BODY MASS INDEX: 18.95 KG/M2 | WEIGHT: 94 LBS | HEIGHT: 59 IN

## 2025-03-11 DIAGNOSIS — C10.9 MALIGNANT NEOPLASM OF OROPHARYNX: Primary | ICD-10-CM

## 2025-03-11 DIAGNOSIS — R13.12 OROPHARYNGEAL DYSPHAGIA: ICD-10-CM

## 2025-03-11 PROCEDURE — 1123F ACP DISCUSS/DSCN MKR DOCD: CPT | Performed by: OTOLARYNGOLOGY

## 2025-03-11 PROCEDURE — 1160F RVW MEDS BY RX/DR IN RCRD: CPT | Performed by: OTOLARYNGOLOGY

## 2025-03-11 PROCEDURE — 3008F BODY MASS INDEX DOCD: CPT | Performed by: OTOLARYNGOLOGY

## 2025-03-11 PROCEDURE — 1159F MED LIST DOCD IN RCRD: CPT | Performed by: OTOLARYNGOLOGY

## 2025-03-11 PROCEDURE — 99213 OFFICE O/P EST LOW 20 MIN: CPT | Performed by: OTOLARYNGOLOGY

## 2025-03-11 PROCEDURE — 1111F DSCHRG MED/CURRENT MED MERGE: CPT | Performed by: OTOLARYNGOLOGY

## 2025-03-12 ENCOUNTER — PATIENT OUTREACH (OUTPATIENT)
Dept: PRIMARY CARE | Facility: CLINIC | Age: 75
End: 2025-03-12
Payer: MEDICARE

## 2025-03-12 ENCOUNTER — HOME CARE VISIT (OUTPATIENT)
Dept: HOME HEALTH SERVICES | Facility: HOME HEALTH | Age: 75
End: 2025-03-12
Payer: MEDICARE

## 2025-03-12 VITALS
WEIGHT: 94 LBS | SYSTOLIC BLOOD PRESSURE: 104 MMHG | OXYGEN SATURATION: 98 % | BODY MASS INDEX: 18.99 KG/M2 | RESPIRATION RATE: 16 BRPM | HEART RATE: 91 BPM | DIASTOLIC BLOOD PRESSURE: 58 MMHG | TEMPERATURE: 97.1 F

## 2025-03-12 PROCEDURE — G0299 HHS/HOSPICE OF RN EA 15 MIN: HCPCS | Mod: HHH

## 2025-03-12 ASSESSMENT — ENCOUNTER SYMPTOMS
CHANGE IN APPETITE: UNCHANGED
LAST BOWEL MOVEMENT: 67276
BOWEL PATTERN NORMAL: 1
DENIES PAIN: 1
APPETITE LEVEL: POOR

## 2025-03-12 NOTE — PROGRESS NOTES
Call regarding appt. with PCP on 3/7/25 after hospitalization.  At time of outreach call the patient feels as if their condition has returned to baseline since last visit.  Reviewed the PCP appointment with the pt and addressed any questions or concerns. Plans to start remeron tonight and will let Dr Russell know how she is doing with this medication.

## 2025-03-13 ENCOUNTER — OFFICE VISIT (OUTPATIENT)
Dept: HEMATOLOGY/ONCOLOGY | Facility: CLINIC | Age: 75
End: 2025-03-13
Payer: MEDICARE

## 2025-03-13 VITALS
TEMPERATURE: 98.1 F | DIASTOLIC BLOOD PRESSURE: 79 MMHG | WEIGHT: 93.8 LBS | BODY MASS INDEX: 18.91 KG/M2 | OXYGEN SATURATION: 96 % | SYSTOLIC BLOOD PRESSURE: 127 MMHG | HEART RATE: 91 BPM | HEIGHT: 59 IN | RESPIRATION RATE: 18 BRPM

## 2025-03-13 DIAGNOSIS — C01 CANCER OF BASE OF TONGUE (MULTI): Primary | ICD-10-CM

## 2025-03-13 DIAGNOSIS — C01 MALIGNANT NEOPLASM OF BASE OF TONGUE (MULTI): ICD-10-CM

## 2025-03-13 PROCEDURE — 3074F SYST BP LT 130 MM HG: CPT | Performed by: INTERNAL MEDICINE

## 2025-03-13 PROCEDURE — G2211 COMPLEX E/M VISIT ADD ON: HCPCS | Performed by: INTERNAL MEDICINE

## 2025-03-13 PROCEDURE — 99215 OFFICE O/P EST HI 40 MIN: CPT | Performed by: INTERNAL MEDICINE

## 2025-03-13 PROCEDURE — 1123F ACP DISCUSS/DSCN MKR DOCD: CPT | Performed by: INTERNAL MEDICINE

## 2025-03-13 PROCEDURE — 3008F BODY MASS INDEX DOCD: CPT | Performed by: INTERNAL MEDICINE

## 2025-03-13 PROCEDURE — 99205 OFFICE O/P NEW HI 60 MIN: CPT | Performed by: INTERNAL MEDICINE

## 2025-03-13 PROCEDURE — 3078F DIAST BP <80 MM HG: CPT | Performed by: INTERNAL MEDICINE

## 2025-03-13 PROCEDURE — 1111F DSCHRG MED/CURRENT MED MERGE: CPT | Performed by: INTERNAL MEDICINE

## 2025-03-13 PROCEDURE — 1126F AMNT PAIN NOTED NONE PRSNT: CPT | Performed by: INTERNAL MEDICINE

## 2025-03-13 RX ORDER — SENNOSIDES 8.8 MG/5ML
10 LIQUID ORAL 2 TIMES DAILY PRN
Qty: 240 ML | Refills: 3 | Status: SHIPPED | OUTPATIENT
Start: 2025-03-13 | End: 2025-04-12

## 2025-03-13 ASSESSMENT — PATIENT HEALTH QUESTIONNAIRE - PHQ9
SUM OF ALL RESPONSES TO PHQ9 QUESTIONS 1 AND 2: 1
1. LITTLE INTEREST OR PLEASURE IN DOING THINGS: NOT AT ALL
2. FEELING DOWN, DEPRESSED OR HOPELESS: SEVERAL DAYS
10. IF YOU CHECKED OFF ANY PROBLEMS, HOW DIFFICULT HAVE THESE PROBLEMS MADE IT FOR YOU TO DO YOUR WORK, TAKE CARE OF THINGS AT HOME, OR GET ALONG WITH OTHER PEOPLE: NOT DIFFICULT AT ALL

## 2025-03-13 ASSESSMENT — PAIN SCALES - GENERAL: PAINLEVEL_OUTOF10: 0-NO PAIN

## 2025-03-13 ASSESSMENT — ENCOUNTER SYMPTOMS
LOSS OF SENSATION IN FEET: 0
DEPRESSION: 0
OCCASIONAL FEELINGS OF UNSTEADINESS: 0

## 2025-03-13 ASSESSMENT — COLUMBIA-SUICIDE SEVERITY RATING SCALE - C-SSRS
2. HAVE YOU ACTUALLY HAD ANY THOUGHTS OF KILLING YOURSELF?: NO
1. IN THE PAST MONTH, HAVE YOU WISHED YOU WERE DEAD OR WISHED YOU COULD GO TO SLEEP AND NOT WAKE UP?: NO
6. HAVE YOU EVER DONE ANYTHING, STARTED TO DO ANYTHING, OR PREPARED TO DO ANYTHING TO END YOUR LIFE?: NO

## 2025-03-13 NOTE — PROGRESS NOTES
[facility] - Medical Oncology New Patient Visit    Patient ID: Annette Fernandez is a 74 y.o. female.  Referring Physician: Kirt Gamboa MD  [id number] [place] [address]  Primary Care Provider: Parminder Russell MD      Chief Concern: Patient is here to establish care for newly diagnosed base of tongue/oropharyngeal cancer    HPI Patient here today with her daughter. Oncologic history summarized below. She reports main issue is sore throat - good days and bad, taking aspirin and on Sat took 8 throughout the day because was a bad day. More tired. Lost about 25lbs since this started, now has PEG in place.    No neuropathy  No hearing issues - maybe a little  No kidney issues  No heart issues    Diagnosis: Squamous cell carcinoma of the BOT  Stage: Cancer Staging   Cancer of base of tongue (Multi)  Staging form: Pharynx - P16 Negative Oropharynx, AJCC 8th Edition  - Clinical: Stage BRIDGETTE (cT4a, cN1, cM0, p16-) - Signed by Rahel Del Angel MD on 3/3/2025     Current sites of disease: BOT and lymph nodes  Molecular and ancillary testing: p16 neg    Oncologic History:  1/7/25 - CT neck with Irregular, heterogeneous, and possibly ulcerating mass centered at the base of tongue with involvement of extrinsic tongue musculature and lingual surface of the epiglottis; possible involvement of the  body of the hyoid. Oropharyngeal neoplasm is of high suspicion. No cervical lymphadenopathy by size criteria.  1.8 cm exophytic right posterior thyroid nodule along the right tracheoesophageal groove containing calcification.  2/4/25 - Met with Dr. Gamboa after noting discomfort in throat since August, some discomfort in L ear. DL with fungating lesion involving base of tongue and extending on the lateral pharyngeal wall on the left, involving lingual aspect of epiglottis.  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, p16  neg  2/28/25 - PET with hypermetabolic soft tissue mass at BOT, moderately hypermetabolic right level 2A lymph node    Past Medical History:  07/31/2023: Aspiration into airway  No date: HLD (hyperlipidemia)  No date: HTN (hypertension)  No date: Mass of tongue  No date: Other specified health status      Comment:  No pertinent past medical history  No date: Stiffness of unspecified hand, not elsewhere classified      Comment:  Joint stiffness of hand     4 years ago - throat injury, every time she drank/swallow into lungs (black esophagus)    Past Surgical History:  06/14/2019: OTHER SURGICAL HISTORY      Comment:  Ovarian cystectomy  06/14/2019: OTHER SURGICAL HISTORY      Comment:  Cyst excision     Social Hx:   Annette Fernandez  reports that she has been smoking cigarettes. She has quit using smokeless tobacco.  She  reports current alcohol use.  She  reports no history of drug use.  Smoking - working to cut back, down to 2-3 per day, previously 1/2ppd  Limiting amount each time    Lives on her own  Takes care of her cat    Family History   Problem Relation Name Age of Onset    Heart attack Father      Mom had NHL  Grandparent with colon cancer    Meds (Current):  Current Outpatient Medications   Medication Instructions    acetaminophen (TYLENOL) 500 mg, Every 6 hours PRN    atorvastatin (LIPITOR) 80 mg, oral, Daily RT    DentaGeL 1.1 % gel apply with toothbrush OR flouoride trays    metoprolol tartrate (LOPRESSOR) 25 mg, oral, 2 times daily    mirtazapine (REMERON) 15 mg, oral, Nightly    multivitamin tablet 1 tablet, Daily    NON FORMULARY 1 each, Daily    pantoprazole (PROTONIX) 40 mg, oral, Daily, Do not crush, chew, or split.    polyethylene glycol (GLYCOLAX, MIRALAX) 17 g, g-tube, Daily    senna 8.8 mg/5 mL syrup 10 mL, oral, 2 times daily PRN    thyroid (pork) (ARMOUR THYROID) 15 mg, oral, Daily        Allergies   Allergen Reactions    Wellbutrin [Bupropion Hcl] Other     Review of Systems  "  Constitutional:  Positive for fatigue and unexpected weight change. Negative for appetite change.   HENT:   Positive for sore throat. Negative for hearing loss.    Eyes:  Negative for eye problems.   Respiratory:  Negative for cough and shortness of breath.    Cardiovascular:  Negative for chest pain and leg swelling.   Gastrointestinal:  Negative for abdominal pain, constipation, diarrhea, nausea and vomiting.   Genitourinary:  Negative for difficulty urinating.    Musculoskeletal:  Negative for arthralgias and back pain.   Skin:  Negative for rash.   Neurological:  Negative for dizziness and headaches.   Hematological:  Negative for adenopathy.      Objective   BSA: 1.33 meters squared  /79 (BP Location: Left arm, Patient Position: Sitting)   Pulse 91   Temp 36.7 °C (98.1 °F) (Temporal)   Resp 18   Ht (S) 1.495 m (4' 10.86\")   Wt (!) 42.5 kg (93 lb 12.8 oz)   SpO2 96%   BMI 19.04 kg/m²   Performance Status:  (1) Restricted in physically strenuous activity, ambulatory and able to do work of light nature     Physical Exam  Vitals and nursing note reviewed.   Constitutional:       General: She is not in acute distress.  HENT:      Head: Normocephalic and atraumatic.   Eyes:      Pupils: Pupils are equal, round, and reactive to light.   Cardiovascular:      Rate and Rhythm: Normal rate and regular rhythm.      Heart sounds: Normal heart sounds.   Pulmonary:      Effort: Pulmonary effort is normal.      Breath sounds: Normal breath sounds.   Abdominal:      General: There is no distension.      Comments: +PEG   Musculoskeletal:         General: No swelling.   Skin:     General: Skin is warm and dry.      Findings: No rash.   Neurological:      General: No focal deficit present.      Mental Status: She is alert and oriented to person, place, and time.   Psychiatric:         Mood and Affect: Mood normal.         Behavior: Behavior normal.          Results:  Labs:  Lab Results   Component Value Date    WBC " 13.3 (H) 02/25/2025    HGB 12.4 02/25/2025    HCT 37.0 02/25/2025    MCV 98 02/25/2025     02/25/2025      Lab Results   Component Value Date    NEUTROABS 6.93 (H) 12/24/2024      Lab Results   Component Value Date    GLUCOSE 131 (H) 02/25/2025    CALCIUM 8.9 02/25/2025     02/25/2025    K 4.5 02/25/2025    CO2 28 02/25/2025    CL 97 (L) 02/25/2025    BUN 13 02/25/2025    CREATININE 0.68 02/25/2025     Lab Results   Component Value Date    ALT 18 12/24/2024    AST 25 12/24/2024    ALKPHOS 84 12/24/2024    BILITOT 0.4 12/24/2024        Imaging:  I have personally reviewed the below imaging and concur with the reported findings unless otherwise stated:    === Results for orders placed during the hospital encounter of 01/07/25 ===    CT soft tissue neck w IV contrast [[id number]] 01/07/2025    Status: Normal  Irregular, heterogeneous, and possibly ulcerating mass centered at  the base of tongue with involvement of extrinsic tongue musculature  and lingual surface of the epiglottis; possible involvement of the  body of the hyoid. Oropharyngeal neoplasm is of high suspicion.  Subspecialty consultation/direct visualization is advised.    No cervical lymphadenopathy by size criteria.    1.8 cm exophytic right posterior thyroid nodule along the right  tracheoesophageal groove containing calcification.    I personally reviewed the images/study and I agree with the findings  as stated. This study was interpreted at [facility], [place], [place].    MACRO:  Critical Finding:  See findings. Notification was initiated on  1/7/2025 at 3:12 pm by  Isauro Green.  (**-OCF-**)    Signed by: Vania Lau 1/9/2025 9:46 AM  Dictation workstation:   [id number]    === Results for orders placed during the hospital encounter of 02/28/25 ===    NM PET CT bone skull base to mid thigh [[id number]] 02/28/2025    Status: Normal  1. Intensely hypermetabolic soft tissue mass centered at the base of  the  tongue consistent with a neoplastic process.  2. Moderately hypermetabolic enlarged right level 2A lymph node  consistent with lashell disease. No additional hypermetabolic  lymphadenopathy.  3. No evidence of hypermetabolic distant metastatic disease.  4. A intraperitoneal fluid and gas containing collection along the  inferior medial aspect of the gastrostomy tube is favored to be  postprocedural in nature.  5. A hypodense lesion with central calcification along the posterior  margin of the right thyroid gland is photopenic and favored to be  benign in etiology.      I personally reviewed the image(s) / study and agree with the  findings and interpretation as stated. This study was interpreted at  [facility].    MACRO:  None    Signed by: Yasir Harden 2/28/2025 11:26 AM  Dictation workstation:   [id number]    Pathology:    Reviewed from 2/20/25 as per HPI    Assessment/Plan      Annette Fernandez is a 74 y.o. 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    Discussed overall findings to date with patient and her daughter, as well as recommendations for treatment with concurrent/definitive chemoradiation. We discussed the curative goal of treatment. Discussed that chemotherapy would consist of weekly cisplatin, with side effects discussed, including but not limited to myelosuppression (infection, anemia, bleeding), fatigue, nausea, neuropathy, hearing loss/tinnitus, and nephropathy.    We also discussed the role of a clinical trial  - this randomizes patients to HD cisplatin or weekly cisplatin and we discussed the different side effect profiles of both and rationale for trial. She is interested in considering this - I do worry a bit about her weight loss and ability to tolerate this but will explore and reassess. She is also interested in starting treatment ASAP    - Plan for weekly  cisplatin with radiation to start ASAP, messaged Dr. Del Angel to coordinate start date  - Consider   - Consent for cisplatin signed  - With cisplatin, will have weekly IVF and provider visits  - Zyprexa nightly to start with chemo  - PRN zofran/compazine  - dex 8mg daily x3 days after chemo  - advised her to stop aspirin for pain given risk of NSAIDs and kidney function with cisplatin. Ok for oxycodone as needed and we will refill if gets low  - reminded her about risk of constipation with oxycodone - she is taking miralax as needed and will start regularly if taking, I will also send senna to use if using oxycodone more  - will refer for baseline audiology eval prior to cisplatin    RTC to start treatment    Faith Denson MD  [facility]

## 2025-03-13 NOTE — PROGRESS NOTES
Patient had filled out the distress screening thermometer.   The level of distress was indicated at: 5/10    Patient noted no Physical concerns:   N/A    Patient noted Emotional or Social concerns:   Anxiety, fear  a referral to  will be made.  Patient was given the referral paper for [facility].     Patient noted no Practical Concerns:   N/A    Interventions this visit included:  Discussed [facility], paper referral form given  Social Work referral placed  Treatment plans dicussed to relieve concern

## 2025-03-14 ENCOUNTER — SOCIAL WORK (OUTPATIENT)
Dept: CASE MANAGEMENT | Facility: HOSPITAL | Age: 75
End: 2025-03-14
Payer: MEDICARE

## 2025-03-14 RX ORDER — DIPHENHYDRAMINE HYDROCHLORIDE 50 MG/ML
50 INJECTION INTRAMUSCULAR; INTRAVENOUS AS NEEDED
OUTPATIENT
Start: 2025-03-24

## 2025-03-14 RX ORDER — PALONOSETRON 0.05 MG/ML
0.25 INJECTION, SOLUTION INTRAVENOUS ONCE
OUTPATIENT
Start: 2025-03-24

## 2025-03-14 RX ORDER — EPINEPHRINE 0.3 MG/.3ML
0.3 INJECTION SUBCUTANEOUS EVERY 5 MIN PRN
OUTPATIENT
Start: 2025-03-24

## 2025-03-14 RX ORDER — PROCHLORPERAZINE MALEATE 5 MG
10 TABLET ORAL EVERY 6 HOURS PRN
OUTPATIENT
Start: 2025-03-24

## 2025-03-14 RX ORDER — ALBUTEROL SULFATE 0.83 MG/ML
3 SOLUTION RESPIRATORY (INHALATION) AS NEEDED
OUTPATIENT
Start: 2025-03-24

## 2025-03-14 RX ORDER — PROCHLORPERAZINE EDISYLATE 5 MG/ML
10 INJECTION INTRAMUSCULAR; INTRAVENOUS EVERY 6 HOURS PRN
OUTPATIENT
Start: 2025-03-24

## 2025-03-14 RX ORDER — FAMOTIDINE 10 MG/ML
20 INJECTION, SOLUTION INTRAVENOUS ONCE AS NEEDED
OUTPATIENT
Start: 2025-03-24

## 2025-03-14 ASSESSMENT — ENCOUNTER SYMPTOMS
CONSTIPATION: 0
APPETITE CHANGE: 0
FATIGUE: 1
UNEXPECTED WEIGHT CHANGE: 1
HEADACHES: 0
ABDOMINAL PAIN: 0
NAUSEA: 0
BACK PAIN: 0
VOMITING: 0
ARTHRALGIAS: 0
SORE THROAT: 1
DIZZINESS: 0
LEG SWELLING: 0
EYE PROBLEMS: 0
ADENOPATHY: 0
DIFFICULTY URINATING: 0
DIARRHEA: 0
SHORTNESS OF BREATH: 0
COUGH: 0

## 2025-03-14 NOTE — PROGRESS NOTES
Distress Thermometer Referral    SW left a vm today to discuss resources with patient.  Covering  Angus had left a vm regarding her distress score with med onc.  Patient rated her distress a 5/10 again with rad onc.  Patient was encouraged to contact this writer.     SW will continue to follow as needed. MARLYS Pelaez, [phone]

## 2025-03-17 ENCOUNTER — TELEPHONE (OUTPATIENT)
Dept: HEMATOLOGY/ONCOLOGY | Facility: HOSPITAL | Age: 75
End: 2025-03-17
Payer: MEDICARE

## 2025-03-17 DIAGNOSIS — C01 CANCER OF BASE OF TONGUE (MULTI): ICD-10-CM

## 2025-03-17 DIAGNOSIS — C01 CANCER OF BASE OF TONGUE (MULTI): Primary | ICD-10-CM

## 2025-03-17 NOTE — TELEPHONE ENCOUNTER
Annette called in to confirm location for her treatment on 3/26/25. Confirmed with her that her infusion will be at the [facility] location on the second floor. Patient verbalized understanding and agreement with the plan.  Tere MCCURDYN, RN CMSRN

## 2025-03-17 NOTE — TELEPHONE ENCOUNTER
PT called in to confirm a  lab draw is needed prior to treatment. RN reviewed chart and notified PT that yes, she needs labs drawn at [facility]. PT is aware of the location and where to check in.

## 2025-03-20 ENCOUNTER — HOME CARE VISIT (OUTPATIENT)
Dept: HOME HEALTH SERVICES | Facility: HOME HEALTH | Age: 75
End: 2025-03-20
Payer: MEDICARE

## 2025-03-20 DIAGNOSIS — C01 CANCER OF BASE OF TONGUE (MULTI): Primary | ICD-10-CM

## 2025-03-20 RX ORDER — PALONOSETRON 0.05 MG/ML
0.25 INJECTION, SOLUTION INTRAVENOUS ONCE
OUTPATIENT
Start: 2025-04-28

## 2025-03-20 RX ORDER — PALONOSETRON 0.05 MG/ML
0.25 INJECTION, SOLUTION INTRAVENOUS ONCE
OUTPATIENT
Start: 2025-04-01

## 2025-03-20 RX ORDER — PROCHLORPERAZINE MALEATE 10 MG
10 TABLET ORAL EVERY 6 HOURS PRN
OUTPATIENT
Start: 2025-04-01

## 2025-03-20 RX ORDER — ALBUTEROL SULFATE 0.83 MG/ML
3 SOLUTION RESPIRATORY (INHALATION) AS NEEDED
OUTPATIENT
Start: 2025-04-21

## 2025-03-20 RX ORDER — PALONOSETRON 0.05 MG/ML
0.25 INJECTION, SOLUTION INTRAVENOUS ONCE
OUTPATIENT
Start: 2025-05-05

## 2025-03-20 RX ORDER — EPINEPHRINE 0.3 MG/.3ML
0.3 INJECTION SUBCUTANEOUS EVERY 5 MIN PRN
OUTPATIENT
Start: 2025-04-01

## 2025-03-20 RX ORDER — DIPHENHYDRAMINE HYDROCHLORIDE 50 MG/ML
50 INJECTION, SOLUTION INTRAMUSCULAR; INTRAVENOUS AS NEEDED
OUTPATIENT
Start: 2025-04-07

## 2025-03-20 RX ORDER — PROCHLORPERAZINE MALEATE 10 MG
10 TABLET ORAL EVERY 6 HOURS PRN
OUTPATIENT
Start: 2025-04-07

## 2025-03-20 RX ORDER — DIPHENHYDRAMINE HYDROCHLORIDE 50 MG/ML
50 INJECTION, SOLUTION INTRAMUSCULAR; INTRAVENOUS AS NEEDED
OUTPATIENT
Start: 2025-04-14

## 2025-03-20 RX ORDER — ALBUTEROL SULFATE 0.83 MG/ML
3 SOLUTION RESPIRATORY (INHALATION) AS NEEDED
OUTPATIENT
Start: 2025-04-07

## 2025-03-20 RX ORDER — DIPHENHYDRAMINE HYDROCHLORIDE 50 MG/ML
50 INJECTION, SOLUTION INTRAMUSCULAR; INTRAVENOUS AS NEEDED
OUTPATIENT
Start: 2025-04-01

## 2025-03-20 RX ORDER — FAMOTIDINE 10 MG/ML
20 INJECTION, SOLUTION INTRAVENOUS ONCE AS NEEDED
OUTPATIENT
Start: 2025-04-07

## 2025-03-20 RX ORDER — DIPHENHYDRAMINE HYDROCHLORIDE 50 MG/ML
50 INJECTION, SOLUTION INTRAMUSCULAR; INTRAVENOUS AS NEEDED
OUTPATIENT
Start: 2025-04-21

## 2025-03-20 RX ORDER — EPINEPHRINE 0.3 MG/.3ML
0.3 INJECTION SUBCUTANEOUS EVERY 5 MIN PRN
OUTPATIENT
Start: 2025-04-21

## 2025-03-20 RX ORDER — PROCHLORPERAZINE MALEATE 10 MG
10 TABLET ORAL EVERY 6 HOURS PRN
OUTPATIENT
Start: 2025-04-28

## 2025-03-20 RX ORDER — PROCHLORPERAZINE MALEATE 10 MG
10 TABLET ORAL EVERY 6 HOURS PRN
Qty: 30 TABLET | Refills: 5 | Status: SHIPPED | OUTPATIENT
Start: 2025-03-20

## 2025-03-20 RX ORDER — DIPHENHYDRAMINE HYDROCHLORIDE 50 MG/ML
50 INJECTION, SOLUTION INTRAMUSCULAR; INTRAVENOUS AS NEEDED
OUTPATIENT
Start: 2025-04-28

## 2025-03-20 RX ORDER — FAMOTIDINE 10 MG/ML
20 INJECTION, SOLUTION INTRAVENOUS ONCE AS NEEDED
OUTPATIENT
Start: 2025-04-01

## 2025-03-20 RX ORDER — PROCHLORPERAZINE EDISYLATE 5 MG/ML
10 INJECTION INTRAMUSCULAR; INTRAVENOUS EVERY 6 HOURS PRN
OUTPATIENT
Start: 2025-04-28

## 2025-03-20 RX ORDER — PROCHLORPERAZINE EDISYLATE 5 MG/ML
10 INJECTION INTRAMUSCULAR; INTRAVENOUS EVERY 6 HOURS PRN
OUTPATIENT
Start: 2025-04-14

## 2025-03-20 RX ORDER — EPINEPHRINE 0.3 MG/.3ML
0.3 INJECTION SUBCUTANEOUS EVERY 5 MIN PRN
OUTPATIENT
Start: 2025-05-05

## 2025-03-20 RX ORDER — PROCHLORPERAZINE MALEATE 10 MG
10 TABLET ORAL EVERY 6 HOURS PRN
OUTPATIENT
Start: 2025-05-05

## 2025-03-20 RX ORDER — ALBUTEROL SULFATE 0.83 MG/ML
3 SOLUTION RESPIRATORY (INHALATION) AS NEEDED
OUTPATIENT
Start: 2025-04-28

## 2025-03-20 RX ORDER — EPINEPHRINE 0.3 MG/.3ML
0.3 INJECTION SUBCUTANEOUS EVERY 5 MIN PRN
OUTPATIENT
Start: 2025-04-07

## 2025-03-20 RX ORDER — PROCHLORPERAZINE EDISYLATE 5 MG/ML
10 INJECTION INTRAMUSCULAR; INTRAVENOUS EVERY 6 HOURS PRN
OUTPATIENT
Start: 2025-05-05

## 2025-03-20 RX ORDER — ONDANSETRON HYDROCHLORIDE 8 MG/1
8 TABLET, FILM COATED ORAL EVERY 8 HOURS PRN
Qty: 30 TABLET | Refills: 5 | Status: SHIPPED | OUTPATIENT
Start: 2025-03-20

## 2025-03-20 RX ORDER — FAMOTIDINE 10 MG/ML
20 INJECTION, SOLUTION INTRAVENOUS ONCE AS NEEDED
OUTPATIENT
Start: 2025-04-14

## 2025-03-20 RX ORDER — FAMOTIDINE 10 MG/ML
20 INJECTION, SOLUTION INTRAVENOUS ONCE AS NEEDED
OUTPATIENT
Start: 2025-04-21

## 2025-03-20 RX ORDER — PROCHLORPERAZINE EDISYLATE 5 MG/ML
10 INJECTION INTRAMUSCULAR; INTRAVENOUS EVERY 6 HOURS PRN
OUTPATIENT
Start: 2025-04-07

## 2025-03-20 RX ORDER — PALONOSETRON 0.05 MG/ML
0.25 INJECTION, SOLUTION INTRAVENOUS ONCE
OUTPATIENT
Start: 2025-04-07

## 2025-03-20 RX ORDER — OLANZAPINE 5 MG/1
5 TABLET ORAL NIGHTLY
Qty: 30 TABLET | Refills: 1 | Status: SHIPPED | OUTPATIENT
Start: 2025-03-20

## 2025-03-20 RX ORDER — ALBUTEROL SULFATE 0.83 MG/ML
3 SOLUTION RESPIRATORY (INHALATION) AS NEEDED
OUTPATIENT
Start: 2025-05-05

## 2025-03-20 RX ORDER — PALONOSETRON 0.05 MG/ML
0.25 INJECTION, SOLUTION INTRAVENOUS ONCE
OUTPATIENT
Start: 2025-04-14

## 2025-03-20 RX ORDER — PROCHLORPERAZINE EDISYLATE 5 MG/ML
10 INJECTION INTRAMUSCULAR; INTRAVENOUS EVERY 6 HOURS PRN
OUTPATIENT
Start: 2025-04-21

## 2025-03-20 RX ORDER — PROCHLORPERAZINE MALEATE 10 MG
10 TABLET ORAL EVERY 6 HOURS PRN
OUTPATIENT
Start: 2025-04-14

## 2025-03-20 RX ORDER — FAMOTIDINE 10 MG/ML
20 INJECTION, SOLUTION INTRAVENOUS ONCE AS NEEDED
OUTPATIENT
Start: 2025-05-05

## 2025-03-20 RX ORDER — PROCHLORPERAZINE MALEATE 10 MG
10 TABLET ORAL EVERY 6 HOURS PRN
OUTPATIENT
Start: 2025-04-21

## 2025-03-20 RX ORDER — PALONOSETRON 0.05 MG/ML
0.25 INJECTION, SOLUTION INTRAVENOUS ONCE
OUTPATIENT
Start: 2025-04-21

## 2025-03-20 RX ORDER — DEXAMETHASONE 4 MG/1
8 TABLET ORAL DAILY
Qty: 42 TABLET | Refills: 0 | Status: SHIPPED | OUTPATIENT
Start: 2025-03-20

## 2025-03-20 RX ORDER — ALBUTEROL SULFATE 0.83 MG/ML
3 SOLUTION RESPIRATORY (INHALATION) AS NEEDED
OUTPATIENT
Start: 2025-04-14

## 2025-03-20 RX ORDER — FAMOTIDINE 10 MG/ML
20 INJECTION, SOLUTION INTRAVENOUS ONCE AS NEEDED
OUTPATIENT
Start: 2025-04-28

## 2025-03-20 RX ORDER — DIPHENHYDRAMINE HYDROCHLORIDE 50 MG/ML
50 INJECTION, SOLUTION INTRAMUSCULAR; INTRAVENOUS AS NEEDED
OUTPATIENT
Start: 2025-05-05

## 2025-03-20 RX ORDER — EPINEPHRINE 0.3 MG/.3ML
0.3 INJECTION SUBCUTANEOUS EVERY 5 MIN PRN
OUTPATIENT
Start: 2025-04-14

## 2025-03-20 RX ORDER — PROCHLORPERAZINE EDISYLATE 5 MG/ML
10 INJECTION INTRAMUSCULAR; INTRAVENOUS EVERY 6 HOURS PRN
OUTPATIENT
Start: 2025-04-01

## 2025-03-20 RX ORDER — EPINEPHRINE 0.3 MG/.3ML
0.3 INJECTION SUBCUTANEOUS EVERY 5 MIN PRN
OUTPATIENT
Start: 2025-04-28

## 2025-03-20 RX ORDER — ALBUTEROL SULFATE 0.83 MG/ML
3 SOLUTION RESPIRATORY (INHALATION) AS NEEDED
OUTPATIENT
Start: 2025-04-01

## 2025-03-21 ENCOUNTER — LAB (OUTPATIENT)
Dept: LAB | Facility: CLINIC | Age: 75
End: 2025-03-21
Payer: MEDICARE

## 2025-03-21 DIAGNOSIS — C01 CANCER OF BASE OF TONGUE (MULTI): ICD-10-CM

## 2025-03-21 PROCEDURE — 86706 HEP B SURFACE ANTIBODY: CPT

## 2025-03-21 PROCEDURE — 85025 COMPLETE CBC W/AUTO DIFF WBC: CPT

## 2025-03-21 PROCEDURE — 84443 ASSAY THYROID STIM HORMONE: CPT

## 2025-03-21 PROCEDURE — 87340 HEPATITIS B SURFACE AG IA: CPT

## 2025-03-21 PROCEDURE — 83735 ASSAY OF MAGNESIUM: CPT

## 2025-03-21 PROCEDURE — 36415 COLL VENOUS BLD VENIPUNCTURE: CPT

## 2025-03-21 PROCEDURE — 86704 HEP B CORE ANTIBODY TOTAL: CPT

## 2025-03-21 PROCEDURE — 80053 COMPREHEN METABOLIC PANEL: CPT

## 2025-03-22 LAB
ALBUMIN SERPL BCP-MCNC: 3.8 G/DL (ref 3.4–5)
ALP SERPL-CCNC: 69 U/L (ref 33–136)
ALT SERPL W P-5'-P-CCNC: 25 U/L (ref 7–45)
ANION GAP SERPL CALC-SCNC: 14 MMOL/L (ref 10–20)
AST SERPL W P-5'-P-CCNC: 27 U/L (ref 9–39)
BASOPHILS # BLD AUTO: 0.04 X10*3/UL (ref 0–0.1)
BASOPHILS NFR BLD AUTO: 0.4 %
BILIRUB SERPL-MCNC: 0.2 MG/DL (ref 0–1.2)
BUN SERPL-MCNC: 25 MG/DL (ref 6–23)
CALCIUM SERPL-MCNC: 9.7 MG/DL (ref 8.6–10.6)
CHLORIDE SERPL-SCNC: 97 MMOL/L (ref 98–107)
CO2 SERPL-SCNC: 30 MMOL/L (ref 21–32)
CREAT SERPL-MCNC: 0.81 MG/DL (ref 0.5–1.05)
EGFRCR SERPLBLD CKD-EPI 2021: 77 ML/MIN/1.73M*2
EOSINOPHIL # BLD AUTO: 0.27 X10*3/UL (ref 0–0.4)
EOSINOPHIL NFR BLD AUTO: 2.7 %
ERYTHROCYTE [DISTWIDTH] IN BLOOD BY AUTOMATED COUNT: 12.2 % (ref 11.5–14.5)
GLUCOSE SERPL-MCNC: 92 MG/DL (ref 74–99)
HBV CORE AB SER QL: NONREACTIVE
HBV SURFACE AB SER-ACNC: <3.1 MIU/ML
HBV SURFACE AG SERPL QL IA: NONREACTIVE
HCT VFR BLD AUTO: 40.3 % (ref 36–46)
HGB BLD-MCNC: 13.1 G/DL (ref 12–16)
IMM GRANULOCYTES # BLD AUTO: 0.03 X10*3/UL (ref 0–0.5)
IMM GRANULOCYTES NFR BLD AUTO: 0.3 % (ref 0–0.9)
LYMPHOCYTES # BLD AUTO: 2.54 X10*3/UL (ref 0.8–3)
LYMPHOCYTES NFR BLD AUTO: 25 %
MAGNESIUM SERPL-MCNC: 1.9 MG/DL (ref 1.6–2.4)
MCH RBC QN AUTO: 31.6 PG (ref 26–34)
MCHC RBC AUTO-ENTMCNC: 32.5 G/DL (ref 32–36)
MCV RBC AUTO: 97 FL (ref 80–100)
MONOCYTES # BLD AUTO: 0.92 X10*3/UL (ref 0.05–0.8)
MONOCYTES NFR BLD AUTO: 9 %
NEUTROPHILS # BLD AUTO: 6.37 X10*3/UL (ref 1.6–5.5)
NEUTROPHILS NFR BLD AUTO: 62.6 %
NRBC BLD-RTO: 0 /100 WBCS (ref 0–0)
PLATELET # BLD AUTO: 483 X10*3/UL (ref 150–450)
POTASSIUM SERPL-SCNC: 4.2 MMOL/L (ref 3.5–5.3)
PROT SERPL-MCNC: 7.2 G/DL (ref 6.4–8.2)
RBC # BLD AUTO: 4.15 X10*6/UL (ref 4–5.2)
SODIUM SERPL-SCNC: 137 MMOL/L (ref 136–145)
TSH SERPL-ACNC: 0.52 MIU/L (ref 0.44–3.98)
WBC # BLD AUTO: 10.2 X10*3/UL (ref 4.4–11.3)

## 2025-03-24 ENCOUNTER — HOSPITAL ENCOUNTER (OUTPATIENT)
Dept: RADIATION ONCOLOGY | Facility: CLINIC | Age: 75
Setting detail: RADIATION/ONCOLOGY SERIES
Discharge: HOME | End: 2025-03-24
Payer: MEDICARE

## 2025-03-24 ENCOUNTER — APPOINTMENT (OUTPATIENT)
Dept: HEMATOLOGY/ONCOLOGY | Facility: CLINIC | Age: 75
End: 2025-03-24
Payer: MEDICARE

## 2025-03-24 PROCEDURE — 77300 RADIATION THERAPY DOSE PLAN: CPT | Performed by: RADIOLOGY

## 2025-03-24 PROCEDURE — 77301 RADIOTHERAPY DOSE PLAN IMRT: CPT | Performed by: RADIOLOGY

## 2025-03-24 PROCEDURE — 77338 DESIGN MLC DEVICE FOR IMRT: CPT | Performed by: RADIOLOGY

## 2025-03-25 ENCOUNTER — APPOINTMENT (OUTPATIENT)
Facility: CLINIC | Age: 75
End: 2025-03-25
Payer: MEDICARE

## 2025-03-25 ENCOUNTER — APPOINTMENT (OUTPATIENT)
Dept: RADIATION ONCOLOGY | Facility: CLINIC | Age: 75
End: 2025-03-25
Payer: MEDICARE

## 2025-03-26 ENCOUNTER — HOSPITAL ENCOUNTER (OUTPATIENT)
Dept: RADIATION ONCOLOGY | Facility: CLINIC | Age: 75
Setting detail: RADIATION/ONCOLOGY SERIES
Discharge: HOME | End: 2025-03-26
Payer: MEDICARE

## 2025-03-26 ENCOUNTER — NUTRITION (OUTPATIENT)
Dept: HEMATOLOGY/ONCOLOGY | Facility: CLINIC | Age: 75
End: 2025-03-26

## 2025-03-26 ENCOUNTER — INFUSION (OUTPATIENT)
Dept: HEMATOLOGY/ONCOLOGY | Facility: CLINIC | Age: 75
End: 2025-03-26
Payer: MEDICARE

## 2025-03-26 VITALS — WEIGHT: 95.02 LBS | BODY MASS INDEX: 19.16 KG/M2 | HEIGHT: 59 IN

## 2025-03-26 VITALS
SYSTOLIC BLOOD PRESSURE: 106 MMHG | WEIGHT: 95.02 LBS | OXYGEN SATURATION: 98 % | HEART RATE: 91 BPM | DIASTOLIC BLOOD PRESSURE: 63 MMHG | RESPIRATION RATE: 18 BRPM | BODY MASS INDEX: 19.28 KG/M2 | TEMPERATURE: 97 F

## 2025-03-26 DIAGNOSIS — C01 MALIGNANT NEOPLASM OF BASE OF TONGUE (MULTI): Primary | ICD-10-CM

## 2025-03-26 DIAGNOSIS — C01 CANCER OF BASE OF TONGUE (MULTI): ICD-10-CM

## 2025-03-26 DIAGNOSIS — C01 MALIGNANT NEOPLASM OF BASE OF TONGUE (MULTI): ICD-10-CM

## 2025-03-26 LAB
RAD ONC MSQ ACTUAL FRACTIONS DELIVERED: 1
RAD ONC MSQ ACTUAL SESSION DELIVERED DOSE: 212 CGRAY
RAD ONC MSQ ACTUAL TOTAL DOSE: 212 CGRAY
RAD ONC MSQ ELAPSED DAYS: 0
RAD ONC MSQ LAST DATE: NORMAL
RAD ONC MSQ PRESCRIBED FRACTIONAL DOSE: 212 CGRAY
RAD ONC MSQ PRESCRIBED NUMBER OF FRACTIONS: 33
RAD ONC MSQ PRESCRIBED TECHNIQUE: NORMAL
RAD ONC MSQ PRESCRIBED TOTAL DOSE: 6996 CGRAY
RAD ONC MSQ PRESCRIPTION PATTERN COMMENT: NORMAL
RAD ONC MSQ START DATE: NORMAL
RAD ONC MSQ TREATMENT COURSE NUMBER: 1
RAD ONC MSQ TREATMENT SITE: NORMAL

## 2025-03-26 PROCEDURE — 96367 TX/PROPH/DG ADDL SEQ IV INF: CPT

## 2025-03-26 PROCEDURE — 96375 TX/PRO/DX INJ NEW DRUG ADDON: CPT | Mod: INF

## 2025-03-26 PROCEDURE — 96413 CHEMO IV INFUSION 1 HR: CPT

## 2025-03-26 PROCEDURE — 77386 HC INTENSITY-MODULATED RADIATION THERAPY (IMRT), COMPLEX: CPT | Performed by: RADIOLOGY

## 2025-03-26 PROCEDURE — 2500000004 HC RX 250 GENERAL PHARMACY W/ HCPCS (ALT 636 FOR OP/ED): Performed by: INTERNAL MEDICINE

## 2025-03-26 PROCEDURE — 96361 HYDRATE IV INFUSION ADD-ON: CPT | Mod: INF

## 2025-03-26 RX ORDER — ALBUTEROL SULFATE 0.83 MG/ML
3 SOLUTION RESPIRATORY (INHALATION) AS NEEDED
Status: DISCONTINUED | OUTPATIENT
Start: 2025-03-26 | End: 2025-03-26 | Stop reason: HOSPADM

## 2025-03-26 RX ORDER — DIPHENHYDRAMINE HYDROCHLORIDE 50 MG/ML
50 INJECTION, SOLUTION INTRAMUSCULAR; INTRAVENOUS AS NEEDED
Status: DISCONTINUED | OUTPATIENT
Start: 2025-03-26 | End: 2025-03-26 | Stop reason: HOSPADM

## 2025-03-26 RX ORDER — EPINEPHRINE 0.3 MG/.3ML
0.3 INJECTION SUBCUTANEOUS EVERY 5 MIN PRN
Status: DISCONTINUED | OUTPATIENT
Start: 2025-03-26 | End: 2025-03-26 | Stop reason: HOSPADM

## 2025-03-26 RX ORDER — PROCHLORPERAZINE MALEATE 10 MG
10 TABLET ORAL EVERY 6 HOURS PRN
Status: DISCONTINUED | OUTPATIENT
Start: 2025-03-26 | End: 2025-03-26 | Stop reason: HOSPADM

## 2025-03-26 RX ORDER — PROCHLORPERAZINE EDISYLATE 5 MG/ML
10 INJECTION INTRAMUSCULAR; INTRAVENOUS EVERY 6 HOURS PRN
Status: DISCONTINUED | OUTPATIENT
Start: 2025-03-26 | End: 2025-03-26 | Stop reason: HOSPADM

## 2025-03-26 RX ORDER — FAMOTIDINE 10 MG/ML
20 INJECTION, SOLUTION INTRAVENOUS ONCE AS NEEDED
Status: DISCONTINUED | OUTPATIENT
Start: 2025-03-26 | End: 2025-03-26 | Stop reason: HOSPADM

## 2025-03-26 RX ORDER — PALONOSETRON 0.05 MG/ML
0.25 INJECTION, SOLUTION INTRAVENOUS ONCE
Status: COMPLETED | OUTPATIENT
Start: 2025-03-26 | End: 2025-03-26

## 2025-03-26 RX ADMIN — POTASSIUM CHLORIDE 999 ML/HR: 2 INJECTION, SOLUTION, CONCENTRATE INTRAVENOUS at 10:20

## 2025-03-26 RX ADMIN — PALONOSETRON 0.25 MG: 0.05 INJECTION, SOLUTION INTRAVENOUS at 11:04

## 2025-03-26 RX ADMIN — SODIUM CHLORIDE 1000 ML: 9 INJECTION, SOLUTION INTRAVENOUS at 12:47

## 2025-03-26 RX ADMIN — FOSAPREPITANT 150 MG: 150 INJECTION, POWDER, LYOPHILIZED, FOR SOLUTION INTRAVENOUS at 11:45

## 2025-03-26 RX ADMIN — DEXAMETHASONE SODIUM PHOSPHATE 12 MG: 10 INJECTION INTRAMUSCULAR; INTRAVENOUS at 11:25

## 2025-03-26 RX ADMIN — CISPLATIN 40 MG: 1 INJECTION, SOLUTION INTRAVENOUS at 12:24

## 2025-03-26 ASSESSMENT — PAIN SCALES - GENERAL: PAINLEVEL_OUTOF10: 0-NO PAIN

## 2025-03-26 NOTE — PROGRESS NOTES
"NUTRITION Assessment NOTE    Nutrition Assessment     Reason for Visit:  Annette Fernandez is a 74 y.o. female seen today during her infusion at [facility].  Patient receiving CISplatin with Concurrent Radiation (Weekly), 7 Week Cycle for diagnosis of base of tongue SCC.  PEG tube placed 2/21/2025.      Patient Active Problem List   Diagnosis    HTN (hypertension), benign    Hyperlipidemia    Acquired hypothyroidism    Peripheral edema    Thyroid nodule    Esophageal necrosis    Anemia    Encounter for subsequent annual wellness visit (AWV) in Medicare patient    Tobacco use disorder    Other primary ovarian failure    Contact dermatitis and eczema    Mass of tongue    Cancer of base of tongue (Multi)    Malignant neoplasm of base of tongue (Multi)    Oropharyngeal dysphagia       Nutrition Significant Labs:  Lab Results   Component Value Date/Time    GLUCOSE 92 03/21/2025 1548     03/21/2025 1548    K 4.2 03/21/2025 1548    CL 97 (L) 03/21/2025 1548    CO2 30 03/21/2025 1548    ANIONGAP 14 03/21/2025 1548    BUN 25 (H) 03/21/2025 1548    CREATININE 0.81 03/21/2025 1548    EGFR 77 03/21/2025 1548    CALCIUM 9.7 03/21/2025 1548    ALBUMIN 3.8 03/21/2025 1548    ALKPHOS 69 03/21/2025 1548    PROT 7.2 03/21/2025 1548    AST 27 03/21/2025 1548    BILITOT 0.2 03/21/2025 1548    ALT 25 03/21/2025 1548    MG 1.90 03/21/2025 1548    PHOS 2.5 02/25/2025 0611     No results found for: \"VITD25\"      Anthropometrics:  Height: 149.5 cm (4' 10.86\")   Weight: (!) 43.1 kg (95 lb 0.3 oz)   BMI (Calculated): 19.28    IBW/kg (Dietitian Calculated): 40.9 kg Percent of IBW: 105 %          Weight History:   Daily Weight  03/26/25 : (!) 43.1 kg (95 lb 0.3 oz)  03/26/25 : (!) 43.1 kg (95 lb 0.3 oz)  03/13/25 : (!) 42.5 kg (93 lb 12.8 oz)  03/12/25 : (!) 42.6 kg (94 lb)  03/11/25 : (!) 42.6 kg (94 lb)  03/07/25 : (!) 42.5 kg (93 lb 12.8 oz)  03/04/25 : (!) 43.2 kg (95 lb 3.8 oz)  03/03/25 : (!) 43.2 kg (95 lb 3.8 oz)  02/22/25 : (!) " 44.4 kg (97 lb 12.8 oz)  02/18/25 : (!) 44.2 kg (97 lb 8 oz)    Weight Change %:  Weight History / % Weight Change: was noted from RN that pt has lost 25 lbs over the last year (20%); and 3 lbs (3%) wt loss noted over the last month  Significant Weight Loss: Yes  Interpretation of Weight Loss: 20% in 1 year    Nutrition History:  Food & Nutrition History: Per RN in radiation pt is able to eat PO but takes smaller bites and cuts food up well.   She stated today she eats very little, moslty soups.  Food Allergies:    Food Intolerances:    Vitamin/mineral intake:       Herbal supplements:    Medication and Complementary/Alternative Medicine Use:    Dentition:    Sleep Habits:      Diet Recall:  Meal 1:    Snack 1:    Meal 2:    Snack 2:    Meal 3:    Snack 3:    Food Variety:    Oral Nutrition Supplement Use: Oral Nutrition Supplements: Ensure Frequency: 1/day Calories: 220 calories Protein: 9g protein  Fluid Intake: PO takes 4 oz juice daily, 4+ oz water daily; soups  Energy Intake: Poor < 50 %    Food Preparation:  Cooking:    Grocery Shopping:    Dining Out:      Enteral Nutrition History:   Enteral Nutrition Formula/Solution: Isosource 1.5  Method of TF administration: Bolus enteral nutrition feeding  TF infusion rate: per pt getting in 3 cartons per day (this provides 1125 calories, 51g protein, 573ml FW)  Feeding Tube Flush: 60ml pre/post bolus 3 times per day; will add extra water into formula as she stated it is thick and takes longer to go in then she likes. (provides 360 ml water plus little extra she adds to formula.)     Medications:  Current Outpatient Medications   Medication Instructions    acetaminophen (TYLENOL) 500 mg, Every 6 hours PRN    atorvastatin (LIPITOR) 80 mg, oral, Daily RT    DentaGeL 1.1 % gel apply with toothbrush OR flouoride trays    dexAMETHasone (DECADRON) 8 mg, oral, Daily, For 3 days per week starting the day after treatment.    metoprolol tartrate (LOPRESSOR) 25 mg, oral, 2 times  daily    mirtazapine (REMERON) 15 mg, oral, Nightly    multivitamin tablet 1 tablet, Daily    NON FORMULARY 1 each, Daily    OLANZapine (ZYPREXA) 5 mg, oral, Nightly    ondansetron (ZOFRAN) 8 mg, oral, Every 8 hours PRN    pantoprazole (PROTONIX) 40 mg, oral, Daily, Do not crush, chew, or split.    polyethylene glycol (GLYCOLAX, MIRALAX) 17 g, g-tube, Daily    prochlorperazine (COMPAZINE) 10 mg, oral, Every 6 hours PRN    senna 8.8 mg/5 mL syrup 10 mL, oral, 2 times daily PRN    thyroid (pork) (ARMOUR THYROID) 15 mg, oral, Daily       Nutrition Focused Physical Exam Findings:    Subcutaneous Fat Loss:   Orbital Fat Pads: Mild-Moderate (slight dark circles and slight hollowing)  Buccal Fat Pads: Mild-Moderate (flat cheeks, minimal bounce)    Muscle Wasting:  Temporalis: Mild-Moderate (slight depression)    Physical Findings:          Estimated Needs:       Total Energy Estimated Needs in 24 hours (kCal): 1500 kCal  Method for Estimating Needs: 2862-3502 kcals/d (35-40 kcals/kg)  Total Protein Estimated Needs in 24 Hours (g): 52 g  Method for Estimating 24 Hour Protein Needs: 52-65 g protein/day (1.2-1.5g protein/kg)  Total Fluid Estimated Needs in 24 Hours (mL): 1500 mL  Method for Estimating 24 Hour Fluid Needs: or 1ml/kcal             Nutrition Diagnosis   Malnutrition Diagnosis  Patient has Malnutrition Diagnosis: Yes  Diagnosis Status: Active  Malnutrition Diagnosis: Moderate malnutrition related to chronic disease or condition  As Evidenced by: <75% of estimated energy needs for >= 1 month; >20% wt loss in past year    Nutrition Diagnosis  Patient has Nutrition Diagnosis: Yes  Diagnosis Status (1): New  Nutrition Diagnosis 1: Increased nutrient needs  Related to (1): increased metabolic demand  As Evidenced by (1): chemoRT for diagnosis of BOT SCC.       Nutrition Interventions/Recommendations   Nutrition Prescription: Oral nutrition, Enteral nutrition Management of nutrition impact symptoms; adjust diet  consistency as tolerated to continue swallow function as safe; PEG tube to meet 100% of nutritional needs; maintain weight    Nutrition Interventions:   Food and Nutrient Delivery: Meals & Snacks: Energy-modified diet, Fluid-modified diet, Protein-modified diet, Texture-Modified Diet  Goals: Oral intake as safe to swallow.  Enteral Nutrition: Management of enteral nutrition site care, Management of flushing of feeding tube, Management of schedule of enteral nutrition  Goals: Can continue with 3 isosource 1.5 per day and increase as tolerated to goal of 4 cartons per day if PO intake decreases, and/or weight loss noted.   Flush with 60ml water before and after each feed. Additional water intake either PO or via PEG of 380ml per day.  Medical Food Supplement: Commercial beverage medical food supplement therapy  Goals: Continue with 1 Ensure per day as tolerated to provide extra 220 calories, 9g protein; can consider ensure plus 1 per day for more protein and calories.  Feeding Assistance Management: Mouth care management  Goals: Oral care; can use baking soda/ salt water swish; may try carbonating beverage (tonic water, carobonated water) swish and spit to aid in breaking up thick phlegm as needed.  Other:: Hydration  Goals: Encouraged adequate fluid intake.  Recommend extra 380ml water flush either via PEG tube or orally as tolerated. Discussed electrolyte replacements as needed throughout tx.     Coordination of Care: Collaboration and referral of nutrition care: Collaboration and Referral of Nutrition Care, Referral by nutrition professional to community agencies and programs  Goals: PEG tube feeding and supplies comes from [facility].     Nutrition Education:   Nutrition Education Content: Content related nutrition education   Discussed PEG tube feedings and nutrition throughout cancer tx.             Nutrition Monitoring and Evaluation   Food and Nutrient Intake  Monitoring and Evaluation Plan: Fluid intake,  Enternal and parenternal nutrition intake determination  Fluid Intake: Estimated fluid intake  Criteria: Maintain hydration of at least 64 oz per day - coming from TF,  water flushes PEG tube, and PO fluid intake.  Enteral and Parenteral Nutrition Intake Determination: Enteral nutrition formula/solution, Enteral nutrition intake  Criteria: meet 100% of estimated nutritional needs via PEG tube feedings    Anthropometric measurements  Monitoring and Evaluation Plan: Weight  Body Weight: Measured body weight  Criteria: Maintain weight    Biochemical Data, Medical Tests and Procedures  Monitoring and Evaluation Plan: Electrolyte/renal panel  Criteria: labs WNL              Follow Up: Planned follow up visit: will continue to follow up throughout [facility] treatments.     Time Spent  Prep time on day of patient encounter: 10 minutes  Time spent directly with patient, family or caregiver: 15 minutes  Additional Time Spent on Patient Care Activities: 10 minutes  Documentation Time: 15 minutes  Other Time Spent: 0 minutes  Total: 50 minutes

## 2025-03-26 NOTE — SIGNIFICANT EVENT
03/26/25 1008   Prechemo Checklist   Has the patient been in the hospital, ED, or urgent care since last date of service No   Chemo/Immuno Consent Completed and Signed Yes   Protocol/Indications Verified Yes   Confirmed to previous date/time of medication Yes  (first dose)   Compared to previous dose Yes  (first dose)   All medications are dated accurately Yes   Pregnancy Test Negative Not applicable   Parameters Met (!) No  (ok to treat with CrCl- dose reduced)   Provider Notified Yes   Provider Name Chiec   Is Patient Proceeding With Treatment? Yes   BSA/Weight-Height Verified Yes   Dose Calculations Verified (current, total, cumulative) Yes

## 2025-03-27 ENCOUNTER — HOSPITAL ENCOUNTER (OUTPATIENT)
Dept: RADIATION ONCOLOGY | Facility: CLINIC | Age: 75
Setting detail: RADIATION/ONCOLOGY SERIES
Discharge: HOME | End: 2025-03-27
Payer: MEDICARE

## 2025-03-27 ENCOUNTER — HOME CARE VISIT (OUTPATIENT)
Dept: HOME HEALTH SERVICES | Facility: HOME HEALTH | Age: 75
End: 2025-03-27
Payer: MEDICARE

## 2025-03-27 VITALS
OXYGEN SATURATION: 93 % | RESPIRATION RATE: 18 BRPM | SYSTOLIC BLOOD PRESSURE: 114 MMHG | DIASTOLIC BLOOD PRESSURE: 60 MMHG | HEART RATE: 74 BPM | TEMPERATURE: 97.1 F

## 2025-03-27 DIAGNOSIS — C01 MALIGNANT NEOPLASM OF BASE OF TONGUE (MULTI): ICD-10-CM

## 2025-03-27 DIAGNOSIS — Z51.0 ENCOUNTER FOR ANTINEOPLASTIC RADIATION THERAPY: ICD-10-CM

## 2025-03-27 LAB
RAD ONC MSQ ACTUAL FRACTIONS DELIVERED: 2
RAD ONC MSQ ACTUAL SESSION DELIVERED DOSE: 212 CGRAY
RAD ONC MSQ ACTUAL TOTAL DOSE: 424 CGRAY
RAD ONC MSQ ELAPSED DAYS: 1
RAD ONC MSQ LAST DATE: NORMAL
RAD ONC MSQ PRESCRIBED FRACTIONAL DOSE: 212 CGRAY
RAD ONC MSQ PRESCRIBED NUMBER OF FRACTIONS: 33
RAD ONC MSQ PRESCRIBED TECHNIQUE: NORMAL
RAD ONC MSQ PRESCRIBED TOTAL DOSE: 6996 CGRAY
RAD ONC MSQ PRESCRIPTION PATTERN COMMENT: NORMAL
RAD ONC MSQ START DATE: NORMAL
RAD ONC MSQ TREATMENT COURSE NUMBER: 1
RAD ONC MSQ TREATMENT SITE: NORMAL

## 2025-03-27 PROCEDURE — 77386 HC INTENSITY-MODULATED RADIATION THERAPY (IMRT), COMPLEX: CPT | Performed by: RADIOLOGY

## 2025-03-27 PROCEDURE — G0299 HHS/HOSPICE OF RN EA 15 MIN: HCPCS | Mod: HHH

## 2025-03-27 ASSESSMENT — ACTIVITIES OF DAILY LIVING (ADL)
OASIS_M1830: 00
HOME_HEALTH_OASIS: 00

## 2025-03-28 ENCOUNTER — SOCIAL WORK (OUTPATIENT)
Dept: CASE MANAGEMENT | Facility: HOSPITAL | Age: 75
End: 2025-03-28

## 2025-03-28 ENCOUNTER — HOSPITAL ENCOUNTER (OUTPATIENT)
Dept: RADIATION ONCOLOGY | Facility: CLINIC | Age: 75
Setting detail: RADIATION/ONCOLOGY SERIES
Discharge: HOME | End: 2025-03-28
Payer: MEDICARE

## 2025-03-28 ENCOUNTER — INFUSION (OUTPATIENT)
Dept: HEMATOLOGY/ONCOLOGY | Facility: CLINIC | Age: 75
End: 2025-03-28
Payer: MEDICARE

## 2025-03-28 VITALS
HEART RATE: 56 BPM | WEIGHT: 99.21 LBS | DIASTOLIC BLOOD PRESSURE: 67 MMHG | OXYGEN SATURATION: 96 % | TEMPERATURE: 96.1 F | SYSTOLIC BLOOD PRESSURE: 122 MMHG | RESPIRATION RATE: 18 BRPM | BODY MASS INDEX: 20.13 KG/M2

## 2025-03-28 DIAGNOSIS — Z51.0 ENCOUNTER FOR ANTINEOPLASTIC RADIATION THERAPY: ICD-10-CM

## 2025-03-28 DIAGNOSIS — C01 CANCER OF BASE OF TONGUE (MULTI): ICD-10-CM

## 2025-03-28 DIAGNOSIS — C01 MALIGNANT NEOPLASM OF BASE OF TONGUE (MULTI): ICD-10-CM

## 2025-03-28 LAB
ALBUMIN SERPL BCP-MCNC: 3.6 G/DL (ref 3.4–5)
ALP SERPL-CCNC: 51 U/L (ref 33–136)
ALT SERPL W P-5'-P-CCNC: 16 U/L (ref 7–45)
ANION GAP SERPL CALC-SCNC: 12 MMOL/L (ref 10–20)
AST SERPL W P-5'-P-CCNC: 17 U/L (ref 9–39)
BASOPHILS # BLD AUTO: 0.02 X10*3/UL (ref 0–0.1)
BASOPHILS NFR BLD AUTO: 0.1 %
BILIRUB SERPL-MCNC: 0.2 MG/DL (ref 0–1.2)
BUN SERPL-MCNC: 30 MG/DL (ref 6–23)
CALCIUM SERPL-MCNC: 8.6 MG/DL (ref 8.6–10.3)
CHLORIDE SERPL-SCNC: 103 MMOL/L (ref 98–107)
CO2 SERPL-SCNC: 27 MMOL/L (ref 21–32)
CREAT SERPL-MCNC: 0.77 MG/DL (ref 0.5–1.05)
EGFRCR SERPLBLD CKD-EPI 2021: 82 ML/MIN/1.73M*2
EOSINOPHIL # BLD AUTO: 0 X10*3/UL (ref 0–0.4)
EOSINOPHIL NFR BLD AUTO: 0 %
ERYTHROCYTE [DISTWIDTH] IN BLOOD BY AUTOMATED COUNT: 12.5 % (ref 11.5–14.5)
GLUCOSE SERPL-MCNC: 82 MG/DL (ref 74–99)
HCT VFR BLD AUTO: 37 % (ref 36–46)
HGB BLD-MCNC: 11.7 G/DL (ref 12–16)
IMM GRANULOCYTES # BLD AUTO: 0.04 X10*3/UL (ref 0–0.5)
IMM GRANULOCYTES NFR BLD AUTO: 0.2 % (ref 0–0.9)
LYMPHOCYTES # BLD AUTO: 0.63 X10*3/UL (ref 0.8–3)
LYMPHOCYTES NFR BLD AUTO: 2.7 %
MCH RBC QN AUTO: 31.5 PG (ref 26–34)
MCHC RBC AUTO-ENTMCNC: 31.6 G/DL (ref 32–36)
MCV RBC AUTO: 100 FL (ref 80–100)
MONOCYTES # BLD AUTO: 0.87 X10*3/UL (ref 0.05–0.8)
MONOCYTES NFR BLD AUTO: 3.7 %
NEUTROPHILS # BLD AUTO: 21.84 X10*3/UL (ref 1.6–5.5)
NEUTROPHILS NFR BLD AUTO: 93.3 %
NRBC BLD-RTO: ABNORMAL /100{WBCS}
PLATELET # BLD AUTO: 413 X10*3/UL (ref 150–450)
POTASSIUM SERPL-SCNC: 3.7 MMOL/L (ref 3.5–5.3)
PROT SERPL-MCNC: 6.5 G/DL (ref 6.4–8.2)
RAD ONC MSQ ACTUAL FRACTIONS DELIVERED: 3
RAD ONC MSQ ACTUAL SESSION DELIVERED DOSE: 212 CGRAY
RAD ONC MSQ ACTUAL TOTAL DOSE: 636 CGRAY
RAD ONC MSQ ELAPSED DAYS: 2
RAD ONC MSQ LAST DATE: NORMAL
RAD ONC MSQ PRESCRIBED FRACTIONAL DOSE: 212 CGRAY
RAD ONC MSQ PRESCRIBED NUMBER OF FRACTIONS: 33
RAD ONC MSQ PRESCRIBED TECHNIQUE: NORMAL
RAD ONC MSQ PRESCRIBED TOTAL DOSE: 6996 CGRAY
RAD ONC MSQ PRESCRIPTION PATTERN COMMENT: NORMAL
RAD ONC MSQ START DATE: NORMAL
RAD ONC MSQ TREATMENT COURSE NUMBER: 1
RAD ONC MSQ TREATMENT SITE: NORMAL
RBC # BLD AUTO: 3.72 X10*6/UL (ref 4–5.2)
SODIUM SERPL-SCNC: 138 MMOL/L (ref 136–145)
WBC # BLD AUTO: 23.4 X10*3/UL (ref 4.4–11.3)

## 2025-03-28 PROCEDURE — 96360 HYDRATION IV INFUSION INIT: CPT | Mod: INF

## 2025-03-28 PROCEDURE — 2500000004 HC RX 250 GENERAL PHARMACY W/ HCPCS (ALT 636 FOR OP/ED): Performed by: INTERNAL MEDICINE

## 2025-03-28 PROCEDURE — 77386 HC INTENSITY-MODULATED RADIATION THERAPY (IMRT), COMPLEX: CPT | Performed by: RADIOLOGY

## 2025-03-28 PROCEDURE — 85025 COMPLETE CBC W/AUTO DIFF WBC: CPT

## 2025-03-28 PROCEDURE — 80053 COMPREHEN METABOLIC PANEL: CPT

## 2025-03-28 RX ORDER — HEPARIN 100 UNIT/ML
500 SYRINGE INTRAVENOUS AS NEEDED
Status: CANCELLED | OUTPATIENT
Start: 2025-03-28

## 2025-03-28 RX ORDER — HEPARIN 100 UNIT/ML
500 SYRINGE INTRAVENOUS AS NEEDED
OUTPATIENT
Start: 2025-03-28

## 2025-03-28 RX ORDER — HEPARIN SODIUM,PORCINE/PF 10 UNIT/ML
50 SYRINGE (ML) INTRAVENOUS AS NEEDED
Status: CANCELLED | OUTPATIENT
Start: 2025-03-28

## 2025-03-28 RX ORDER — HEPARIN SODIUM,PORCINE/PF 10 UNIT/ML
50 SYRINGE (ML) INTRAVENOUS AS NEEDED
OUTPATIENT
Start: 2025-03-28

## 2025-03-28 RX ADMIN — SODIUM CHLORIDE 1000 ML: 9 INJECTION, SOLUTION INTRAVENOUS at 12:08

## 2025-03-28 ASSESSMENT — PAIN SCALES - GENERAL: PAINLEVEL_OUTOF10: 0-NO PAIN

## 2025-03-28 NOTE — PROGRESS NOTES
Radiation Oncology On Treatment Visit    Patient Name:  Annette Fernandez  MRN:  [id number]  :  1950    Referring Provider: Kirt Gamboa MD  Primary Care Provider: Parminder Russell MD  Care Team: Patient Care Team:  Parminder Russell MD as PCP - General  Parminder Russell MD as PCP - [facility] Attributed Provider  ELLYN Corona-CNP as Nurse Practitioner (Otolaryngology)  Kirt Gamboa MD as Surgeon (Otolaryngology)  Stefani Obrien RN as Care Manager (Case Management)  Faith Denson MD as Consulting Physician (Hematology and Oncology)  Rahel Del Angel MD as Radiation Oncologist (Radiation Oncology)    Date of Service: 3/28/2025     Diagnosis:   Specialty Problems          Radiation Oncology Problems    Cancer of base of tongue (Multi)        Malignant neoplasm of base of tongue (Multi)         Treatment Summary:  Radiation Therapy    Treatment Period Technique Fraction Dose Fractions Total Dose   Course 1 3/26/2025-3/28/2025  (days elapsed: 2)         BOT 3/26/2025-3/28/2025 VMAT 212 / 212 cGy 3 / 33 636 / 6,996 cGy     SUBJECTIVE: tolerating radiation well. No side effects.       OBJECTIVE:   Vital Signs:  /67 (BP Location: Right arm, Patient Position: Sitting, BP Cuff Size: Child)   Pulse 56   Temp 35.6 °C (96.1 °F) (Temporal)   Resp 18   Wt 45 kg (99 lb 3.3 oz)   SpO2 96%   BMI 20.13 kg/m²     Other Pertinent Findings:     Toxicity Assessment          3/28/2025    11:00   Toxicity Assessment   Adverse Events Reviewed (WDL) Yes (Within Defined Limits)   Treatment  and neck   Anorexia Grade 0   Dehydration Grade 0   Dermatitis Radiation Grade 1   Fatigue Grade 0   Nausea Grade 0   Pain Grade 0   Vomiting Grade 0   Dysphagia Grade 0   Esophagitis Grade 0   Mucositis Oral Grade 0   Dry Mouth Grade 0   Ear Pain Grade 0   Tinnitus Grade 0   Oral Pain Grade 0   Edema Face Grade 0   Aspiration Grade 0   Hoarseness Grade 0   Voice Alteration Grade 0        Assessment / Plan:  The  patient is tolerating radiation therapy as anticipated.  Continue per current treatment plan.

## 2025-03-28 NOTE — PROGRESS NOTES
pt tolerated today's IVF infusions without difficulty. Labs drawn for treatment next week, WBC 23.4 pt taking Decadron for 3 days post chemotherapy. Dr. Denson made aware of lab results. Provider contributes increase in WBC to steroid use. This RN reviewed with pt signs and symptoms of infection, understanding verbalized. pt aware of upcoming appt schedule and will call with any questions and/or concerns.

## 2025-03-28 NOTE — PROGRESS NOTES
Social Work Note  3/28/2025 SW talked with patient prior to her OTV today.  SW introduced self and social work services.  Patient said she was aware of [employer] as she had gone to one of their furniture sales.  Patient did ask about transportation stating she may need it in the future.  She does have some neighbors who have offered to help.  SW talked with her about [facility] Transportation (T) as an option and said she could register ahead of time just so that part was completed if she did need it.  SW gave patient contact # for this writer and [facility] as a resource. She was encouraged to call for assistance and concerns.   Patient is being concurrently treated for malignant neoplasm of base of tongue.  She did have a peg tube placed while she was in the hospital.  [facility] and [facility] were referred for her.  Patient lives in [place] which is [facility].She does have support in her daughter and neighbors.    SW will remain available to assist patient.  Phuong Pinto, MARLYS, [phone]

## 2025-03-31 ENCOUNTER — HOSPITAL ENCOUNTER (OUTPATIENT)
Dept: RADIATION ONCOLOGY | Facility: CLINIC | Age: 75
Setting detail: RADIATION/ONCOLOGY SERIES
Discharge: HOME | End: 2025-03-31
Payer: MEDICARE

## 2025-03-31 ENCOUNTER — NUTRITION (OUTPATIENT)
Dept: HEMATOLOGY/ONCOLOGY | Facility: CLINIC | Age: 75
End: 2025-03-31
Payer: MEDICARE

## 2025-03-31 ENCOUNTER — OFFICE VISIT (OUTPATIENT)
Dept: HEMATOLOGY/ONCOLOGY | Facility: CLINIC | Age: 75
End: 2025-03-31
Payer: MEDICARE

## 2025-03-31 ENCOUNTER — INFUSION (OUTPATIENT)
Dept: HEMATOLOGY/ONCOLOGY | Facility: CLINIC | Age: 75
End: 2025-03-31
Payer: MEDICARE

## 2025-03-31 VITALS
TEMPERATURE: 99.9 F | RESPIRATION RATE: 18 BRPM | OXYGEN SATURATION: 97 % | SYSTOLIC BLOOD PRESSURE: 124 MMHG | WEIGHT: 97.22 LBS | DIASTOLIC BLOOD PRESSURE: 69 MMHG | BODY MASS INDEX: 19.73 KG/M2 | HEART RATE: 78 BPM

## 2025-03-31 VITALS
TEMPERATURE: 96.8 F | WEIGHT: 99.65 LBS | OXYGEN SATURATION: 95 % | HEART RATE: 68 BPM | DIASTOLIC BLOOD PRESSURE: 64 MMHG | SYSTOLIC BLOOD PRESSURE: 120 MMHG | BODY MASS INDEX: 20.22 KG/M2 | RESPIRATION RATE: 16 BRPM

## 2025-03-31 VITALS — BODY MASS INDEX: 19.6 KG/M2 | HEIGHT: 59 IN | WEIGHT: 97.22 LBS

## 2025-03-31 DIAGNOSIS — C01 CANCER OF BASE OF TONGUE (MULTI): Primary | ICD-10-CM

## 2025-03-31 DIAGNOSIS — Z51.11 ENCOUNTER FOR ANTINEOPLASTIC CHEMOTHERAPY: ICD-10-CM

## 2025-03-31 DIAGNOSIS — C01 CANCER OF BASE OF TONGUE (MULTI): ICD-10-CM

## 2025-03-31 DIAGNOSIS — Z51.0 ENCOUNTER FOR ANTINEOPLASTIC RADIATION THERAPY: ICD-10-CM

## 2025-03-31 DIAGNOSIS — C01 MALIGNANT NEOPLASM OF BASE OF TONGUE (MULTI): ICD-10-CM

## 2025-03-31 LAB
RAD ONC MSQ ACTUAL FRACTIONS DELIVERED: 4
RAD ONC MSQ ACTUAL SESSION DELIVERED DOSE: 212 CGRAY
RAD ONC MSQ ACTUAL TOTAL DOSE: 848 CGRAY
RAD ONC MSQ ELAPSED DAYS: 5
RAD ONC MSQ LAST DATE: NORMAL
RAD ONC MSQ PRESCRIBED FRACTIONAL DOSE: 212 CGRAY
RAD ONC MSQ PRESCRIBED NUMBER OF FRACTIONS: 33
RAD ONC MSQ PRESCRIBED TECHNIQUE: NORMAL
RAD ONC MSQ PRESCRIBED TOTAL DOSE: 6996 CGRAY
RAD ONC MSQ PRESCRIPTION PATTERN COMMENT: NORMAL
RAD ONC MSQ START DATE: NORMAL
RAD ONC MSQ TREATMENT COURSE NUMBER: 1
RAD ONC MSQ TREATMENT SITE: NORMAL

## 2025-03-31 PROCEDURE — 96367 TX/PROPH/DG ADDL SEQ IV INF: CPT

## 2025-03-31 PROCEDURE — 2500000004 HC RX 250 GENERAL PHARMACY W/ HCPCS (ALT 636 FOR OP/ED): Performed by: NURSE PRACTITIONER

## 2025-03-31 PROCEDURE — 96375 TX/PRO/DX INJ NEW DRUG ADDON: CPT | Mod: INF

## 2025-03-31 PROCEDURE — 3074F SYST BP LT 130 MM HG: CPT | Performed by: NURSE PRACTITIONER

## 2025-03-31 PROCEDURE — 1123F ACP DISCUSS/DSCN MKR DOCD: CPT | Performed by: NURSE PRACTITIONER

## 2025-03-31 PROCEDURE — 3078F DIAST BP <80 MM HG: CPT | Performed by: NURSE PRACTITIONER

## 2025-03-31 PROCEDURE — 99215 OFFICE O/P EST HI 40 MIN: CPT | Mod: 25 | Performed by: NURSE PRACTITIONER

## 2025-03-31 PROCEDURE — 99215 OFFICE O/P EST HI 40 MIN: CPT | Performed by: NURSE PRACTITIONER

## 2025-03-31 PROCEDURE — 2500000004 HC RX 250 GENERAL PHARMACY W/ HCPCS (ALT 636 FOR OP/ED): Performed by: INTERNAL MEDICINE

## 2025-03-31 PROCEDURE — 1159F MED LIST DOCD IN RCRD: CPT | Performed by: NURSE PRACTITIONER

## 2025-03-31 PROCEDURE — 1125F AMNT PAIN NOTED PAIN PRSNT: CPT | Performed by: NURSE PRACTITIONER

## 2025-03-31 PROCEDURE — 96413 CHEMO IV INFUSION 1 HR: CPT

## 2025-03-31 PROCEDURE — G2211 COMPLEX E/M VISIT ADD ON: HCPCS | Performed by: NURSE PRACTITIONER

## 2025-03-31 PROCEDURE — 77386 HC INTENSITY-MODULATED RADIATION THERAPY (IMRT), COMPLEX: CPT | Performed by: RADIOLOGY

## 2025-03-31 RX ORDER — PROCHLORPERAZINE EDISYLATE 5 MG/ML
10 INJECTION INTRAMUSCULAR; INTRAVENOUS EVERY 6 HOURS PRN
Status: DISCONTINUED | OUTPATIENT
Start: 2025-03-31 | End: 2025-03-31 | Stop reason: HOSPADM

## 2025-03-31 RX ORDER — PROCHLORPERAZINE MALEATE 10 MG
10 TABLET ORAL EVERY 6 HOURS PRN
Status: DISCONTINUED | OUTPATIENT
Start: 2025-03-31 | End: 2025-03-31 | Stop reason: HOSPADM

## 2025-03-31 RX ORDER — ALBUTEROL SULFATE 0.83 MG/ML
3 SOLUTION RESPIRATORY (INHALATION) AS NEEDED
Status: DISCONTINUED | OUTPATIENT
Start: 2025-03-31 | End: 2025-03-31 | Stop reason: HOSPADM

## 2025-03-31 RX ORDER — DIPHENHYDRAMINE HYDROCHLORIDE 50 MG/ML
50 INJECTION, SOLUTION INTRAMUSCULAR; INTRAVENOUS AS NEEDED
Status: DISCONTINUED | OUTPATIENT
Start: 2025-03-31 | End: 2025-03-31 | Stop reason: HOSPADM

## 2025-03-31 RX ORDER — EPINEPHRINE 0.3 MG/.3ML
0.3 INJECTION SUBCUTANEOUS EVERY 5 MIN PRN
Status: DISCONTINUED | OUTPATIENT
Start: 2025-03-31 | End: 2025-03-31 | Stop reason: HOSPADM

## 2025-03-31 RX ORDER — HEPARIN SODIUM,PORCINE/PF 10 UNIT/ML
50 SYRINGE (ML) INTRAVENOUS AS NEEDED
OUTPATIENT
Start: 2025-03-31

## 2025-03-31 RX ORDER — PALONOSETRON 0.05 MG/ML
0.25 INJECTION, SOLUTION INTRAVENOUS ONCE
Status: COMPLETED | OUTPATIENT
Start: 2025-03-31 | End: 2025-03-31

## 2025-03-31 RX ORDER — FAMOTIDINE 10 MG/ML
20 INJECTION, SOLUTION INTRAVENOUS ONCE AS NEEDED
Status: DISCONTINUED | OUTPATIENT
Start: 2025-03-31 | End: 2025-03-31 | Stop reason: HOSPADM

## 2025-03-31 RX ORDER — OXYCODONE HCL 5 MG/5 ML
SOLUTION, ORAL ORAL
COMMUNITY

## 2025-03-31 RX ORDER — HEPARIN 100 UNIT/ML
500 SYRINGE INTRAVENOUS AS NEEDED
OUTPATIENT
Start: 2025-03-31

## 2025-03-31 RX ADMIN — POTASSIUM CHLORIDE 999 ML/HR: 2 INJECTION, SOLUTION, CONCENTRATE INTRAVENOUS at 09:31

## 2025-03-31 RX ADMIN — SODIUM CHLORIDE 1000 ML: 9 INJECTION, SOLUTION INTRAVENOUS at 13:04

## 2025-03-31 RX ADMIN — FOSAPREPITANT 150 MG: 150 INJECTION, POWDER, LYOPHILIZED, FOR SOLUTION INTRAVENOUS at 11:14

## 2025-03-31 RX ADMIN — PALONOSETRON 0.25 MG: 0.05 INJECTION, SOLUTION INTRAVENOUS at 10:44

## 2025-03-31 RX ADMIN — DEXAMETHASONE SODIUM PHOSPHATE 12 MG: 10 INJECTION INTRAMUSCULAR; INTRAVENOUS at 10:44

## 2025-03-31 RX ADMIN — CISPLATIN 40 MG: 1 INJECTION, SOLUTION INTRAVENOUS at 11:55

## 2025-03-31 ASSESSMENT — ENCOUNTER SYMPTOMS
UNEXPECTED WEIGHT CHANGE: 0
LEG SWELLING: 0
NAUSEA: 0
DIARRHEA: 0
SHORTNESS OF BREATH: 0
ABDOMINAL PAIN: 0
DIZZINESS: 0
SORE THROAT: 1
CONSTIPATION: 0
NECK PAIN: 0
BACK PAIN: 0
FATIGUE: 1
APPETITE CHANGE: 0
EYE PROBLEMS: 0
COUGH: 0
ARTHRALGIAS: 0
VOMITING: 0
HEADACHES: 0
DIFFICULTY URINATING: 0
ADENOPATHY: 0

## 2025-03-31 ASSESSMENT — PAIN SCALES - GENERAL
PAINLEVEL_OUTOF10: 3
PAINLEVEL_OUTOF10: 1

## 2025-03-31 NOTE — PROGRESS NOTES
"NUTRITION Assessment NOTE    Nutrition Assessment     Reason for Visit:  Annette Fernandez is a 74 y.o. female seen today during her infusion at [facility].  Patient receiving CISplatin with Concurrent Radiation (Weekly), 7 Week Cycle for diagnosis of base of tongue SCC.  PEG tube placed 2/21/2025.      Patient Active Problem List   Diagnosis    HTN (hypertension), benign    Hyperlipidemia    Acquired hypothyroidism    Peripheral edema    Thyroid nodule    Esophageal necrosis    Anemia    Encounter for subsequent annual wellness visit (AWV) in Medicare patient    Tobacco use disorder    Other primary ovarian failure    Contact dermatitis and eczema    Mass of tongue    Cancer of base of tongue (Multi)    Malignant neoplasm of base of tongue (Multi)    Oropharyngeal dysphagia       Nutrition Significant Labs:  Lab Results   Component Value Date/Time    GLUCOSE 82 03/28/2025 1157     03/28/2025 1157    K 3.7 03/28/2025 1157     03/28/2025 1157    CO2 27 03/28/2025 1157    ANIONGAP 12 03/28/2025 1157    BUN 30 (H) 03/28/2025 1157    CREATININE 0.77 03/28/2025 1157    EGFR 82 03/28/2025 1157    CALCIUM 8.6 03/28/2025 1157    ALBUMIN 3.6 03/28/2025 1157    ALKPHOS 51 03/28/2025 1157    PROT 6.5 03/28/2025 1157    AST 17 03/28/2025 1157    BILITOT 0.2 03/28/2025 1157    ALT 16 03/28/2025 1157    MG 1.90 03/21/2025 1548    PHOS 2.5 02/25/2025 0611     No results found for: \"VITD25\"      Anthropometrics:  Height: 149.5 cm (4' 10.86\")   Weight: (!) 44.1 kg (97 lb 3.6 oz)   BMI (Calculated): 19.73    IBW/kg (Dietitian Calculated): 40.9 kg Percent of IBW: 108 %          Weight History:   Daily Weight  03/31/25 : (!) 44.1 kg (97 lb 3.6 oz)  03/31/25 : (!) 44.1 kg (97 lb 3.6 oz)  03/31/25 : 45.2 kg (99 lb 10.4 oz)  03/28/25 : 45 kg (99 lb 3.3 oz)  03/26/25 : (!) 43.1 kg (95 lb 0.3 oz)  03/26/25 : (!) 43.1 kg (95 lb 0.3 oz)  03/13/25 : (!) 42.5 kg (93 lb 12.8 oz)  03/12/25 : (!) 42.6 kg (94 lb)  03/11/25 : (!) 42.6 " kg (94 lb)  03/07/25 : (!) 42.5 kg (93 lb 12.8 oz)    Weight Change %:       Nutrition History:  Food & Nutrition History: She stated today she eats very little, moslty soups, still trying to eat a little.  Reported sore throat this week.  Food Allergies:    Food Intolerances:    Vitamin/mineral intake:       Herbal supplements:    Medication and Complementary/Alternative Medicine Use:    Dentition:    Sleep Habits:      Diet Recall:  Meal 1:    Snack 1:    Meal 2:    Snack 2:    Meal 3:    Snack 3:    Food Variety:    Oral Nutrition Supplement Use: Oral Nutrition Supplements: Ensure Frequency: 1/day Calories: 220 calories Protein: 9g protein  Fluid Intake: PO takes 4 oz juice daily, 4+ oz water daily; soups  Energy Intake: Poor < 50 %    Food Preparation:  Cooking:    Grocery Shopping:    Dining Out:      Enteral Nutrition History:   Enteral Nutrition Formula/Solution: Isosource 1.5  Method of TF administration: Bolus enteral nutrition feeding  TF infusion rate: per pt trying to get in 3 cartons per day (this provides 1125 calories, 51g protein, 573ml FW)  Feeding Tube Flush: 60ml pre/post bolus 3 times per day; will add extra water into formula as she stated it is thick and takes longer to go in then she likes. (provides 360 ml water plus little extra she adds to formula.)     Medications:  Current Outpatient Medications   Medication Instructions    acetaminophen (TYLENOL) 500 mg, Every 6 hours PRN    atorvastatin (LIPITOR) 80 mg, oral, Daily RT    DentaGeL 1.1 % gel apply with toothbrush OR flouoride trays    dexAMETHasone (DECADRON) 8 mg, oral, Daily, For 3 days per week starting the day after treatment.    magic mouthwash (lidocaine, diphenhydrAMINE, Maalox 1:1:1) 10 mL, Swish & Spit, Every 6 hours PRN    metoprolol tartrate (LOPRESSOR) 25 mg, oral, 2 times daily    multivitamin tablet 1 tablet, Daily    NON FORMULARY 1 each, Daily    OLANZapine (ZYPREXA) 5 mg, oral, Nightly    ondansetron (ZOFRAN) 8 mg, oral,  Every 8 hours PRN    oxyCODONE (Roxicodone) 5 mg/5 mL solution Take by mouth.    pantoprazole (PROTONIX) 40 mg, oral, Daily, Do not crush, chew, or split.    polyethylene glycol (GLYCOLAX, MIRALAX) 17 g, g-tube, Daily    prochlorperazine (COMPAZINE) 10 mg, oral, Every 6 hours PRN    senna 8.8 mg/5 mL syrup 10 mL, oral, 2 times daily PRN    thyroid (pork) (ARMOUR THYROID) 15 mg, oral, Daily       Nutrition Focused Physical Exam Findings:    Subcutaneous Fat Loss:   Orbital Fat Pads: Mild-Moderate (slight dark circles and slight hollowing)  Buccal Fat Pads: Mild-Moderate (flat cheeks, minimal bounce)    Muscle Wasting:  Temporalis: Mild-Moderate (slight depression)    Physical Findings:          Estimated Needs:       Total Energy Estimated Needs in 24 hours (kCal): 1500 kCal  Method for Estimating Needs: 5683-5280 kcals/d (35-40 kcals/kg)  Total Protein Estimated Needs in 24 Hours (g): 52 g  Method for Estimating 24 Hour Protein Needs: 52-65 g protein/day (1.2-1.5g protein/kg)  Total Fluid Estimated Needs in 24 Hours (mL): 1500 mL  Method for Estimating 24 Hour Fluid Needs: or 1ml/kcal             Nutrition Diagnosis   Malnutrition Diagnosis  Patient has Malnutrition Diagnosis: Yes  Diagnosis Status: Active  Malnutrition Diagnosis: Moderate malnutrition related to chronic disease or condition  Related to: decreased oral intake prior to PEG tube placement.  As Evidenced by: <75% of estimated energy needs for >= 1 month; >20% wt loss in past year    Nutrition Diagnosis  Patient has Nutrition Diagnosis: Yes  Diagnosis Status (1): Active  Nutrition Diagnosis 1: Increased nutrient needs  Related to (1): increased metabolic demand  As Evidenced by (1): chemoRT for diagnosis of BOT SCC.       Nutrition Interventions/Recommendations   Nutrition Prescription: Oral nutrition, Enteral nutrition Management of nutrition impact symptoms; adjust diet consistency as tolerated to continue swallow function as safe; PEG tube to meet  100% of nutritional needs; maintain weight    Nutrition Interventions:   Food and Nutrient Delivery: Meals & Snacks: Energy-modified diet, Fluid-modified diet, Protein-modified diet, Texture-Modified Diet  Goals: Oral intake as safe to swallow.  Enteral Nutrition: Management of enteral nutrition site care, Management of flushing of feeding tube, Management of schedule of enteral nutrition  Goals: Can continue with 3 isosource 1.5 per day and increase as tolerated to goal of 4 cartons per day if PO intake decreases, and/or weight loss noted.   Flush with 60ml water before and after each feed. Additional water intake either PO or via PEG of 380ml per day.  Medical Food Supplement: Commercial beverage medical food supplement therapy  Goals: Continue with 1 Ensure per day as tolerated to provide extra 220 calories, 9g protein; can consider ensure plus 1 per day for more protein and calories.  Feeding Assistance Management: Mouth care management  Goals: Oral care; can use baking soda/ salt water swish; may try carbonating beverage (tonic water, carobonated water) swish and spit to aid in breaking up thick phlegm as needed.  Other:: Hydration  Goals: Encouraged adequate fluid intake.  Recommend extra 380ml water flush either via PEG tube or orally as tolerated. Discussed electrolyte replacements as needed throughout tx.     Coordination of Care: Collaboration and referral of nutrition care: Collaboration and Referral of Nutrition Care, Referral by nutrition professional to community agencies and programs  Goals: PEG tube feeding and supplies comes from [facility].     Nutrition Education:   Nutrition Education Content: Content related nutrition education   Discussed PEG tube feedings and nutrition throughout cancer tx.             Nutrition Monitoring and Evaluation   Food and Nutrient Intake  Monitoring and Evaluation Plan: Fluid intake, Enternal and parenternal nutrition intake determination  Fluid Intake: Estimated  fluid intake  Criteria: Maintain hydration of at least 64 oz per day - coming from TF,  water flushes PEG tube, and PO fluid intake.  Enteral and Parenteral Nutrition Intake Determination: Enteral nutrition formula/solution, Enteral nutrition intake  Criteria: meet 100% of estimated nutritional needs via PEG tube feedings    Anthropometric measurements  Monitoring and Evaluation Plan: Weight  Body Weight: Measured body weight  Criteria: Maintain weight    Biochemical Data, Medical Tests and Procedures  Monitoring and Evaluation Plan: Electrolyte/renal panel  Criteria: labs WNL              Follow Up: Planned follow up visit: will continue to follow up throughout [facility] treatments.

## 2025-03-31 NOTE — PROGRESS NOTES
[facility] - Medical Oncology Follow-up Visit    Patient ID: Annette Fernandez is a 74 y.o. female.  Referring Physician: Faith Denson MD  [address] [place] [address]  Primary Care Provider: Parminder Russell MD      Chief Concern: Follow-up and readiness to treat     HPI Patient here today with her granddtr.  Mild fatigue - off and on.  Started prior to treatment.  +Daily naps.  Today when she woke, felt lightheaded.  Lasted 30 minutes.  Discussed concurrent remeron and olanzapine admin.  Will stop the remeron.  Reports increased throat pain.  Oxycodone taken prior to visit.  A total of three doses since Rx provided.  She rates her pain 5/10.   ASA stopped.  Both oral and intake via peg.  Small meals/grazing.  Tastes intake.  Denies mucositis.  Using s&s rinse.  Taking in 2-3 TF cartons/peg/day.  Denies n/v/c/d.  Last BM yesterday.  Voiding fine.  No hearing changes.  Denies neuropathy.   No fevers, chills, or CP. Labs WNL.  Ready for treatment.        Diagnosis: Squamous cell carcinoma of the BOT  Stage:  Cancer Staging   Cancer of base of tongue (Multi)  Staging form: Pharynx - P16 Negative Oropharynx, AJCC 8th Edition  - Clinical: Stage BRIDGETTE (cT4a, cN1, cM0, p16-) - Signed by Rahel Del Angel MD on 3/3/2025     Current sites of disease: BOT and lymph nodes  Molecular and ancillary testing: p16 neg    Oncologic History:  1/7/25 - CT neck with Irregular, heterogeneous, and possibly ulcerating mass centered at the base of tongue with involvement of extrinsic tongue musculature and lingual surface of the epiglottis; possible involvement of the  body of the hyoid. Oropharyngeal neoplasm is of high suspicion. No cervical lymphadenopathy by size criteria.  1.8 cm exophytic right posterior thyroid nodule along the right tracheoesophageal groove containing calcification.  2/4/25 - Met with Dr. Gamboa after noting discomfort in throat since August, some discomfort in L ear. DL with  fungating lesion involving base of tongue and extending on the lateral pharyngeal wall on the left, involving lingual aspect of epiglottis.  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  2/28/25 - PET with hypermetabolic soft tissue mass at BOT, moderately hypermetabolic right level 2A lymph node    Past Medical History:  07/31/2023: Aspiration into airway  No date: HLD (hyperlipidemia)  No date: HTN (hypertension)  No date: Mass of tongue  No date: Other specified health status      Comment:  No pertinent past medical history  No date: Stiffness of unspecified hand, not elsewhere classified      Comment:  Joint stiffness of hand     4 years ago - throat injury, every time she drank/swallow into lungs (black esophagus)    Past Surgical History:  06/14/2019: OTHER SURGICAL HISTORY      Comment:  Ovarian cystectomy  06/14/2019: OTHER SURGICAL HISTORY      Comment:  Cyst excision     Social Hx:   Annette Fernandez  reports that she has been smoking cigarettes. She has quit using smokeless tobacco.  She  reports current alcohol use.  She  reports no history of drug use.  Smoking - working to cut back, down to 2-3 per day, previously 1/2ppd  Limiting amount each time    Lives on her own  Takes care of her cat    Family History   Problem Relation Name Age of Onset    Heart attack Father      Mom had NHL  Grandparent with colon cancer    Meds (Current):  Current Outpatient Medications   Medication Instructions    acetaminophen (TYLENOL) 500 mg, Every 6 hours PRN    atorvastatin (LIPITOR) 80 mg, oral, Daily RT    DentaGeL 1.1 % gel apply with toothbrush OR flouoride trays    dexAMETHasone (DECADRON) 8 mg, oral, Daily, For 3 days per week starting the day after treatment.    magic mouthwash (lidocaine, diphenhydrAMINE, Maalox 1:1:1) 10 mL, Swish & Spit, Every 6 hours PRN    metoprolol tartrate (LOPRESSOR) 25 mg, oral, 2 times daily    multivitamin tablet 1 tablet,  Daily    NON FORMULARY 1 each, Daily    OLANZapine (ZYPREXA) 5 mg, oral, Nightly    ondansetron (ZOFRAN) 8 mg, oral, Every 8 hours PRN    oxyCODONE (Roxicodone) 5 mg/5 mL solution Take by mouth.    pantoprazole (PROTONIX) 40 mg, oral, Daily, Do not crush, chew, or split.    polyethylene glycol (GLYCOLAX, MIRALAX) 17 g, g-tube, Daily    prochlorperazine (COMPAZINE) 10 mg, oral, Every 6 hours PRN    senna 8.8 mg/5 mL syrup 10 mL, oral, 2 times daily PRN    thyroid (pork) (ARMOUR THYROID) 15 mg, oral, Daily        Allergies   Allergen Reactions    Wellbutrin [Bupropion Hcl] Other     Review of Systems   Constitutional:  Positive for fatigue. Negative for appetite change and unexpected weight change.   HENT:   Positive for sore throat. Negative for hearing loss.    Eyes:  Negative for eye problems.   Respiratory:  Negative for cough and shortness of breath.    Cardiovascular:  Negative for chest pain and leg swelling.   Gastrointestinal:  Negative for abdominal pain, constipation, diarrhea, nausea and vomiting.   Genitourinary:  Negative for difficulty urinating.    Musculoskeletal:  Negative for arthralgias, back pain and neck pain.   Skin:  Negative for rash.   Neurological:  Negative for dizziness and headaches.   Hematological:  Negative for adenopathy.      Objective   BSA: 1.37 meters squared  /64   Pulse 68   Temp 36 °C (96.8 °F)   Resp 16   Wt 45.2 kg (99 lb 10.4 oz)   SpO2 95%   BMI 20.22 kg/m²     Wt Readings from Last 5 Encounters:   03/31/25 (!) 44.1 kg (97 lb 3.6 oz)   03/31/25 (!) 44.1 kg (97 lb 3.6 oz)   03/31/25 45.2 kg (99 lb 10.4 oz)   03/28/25 45 kg (99 lb 3.3 oz)   03/26/25 (!) 43.1 kg (95 lb 0.3 oz)      Performance Status:  (1) Restricted in physically strenuous activity, ambulatory and able to do work of light nature     Physical Exam  Vitals reviewed.   Constitutional:       General: She is not in acute distress.  HENT:      Head: Normocephalic and atraumatic.      Mouth/Throat:       Mouth: Mucous membranes are moist.   Eyes:      Conjunctiva/sclera: Conjunctivae normal.      Pupils: Pupils are equal, round, and reactive to light.   Cardiovascular:      Rate and Rhythm: Normal rate and regular rhythm.      Heart sounds: Normal heart sounds.   Pulmonary:      Effort: Pulmonary effort is normal.      Breath sounds: Normal breath sounds.   Abdominal:      General: Bowel sounds are normal. There is no distension.      Palpations: Abdomen is soft.      Comments: +PEG   Musculoskeletal:         General: No swelling. Normal range of motion.      Cervical back: Normal range of motion.   Skin:     General: Skin is warm and dry.      Findings: No rash.   Neurological:      General: No focal deficit present.      Mental Status: She is alert and oriented to person, place, and time.   Psychiatric:         Mood and Affect: Mood normal.         Behavior: Behavior normal.          Results:  Labs:  Lab Results   Component Value Date    WBC 23.4 (H) 03/28/2025    HGB 11.7 (L) 03/28/2025    HCT 37.0 03/28/2025     03/28/2025     03/28/2025      Lab Results   Component Value Date    NEUTROABS 21.84 (H) 03/28/2025      Lab Results   Component Value Date    GLUCOSE 82 03/28/2025    CALCIUM 8.6 03/28/2025     03/28/2025    K 3.7 03/28/2025    CO2 27 03/28/2025     03/28/2025    BUN 30 (H) 03/28/2025    CREATININE 0.77 03/28/2025     Lab Results   Component Value Date    ALT 16 03/28/2025    AST 17 03/28/2025    ALKPHOS 51 03/28/2025    BILITOT 0.2 03/28/2025        Imaging:  No new imaging.        Pathology:    Reviewed from 2/20/25 as per HPI    Assessment/Plan      Annette Fernandez is a 74 y.o. 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    Discussed overall findings to date with patient and her daughter, as well as recommendations for treatment with concurrent/definitive chemoradiation. We  discussed the curative goal of treatment. Discussed that chemotherapy would consist of weekly cisplatin, with side effects discussed, including but not limited to myelosuppression (infection, anemia, bleeding), fatigue, nausea, neuropathy, hearing loss/tinnitus, and nephropathy.    We also discussed the role of a clinical trial  - this randomizes patients to HD cisplatin or weekly cisplatin and we discussed the different side effect profiles of both and rationale for trial. She is interested in considering this - I do worry a bit about her weight loss and ability to tolerate this but will explore and reassess. She is also interested in starting treatment ASAP    - Plan for weekly cisplatin with radiation to start ASAP, messaged Dr. Del Angel to coordinate start date  - Consider   - Consent for cisplatin signed  - With cisplatin, will have weekly IVF and provider visits  - Zyprexa nightly to start with chemo  - PRN zofran/compazine  - dex 8mg daily x3 days after chemo  - advised her to stop aspirin for pain given risk of NSAIDs and kidney function with cisplatin. Ok for oxycodone as needed and we will refill if gets low  - reminded her about risk of constipation with oxycodone - she is taking miralax as needed and will start regularly if taking, I will also send senna to use if using oxycodone more  - will refer for baseline audiology eval prior to cisplatin.      # Throat pain  - Oxycodone Rx in place.  Oxycodone 5mg/5ml PRN Q6H, 90ml.    - Supportive onc referral placed  - Continue s&s rinse.  Rx sent for BMX rinse.  10ml swish and spit Q6h PRN.     # TF/peg/nutritional intake  - Goal 4 Isosource 1.5 cartons/day with 60ml flush before and after feedings.  Addl 380ml free water flush via peg or po.    - 3/31:  Current 3 TF cartons/day + pleasure foods    # Leukocytosis 3/28/25 23.4  - no clinical s/sx's infection  - ongoing monitoring     Weekly visits during treatment course.

## 2025-03-31 NOTE — SIGNIFICANT EVENT
03/31/25 0917   Prechemo Checklist   Has the patient been in the hospital, ED, or urgent care since last date of service No   Chemo/Immuno Consent Completed and Signed Yes   Protocol/Indications Verified Yes   Confirmed to previous date/time of medication Yes  (ok to treat early per RAD Jalloh NP)   Compared to previous dose Yes   All medications are dated accurately Yes   Pregnancy Test Negative Not applicable   Parameters Met (!) No  (CrCl below parameter)   Provider Notified Yes   Provider Name RAD Jalloh NP   Is Patient Proceeding With Treatment? Yes   BSA/Weight-Height Verified Yes   Dose Calculations Verified (current, total, cumulative) Yes

## 2025-04-01 ENCOUNTER — HOSPITAL ENCOUNTER (OUTPATIENT)
Dept: RADIATION ONCOLOGY | Facility: CLINIC | Age: 75
Setting detail: RADIATION/ONCOLOGY SERIES
Discharge: HOME | End: 2025-04-01
Payer: MEDICARE

## 2025-04-01 ENCOUNTER — TELEPHONE (OUTPATIENT)
Dept: PALLIATIVE MEDICINE | Facility: HOSPITAL | Age: 75
End: 2025-04-01
Payer: MEDICARE

## 2025-04-01 DIAGNOSIS — Z51.0 ENCOUNTER FOR ANTINEOPLASTIC RADIATION THERAPY: ICD-10-CM

## 2025-04-01 DIAGNOSIS — C01 MALIGNANT NEOPLASM OF BASE OF TONGUE (MULTI): ICD-10-CM

## 2025-04-01 LAB
RAD ONC MSQ ACTUAL FRACTIONS DELIVERED: 5
RAD ONC MSQ ACTUAL SESSION DELIVERED DOSE: 212 CGRAY
RAD ONC MSQ ACTUAL TOTAL DOSE: 1060 CGRAY
RAD ONC MSQ ELAPSED DAYS: 6
RAD ONC MSQ LAST DATE: NORMAL
RAD ONC MSQ PRESCRIBED FRACTIONAL DOSE: 212 CGRAY
RAD ONC MSQ PRESCRIBED NUMBER OF FRACTIONS: 33
RAD ONC MSQ PRESCRIBED TECHNIQUE: NORMAL
RAD ONC MSQ PRESCRIBED TOTAL DOSE: 6996 CGRAY
RAD ONC MSQ PRESCRIPTION PATTERN COMMENT: NORMAL
RAD ONC MSQ START DATE: NORMAL
RAD ONC MSQ TREATMENT COURSE NUMBER: 1
RAD ONC MSQ TREATMENT SITE: NORMAL

## 2025-04-01 PROCEDURE — 77386 HC INTENSITY-MODULATED RADIATION THERAPY (IMRT), COMPLEX: CPT | Performed by: RADIOLOGY

## 2025-04-01 PROCEDURE — 77336 RADIATION PHYSICS CONSULT: CPT | Performed by: RADIOLOGY

## 2025-04-01 NOTE — TELEPHONE ENCOUNTER
"Supportive Oncology referral received from Umm Jalloh NP.  Ms. Fernandez has cancer of the base of the tongue.  Per Ms. Jalloh, \"New concurrent chemo/RT pt.  Peg in place. Current oxy Rx.  This is week two, oxy taken x3.  BMX Rx sent in. \". Referral is for management  of pain and fatigue.  Call placed to Ms. Fernandez  to review the referral and offer assistance in scheduling NPV. She accepts  an appointment with Citlaly Guy NP on 4/14/25 @ 10 am.  Will request private room for infusion that day .   "

## 2025-04-02 ENCOUNTER — HOSPITAL ENCOUNTER (OUTPATIENT)
Dept: RADIATION ONCOLOGY | Facility: CLINIC | Age: 75
Setting detail: RADIATION/ONCOLOGY SERIES
Discharge: HOME | End: 2025-04-02
Payer: MEDICARE

## 2025-04-02 DIAGNOSIS — C01 MALIGNANT NEOPLASM OF BASE OF TONGUE (MULTI): ICD-10-CM

## 2025-04-02 DIAGNOSIS — Z51.0 ENCOUNTER FOR ANTINEOPLASTIC RADIATION THERAPY: ICD-10-CM

## 2025-04-02 LAB
RAD ONC MSQ ACTUAL FRACTIONS DELIVERED: 6
RAD ONC MSQ ACTUAL SESSION DELIVERED DOSE: 212 CGRAY
RAD ONC MSQ ACTUAL TOTAL DOSE: 1272 CGRAY
RAD ONC MSQ ELAPSED DAYS: 7
RAD ONC MSQ LAST DATE: NORMAL
RAD ONC MSQ PRESCRIBED FRACTIONAL DOSE: 212 CGRAY
RAD ONC MSQ PRESCRIBED NUMBER OF FRACTIONS: 33
RAD ONC MSQ PRESCRIBED TECHNIQUE: NORMAL
RAD ONC MSQ PRESCRIBED TOTAL DOSE: 6996 CGRAY
RAD ONC MSQ PRESCRIPTION PATTERN COMMENT: NORMAL
RAD ONC MSQ START DATE: NORMAL
RAD ONC MSQ TREATMENT COURSE NUMBER: 1
RAD ONC MSQ TREATMENT SITE: NORMAL

## 2025-04-02 PROCEDURE — 77386 HC INTENSITY-MODULATED RADIATION THERAPY (IMRT), COMPLEX: CPT | Performed by: RADIOLOGY

## 2025-04-03 ENCOUNTER — OFFICE VISIT (OUTPATIENT)
Dept: HEMATOLOGY/ONCOLOGY | Facility: CLINIC | Age: 75
End: 2025-04-03
Payer: MEDICARE

## 2025-04-03 ENCOUNTER — HOSPITAL ENCOUNTER (OUTPATIENT)
Dept: RADIATION ONCOLOGY | Facility: CLINIC | Age: 75
Setting detail: RADIATION/ONCOLOGY SERIES
Discharge: HOME | End: 2025-04-03
Payer: MEDICARE

## 2025-04-03 VITALS
DIASTOLIC BLOOD PRESSURE: 66 MMHG | WEIGHT: 100.7 LBS | TEMPERATURE: 97.5 F | OXYGEN SATURATION: 96 % | RESPIRATION RATE: 16 BRPM | BODY MASS INDEX: 20.44 KG/M2 | SYSTOLIC BLOOD PRESSURE: 117 MMHG | HEART RATE: 83 BPM

## 2025-04-03 DIAGNOSIS — C01 CANCER OF BASE OF TONGUE (MULTI): ICD-10-CM

## 2025-04-03 DIAGNOSIS — Z51.11 ENCOUNTER FOR ANTINEOPLASTIC CHEMOTHERAPY: Primary | ICD-10-CM

## 2025-04-03 DIAGNOSIS — C01 MALIGNANT NEOPLASM OF BASE OF TONGUE (MULTI): ICD-10-CM

## 2025-04-03 DIAGNOSIS — G89.3 CANCER ASSOCIATED PAIN: ICD-10-CM

## 2025-04-03 DIAGNOSIS — Z51.0 ENCOUNTER FOR ANTINEOPLASTIC RADIATION THERAPY: ICD-10-CM

## 2025-04-03 LAB
RAD ONC MSQ ACTUAL FRACTIONS DELIVERED: 7
RAD ONC MSQ ACTUAL SESSION DELIVERED DOSE: 212 CGRAY
RAD ONC MSQ ACTUAL TOTAL DOSE: 1484 CGRAY
RAD ONC MSQ ELAPSED DAYS: 8
RAD ONC MSQ LAST DATE: NORMAL
RAD ONC MSQ PRESCRIBED FRACTIONAL DOSE: 212 CGRAY
RAD ONC MSQ PRESCRIBED NUMBER OF FRACTIONS: 33
RAD ONC MSQ PRESCRIBED TECHNIQUE: NORMAL
RAD ONC MSQ PRESCRIBED TOTAL DOSE: 6996 CGRAY
RAD ONC MSQ PRESCRIPTION PATTERN COMMENT: NORMAL
RAD ONC MSQ START DATE: NORMAL
RAD ONC MSQ TREATMENT COURSE NUMBER: 1
RAD ONC MSQ TREATMENT SITE: NORMAL

## 2025-04-03 PROCEDURE — 99213 OFFICE O/P EST LOW 20 MIN: CPT | Performed by: NURSE PRACTITIONER

## 2025-04-03 PROCEDURE — 3078F DIAST BP <80 MM HG: CPT | Performed by: NURSE PRACTITIONER

## 2025-04-03 PROCEDURE — 1125F AMNT PAIN NOTED PAIN PRSNT: CPT | Performed by: NURSE PRACTITIONER

## 2025-04-03 PROCEDURE — 3074F SYST BP LT 130 MM HG: CPT | Performed by: NURSE PRACTITIONER

## 2025-04-03 PROCEDURE — G2211 COMPLEX E/M VISIT ADD ON: HCPCS | Performed by: NURSE PRACTITIONER

## 2025-04-03 PROCEDURE — 1159F MED LIST DOCD IN RCRD: CPT | Performed by: NURSE PRACTITIONER

## 2025-04-03 PROCEDURE — 1123F ACP DISCUSS/DSCN MKR DOCD: CPT | Performed by: NURSE PRACTITIONER

## 2025-04-03 ASSESSMENT — ENCOUNTER SYMPTOMS
SORE THROAT: 1
ADENOPATHY: 0
SHORTNESS OF BREATH: 0
DIZZINESS: 0
COUGH: 0
NECK PAIN: 0
EYE PROBLEMS: 0
FATIGUE: 1
ABDOMINAL PAIN: 0
NAUSEA: 0
LEG SWELLING: 0
CONSTIPATION: 0
BACK PAIN: 0
DIFFICULTY URINATING: 0
ARTHRALGIAS: 0
DIARRHEA: 0
HEADACHES: 0
VOMITING: 0
UNEXPECTED WEIGHT CHANGE: 0
APPETITE CHANGE: 0

## 2025-04-03 ASSESSMENT — PAIN SCALES - GENERAL: PAINLEVEL_OUTOF10: 2

## 2025-04-03 NOTE — PROGRESS NOTES
[facility] - Medical Oncology Follow-up Visit    Patient ID: Annette Fernandez is a 74 y.o. female.  Referring Physician: Faith Denson MD  [address] [place] [address]  Primary Care Provider: Parminder Russell MD      Chief Concern: Follow-up and readiness to treat     HPI Patient here today alone.   Reports increased c/o throat pain.  Now taking PRN oxycodone 1-2x/day.  1st dose in the morning.  Senna and miralax bowel regimen.   Continues with s&s rinse.  Plans to pickup BMX rinse today.  No fevers, chills, or CP.  No other changes.   Labs tomorrow with IV hydration visit.  Pending labs, okay to treat 4/7/25.    Diagnosis: Squamous cell carcinoma of the BOT  Stage:  Cancer Staging   Cancer of base of tongue (Multi)  Staging form: Pharynx - P16 Negative Oropharynx, AJCC 8th Edition  - Clinical: Stage BRIDGETTE (cT4a, cN1, cM0, p16-) - Signed by Rahel Del Angel MD on 3/3/2025     Current sites of disease: BOT and lymph nodes  Molecular and ancillary testing: p16 neg    Oncologic History:  1/7/25 - CT neck with Irregular, heterogeneous, and possibly ulcerating mass centered at the base of tongue with involvement of extrinsic tongue musculature and lingual surface of the epiglottis; possible involvement of the  body of the hyoid. Oropharyngeal neoplasm is of high suspicion. No cervical lymphadenopathy by size criteria.  1.8 cm exophytic right posterior thyroid nodule along the right tracheoesophageal groove containing calcification.  2/4/25 - Met with Dr. Gamboa after noting discomfort in throat since August, some discomfort in L ear. DL with fungating lesion involving base of tongue and extending on the lateral pharyngeal wall on the left, involving lingual aspect of epiglottis.  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  2/28/25 - PET with hypermetabolic soft tissue mass at BOT, moderately hypermetabolic right level 2A  lymph node    Past Medical History:  07/31/2023: Aspiration into airway  No date: HLD (hyperlipidemia)  No date: HTN (hypertension)  No date: Mass of tongue  No date: Other specified health status      Comment:  No pertinent past medical history  No date: Stiffness of unspecified hand, not elsewhere classified      Comment:  Joint stiffness of hand     4 years ago - throat injury, every time she drank/swallow into lungs (black esophagus)    Past Surgical History:  06/14/2019: OTHER SURGICAL HISTORY      Comment:  Ovarian cystectomy  06/14/2019: OTHER SURGICAL HISTORY      Comment:  Cyst excision     Social Hx:   Annette Fernandez  reports that she has been smoking cigarettes. She has quit using smokeless tobacco.  She  reports current alcohol use.  She  reports no history of drug use.  Smoking - working to cut back, down to 2-3 per day, previously 1/2ppd  Limiting amount each time    Lives on her own  Takes care of her cat    Family History   Problem Relation Name Age of Onset    Heart attack Father      Mom had NHL  Grandparent with colon cancer    Meds (Current):  Current Outpatient Medications   Medication Instructions    atorvastatin (LIPITOR) 80 mg, oral, Daily RT    DentaGeL 1.1 % gel apply with toothbrush OR flouoride trays    dexAMETHasone (DECADRON) 8 mg, oral, Daily, For 3 days per week starting the day after treatment.    magic mouthwash (lidocaine, diphenhydrAMINE, Maalox 1:1:1) 10 mL, Swish & Spit, Every 6 hours PRN    metoprolol tartrate (LOPRESSOR) 25 mg, oral, 2 times daily    multivitamin tablet 1 tablet, Daily    NON FORMULARY 1 each, Daily    OLANZapine (ZYPREXA) 5 mg, oral, Nightly    ondansetron (ZOFRAN) 8 mg, oral, Every 8 hours PRN    oxyCODONE (Roxicodone) 5 mg/5 mL solution Take by mouth.    pantoprazole (PROTONIX) 40 mg, oral, Daily, Do not crush, chew, or split.    polyethylene glycol (GLYCOLAX, MIRALAX) 17 g, g-tube, Daily    prochlorperazine (COMPAZINE) 10 mg, oral, Every 6 hours PRN     senna 8.8 mg/5 mL syrup 10 mL, oral, 2 times daily PRN    thyroid (pork) (ARMOUR THYROID) 15 mg, oral, Daily        Allergies   Allergen Reactions    Wellbutrin [Bupropion Hcl] Other     Review of Systems   Constitutional:  Positive for fatigue. Negative for appetite change and unexpected weight change.   HENT:   Positive for sore throat. Negative for hearing loss.    Eyes:  Negative for eye problems.   Respiratory:  Negative for cough and shortness of breath.    Cardiovascular:  Negative for chest pain and leg swelling.   Gastrointestinal:  Negative for abdominal pain, constipation, diarrhea, nausea and vomiting.   Genitourinary:  Negative for difficulty urinating.    Musculoskeletal:  Negative for arthralgias, back pain and neck pain.   Skin:  Negative for rash.   Neurological:  Negative for dizziness and headaches.   Hematological:  Negative for adenopathy.      Objective   BSA: There is no height or weight on file to calculate BSA.  There were no vitals taken for this visit.    Wt Readings from Last 5 Encounters:   03/31/25 (!) 44.1 kg (97 lb 3.6 oz)   03/31/25 (!) 44.1 kg (97 lb 3.6 oz)   03/31/25 45.2 kg (99 lb 10.4 oz)   03/28/25 45 kg (99 lb 3.3 oz)   03/26/25 (!) 43.1 kg (95 lb 0.3 oz)      Performance Status:  (1) Restricted in physically strenuous activity, ambulatory and able to do work of light nature     Physical Exam  Vitals reviewed.   Constitutional:       General: She is not in acute distress.     Appearance: Normal appearance.   HENT:      Head: Normocephalic and atraumatic.      Mouth/Throat:      Mouth: Mucous membranes are moist.   Eyes:      Conjunctiva/sclera: Conjunctivae normal.      Pupils: Pupils are equal, round, and reactive to light.   Cardiovascular:      Rate and Rhythm: Normal rate and regular rhythm.      Heart sounds: Normal heart sounds.   Pulmonary:      Effort: Pulmonary effort is normal.      Breath sounds: Normal breath sounds.   Abdominal:      General: Bowel sounds are  normal. There is no distension.      Palpations: Abdomen is soft.      Comments: +PEG   Musculoskeletal:         General: No swelling. Normal range of motion.      Cervical back: Normal range of motion.   Skin:     General: Skin is warm and dry.      Findings: No rash.   Neurological:      General: No focal deficit present.      Mental Status: She is alert and oriented to person, place, and time.   Psychiatric:         Mood and Affect: Mood normal.         Behavior: Behavior normal.          Results:  Labs:  Lab Results   Component Value Date    WBC 23.4 (H) 03/28/2025    HGB 11.7 (L) 03/28/2025    HCT 37.0 03/28/2025     03/28/2025     03/28/2025      Lab Results   Component Value Date    NEUTROABS 21.84 (H) 03/28/2025      Lab Results   Component Value Date    GLUCOSE 82 03/28/2025    CALCIUM 8.6 03/28/2025     03/28/2025    K 3.7 03/28/2025    CO2 27 03/28/2025     03/28/2025    BUN 30 (H) 03/28/2025    CREATININE 0.77 03/28/2025     Lab Results   Component Value Date    ALT 16 03/28/2025    AST 17 03/28/2025    ALKPHOS 51 03/28/2025    BILITOT 0.2 03/28/2025        Imaging:  No new imaging.        Pathology:    Reviewed from 2/20/25 as per HPI    Assessment/Plan      Annette Fernandez is a 74 y.o. 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    Discussed overall findings to date with patient and her daughter, as well as recommendations for treatment with concurrent/definitive chemoradiation. We discussed the curative goal of treatment. Discussed that chemotherapy would consist of weekly cisplatin, with side effects discussed, including but not limited to myelosuppression (infection, anemia, bleeding), fatigue, nausea, neuropathy, hearing loss/tinnitus, and nephropathy.    We also discussed the role of a clinical trial  - this randomizes patients to HD cisplatin or weekly cisplatin and we  discussed the different side effect profiles of both and rationale for trial. She is interested in considering this - I do worry a bit about her weight loss and ability to tolerate this but will explore and reassess. She is also interested in starting treatment ASAP    - Plan for weekly cisplatin with radiation to start ASAP, messaged Dr. Del Angel to coordinate start date  - Consider   - Consent for cisplatin signed  - With cisplatin, will have weekly IVF and provider visits  - Zyprexa nightly to start with chemo  - PRN zofran/compazine  - dex 8mg daily x3 days after chemo  - advised her to stop aspirin for pain given risk of NSAIDs and kidney function with cisplatin. Ok for oxycodone as needed and we will refill if gets low  - reminded her about risk of constipation with oxycodone - she is taking miralax as needed and will start regularly if taking, I will also send senna to use if using oxycodone more  - will refer for baseline audiology eval prior to cisplatin.      # Throat pain  - Oxycodone Rx in place.  Oxycodone 5mg/5ml PRN Q6H, 90ml.    - Supportive onc referral placed - visit scheduled 4/14/25   - Continue s&s rinse.  Will pick-up BMX rinse.  10ml swish and spit Q6h PRN.  OARRS reviewed.  Plan to send refill Rx 4/7/25.    # TF/peg/nutritional intake  - Goal 4 Isosource 1.5 cartons/day with 60ml flush before and after feedings.  Addl 380ml free water flush via peg or po.    - 3/31:  Current 3 TF cartons/day + pleasure foods    # Leukocytosis 3/28/25 23.4  - no clinical s/sx's infection  - repeat lab 4/4/25    Weekly visits during treatment course.

## 2025-04-04 ENCOUNTER — HOSPITAL ENCOUNTER (OUTPATIENT)
Dept: RADIATION ONCOLOGY | Facility: CLINIC | Age: 75
Setting detail: RADIATION/ONCOLOGY SERIES
Discharge: HOME | End: 2025-04-04
Payer: MEDICARE

## 2025-04-04 ENCOUNTER — INFUSION (OUTPATIENT)
Dept: HEMATOLOGY/ONCOLOGY | Facility: CLINIC | Age: 75
End: 2025-04-04
Payer: MEDICARE

## 2025-04-04 ENCOUNTER — SOCIAL WORK (OUTPATIENT)
Dept: CASE MANAGEMENT | Facility: HOSPITAL | Age: 75
End: 2025-04-04

## 2025-04-04 VITALS
HEART RATE: 76 BPM | SYSTOLIC BLOOD PRESSURE: 146 MMHG | WEIGHT: 100.97 LBS | DIASTOLIC BLOOD PRESSURE: 72 MMHG | OXYGEN SATURATION: 97 % | RESPIRATION RATE: 18 BRPM | BODY MASS INDEX: 20.49 KG/M2 | TEMPERATURE: 96.8 F

## 2025-04-04 VITALS
OXYGEN SATURATION: 98 % | HEART RATE: 72 BPM | TEMPERATURE: 97.7 F | SYSTOLIC BLOOD PRESSURE: 125 MMHG | DIASTOLIC BLOOD PRESSURE: 70 MMHG | WEIGHT: 98.99 LBS | RESPIRATION RATE: 18 BRPM | BODY MASS INDEX: 20.09 KG/M2

## 2025-04-04 DIAGNOSIS — C01 CANCER OF BASE OF TONGUE (MULTI): ICD-10-CM

## 2025-04-04 LAB
ALBUMIN SERPL BCP-MCNC: 3.6 G/DL (ref 3.4–5)
ALP SERPL-CCNC: 46 U/L (ref 33–136)
ALT SERPL W P-5'-P-CCNC: 18 U/L (ref 7–45)
ANION GAP SERPL CALC-SCNC: 12 MMOL/L (ref 10–20)
AST SERPL W P-5'-P-CCNC: 17 U/L (ref 9–39)
BASOPHILS # BLD AUTO: 0 X10*3/UL (ref 0–0.1)
BASOPHILS NFR BLD AUTO: 0 %
BILIRUB SERPL-MCNC: 0.3 MG/DL (ref 0–1.2)
BUN SERPL-MCNC: 17 MG/DL (ref 6–23)
CALCIUM SERPL-MCNC: 8.9 MG/DL (ref 8.6–10.3)
CHLORIDE SERPL-SCNC: 97 MMOL/L (ref 98–107)
CO2 SERPL-SCNC: 31 MMOL/L (ref 21–32)
CREAT SERPL-MCNC: 0.7 MG/DL (ref 0.5–1.05)
EGFRCR SERPLBLD CKD-EPI 2021: >90 ML/MIN/1.73M*2
EOSINOPHIL # BLD AUTO: 0.01 X10*3/UL (ref 0–0.4)
EOSINOPHIL NFR BLD AUTO: 0.1 %
ERYTHROCYTE [DISTWIDTH] IN BLOOD BY AUTOMATED COUNT: 12.5 % (ref 11.5–14.5)
GLUCOSE SERPL-MCNC: 88 MG/DL (ref 74–99)
HCT VFR BLD AUTO: 36.5 % (ref 36–46)
HGB BLD-MCNC: 11.8 G/DL (ref 12–16)
IMM GRANULOCYTES # BLD AUTO: 0.02 X10*3/UL (ref 0–0.5)
IMM GRANULOCYTES NFR BLD AUTO: 0.3 % (ref 0–0.9)
LYMPHOCYTES # BLD AUTO: 0.98 X10*3/UL (ref 0.8–3)
LYMPHOCYTES NFR BLD AUTO: 12.4 %
MAGNESIUM SERPL-MCNC: 1.55 MG/DL (ref 1.6–2.4)
MCH RBC QN AUTO: 31 PG (ref 26–34)
MCHC RBC AUTO-ENTMCNC: 32.3 G/DL (ref 32–36)
MCV RBC AUTO: 96 FL (ref 80–100)
MONOCYTES # BLD AUTO: 0.68 X10*3/UL (ref 0.05–0.8)
MONOCYTES NFR BLD AUTO: 8.6 %
NEUTROPHILS # BLD AUTO: 6.23 X10*3/UL (ref 1.6–5.5)
NEUTROPHILS NFR BLD AUTO: 78.6 %
NRBC BLD-RTO: ABNORMAL /100{WBCS}
PLATELET # BLD AUTO: 359 X10*3/UL (ref 150–450)
POTASSIUM SERPL-SCNC: 3.6 MMOL/L (ref 3.5–5.3)
PROT SERPL-MCNC: 6.2 G/DL (ref 6.4–8.2)
RBC # BLD AUTO: 3.81 X10*6/UL (ref 4–5.2)
SODIUM SERPL-SCNC: 136 MMOL/L (ref 136–145)
WBC # BLD AUTO: 7.9 X10*3/UL (ref 4.4–11.3)

## 2025-04-04 PROCEDURE — 2500000004 HC RX 250 GENERAL PHARMACY W/ HCPCS (ALT 636 FOR OP/ED): Performed by: INTERNAL MEDICINE

## 2025-04-04 PROCEDURE — 96361 HYDRATE IV INFUSION ADD-ON: CPT | Mod: INF

## 2025-04-04 PROCEDURE — 77386 HC INTENSITY-MODULATED RADIATION THERAPY (IMRT), COMPLEX: CPT | Performed by: RADIOLOGY

## 2025-04-04 PROCEDURE — 83735 ASSAY OF MAGNESIUM: CPT

## 2025-04-04 PROCEDURE — 96365 THER/PROPH/DIAG IV INF INIT: CPT | Mod: INF

## 2025-04-04 PROCEDURE — 80053 COMPREHEN METABOLIC PANEL: CPT

## 2025-04-04 PROCEDURE — 85025 COMPLETE CBC W/AUTO DIFF WBC: CPT

## 2025-04-04 RX ORDER — MAGNESIUM SULFATE HEPTAHYDRATE 40 MG/ML
2 INJECTION, SOLUTION INTRAVENOUS ONCE
Status: CANCELLED | OUTPATIENT
Start: 2025-04-04

## 2025-04-04 RX ORDER — MAGNESIUM SULFATE HEPTAHYDRATE 40 MG/ML
2 INJECTION, SOLUTION INTRAVENOUS ONCE
Status: COMPLETED | OUTPATIENT
Start: 2025-04-04 | End: 2025-04-04

## 2025-04-04 RX ADMIN — SODIUM CHLORIDE 1000 ML: 9 INJECTION, SOLUTION INTRAVENOUS at 09:06

## 2025-04-04 RX ADMIN — MAGNESIUM SULFATE IN WATER 2 G: 40 INJECTION, SOLUTION INTRAVENOUS at 09:57

## 2025-04-04 ASSESSMENT — PAIN SCALES - GENERAL
PAINLEVEL_OUTOF10: 0-NO PAIN
PAINLEVEL_OUTOF10: 0-NO PAIN

## 2025-04-04 NOTE — PROGRESS NOTES
Social Work Note  4/4/2025 SW met with patient and her granddaughter during infusion today.  SW talked with her about how she is feeling.  She had her granddaughter keeping her company today.  She was happy about this. Patient denied any concerns or needs.  This writer encouraged her to reach out if this changed.   SW will remain available to assist patient.  MARLYS Pelaez, W [phone]

## 2025-04-04 NOTE — PROGRESS NOTES
Patient received IVF and 2gm of magnesium today in infusion.  Patient reports feeling well and no issues today. Patient sent to radiation.

## 2025-04-04 NOTE — PROGRESS NOTES
Radiation Oncology On Treatment Visit    Patient Name:  Annette Fernandez  MRN:  [id number]  :  1950    Referring Provider: Kirt Gamboa MD  Primary Care Provider: Parminder Russell MD  Care Team: Patient Care Team:  Parminder Russell MD as PCP - General  Parminder Russell MD as PCP [facility]  ELLYN Corona-CNP as Nurse Practitioner (Otolaryngology)  Kirt Gamboa MD as Surgeon (Otolaryngology)  Stefani Obrien RN as Care Manager (Case Management)  Faith Denson MD as Consulting Physician (Hematology and Oncology)  Rahel Del Angel MD as Radiation Oncologist (Radiation Oncology)    Date of Service: 2025     Diagnosis:   Specialty Problems          Radiation Oncology Problems    Cancer of base of tongue (Multi)        Malignant neoplasm of base of tongue (Multi)         Treatment Summary:  Radiation Therapy    Treatment Period Technique Fraction Dose Fractions Total Dose   Course 1 3/26/2025-4/3/2025  (days elapsed: 8)         BOT 3/26/2025-4/3/2025 VMAT 212 / 212 cGy  1484 / 6,996 cGy     SUBJECTIVE: tolerating treatment well no side effects.       OBJECTIVE:   Vital Signs:  /72 (BP Location: Right arm, Patient Position: Sitting, BP Cuff Size: Child)   Pulse 76   Temp 36 °C (96.8 °F) (Temporal)   Resp 18   Wt 45.8 kg (100 lb 15.5 oz)   SpO2 97%   BMI 20.49 kg/m²     Other Pertinent Findings:     Toxicity Assessment          3/28/2025    11:00 2025    11:00   Toxicity Assessment   Adverse Events Reviewed (WDL) Yes (Within Defined Limits) Yes (Within Defined Limits)   Treatment  and neck Head and neck   Anorexia Grade 0 Grade 0   Dehydration Grade 0 Grade 0       weekly iv infusions received MG today.   Dermatitis Radiation Grade 1 Grade 1       dry intact using aquaphor   Fatigue Grade 0 Grade 0   Nausea Grade 0 Grade 0   Pain Grade 0 Grade 1       throat pain on occasion using oxycodone prn daily   Vomiting Grade 0    Dysphagia Grade 0 Grade  0   Esophagitis Grade 0    Mucositis Oral Grade 0 Grade 1       sore tongue noted/ BMX ordered   Dry Mouth Grade 0 Grade 1       mild /using salt and soda rinse   Ear Pain Grade 0    Tinnitus Grade 0    Oral Pain Grade 0    Edema Face Grade 0    Aspiration Grade 0    Hoarseness Grade 0    Voice Alteration Grade 0         Assessment / Plan:  The patient is tolerating radiation therapy as anticipated.  Continue per current treatment plan.

## 2025-04-07 ENCOUNTER — NUTRITION (OUTPATIENT)
Dept: HEMATOLOGY/ONCOLOGY | Facility: CLINIC | Age: 75
End: 2025-04-07
Payer: MEDICARE

## 2025-04-07 ENCOUNTER — INFUSION (OUTPATIENT)
Dept: HEMATOLOGY/ONCOLOGY | Facility: CLINIC | Age: 75
End: 2025-04-07
Payer: MEDICARE

## 2025-04-07 ENCOUNTER — HOSPITAL ENCOUNTER (OUTPATIENT)
Dept: RADIATION ONCOLOGY | Facility: CLINIC | Age: 75
Setting detail: RADIATION/ONCOLOGY SERIES
Discharge: HOME | End: 2025-04-07
Payer: MEDICARE

## 2025-04-07 VITALS
DIASTOLIC BLOOD PRESSURE: 64 MMHG | WEIGHT: 96.78 LBS | TEMPERATURE: 97.7 F | SYSTOLIC BLOOD PRESSURE: 99 MMHG | RESPIRATION RATE: 18 BRPM | BODY MASS INDEX: 19.64 KG/M2 | OXYGEN SATURATION: 98 % | HEART RATE: 82 BPM

## 2025-04-07 VITALS — BODY MASS INDEX: 19.51 KG/M2 | WEIGHT: 96.78 LBS | HEIGHT: 59 IN

## 2025-04-07 DIAGNOSIS — G89.3 CANCER ASSOCIATED PAIN: Primary | ICD-10-CM

## 2025-04-07 DIAGNOSIS — C01 CANCER OF BASE OF TONGUE (MULTI): ICD-10-CM

## 2025-04-07 PROCEDURE — 77386 HC INTENSITY-MODULATED RADIATION THERAPY (IMRT), COMPLEX: CPT | Performed by: RADIOLOGY

## 2025-04-07 PROCEDURE — 96375 TX/PRO/DX INJ NEW DRUG ADDON: CPT | Mod: INF

## 2025-04-07 PROCEDURE — 2500000004 HC RX 250 GENERAL PHARMACY W/ HCPCS (ALT 636 FOR OP/ED): Performed by: INTERNAL MEDICINE

## 2025-04-07 PROCEDURE — 96413 CHEMO IV INFUSION 1 HR: CPT

## 2025-04-07 PROCEDURE — 96367 TX/PROPH/DG ADDL SEQ IV INF: CPT

## 2025-04-07 PROCEDURE — 96361 HYDRATE IV INFUSION ADD-ON: CPT | Mod: INF

## 2025-04-07 RX ORDER — DIPHENHYDRAMINE HYDROCHLORIDE 50 MG/ML
50 INJECTION, SOLUTION INTRAMUSCULAR; INTRAVENOUS AS NEEDED
Status: DISCONTINUED | OUTPATIENT
Start: 2025-04-07 | End: 2025-04-07 | Stop reason: HOSPADM

## 2025-04-07 RX ORDER — PALONOSETRON 0.05 MG/ML
0.25 INJECTION, SOLUTION INTRAVENOUS ONCE
Status: COMPLETED | OUTPATIENT
Start: 2025-04-07 | End: 2025-04-07

## 2025-04-07 RX ORDER — OXYCODONE HCL 5 MG/5 ML
5 SOLUTION, ORAL ORAL EVERY 6 HOURS PRN
Qty: 140 ML | Refills: 0 | Status: SHIPPED | OUTPATIENT
Start: 2025-04-07 | End: 2025-04-14

## 2025-04-07 RX ORDER — PROCHLORPERAZINE EDISYLATE 5 MG/ML
10 INJECTION INTRAMUSCULAR; INTRAVENOUS EVERY 6 HOURS PRN
Status: DISCONTINUED | OUTPATIENT
Start: 2025-04-07 | End: 2025-04-07 | Stop reason: HOSPADM

## 2025-04-07 RX ORDER — ALBUTEROL SULFATE 0.83 MG/ML
3 SOLUTION RESPIRATORY (INHALATION) AS NEEDED
Status: DISCONTINUED | OUTPATIENT
Start: 2025-04-07 | End: 2025-04-07 | Stop reason: HOSPADM

## 2025-04-07 RX ORDER — HEPARIN 100 UNIT/ML
500 SYRINGE INTRAVENOUS AS NEEDED
OUTPATIENT
Start: 2025-04-07

## 2025-04-07 RX ORDER — HEPARIN SODIUM,PORCINE/PF 10 UNIT/ML
50 SYRINGE (ML) INTRAVENOUS AS NEEDED
OUTPATIENT
Start: 2025-04-07

## 2025-04-07 RX ORDER — PROCHLORPERAZINE MALEATE 10 MG
10 TABLET ORAL EVERY 6 HOURS PRN
Status: DISCONTINUED | OUTPATIENT
Start: 2025-04-07 | End: 2025-04-07 | Stop reason: HOSPADM

## 2025-04-07 RX ORDER — EPINEPHRINE 0.3 MG/.3ML
0.3 INJECTION SUBCUTANEOUS EVERY 5 MIN PRN
Status: DISCONTINUED | OUTPATIENT
Start: 2025-04-07 | End: 2025-04-07 | Stop reason: HOSPADM

## 2025-04-07 RX ORDER — FAMOTIDINE 10 MG/ML
20 INJECTION, SOLUTION INTRAVENOUS ONCE AS NEEDED
Status: DISCONTINUED | OUTPATIENT
Start: 2025-04-07 | End: 2025-04-07 | Stop reason: HOSPADM

## 2025-04-07 RX ADMIN — CISPLATIN 40 MG: 1 INJECTION, SOLUTION INTRAVENOUS at 11:41

## 2025-04-07 RX ADMIN — SODIUM CHLORIDE 1000 ML: 9 INJECTION, SOLUTION INTRAVENOUS at 12:48

## 2025-04-07 RX ADMIN — PALONOSETRON 0.25 MG: 0.05 INJECTION, SOLUTION INTRAVENOUS at 10:28

## 2025-04-07 RX ADMIN — FOSAPREPITANT 150 MG: 150 INJECTION, POWDER, LYOPHILIZED, FOR SOLUTION INTRAVENOUS at 10:59

## 2025-04-07 RX ADMIN — DEXAMETHASONE SODIUM PHOSPHATE 12 MG: 10 INJECTION, SOLUTION INTRAMUSCULAR; INTRAVENOUS at 10:28

## 2025-04-07 RX ADMIN — POTASSIUM CHLORIDE 999 ML/HR: 2 INJECTION, SOLUTION, CONCENTRATE INTRAVENOUS at 09:18

## 2025-04-07 ASSESSMENT — PAIN SCALES - GENERAL: PAINLEVEL_OUTOF10: 0-NO PAIN

## 2025-04-07 NOTE — SIGNIFICANT EVENT
04/07/25 0904   Prechemo Checklist   Has the patient been in the hospital, ED, or urgent care since last date of service No   Chemo/Immuno Consent Completed and Signed Yes   Protocol/Indications Verified Yes   Confirmed to previous date/time of medication Yes   Compared to previous dose Yes   All medications are dated accurately Yes   Pregnancy Test Negative Not applicable   Parameters Met (!) No  (okay to treat w/ crcl in place)   Is Patient Proceeding With Treatment? Yes   BSA/Weight-Height Verified Yes   Dose Calculations Verified (current, total, cumulative) Yes

## 2025-04-07 NOTE — PROGRESS NOTES
"NUTRITION Follow-Up NOTE    Nutrition Assessment     Reason for Visit:  Annette Fernandez is a 74 y.o. female seen today during her infusion at [facility].  Patient receiving CISplatin with Concurrent Radiation (Weekly), 7 Week Cycle for diagnosis of base of tongue SCC.  PEG tube placed 2/21/2025.      Patient Active Problem List   Diagnosis    HTN (hypertension), benign    Hyperlipidemia    Acquired hypothyroidism    Peripheral edema    Thyroid nodule    Esophageal necrosis    Anemia    Encounter for subsequent annual wellness visit (AWV) in Medicare patient    Tobacco use disorder    Other primary ovarian failure    Contact dermatitis and eczema    Mass of tongue    Cancer of base of tongue (Multi)    Malignant neoplasm of base of tongue (Multi)    Oropharyngeal dysphagia    Encounter for antineoplastic chemotherapy    Cancer associated pain       Nutrition Significant Labs:  Lab Results   Component Value Date/Time    GLUCOSE 88 04/04/2025 0849     04/04/2025 0849    K 3.6 04/04/2025 0849    CL 97 (L) 04/04/2025 0849    CO2 31 04/04/2025 0849    ANIONGAP 12 04/04/2025 0849    BUN 17 04/04/2025 0849    CREATININE 0.70 04/04/2025 0849    EGFR >90 04/04/2025 0849    CALCIUM 8.9 04/04/2025 0849    ALBUMIN 3.6 04/04/2025 0849    ALKPHOS 46 04/04/2025 0849    PROT 6.2 (L) 04/04/2025 0849    AST 17 04/04/2025 0849    BILITOT 0.3 04/04/2025 0849    ALT 18 04/04/2025 0849    MG 1.55 (L) 04/04/2025 0849    PHOS 2.5 02/25/2025 0611     No results found for: \"VITD25\"      Anthropometrics:  Height: 149.5 cm (4' 10.86\")   Weight: (!) 43.9 kg (96 lb 12.5 oz)   BMI (Calculated): 19.64    IBW/kg (Dietitian Calculated): 40.9 kg Percent of IBW: 107 %          Weight History:   Daily Weight  04/07/25 : (!) 43.9 kg (96 lb 12.5 oz)  04/07/25 : (!) 43.9 kg (96 lb 12.5 oz)  04/04/25 : 45.8 kg (100 lb 15.5 oz)  04/04/25 : (!) 44.9 kg (98 lb 15.8 oz)  04/03/25 : 45.7 kg (100 lb 11.2 oz)  03/31/25 : (!) 44.1 kg (97 lb 3.6 " oz)  03/31/25 : (!) 44.1 kg (97 lb 3.6 oz)  03/31/25 : 45.2 kg (99 lb 10.4 oz)  03/28/25 : 45 kg (99 lb 3.3 oz)  03/26/25 : (!) 43.1 kg (95 lb 0.3 oz)    Weight Change %:  Weight History / % Weight Change: 1 lb wt loss over the last week.    Nutrition History:  Food & Nutrition History: Reported remains to eat ok, little sore throat, but still eating softer foods.  Food Allergies:    Food Intolerances:    Vitamin/mineral intake:       Herbal supplements:    Medication and Complementary/Alternative Medicine Use:    Dentition:    Sleep Habits:      Diet Recall:  Meal 1:    Snack 1:    Meal 2:    Snack 2:    Meal 3:    Snack 3:    Food Variety:    Oral Nutrition Supplement Use: Oral Nutrition Supplements: Ensure Frequency: 1/day Calories: 220 calories Protein: 9g protein  Fluid Intake:    Energy Intake: Poor < 50 %    Food Preparation:  Cooking:    Grocery Shopping:    Dining Out:      Enteral Nutrition History:   Enteral Nutrition Formula/Solution: Isosource 1.5  Method of TF administration: Bolus enteral nutrition feeding  TF infusion rate: per pt trying to get in 3 cartons per day (this provides 1125 calories, 51g protein, 573ml FW)  Feeding Tube Flush: 60ml pre/post bolus 3 times per day; will add extra water into formula as she stated it is thick and takes longer to go in then she likes. (provides 360 ml water plus little extra she adds to formula.)     Medications:  Current Outpatient Medications   Medication Instructions    atorvastatin (LIPITOR) 80 mg, oral, Daily RT    DentaGeL 1.1 % gel apply with toothbrush OR flouoride trays    dexAMETHasone (DECADRON) 8 mg, oral, Daily, For 3 days per week starting the day after treatment.    magic mouthwash (lidocaine, diphenhydrAMINE, Maalox 1:1:1) 10 mL, Swish & Spit, Every 6 hours PRN    metoprolol tartrate (LOPRESSOR) 25 mg, oral, 2 times daily    multivitamin tablet 1 tablet, Daily    NON FORMULARY 1 each, Daily    OLANZapine (ZYPREXA) 5 mg, oral, Nightly     ondansetron (ZOFRAN) 8 mg, oral, Every 8 hours PRN    oxyCODONE (ROXICODONE) 5 mg, oral, Every 6 hours PRN    pantoprazole (PROTONIX) 40 mg, oral, Daily, Do not crush, chew, or split.    polyethylene glycol (GLYCOLAX, MIRALAX) 17 g, g-tube, Daily    prochlorperazine (COMPAZINE) 10 mg, oral, Every 6 hours PRN    senna 8.8 mg/5 mL syrup 10 mL, oral, 2 times daily PRN    thyroid (pork) (ARMOUR THYROID) 15 mg, oral, Daily       Nutrition Focused Physical Exam Findings:    Subcutaneous Fat Loss:   Orbital Fat Pads: Mild-Moderate (slight dark circles and slight hollowing)  Buccal Fat Pads: Mild-Moderate (flat cheeks, minimal bounce)    Muscle Wasting:  Temporalis: Mild-Moderate (slight depression)    Physical Findings:          Estimated Needs:       Total Energy Estimated Needs in 24 hours (kCal): 1500 kCal  Method for Estimating Needs: 2338-4057 kcals/d (35-40 kcals/kg)  Total Protein Estimated Needs in 24 Hours (g): 52 g  Method for Estimating 24 Hour Protein Needs: 52-65 g protein/day (1.2-1.5g protein/kg)  Total Fluid Estimated Needs in 24 Hours (mL): 1500 mL  Method for Estimating 24 Hour Fluid Needs: or 1ml/kcal             Nutrition Diagnosis   Malnutrition Diagnosis  Patient has Malnutrition Diagnosis: Yes  Diagnosis Status: Active  Malnutrition Diagnosis: Moderate malnutrition related to chronic disease or condition  Related to: decreased oral intake prior to PEG tube placement.  As Evidenced by: <75% of estimated energy needs for >= 1 month; >20% wt loss in past year    Nutrition Diagnosis  Patient has Nutrition Diagnosis: Yes  Diagnosis Status (1): Active  Nutrition Diagnosis 1: Increased nutrient needs  Related to (1): increased metabolic demand  As Evidenced by (1): chemoRT for diagnosis of BOT SCC.       Nutrition Interventions/Recommendations   Nutrition Prescription: Oral nutrition, Enteral nutrition Management of nutrition impact symptoms; adjust diet consistency as tolerated to continue swallow function  as safe; PEG tube to meet 100% of nutritional needs; maintain weight    Nutrition Interventions:   Food and Nutrient Delivery: Meals & Snacks: Energy-modified diet, Fluid-modified diet, Protein-modified diet, Texture-Modified Diet  Goals: Oral intake as safe to swallow.  Enteral Nutrition: Management of enteral nutrition site care, Management of flushing of feeding tube, Management of schedule of enteral nutrition  Goals: Can continue with 3 isosource 1.5 per day and increase as tolerated to goal of 4 cartons per day if PO intake decreases, and/or weight loss noted.   Flush with 60ml water before and after each feed. Additional water intake either PO or via PEG of 380ml per day.  Medical Food Supplement: Commercial beverage medical food supplement therapy  Goals: Continue with 1 Ensure per day as tolerated to provide extra 220 calories, 9g protein; can consider ensure plus 1 per day for more protein and calories.  Feeding Assistance Management: Mouth care management  Goals: Oral care; can use baking soda/ salt water swish; may try carbonating beverage (tonic water, carobonated water) swish and spit to aid in breaking up thick phlegm as needed.  Other:: Hydration  Goals: Encouraged adequate fluid intake.  Recommend extra 380ml water flush either via PEG tube or orally as tolerated. Discussed electrolyte replacements as needed throughout tx.     Coordination of Care: Collaboration and referral of nutrition care: Collaboration and Referral of Nutrition Care, Referral by nutrition professional to community agencies and programs  Goals: PEG tube feeding and supplies comes from [facility].     Nutrition Education:   Nutrition Education Content: Content related nutrition education   Discussed PEG tube feedings and nutrition throughout cancer tx.             Nutrition Monitoring and Evaluation   Food and Nutrient Intake  Monitoring and Evaluation Plan: Fluid intake, Enternal and parenternal nutrition intake  determination  Fluid Intake: Estimated fluid intake  Criteria: Maintain hydration of at least 64 oz per day - coming from TF,  water flushes PEG tube, and PO fluid intake.  Enteral and Parenteral Nutrition Intake Determination: Enteral nutrition formula/solution, Enteral nutrition intake  Criteria: meet 100% of estimated nutritional needs via PEG tube feedings    Anthropometric measurements  Monitoring and Evaluation Plan: Weight  Body Weight: Measured body weight  Criteria: Maintain weight    Biochemical Data, Medical Tests and Procedures  Monitoring and Evaluation Plan: Electrolyte/renal panel  Criteria: labs WNL              Follow Up: Planned follow up visit: will continue to follow up throughout [facility]old[facility] treatments.

## 2025-04-08 ENCOUNTER — HOSPITAL ENCOUNTER (OUTPATIENT)
Dept: RADIATION ONCOLOGY | Facility: CLINIC | Age: 75
Setting detail: RADIATION/ONCOLOGY SERIES
Discharge: HOME | End: 2025-04-08
Payer: MEDICARE

## 2025-04-08 DIAGNOSIS — Z51.0 ENCOUNTER FOR ANTINEOPLASTIC RADIATION THERAPY: ICD-10-CM

## 2025-04-08 DIAGNOSIS — C01 MALIGNANT NEOPLASM OF BASE OF TONGUE (MULTI): ICD-10-CM

## 2025-04-08 LAB
RAD ONC MSQ ACTUAL FRACTIONS DELIVERED: 10
RAD ONC MSQ ACTUAL SESSION DELIVERED DOSE: 212 CGRAY
RAD ONC MSQ ACTUAL TOTAL DOSE: 2120 CGRAY
RAD ONC MSQ ELAPSED DAYS: 13
RAD ONC MSQ LAST DATE: NORMAL
RAD ONC MSQ PRESCRIBED FRACTIONAL DOSE: 212 CGRAY
RAD ONC MSQ PRESCRIBED NUMBER OF FRACTIONS: 33
RAD ONC MSQ PRESCRIBED TECHNIQUE: NORMAL
RAD ONC MSQ PRESCRIBED TOTAL DOSE: 6996 CGRAY
RAD ONC MSQ PRESCRIPTION PATTERN COMMENT: NORMAL
RAD ONC MSQ START DATE: NORMAL
RAD ONC MSQ TREATMENT COURSE NUMBER: 1
RAD ONC MSQ TREATMENT SITE: NORMAL

## 2025-04-08 PROCEDURE — 77386 HC INTENSITY-MODULATED RADIATION THERAPY (IMRT), COMPLEX: CPT | Performed by: STUDENT IN AN ORGANIZED HEALTH CARE EDUCATION/TRAINING PROGRAM

## 2025-04-09 ENCOUNTER — HOSPITAL ENCOUNTER (OUTPATIENT)
Dept: RADIATION ONCOLOGY | Facility: CLINIC | Age: 75
Setting detail: RADIATION/ONCOLOGY SERIES
Discharge: HOME | End: 2025-04-09
Payer: MEDICARE

## 2025-04-09 DIAGNOSIS — C01 MALIGNANT NEOPLASM OF BASE OF TONGUE (MULTI): ICD-10-CM

## 2025-04-09 DIAGNOSIS — Z51.0 ENCOUNTER FOR ANTINEOPLASTIC RADIATION THERAPY: ICD-10-CM

## 2025-04-09 LAB
RAD ONC MSQ ACTUAL FRACTIONS DELIVERED: 11
RAD ONC MSQ ACTUAL SESSION DELIVERED DOSE: 212 CGRAY
RAD ONC MSQ ACTUAL TOTAL DOSE: 2332 CGRAY
RAD ONC MSQ ELAPSED DAYS: 14
RAD ONC MSQ LAST DATE: NORMAL
RAD ONC MSQ PRESCRIBED FRACTIONAL DOSE: 212 CGRAY
RAD ONC MSQ PRESCRIBED NUMBER OF FRACTIONS: 33
RAD ONC MSQ PRESCRIBED TECHNIQUE: NORMAL
RAD ONC MSQ PRESCRIBED TOTAL DOSE: 6996 CGRAY
RAD ONC MSQ PRESCRIPTION PATTERN COMMENT: NORMAL
RAD ONC MSQ START DATE: NORMAL
RAD ONC MSQ TREATMENT COURSE NUMBER: 1
RAD ONC MSQ TREATMENT SITE: NORMAL

## 2025-04-09 PROCEDURE — 77386 HC INTENSITY-MODULATED RADIATION THERAPY (IMRT), COMPLEX: CPT | Performed by: RADIOLOGY

## 2025-04-09 PROCEDURE — 77336 RADIATION PHYSICS CONSULT: CPT | Performed by: RADIOLOGY

## 2025-04-10 ENCOUNTER — LAB (OUTPATIENT)
Dept: LAB | Facility: CLINIC | Age: 75
End: 2025-04-10
Payer: MEDICARE

## 2025-04-10 ENCOUNTER — INFUSION (OUTPATIENT)
Dept: HEMATOLOGY/ONCOLOGY | Facility: CLINIC | Age: 75
End: 2025-04-10
Payer: MEDICARE

## 2025-04-10 ENCOUNTER — OFFICE VISIT (OUTPATIENT)
Dept: HEMATOLOGY/ONCOLOGY | Facility: CLINIC | Age: 75
End: 2025-04-10
Payer: MEDICARE

## 2025-04-10 ENCOUNTER — HOSPITAL ENCOUNTER (OUTPATIENT)
Dept: RADIATION ONCOLOGY | Facility: CLINIC | Age: 75
Setting detail: RADIATION/ONCOLOGY SERIES
Discharge: HOME | End: 2025-04-10
Payer: MEDICARE

## 2025-04-10 VITALS
DIASTOLIC BLOOD PRESSURE: 65 MMHG | BODY MASS INDEX: 20.15 KG/M2 | WEIGHT: 99.3 LBS | SYSTOLIC BLOOD PRESSURE: 120 MMHG | RESPIRATION RATE: 16 BRPM | TEMPERATURE: 97.2 F | OXYGEN SATURATION: 98 % | HEART RATE: 73 BPM

## 2025-04-10 DIAGNOSIS — C01 CANCER OF BASE OF TONGUE (MULTI): ICD-10-CM

## 2025-04-10 DIAGNOSIS — Z51.0 ENCOUNTER FOR ANTINEOPLASTIC RADIATION THERAPY: ICD-10-CM

## 2025-04-10 DIAGNOSIS — C01 MALIGNANT NEOPLASM OF BASE OF TONGUE (MULTI): ICD-10-CM

## 2025-04-10 LAB
ALBUMIN SERPL BCP-MCNC: 3.3 G/DL (ref 3.4–5)
ALP SERPL-CCNC: 42 U/L (ref 33–136)
ALT SERPL W P-5'-P-CCNC: 17 U/L (ref 7–45)
ANION GAP SERPL CALC-SCNC: 9 MMOL/L (ref 10–20)
AST SERPL W P-5'-P-CCNC: 15 U/L (ref 9–39)
BASOPHILS # BLD AUTO: 0 X10*3/UL (ref 0–0.1)
BASOPHILS NFR BLD AUTO: 0 %
BILIRUB SERPL-MCNC: 0.3 MG/DL (ref 0–1.2)
BUN SERPL-MCNC: 23 MG/DL (ref 6–23)
CALCIUM SERPL-MCNC: 8.7 MG/DL (ref 8.6–10.3)
CHLORIDE SERPL-SCNC: 99 MMOL/L (ref 98–107)
CO2 SERPL-SCNC: 31 MMOL/L (ref 21–32)
CREAT SERPL-MCNC: 0.62 MG/DL (ref 0.5–1.05)
EGFRCR SERPLBLD CKD-EPI 2021: >90 ML/MIN/1.73M*2
EOSINOPHIL # BLD AUTO: 0.01 X10*3/UL (ref 0–0.4)
EOSINOPHIL NFR BLD AUTO: 0.1 %
ERYTHROCYTE [DISTWIDTH] IN BLOOD BY AUTOMATED COUNT: 12.8 % (ref 11.5–14.5)
GLUCOSE SERPL-MCNC: 81 MG/DL (ref 74–99)
HCT VFR BLD AUTO: 31.5 % (ref 36–46)
HGB BLD-MCNC: 10.2 G/DL (ref 12–16)
IMM GRANULOCYTES # BLD AUTO: 0.01 X10*3/UL (ref 0–0.5)
IMM GRANULOCYTES NFR BLD AUTO: 0.1 % (ref 0–0.9)
LYMPHOCYTES # BLD AUTO: 0.83 X10*3/UL (ref 0.8–3)
LYMPHOCYTES NFR BLD AUTO: 8.7 %
MAGNESIUM SERPL-MCNC: 1.69 MG/DL (ref 1.6–2.4)
MCH RBC QN AUTO: 31.1 PG (ref 26–34)
MCHC RBC AUTO-ENTMCNC: 32.4 G/DL (ref 32–36)
MCV RBC AUTO: 96 FL (ref 80–100)
MONOCYTES # BLD AUTO: 0.9 X10*3/UL (ref 0.05–0.8)
MONOCYTES NFR BLD AUTO: 9.4 %
NEUTROPHILS # BLD AUTO: 7.78 X10*3/UL (ref 1.6–5.5)
NEUTROPHILS NFR BLD AUTO: 81.7 %
NRBC BLD-RTO: ABNORMAL /100{WBCS}
PLATELET # BLD AUTO: 246 X10*3/UL (ref 150–450)
POTASSIUM SERPL-SCNC: 4 MMOL/L (ref 3.5–5.3)
PROT SERPL-MCNC: 5.6 G/DL (ref 6.4–8.2)
RAD ONC MSQ ACTUAL FRACTIONS DELIVERED: 12
RAD ONC MSQ ACTUAL SESSION DELIVERED DOSE: 212 CGRAY
RAD ONC MSQ ACTUAL TOTAL DOSE: 2544 CGRAY
RAD ONC MSQ ELAPSED DAYS: 15
RAD ONC MSQ LAST DATE: NORMAL
RAD ONC MSQ PRESCRIBED FRACTIONAL DOSE: 212 CGRAY
RAD ONC MSQ PRESCRIBED NUMBER OF FRACTIONS: 33
RAD ONC MSQ PRESCRIBED TECHNIQUE: NORMAL
RAD ONC MSQ PRESCRIBED TOTAL DOSE: 6996 CGRAY
RAD ONC MSQ PRESCRIPTION PATTERN COMMENT: NORMAL
RAD ONC MSQ START DATE: NORMAL
RAD ONC MSQ TREATMENT COURSE NUMBER: 1
RAD ONC MSQ TREATMENT SITE: NORMAL
RBC # BLD AUTO: 3.28 X10*6/UL (ref 4–5.2)
SODIUM SERPL-SCNC: 135 MMOL/L (ref 136–145)
WBC # BLD AUTO: 9.5 X10*3/UL (ref 4.4–11.3)

## 2025-04-10 PROCEDURE — G2211 COMPLEX E/M VISIT ADD ON: HCPCS | Performed by: INTERNAL MEDICINE

## 2025-04-10 PROCEDURE — 1126F AMNT PAIN NOTED NONE PRSNT: CPT | Performed by: INTERNAL MEDICINE

## 2025-04-10 PROCEDURE — 85025 COMPLETE CBC W/AUTO DIFF WBC: CPT

## 2025-04-10 PROCEDURE — 36415 COLL VENOUS BLD VENIPUNCTURE: CPT

## 2025-04-10 PROCEDURE — 3074F SYST BP LT 130 MM HG: CPT | Performed by: INTERNAL MEDICINE

## 2025-04-10 PROCEDURE — 2500000004 HC RX 250 GENERAL PHARMACY W/ HCPCS (ALT 636 FOR OP/ED): Performed by: INTERNAL MEDICINE

## 2025-04-10 PROCEDURE — 80053 COMPREHEN METABOLIC PANEL: CPT

## 2025-04-10 PROCEDURE — 1123F ACP DISCUSS/DSCN MKR DOCD: CPT | Performed by: INTERNAL MEDICINE

## 2025-04-10 PROCEDURE — 3078F DIAST BP <80 MM HG: CPT | Performed by: INTERNAL MEDICINE

## 2025-04-10 PROCEDURE — 83735 ASSAY OF MAGNESIUM: CPT

## 2025-04-10 PROCEDURE — 99214 OFFICE O/P EST MOD 30 MIN: CPT | Mod: 25 | Performed by: INTERNAL MEDICINE

## 2025-04-10 PROCEDURE — 77386 HC INTENSITY-MODULATED RADIATION THERAPY (IMRT), COMPLEX: CPT | Performed by: RADIOLOGY

## 2025-04-10 PROCEDURE — 96360 HYDRATION IV INFUSION INIT: CPT | Mod: INF

## 2025-04-10 PROCEDURE — 99214 OFFICE O/P EST MOD 30 MIN: CPT | Performed by: INTERNAL MEDICINE

## 2025-04-10 PROCEDURE — 1159F MED LIST DOCD IN RCRD: CPT | Performed by: INTERNAL MEDICINE

## 2025-04-10 RX ADMIN — SODIUM CHLORIDE 1000 ML: 9 INJECTION, SOLUTION INTRAVENOUS at 10:41

## 2025-04-10 ASSESSMENT — PAIN SCALES - GENERAL: PAINLEVEL_OUTOF10: 0-NO PAIN

## 2025-04-11 ENCOUNTER — HOSPITAL ENCOUNTER (OUTPATIENT)
Dept: RADIATION ONCOLOGY | Facility: CLINIC | Age: 75
Setting detail: RADIATION/ONCOLOGY SERIES
Discharge: HOME | End: 2025-04-11
Payer: MEDICARE

## 2025-04-11 VITALS
OXYGEN SATURATION: 95 % | SYSTOLIC BLOOD PRESSURE: 108 MMHG | DIASTOLIC BLOOD PRESSURE: 65 MMHG | RESPIRATION RATE: 18 BRPM | TEMPERATURE: 97.5 F | WEIGHT: 98.44 LBS | HEART RATE: 76 BPM | BODY MASS INDEX: 19.98 KG/M2

## 2025-04-11 DIAGNOSIS — C01 MALIGNANT NEOPLASM OF BASE OF TONGUE (MULTI): ICD-10-CM

## 2025-04-11 DIAGNOSIS — Z51.0 ENCOUNTER FOR ANTINEOPLASTIC RADIATION THERAPY: ICD-10-CM

## 2025-04-11 LAB
RAD ONC MSQ ACTUAL FRACTIONS DELIVERED: 13
RAD ONC MSQ ACTUAL SESSION DELIVERED DOSE: 212 CGRAY
RAD ONC MSQ ACTUAL TOTAL DOSE: 2756 CGRAY
RAD ONC MSQ ELAPSED DAYS: 16
RAD ONC MSQ LAST DATE: NORMAL
RAD ONC MSQ PRESCRIBED FRACTIONAL DOSE: 212 CGRAY
RAD ONC MSQ PRESCRIBED NUMBER OF FRACTIONS: 33
RAD ONC MSQ PRESCRIBED TECHNIQUE: NORMAL
RAD ONC MSQ PRESCRIBED TOTAL DOSE: 6996 CGRAY
RAD ONC MSQ PRESCRIPTION PATTERN COMMENT: NORMAL
RAD ONC MSQ START DATE: NORMAL
RAD ONC MSQ TREATMENT COURSE NUMBER: 1
RAD ONC MSQ TREATMENT SITE: NORMAL

## 2025-04-11 PROCEDURE — 77386 HC INTENSITY-MODULATED RADIATION THERAPY (IMRT), COMPLEX: CPT | Performed by: RADIOLOGY

## 2025-04-11 ASSESSMENT — PAIN SCALES - GENERAL: PAINLEVEL_OUTOF10: 0-NO PAIN

## 2025-04-11 NOTE — PROGRESS NOTES
Radiation Oncology On Treatment Visit    Patient Name:  Annette Fernandez  MRN:  [id number]  :  1950    Referring Provider: Kirt Gamboa MD  Primary Care Provider: Parminder Russell MD  Care Team: Patient Care Team:  Parminder Russell MD as PCP - General  [facility]min[facility] Russell MD [facility]  ELLYN Corona-CNP as Nurse Practitioner (Otolaryngology)  Kirt Gamboa MD as Surgeon (Otolaryngology)  Stefani Obrien RN as Care Manager (Case Management)  Faith Denson MD as Consulting Physician (Hematology and Oncology)  Rahel Del Angel MD as Radiation Oncologist (Radiation Oncology)    Date of Service: 2025     Diagnosis:   Specialty Problems          Radiation Oncology Problems    Cancer of base of tongue (Multi)        Malignant neoplasm of base of tongue (Multi)         Treatment Summary:  Radiation Therapy    Treatment Period Technique Fraction Dose Fractions Total Dose   Course 1 3/26/2025-2025  (days elapsed: 16)         BOT 3/26/2025-2025 VMAT 212 / 212 cGy  2756 / 6,996 cGy     SUBJECTIVE: tolerating radiation well. Has some increase dysphagia and odynophagia which she takes BMX for      OBJECTIVE:   Vital Signs:  /65   Pulse 76   Temp 36.4 °C (97.5 °F)   Resp 18   Wt (!) 44.7 kg (98 lb 7 oz)   SpO2 95%   BMI 19.98 kg/m²     Other Pertinent Findings:     Toxicity Assessment          3/28/2025    11:00 2025    11:00 2025    10:00   Toxicity Assessment   Adverse Events Reviewed (WDL) Yes (Within Defined Limits) Yes (Within Defined Limits) Yes (Within Defined Limits)   Treatment  and neck Head and neck Head and neck   Anorexia Grade 0 Grade 0 Grade 0   Dehydration Grade 0 Grade 0       weekly iv infusions received MG today. Grade 0       gets IVF   Dermatitis Radiation Grade 1 Grade 1       dry intact using aquaphor Grade 1       using aquaphor   Fatigue Grade 0 Grade 0 Grade 0   Nausea Grade 0 Grade 0 Grade 0   Pain Grade 0  Grade 1       throat pain on occasion using oxycodone prn daily Grade 0   Vomiting Grade 0  Grade 0   Dysphagia Grade 0 Grade 0 Grade 1       struggles with bread   Esophagitis Grade 0  Grade 0   Mucositis Oral Grade 0 Grade 1       sore tongue noted/ BMX ordered Grade 1       irritation in spots; using BMX   Dry Mouth Grade 0 Grade 1       mild /using salt and soda rinse    Ear Pain Grade 0     Tinnitus Grade 0     Oral Pain Grade 0     Edema Face Grade 0  Grade 0   Aspiration Grade 0  Grade 0   Hoarseness Grade 0  Grade 0   Voice Alteration Grade 0  Grade 0        Assessment / Plan:  The patient is tolerating radiation therapy as anticipated.  Continue per current treatment plan.

## 2025-04-14 ENCOUNTER — INFUSION (OUTPATIENT)
Dept: HEMATOLOGY/ONCOLOGY | Facility: CLINIC | Age: 75
End: 2025-04-14
Payer: MEDICARE

## 2025-04-14 ENCOUNTER — NUTRITION (OUTPATIENT)
Dept: HEMATOLOGY/ONCOLOGY | Facility: CLINIC | Age: 75
End: 2025-04-14

## 2025-04-14 ENCOUNTER — HOSPITAL ENCOUNTER (OUTPATIENT)
Dept: RADIATION ONCOLOGY | Facility: CLINIC | Age: 75
Setting detail: RADIATION/ONCOLOGY SERIES
Discharge: HOME | End: 2025-04-14
Payer: MEDICARE

## 2025-04-14 ENCOUNTER — OFFICE VISIT (OUTPATIENT)
Dept: PALLIATIVE MEDICINE | Facility: CLINIC | Age: 75
End: 2025-04-14
Payer: MEDICARE

## 2025-04-14 VITALS
HEART RATE: 88 BPM | WEIGHT: 94.58 LBS | BODY MASS INDEX: 19.19 KG/M2 | OXYGEN SATURATION: 98 % | TEMPERATURE: 97 F | RESPIRATION RATE: 18 BRPM | SYSTOLIC BLOOD PRESSURE: 102 MMHG | DIASTOLIC BLOOD PRESSURE: 67 MMHG

## 2025-04-14 VITALS — HEIGHT: 59 IN | BODY MASS INDEX: 19.07 KG/M2 | WEIGHT: 94.58 LBS

## 2025-04-14 DIAGNOSIS — Z51.5 PALLIATIVE CARE ENCOUNTER: Primary | ICD-10-CM

## 2025-04-14 DIAGNOSIS — R11.2 NAUSEA AND VOMITING, UNSPECIFIED VOMITING TYPE: ICD-10-CM

## 2025-04-14 DIAGNOSIS — C01 CANCER OF BASE OF TONGUE (MULTI): Primary | ICD-10-CM

## 2025-04-14 DIAGNOSIS — C01 MALIGNANT NEOPLASM OF BASE OF TONGUE (MULTI): ICD-10-CM

## 2025-04-14 DIAGNOSIS — C01 CANCER OF BASE OF TONGUE (MULTI): ICD-10-CM

## 2025-04-14 DIAGNOSIS — Z51.0 ENCOUNTER FOR ANTINEOPLASTIC RADIATION THERAPY: ICD-10-CM

## 2025-04-14 DIAGNOSIS — G89.3 CANCER ASSOCIATED PAIN: ICD-10-CM

## 2025-04-14 DIAGNOSIS — K59.00 CONSTIPATION, UNSPECIFIED CONSTIPATION TYPE: ICD-10-CM

## 2025-04-14 LAB
RAD ONC MSQ ACTUAL FRACTIONS DELIVERED: 14
RAD ONC MSQ ACTUAL SESSION DELIVERED DOSE: 212 CGRAY
RAD ONC MSQ ACTUAL TOTAL DOSE: 2968 CGRAY
RAD ONC MSQ ELAPSED DAYS: 19
RAD ONC MSQ LAST DATE: NORMAL
RAD ONC MSQ PRESCRIBED FRACTIONAL DOSE: 212 CGRAY
RAD ONC MSQ PRESCRIBED NUMBER OF FRACTIONS: 33
RAD ONC MSQ PRESCRIBED TECHNIQUE: NORMAL
RAD ONC MSQ PRESCRIBED TOTAL DOSE: 6996 CGRAY
RAD ONC MSQ PRESCRIPTION PATTERN COMMENT: NORMAL
RAD ONC MSQ START DATE: NORMAL
RAD ONC MSQ TREATMENT COURSE NUMBER: 1
RAD ONC MSQ TREATMENT SITE: NORMAL

## 2025-04-14 PROCEDURE — 2500000004 HC RX 250 GENERAL PHARMACY W/ HCPCS (ALT 636 FOR OP/ED): Performed by: INTERNAL MEDICINE

## 2025-04-14 PROCEDURE — 96361 HYDRATE IV INFUSION ADD-ON: CPT | Mod: INF

## 2025-04-14 PROCEDURE — 96375 TX/PRO/DX INJ NEW DRUG ADDON: CPT | Mod: INF

## 2025-04-14 PROCEDURE — 99214 OFFICE O/P EST MOD 30 MIN: CPT

## 2025-04-14 PROCEDURE — 1123F ACP DISCUSS/DSCN MKR DOCD: CPT

## 2025-04-14 PROCEDURE — 96367 TX/PROPH/DG ADDL SEQ IV INF: CPT

## 2025-04-14 PROCEDURE — 96413 CHEMO IV INFUSION 1 HR: CPT

## 2025-04-14 PROCEDURE — 77386 HC INTENSITY-MODULATED RADIATION THERAPY (IMRT), COMPLEX: CPT | Performed by: RADIOLOGY

## 2025-04-14 PROCEDURE — 99214 OFFICE O/P EST MOD 30 MIN: CPT | Mod: 25

## 2025-04-14 RX ORDER — PROCHLORPERAZINE EDISYLATE 5 MG/ML
10 INJECTION INTRAMUSCULAR; INTRAVENOUS EVERY 6 HOURS PRN
Status: DISCONTINUED | OUTPATIENT
Start: 2025-04-14 | End: 2025-04-14 | Stop reason: HOSPADM

## 2025-04-14 RX ORDER — NICOTINE 7MG/24HR
1 PATCH, TRANSDERMAL 24 HOURS TRANSDERMAL EVERY 24 HOURS
Qty: 30 PATCH | Refills: 0 | Status: SHIPPED | OUTPATIENT
Start: 2025-04-14 | End: 2025-05-14

## 2025-04-14 RX ORDER — PALONOSETRON 0.05 MG/ML
0.25 INJECTION, SOLUTION INTRAVENOUS ONCE
Status: COMPLETED | OUTPATIENT
Start: 2025-04-14 | End: 2025-04-14

## 2025-04-14 RX ORDER — DIPHENHYDRAMINE HYDROCHLORIDE 50 MG/ML
50 INJECTION, SOLUTION INTRAMUSCULAR; INTRAVENOUS AS NEEDED
Status: DISCONTINUED | OUTPATIENT
Start: 2025-04-14 | End: 2025-04-14 | Stop reason: HOSPADM

## 2025-04-14 RX ORDER — HEPARIN 100 UNIT/ML
500 SYRINGE INTRAVENOUS AS NEEDED
Status: CANCELLED | OUTPATIENT
Start: 2025-04-14

## 2025-04-14 RX ORDER — OXYCODONE HCL 5 MG/5 ML
5 SOLUTION, ORAL ORAL EVERY 4 HOURS PRN
Qty: 420 ML | Refills: 0 | Status: SHIPPED | OUTPATIENT
Start: 2025-04-14 | End: 2025-04-28

## 2025-04-14 RX ORDER — ALBUTEROL SULFATE 0.83 MG/ML
3 SOLUTION RESPIRATORY (INHALATION) AS NEEDED
Status: DISCONTINUED | OUTPATIENT
Start: 2025-04-14 | End: 2025-04-14 | Stop reason: HOSPADM

## 2025-04-14 RX ORDER — EPINEPHRINE 0.3 MG/.3ML
0.3 INJECTION SUBCUTANEOUS EVERY 5 MIN PRN
Status: DISCONTINUED | OUTPATIENT
Start: 2025-04-14 | End: 2025-04-14 | Stop reason: HOSPADM

## 2025-04-14 RX ORDER — HEPARIN SODIUM,PORCINE/PF 10 UNIT/ML
50 SYRINGE (ML) INTRAVENOUS AS NEEDED
Status: CANCELLED | OUTPATIENT
Start: 2025-04-14

## 2025-04-14 RX ORDER — PROCHLORPERAZINE MALEATE 10 MG
10 TABLET ORAL EVERY 6 HOURS PRN
Status: DISCONTINUED | OUTPATIENT
Start: 2025-04-14 | End: 2025-04-14 | Stop reason: HOSPADM

## 2025-04-14 RX ORDER — FAMOTIDINE 10 MG/ML
20 INJECTION, SOLUTION INTRAVENOUS ONCE AS NEEDED
Status: DISCONTINUED | OUTPATIENT
Start: 2025-04-14 | End: 2025-04-14 | Stop reason: HOSPADM

## 2025-04-14 RX ADMIN — POTASSIUM CHLORIDE 999 ML/HR: 2 INJECTION, SOLUTION, CONCENTRATE INTRAVENOUS at 08:56

## 2025-04-14 RX ADMIN — PALONOSETRON 0.25 MG: 0.05 INJECTION, SOLUTION INTRAVENOUS at 10:08

## 2025-04-14 RX ADMIN — FOSAPREPITANT 150 MG: 150 INJECTION, POWDER, LYOPHILIZED, FOR SOLUTION INTRAVENOUS at 10:33

## 2025-04-14 RX ADMIN — CISPLATIN 40 MG: 1 INJECTION, SOLUTION INTRAVENOUS at 11:09

## 2025-04-14 RX ADMIN — SODIUM CHLORIDE 1000 ML: 9 INJECTION, SOLUTION INTRAVENOUS at 12:16

## 2025-04-14 RX ADMIN — DEXAMETHASONE SODIUM PHOSPHATE 12 MG: 10 INJECTION, SOLUTION INTRAMUSCULAR; INTRAVENOUS at 10:08

## 2025-04-14 ASSESSMENT — PAIN SCALES - GENERAL: PAINLEVEL_OUTOF10: 0-NO PAIN

## 2025-04-14 NOTE — PROGRESS NOTES
"NUTRITION Follow-Up NOTE    Nutrition Assessment     Reason for Visit:  Annette Fernandez is a 74 y.o. female seen today during her infusion at [facility].  Patient receiving CISplatin with Concurrent Radiation (Weekly), 7 Week Cycle for diagnosis of base of tongue SCC.  PEG tube placed 2/21/2025.      Patient Active Problem List   Diagnosis    HTN (hypertension), benign    Hyperlipidemia    Acquired hypothyroidism    Peripheral edema    Thyroid nodule    Esophageal necrosis    Anemia    Encounter for subsequent annual wellness visit (AWV) in Medicare patient    Tobacco use disorder    Other primary ovarian failure    Contact dermatitis and eczema    Mass of tongue    Cancer of base of tongue (Multi)    Malignant neoplasm of base of tongue (Multi)    Oropharyngeal dysphagia    Encounter for antineoplastic chemotherapy    Cancer associated pain       Nutrition Significant Labs:  Lab Results   Component Value Date/Time    GLUCOSE 81 04/10/2025 0901     (L) 04/10/2025 0901    K 4.0 04/10/2025 0901    CL 99 04/10/2025 0901    CO2 31 04/10/2025 0901    ANIONGAP 9 (L) 04/10/2025 0901    BUN 23 04/10/2025 0901    CREATININE 0.62 04/10/2025 0901    EGFR >90 04/10/2025 0901    CALCIUM 8.7 04/10/2025 0901    ALBUMIN 3.3 (L) 04/10/2025 0901    ALKPHOS 42 04/10/2025 0901    PROT 5.6 (L) 04/10/2025 0901    AST 15 04/10/2025 0901    BILITOT 0.3 04/10/2025 0901    ALT 17 04/10/2025 0901    MG 1.69 04/10/2025 0901    PHOS 2.5 02/25/2025 0611     No results found for: \"VITD25\"      Anthropometrics:  Height: 149.5 cm (4' 10.86\")   Weight: (!) 42.9 kg (94 lb 9.2 oz)   BMI (Calculated): 19.19    IBW/kg (Dietitian Calculated): 40.9 kg Percent of IBW: 105 %          Weight History:   Daily Weight  04/14/25 : (!) 42.9 kg (94 lb 9.2 oz)  04/14/25 : (!) 42.9 kg (94 lb 9.2 oz)  04/11/25 : (!) 44.7 kg (98 lb 7 oz)  04/10/25 : 45 kg (99 lb 4.8 oz)  04/07/25 : (!) 43.9 kg (96 lb 12.5 oz)  04/07/25 : (!) 43.9 kg (96 lb 12.5 oz)  04/04/25 " : 45.8 kg (100 lb 15.5 oz)  04/04/25 : (!) 44.9 kg (98 lb 15.8 oz)  04/03/25 : 45.7 kg (100 lb 11.2 oz)  03/31/25 : (!) 44.1 kg (97 lb 3.6 oz)    Weight Change %:  Weight History / % Weight Change: 2 lb wt loss noted over the last week.  Per pt weight goes up and down throughout the week - will monitor.  Significant Weight Loss: Yes  Interpretation of Weight Loss: 1-2% in 1 week    Nutrition History:  Food & Nutrition History: Reported remains to eat ok, little sore throat, but still eating softer foods.  Food Allergies:    Food Intolerances:    Vitamin/mineral intake:       Herbal supplements:    Medication and Complementary/Alternative Medicine Use:    Dentition:    Sleep Habits:      Diet Recall:  Meal 1:    Snack 1:    Meal 2:    Snack 2:    Meal 3:    Snack 3:    Food Variety:    Oral Nutrition Supplement Use: Oral Nutrition Supplements: Ensure Frequency: 1/day Calories: 220 calories Protein: 9g protein  Fluid Intake:    Energy Intake: Poor < 50 %    Food Preparation:  Cooking:    Grocery Shopping:    Dining Out:      Enteral Nutrition History:   Enteral Nutrition Formula/Solution: Isosource 1.5  Method of TF administration: Bolus enteral nutrition feeding  TF infusion rate: per pt trying to get in 3 cartons per day (this provides 1125 calories, 51g protein, 573ml FW)  Feeding Tube Flush: 60ml pre/post bolus 3 times per day; will add extra water into formula as she stated it is thick and takes longer to go in then she likes. (provides 360 ml water plus little extra she adds to formula.)     Medications:  Current Outpatient Medications   Medication Instructions    atorvastatin (LIPITOR) 80 mg, oral, Daily RT    DentaGeL 1.1 % gel apply with toothbrush OR flouoride trays    dexAMETHasone (DECADRON) 8 mg, oral, Daily, For 3 days per week starting the day after treatment.    magic mouthwash (lidocaine, diphenhydrAMINE, Maalox 1:1:1) 10 mL, Swish & Spit, Every 6 hours PRN    metoprolol tartrate (LOPRESSOR) 25 mg,  oral, 2 times daily    multivitamin tablet 1 tablet, Daily    NON FORMULARY 1 each, Daily    OLANZapine (ZYPREXA) 5 mg, oral, Nightly    ondansetron (ZOFRAN) 8 mg, oral, Every 8 hours PRN    oxyCODONE (ROXICODONE) 5 mg, oral, Every 4 hours PRN    pantoprazole (PROTONIX) 40 mg, oral, Daily, Do not crush, chew, or split.    polyethylene glycol (GLYCOLAX, MIRALAX) 17 g, g-tube, Daily    prochlorperazine (COMPAZINE) 10 mg, oral, Every 6 hours PRN    thyroid (pork) (ARMOUR THYROID) 15 mg, oral, Daily       Nutrition Focused Physical Exam Findings:    Subcutaneous Fat Loss:   Orbital Fat Pads: Mild-Moderate (slight dark circles and slight hollowing)  Buccal Fat Pads: Mild-Moderate (flat cheeks, minimal bounce)    Muscle Wasting:  Temporalis: Mild-Moderate (slight depression)    Physical Findings:          Estimated Needs:       Total Energy Estimated Needs in 24 hours (kCal): 1500 kCal  Method for Estimating Needs: 5081-7694 kcals/d (35-40 kcals/kg)  Total Protein Estimated Needs in 24 Hours (g): 52 g  Method for Estimating 24 Hour Protein Needs: 52-65 g protein/day (1.2-1.5g protein/kg)  Total Fluid Estimated Needs in 24 Hours (mL): 1500 mL  Method for Estimating 24 Hour Fluid Needs: or 1ml/kcal             Nutrition Diagnosis   Malnutrition Diagnosis  Patient has Malnutrition Diagnosis: Yes  Diagnosis Status: Active  Malnutrition Diagnosis: Moderate malnutrition related to chronic disease or condition  Related to: decreased oral intake prior to PEG tube placement.  As Evidenced by: <75% of estimated energy needs for >= 1 month; >20% wt loss in past year    Nutrition Diagnosis  Patient has Nutrition Diagnosis: Yes  Diagnosis Status (1): Active  Nutrition Diagnosis 1: Increased nutrient needs  Related to (1): increased metabolic demand  As Evidenced by (1): chemoRT for diagnosis of BOT SCC.       Nutrition Interventions/Recommendations   Nutrition Prescription: Oral nutrition, Enteral nutrition Management of nutrition  impact symptoms; adjust diet consistency as tolerated to continue swallow function as safe; PEG tube to meet 100% of nutritional needs; maintain weight    Nutrition Interventions:   Food and Nutrient Delivery: Meals & Snacks: Energy-modified diet, Fluid-modified diet, Protein-modified diet, Texture-Modified Diet  Goals: Oral intake as safe to swallow.  Enteral Nutrition: Management of enteral nutrition site care, Management of flushing of feeding tube, Management of schedule of enteral nutrition  Goals: Can continue with 3 isosource 1.5 per day and increase as tolerated to goal of 4 cartons per day if PO intake decreases, and/or weight loss noted.   Flush with 60ml water before and after each feed. Additional water intake either PO or via PEG of 380ml per day.  Medical Food Supplement: Commercial beverage medical food supplement therapy  Goals: Continue with 1 Ensure per day as tolerated to provide extra 220 calories, 9g protein; can consider ensure plus 1 per day for more protein and calories.  Feeding Assistance Management: Mouth care management  Goals: Oral care; can use baking soda/ salt water swish; may try carbonating beverage (tonic water, carobonated water) swish and spit to aid in breaking up thick phlegm as needed.  Other:: Hydration  Goals: Encouraged adequate fluid intake.  Recommend extra 380ml water flush either via PEG tube or orally as tolerated. Discussed electrolyte replacements as needed throughout tx.     Coordination of Care: Collaboration and referral of nutrition care: Collaboration and Referral of Nutrition Care, Referral by nutrition professional to community agencies and programs  Goals: PEG tube feeding and supplies comes from [facility].     Nutrition Education:   Nutrition Education Content: Content related nutrition education   Discussed PEG tube feedings and nutrition throughout cancer tx.             Nutrition Monitoring and Evaluation   Food and Nutrient Intake  Monitoring and  Evaluation Plan: Fluid intake, Enternal and parenternal nutrition intake determination  Fluid Intake: Estimated fluid intake  Criteria: Maintain hydration of at least 64 oz per day - coming from TF,  water flushes PEG tube, and PO fluid intake.  Enteral and Parenteral Nutrition Intake Determination: Enteral nutrition formula/solution, Enteral nutrition intake  Criteria: meet 100% of estimated nutritional needs via PEG tube feedings    Anthropometric measurements  Monitoring and Evaluation Plan: Weight  Body Weight: Measured body weight  Criteria: Maintain weight    Biochemical Data, Medical Tests and Procedures  Monitoring and Evaluation Plan: Electrolyte/renal panel  Criteria: labs WNL              Follow Up: Planned follow up visit: will continue to follow up throughout [facility] Roldan treatments.

## 2025-04-14 NOTE — PROGRESS NOTES
SUPPORTIVE AND PALLIATIVE ONCOLOGY CONSULT - OUTPATIENT      SERVICE DATE: 4/14/2025    Referred by:  ELLYN Ochoa  Medical Oncologist: Faith Denson MD   Radiation Oncologist: Rahel Del Angel MD  Primary Physician: Parminder Russell  [phone]    REASON FOR CONSULT/CHIEF CONSULT COMPLAINT: pain management and Introduction to Supportive and Palliative Oncology Services    Subjective   HISTORY OF PRESENT ILLNESS: Annette Fernandez is a 74 y.o. female who presents with a PMH of HLD, HTN, and BOT SCC diagnosed 02/2025. She was started on chemoradiation with Cisplatin 3/26.     Pain Assessment:  Pain Score: 2/10  Location: throat    Symptom Assessment:  Pain:somewhat aching and sharp pain in her throat. The pain is intermittent. It is worse with swallowing. She has continued to drink fluids by mouth and solids that she can tolerate. She is taking oxycodone 5 mg up to 3 times per day with good relief, but this does cause her to sleep. She states that her pain is a 2/10 at its worst. She is going to restart mouth rinses.   Headache: none  Dizziness:none  Lack of energy: somewhat more fatigued over the last 2 days.   Difficulty sleeping: none  Worrying: a little  Anxiety: a little  Depression: a little  Pain in mouth/swallowing: very much  Dry mouth: none  Taste changes: none  Shortness of breath: none  Lack of appetite: somewhat she is taking in 2 cans of isosource per day. She sometimes can get in 3 cans which is the goal. She is sipping ensure by mouth as tolerated.   Nausea: a little  Vomiting: none  Constipation: a little  Diarrhea: none  Sore muscles: none  Numbness or tingling in hands/feet/other: none  Weight loss: a little  Other: a little      Information obtained from: chart review, interview of patient, and interview of family  ______________________________________________________________________     Oncology History   Cancer of base of tongue (Multi)   3/3/2025 Initial Diagnosis    Cancer of base of  tongue (Multi)     3/3/2025 Cancer Staged    Staging form: Pharynx - P16 Negative Oropharynx, AJCC 8th Edition, Clinical: Stage BRIDGETTE (cT4a, cN1, cM0, p16-) - Signed by Rahel Del Angel MD on 3/3/2025     3/26/2025 -  Chemotherapy    CISplatin with Concurrent Radiation (Weekly), 7 Week Cycle         Past Medical History:   Diagnosis Date    Aspiration into airway 07/31/2023    HLD (hyperlipidemia)     HTN (hypertension)     Mass of tongue     Other specified health status     No pertinent past medical history    Stiffness of unspecified hand, not elsewhere classified     Joint stiffness of hand     Past Surgical History:   Procedure Laterality Date    OTHER SURGICAL HISTORY  06/14/2019    Ovarian cystectomy    OTHER SURGICAL HISTORY  06/14/2019    Cyst excision     Family History   Problem Relation Name Age of Onset    Heart attack Father          SOCIAL HISTORY  Children 2, Grandchildren 2, Support system friends and family, Employment worked as an  - retired, and Hobbies reading, art, and movies   Social History:  reports that she has been smoking cigarettes. She has quit using smokeless tobacco. She reports current alcohol use. She reports that she does not use drugs.      REVIEW OF SYSTEMS  Review of systems negative unless noted in HPI.       Objective     Current Outpatient Medications   Medication Instructions    atorvastatin (LIPITOR) 80 mg, oral, Daily RT    DentaGeL 1.1 % gel apply with toothbrush OR flouoride trays    dexAMETHasone (DECADRON) 8 mg, oral, Daily, For 3 days per week starting the day after treatment.    magic mouthwash (lidocaine, diphenhydrAMINE, Maalox 1:1:1) 10 mL, Swish & Spit, Every 6 hours PRN    metoprolol tartrate (LOPRESSOR) 25 mg, oral, 2 times daily    multivitamin tablet 1 tablet, Daily    NON FORMULARY 1 each, Daily    OLANZapine (ZYPREXA) 5 mg, oral, Nightly    ondansetron (ZOFRAN) 8 mg, oral, Every 8 hours PRN    oxyCODONE (ROXICODONE) 5 mg, oral, Every 4 hours PRN     pantoprazole (PROTONIX) 40 mg, oral, Daily, Do not crush, chew, or split.    polyethylene glycol (GLYCOLAX, MIRALAX) 17 g, g-tube, Daily    prochlorperazine (COMPAZINE) 10 mg, oral, Every 6 hours PRN    thyroid (pork) (ARMOUR THYROID) 15 mg, oral, Daily       Allergies:   Allergies   Allergen Reactions    Wellbutrin [Bupropion Hcl] Other     Hospital Outpatient Visit on 04/11/2025   Component Date Value Ref Range Status    Treatment Site 04/11/2025 BOT   Final    Course Number 04/11/2025 1   Final    Prescribed Fractional Dose 04/11/2025 212  cGray Final    Prescribed Total Dose 04/11/2025 6,996  cGray Final    Actual Fractions Delivered 04/11/2025 13   Final    Prescription Pattern Comment 04/11/2025 w/ 59.4 to IR and 54.12 to LR   Final    Actual Session Delivered Dose 04/11/2025 212  cGray Final    Actual Total Dose 04/11/2025 2,756  cGray Final    Prescribed Technique 04/11/2025 VMAT   Final    Elapsed Days 04/11/2025 16   Final    Start Date 04/11/2025 3/26/2025   Final    Last Date 04/11/2025 4/11/2025   Final    Prescribed Number of Fractions 04/11/2025 33   Final   Hospital Outpatient Visit on 04/10/2025   Component Date Value Ref Range Status    Treatment Site 04/10/2025 BOT   Final    Course Number 04/10/2025 1   Final    Prescribed Fractional Dose 04/10/2025 212  cGray Final    Prescribed Total Dose 04/10/2025 6,996  cGray Final    Actual Fractions Delivered 04/10/2025 12   Final    Prescription Pattern Comment 04/10/2025 w/ 59.4 to IR and 54.12 to LR   Final    Actual Session Delivered Dose 04/10/2025 212  cGray Final    Actual Total Dose 04/10/2025 2,544  cGray Final    Prescribed Technique 04/10/2025 VMAT   Final    Elapsed Days 04/10/2025 15   Final    Start Date 04/10/2025 3/26/2025   Final    Last Date 04/10/2025 4/10/2025   Final    Prescribed Number of Fractions 04/10/2025 33   Final   Lab on 04/10/2025   Component Date Value Ref Range Status    WBC 04/10/2025 9.5  4.4 - 11.3 x10*3/uL Final     nRBC 04/10/2025    Final    Not Measured    RBC 04/10/2025 3.28 (L)  4.00 - 5.20 x10*6/uL Final    Hemoglobin 04/10/2025 10.2 (L)  12.0 - 16.0 g/dL Final    Hematocrit 04/10/2025 31.5 (L)  36.0 - 46.0 % Final    MCV 04/10/2025 96  80 - 100 fL Final    MCH 04/10/2025 31.1  26.0 - 34.0 pg Final    MCHC 04/10/2025 32.4  32.0 - 36.0 g/dL Final    RDW 04/10/2025 12.8  11.5 - 14.5 % Final    Platelets 04/10/2025 246  150 - 450 x10*3/uL Final    Neutrophils % 04/10/2025 81.7  40.0 - 80.0 % Final    Immature Granulocytes %, Automated 04/10/2025 0.1  0.0 - 0.9 % Final    Immature Granulocyte Count (IG) includes promyelocytes, myelocytes and metamyelocytes but does not include bands. Percent differential counts (%) should be interpreted in the context of the absolute cell counts (cells/UL).    Lymphocytes % 04/10/2025 8.7  13.0 - 44.0 % Final    Monocytes % 04/10/2025 9.4  2.0 - 10.0 % Final    Eosinophils % 04/10/2025 0.1  0.0 - 6.0 % Final    Basophils % 04/10/2025 0.0  0.0 - 2.0 % Final    Neutrophils Absolute 04/10/2025 7.78 (H)  1.60 - 5.50 x10*3/uL Final    Percent differential counts (%) should be interpreted in the context of the absolute cell counts (cells/uL).    Immature Granulocytes Absolute, Au* 04/10/2025 0.01  0.00 - 0.50 x10*3/uL Final    Lymphocytes Absolute 04/10/2025 0.83  0.80 - 3.00 x10*3/uL Final    Monocytes Absolute 04/10/2025 0.90 (H)  0.05 - 0.80 x10*3/uL Final    Eosinophils Absolute 04/10/2025 0.01  0.00 - 0.40 x10*3/uL Final    Basophils Absolute 04/10/2025 0.00  0.00 - 0.10 x10*3/uL Final    Glucose 04/10/2025 81  74 - 99 mg/dL Final    Sodium 04/10/2025 135 (L)  136 - 145 mmol/L Final    Potassium 04/10/2025 4.0  3.5 - 5.3 mmol/L Final    Chloride 04/10/2025 99  98 - 107 mmol/L Final    Bicarbonate 04/10/2025 31  21 - 32 mmol/L Final    Anion Gap 04/10/2025 9 (L)  10 - 20 mmol/L Final    Urea Nitrogen 04/10/2025 23  6 - 23 mg/dL Final    Creatinine 04/10/2025 0.62  0.50 - 1.05 mg/dL Final     eGFR 04/10/2025 >90  >60 mL/min/1.73m*2 Final    Calculations of estimated GFR are performed using the 2021 CKD-EPI Study Refit equation without the race variable for the IDMS-Traceable creatinine methods.  https://jasn.asnjournals.org/content/early/2021/09/22/ASN.6827910835    Calcium 04/10/2025 8.7  8.6 - 10.3 mg/dL Final    Albumin 04/10/2025 3.3 (L)  3.4 - 5.0 g/dL Final    Alkaline Phosphatase 04/10/2025 42  33 - 136 U/L Final    Total Protein 04/10/2025 5.6 (L)  6.4 - 8.2 g/dL Final    AST 04/10/2025 15  9 - 39 U/L Final    Bilirubin, Total 04/10/2025 0.3  0.0 - 1.2 mg/dL Final    ALT 04/10/2025 17  7 - 45 U/L Final    Patients treated with Sulfasalazine may generate falsely decreased results for ALT.    Magnesium 04/10/2025 1.69  1.60 - 2.40 mg/dL Final   Hospital Outpatient Visit on 04/09/2025   Component Date Value Ref Range Status    Treatment Site 04/09/2025 BOT   Final    Course Number 04/09/2025 1   Final    Prescribed Fractional Dose 04/09/2025 212  cGray Final    Prescribed Total Dose 04/09/2025 6,996  cGray Final    Actual Fractions Delivered 04/09/2025 11   Final    Prescription Pattern Comment 04/09/2025 w/ 59.4 to IR and 54.12 to LR   Final    Actual Session Delivered Dose 04/09/2025 212  cGray Final    Actual Total Dose 04/09/2025 2,332  cGray Final    Prescribed Technique 04/09/2025 VMAT   Final    Elapsed Days 04/09/2025 14   Final    Start Date 04/09/2025 3/26/2025   Final    Last Date 04/09/2025 4/9/2025   Final    Prescribed Number of Fractions 04/09/2025 33   Final   Hospital Outpatient Visit on 04/08/2025   Component Date Value Ref Range Status    Treatment Site 04/08/2025 BOT   Final    Course Number 04/08/2025 1   Final    Prescribed Fractional Dose 04/08/2025 212  cGray Final    Prescribed Total Dose 04/08/2025 6,996  cGray Final    Actual Fractions Delivered 04/08/2025 10   Final    Prescription Pattern Comment 04/08/2025 w/ 59.4 to IR and 54.12 to LR   Final    Actual Session  "Delivered Dose 04/08/2025 212  cGray Final    Actual Total Dose 04/08/2025 2,120  cGray Final    Prescribed Technique 04/08/2025 VMAT   Final    Elapsed Days 04/08/2025 13   Final    Start Date 04/08/2025 3/26/2025   Final    Last Date 04/08/2025 4/8/2025   Final    Prescribed Number of Fractions 04/08/2025 33   Final        PHYSICAL EXAMINATION  Vital Signs:       4/4/2025     8:43 AM 4/4/2025    11:24 AM 4/7/2025     9:00 AM 4/7/2025     2:00 PM 4/10/2025     9:11 AM 4/11/2025    10:04 AM 4/14/2025     8:21 AM   Vitals   Systolic 125 146 99  120 108 102   Diastolic 70 72 64  65 65 67   BP Location  Right arm   Left arm     Heart Rate 72 76 82  73 76 88   Temp 36.5 °C (97.7 °F) 36 °C (96.8 °F) 36.5 °C (97.7 °F)  36.2 °C (97.2 °F) 36.4 °C (97.5 °F) 36.1 °C (97 °F)   Resp 18 18 18  16 18 18   Height    1.495 m (4' 10.86\")      Weight (lb) 98.99 100.97 96.78 96.78 99.3 98.44 94.58   BMI 20.09 kg/m2 20.49 kg/m2 19.64 kg/m2 19.64 kg/m2 20.15 kg/m2 19.98 kg/m2 19.19 kg/m2   BSA (m2) 1.37 m2 1.38 m2 1.35 m2 1.35 m2 1.37 m2 1.36 m2 1.33 m2   Visit Report     Report  Report   ;Vital signs reviewed       Physical Exam  HENT:      Head: Normocephalic.   Eyes:      Pupils: Pupils are equal, round, and reactive to light.   Pulmonary:      Effort: Pulmonary effort is normal.   Musculoskeletal:         General: Normal range of motion.   Neurological:      Mental Status: She is alert and oriented to person, place, and time.   Psychiatric:         Mood and Affect: Mood normal.         Behavior: Behavior normal.         ASSESSMENT/PLAN    Pain  Pain is: cancer related pain  Type: somatic  Pain control: well-controlled  Home regimen:   - Continue Oxycodone 5 mg/5mL - taking 5 mg every 4-6 hours as needed for pain  - Tylenol has not been effective for pain  - Continue BMX up to 4 times per day as needed    Opioid Use  Medication Management:   - OARRS report reviewed with no aberrant behavior; consistent with  prescriptions/records " and patient history  - MED 22.5.  Overdose Risk Score 070.   This has been discussed with patient.   - We will continue to closely monitor the patient for signs of prescription misuse including UDS, OARRS review and subjective reports at each visit.  - No concurrent benzodiazepine use   - I am a provider who either is or has consulted and collaborated with a provider certified in Hospice and Palliative Medicine and have conducted a face-face visit and examination for this patient.  - Routine Urine Drug Screen MED <80-  - Controlled Substance Agreement MED <80  - Specifically discussed that controlled substance prescriptions will only be provided by our group as outlined in the completed agreement  - Prescribed naloxone n/a  - Red Flags: none     Nausea   Intermittent nausea without vomiting related to chemotherapy   - Continue Ondansetron 8 mg every 8 hours as needed  - Continue Prochlorperazine 10 mg every 6 hours as needed  - Continue Olanzapine 5 mg nightly as prescribed by oncology    Constipation   At risk for constipation related to opioids,  currently not constipated   Usual bowel pattern: daily   Current regimen:   - Continue Miralax 17 g daily  - If constipation develops, start Senna 10 mL BID  - If no BM within 48-72 hours, start MOM 8% every 6 hours as needed    Decreased appetite  Related to malignancy  Nutrition following  Weight loss 4 pounds in the last 4 days  Current regimen:    - Goal is 3 cans of isosource per day - reports getting in 2-3 cans per day  - Continue to drink ensure as tolerated  - Goal of 64 oz of fluids per day - majority through her peg tube    Advance Directives  Existence of Advance Directives:Yes, but NOT documented in medical record  Decision maker: RUSSELL is daughter Daisy Zazueta  Code Status: Full code    Next Follow-Up Visit:  Return to clinic in 1 week     Signature and billing  Thank you for allowing us to participate in the care of this patient. Recommendations will  be communicated back to the consulting service by way of shared electronic medical record or face-to-face.    Medical complexity was moderate level due to due to complexity of problems, extensive data review, and high risk of management/treatment.  Time was spent on the following: Prep Time, Time Directly with Patient/Family/Caregiver, Documentation Time. Total time spent: 50      DATA   Diagnostic tests and information reviewed for today's visit:  Most recent labs, Most recent imaging, Medications       Some elements copied from oncology note on 3/13/25, the elements have been updated and all reflect current decision making from today, 4/14/2025.      Plan of Care discussed with: Patient and Family/Significant Other: grand daughter      SIGNATURE: ROB Art    Contact information:  Supportive and Palliative Oncology  Monday-Friday 8 AM-5 PM  Phone:  [phone], press option #5, then option #1.   Or Epic Secure Chat

## 2025-04-14 NOTE — SIGNIFICANT EVENT

## 2025-04-15 ENCOUNTER — HOSPITAL ENCOUNTER (OUTPATIENT)
Dept: RADIATION ONCOLOGY | Facility: CLINIC | Age: 75
Setting detail: RADIATION/ONCOLOGY SERIES
Discharge: HOME | End: 2025-04-15
Payer: MEDICARE

## 2025-04-15 DIAGNOSIS — Z51.0 ENCOUNTER FOR ANTINEOPLASTIC RADIATION THERAPY: ICD-10-CM

## 2025-04-15 DIAGNOSIS — C01 MALIGNANT NEOPLASM OF BASE OF TONGUE (MULTI): ICD-10-CM

## 2025-04-15 LAB
RAD ONC MSQ ACTUAL FRACTIONS DELIVERED: 15
RAD ONC MSQ ACTUAL SESSION DELIVERED DOSE: 212 CGRAY
RAD ONC MSQ ACTUAL TOTAL DOSE: 3180 CGRAY
RAD ONC MSQ ELAPSED DAYS: 20
RAD ONC MSQ LAST DATE: NORMAL
RAD ONC MSQ PRESCRIBED FRACTIONAL DOSE: 212 CGRAY
RAD ONC MSQ PRESCRIBED NUMBER OF FRACTIONS: 33
RAD ONC MSQ PRESCRIBED TECHNIQUE: NORMAL
RAD ONC MSQ PRESCRIBED TOTAL DOSE: 6996 CGRAY
RAD ONC MSQ PRESCRIPTION PATTERN COMMENT: NORMAL
RAD ONC MSQ START DATE: NORMAL
RAD ONC MSQ TREATMENT COURSE NUMBER: 1
RAD ONC MSQ TREATMENT SITE: NORMAL

## 2025-04-15 PROCEDURE — 77386 HC INTENSITY-MODULATED RADIATION THERAPY (IMRT), COMPLEX: CPT | Performed by: RADIOLOGY

## 2025-04-16 ENCOUNTER — HOSPITAL ENCOUNTER (OUTPATIENT)
Dept: RADIATION ONCOLOGY | Facility: CLINIC | Age: 75
Setting detail: RADIATION/ONCOLOGY SERIES
Discharge: HOME | End: 2025-04-16
Payer: MEDICARE

## 2025-04-16 DIAGNOSIS — C01 MALIGNANT NEOPLASM OF BASE OF TONGUE (MULTI): ICD-10-CM

## 2025-04-16 DIAGNOSIS — Z51.0 ENCOUNTER FOR ANTINEOPLASTIC RADIATION THERAPY: ICD-10-CM

## 2025-04-16 LAB
RAD ONC MSQ ACTUAL FRACTIONS DELIVERED: 16
RAD ONC MSQ ACTUAL SESSION DELIVERED DOSE: 212 CGRAY
RAD ONC MSQ ACTUAL TOTAL DOSE: 3392 CGRAY
RAD ONC MSQ ELAPSED DAYS: 21
RAD ONC MSQ LAST DATE: NORMAL
RAD ONC MSQ PRESCRIBED FRACTIONAL DOSE: 212 CGRAY
RAD ONC MSQ PRESCRIBED NUMBER OF FRACTIONS: 33
RAD ONC MSQ PRESCRIBED TECHNIQUE: NORMAL
RAD ONC MSQ PRESCRIBED TOTAL DOSE: 6996 CGRAY
RAD ONC MSQ PRESCRIPTION PATTERN COMMENT: NORMAL
RAD ONC MSQ START DATE: NORMAL
RAD ONC MSQ TREATMENT COURSE NUMBER: 1
RAD ONC MSQ TREATMENT SITE: NORMAL

## 2025-04-16 PROCEDURE — 77336 RADIATION PHYSICS CONSULT: CPT | Performed by: RADIOLOGY

## 2025-04-16 PROCEDURE — 77386 HC INTENSITY-MODULATED RADIATION THERAPY (IMRT), COMPLEX: CPT | Performed by: RADIOLOGY

## 2025-04-16 ASSESSMENT — ENCOUNTER SYMPTOMS
VOMITING: 0
LEG SWELLING: 0
BACK PAIN: 0
DIZZINESS: 0
ADENOPATHY: 0
CONSTIPATION: 0
EYE PROBLEMS: 0
SORE THROAT: 1
COUGH: 0
APPETITE CHANGE: 0
ABDOMINAL PAIN: 0
HEADACHES: 0
SHORTNESS OF BREATH: 0
NAUSEA: 0
FATIGUE: 1
UNEXPECTED WEIGHT CHANGE: 1
ARTHRALGIAS: 0
DIARRHEA: 0
DIFFICULTY URINATING: 0

## 2025-04-16 NOTE — PROGRESS NOTES
"[facility] - Medical Oncology Follow-Up Visit    Patient ID: Annette Fernandez is a 74 y.o. female.  Referring Physician: Faith Denson MD  [id number] [id number]  Primary Care Provider: Parminder Russell MD      Chief Concern: Patient is here to followup and for ongoing toxicity checks for base of tongue/oropharyngeal cancer    HPI Patient here today on her own. Overall she is tolerating treatment well. \"Tiny bit\" of ringing in her ears but denies neuropathy. Pain is well controlled with oxycodone    Diagnosis: Squamous cell carcinoma of the BOT  Stage:  Cancer Staging   Cancer of base of tongue (Multi)  Staging form: Pharynx - P16 Negative Oropharynx, AJCC 8th Edition  - Clinical: Stage BRIDGETTE (cT4a, cN1, cM0, p16-) - Signed by Rahel Del Angel MD on 3/3/2025     Current sites of disease: BOT and lymph nodes  Molecular and ancillary testing: p16 neg    Oncologic History:  1/7/25 - CT neck with Irregular, heterogeneous, and possibly ulcerating mass centered at the base of tongue with involvement of extrinsic tongue musculature and lingual surface of the epiglottis; possible involvement of the  body of the hyoid. Oropharyngeal neoplasm is of high suspicion. No cervical lymphadenopathy by size criteria.  1.8 cm exophytic right posterior thyroid nodule along the right tracheoesophageal groove containing calcification.  2/4/25 - Met with Dr. Gamboa after noting discomfort in throat since August, some discomfort in L ear. DL with fungating lesion involving base of tongue and extending on the lateral pharyngeal wall on the left, involving lingual aspect of epiglottis.  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  2/28/25 - PET with hypermetabolic soft tissue mass at BOT, moderately hypermetabolic right level 2A lymph node  3/26/25 - C1 cisplatin with definitive radiation (30mg/m2 due to renal function)    Past Medical " History:  07/31/2023: Aspiration into airway  No date: HLD (hyperlipidemia)  No date: HTN (hypertension)  No date: Mass of tongue  No date: Other specified health status      Comment:  No pertinent past medical history  No date: Stiffness of unspecified hand, not elsewhere classified      Comment:  Joint stiffness of hand     4 years ago - throat injury, every time she drank/swallow into lungs (black esophagus)    Past Surgical History:  06/14/2019: OTHER SURGICAL HISTORY      Comment:  Ovarian cystectomy  06/14/2019: OTHER SURGICAL HISTORY      Comment:  Cyst excision     Social Hx:   Annette Fernandez  reports that she has been smoking cigarettes. She has quit using smokeless tobacco.  She  reports current alcohol use.  She  reports no history of drug use.  Smoking - working to cut back, down to 2-3 per day, previously 1/2ppd  Limiting amount each time    Lives on her own  Takes care of her cat    Family History   Problem Relation Name Age of Onset    Heart attack Father      Mom had NHL  Grandparent with colon cancer    Meds (Current):  Current Outpatient Medications   Medication Instructions    atorvastatin (LIPITOR) 80 mg, oral, Daily RT    DentaGeL 1.1 % gel apply with toothbrush OR flouoride trays    dexAMETHasone (DECADRON) 8 mg, oral, Daily, For 3 days per week starting the day after treatment.    magic mouthwash (lidocaine, diphenhydrAMINE, Maalox 1:1:1) 10 mL, Swish & Spit, Every 6 hours PRN    metoprolol tartrate (LOPRESSOR) 25 mg, oral, 2 times daily    multivitamin tablet 1 tablet, Daily    nicotine (Nicoderm CQ) 7 mg/24 hr patch 1 patch, transdermal, Every 24 hours    NON FORMULARY 1 each, Daily    OLANZapine (ZYPREXA) 5 mg, oral, Nightly    ondansetron (ZOFRAN) 8 mg, oral, Every 8 hours PRN    oxyCODONE (ROXICODONE) 5 mg, oral, Every 4 hours PRN    pantoprazole (PROTONIX) 40 mg, oral, Daily, Do not crush, chew, or split.    polyethylene glycol (GLYCOLAX, MIRALAX) 17 g, g-tube, Daily     prochlorperazine (COMPAZINE) 10 mg, oral, Every 6 hours PRN    thyroid (pork) (ARMOUR THYROID) 15 mg, oral, Daily        Allergies   Allergen Reactions    Wellbutrin [Bupropion Hcl] Other     Review of Systems   Constitutional:  Positive for fatigue and unexpected weight change. Negative for appetite change.   HENT:   Positive for sore throat. Negative for hearing loss.    Eyes:  Negative for eye problems.   Respiratory:  Negative for cough and shortness of breath.    Cardiovascular:  Negative for chest pain and leg swelling.   Gastrointestinal:  Negative for abdominal pain, constipation, diarrhea, nausea and vomiting.   Genitourinary:  Negative for difficulty urinating.    Musculoskeletal:  Negative for arthralgias and back pain.   Skin:  Negative for rash.   Neurological:  Negative for dizziness and headaches.   Hematological:  Negative for adenopathy.      Objective   BSA: 1.37 meters squared  /65 (BP Location: Left arm, Patient Position: Sitting)   Pulse 73   Temp 36.2 °C (97.2 °F) (Temporal)   Resp 16   Wt 45 kg (99 lb 4.8 oz)   SpO2 98%   BMI 20.15 kg/m²     Wt Readings from Last 5 Encounters:   04/14/25 (!) 42.9 kg (94 lb 9.2 oz)   04/14/25 (!) 42.9 kg (94 lb 9.2 oz)   04/11/25 (!) 44.7 kg (98 lb 7 oz)   04/10/25 45 kg (99 lb 4.8 oz)   04/07/25 (!) 43.9 kg (96 lb 12.5 oz)       Performance Status:  (1) Restricted in physically strenuous activity, ambulatory and able to do work of light nature     Physical Exam  Vitals and nursing note reviewed.   Constitutional:       General: She is not in acute distress.  HENT:      Head: Normocephalic and atraumatic.   Eyes:      Pupils: Pupils are equal, round, and reactive to light.   Cardiovascular:      Rate and Rhythm: Normal rate and regular rhythm.      Heart sounds: Normal heart sounds.   Pulmonary:      Effort: Pulmonary effort is normal.      Breath sounds: Normal breath sounds.   Abdominal:      General: There is no distension.      Comments: +PEG    Musculoskeletal:         General: No swelling.   Skin:     General: Skin is warm and dry.      Findings: No rash.   Neurological:      General: No focal deficit present.      Mental Status: She is alert and oriented to person, place, and time.   Psychiatric:         Mood and Affect: Mood normal.         Behavior: Behavior normal.          Results:  Labs:  Lab Results   Component Value Date    WBC 9.5 04/10/2025    HGB 10.2 (L) 04/10/2025    HCT 31.5 (L) 04/10/2025    MCV 96 04/10/2025     04/10/2025      Lab Results   Component Value Date    NEUTROABS 7.78 (H) 04/10/2025      Lab Results   Component Value Date    GLUCOSE 81 04/10/2025    CALCIUM 8.7 04/10/2025     (L) 04/10/2025    K 4.0 04/10/2025    CO2 31 04/10/2025    CL 99 04/10/2025    BUN 23 04/10/2025    CREATININE 0.62 04/10/2025     Lab Results   Component Value Date    ALT 17 04/10/2025    AST 15 04/10/2025    ALKPHOS 42 04/10/2025    BILITOT 0.3 04/10/2025        Imaging:  No new imaging    Pathology:  No new pathology    Assessment/Plan      Annette Fernandez is a 74 y.o. 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    Discussed overall findings to date with patient and her daughter previously, as well as recommendations for treatment with concurrent/definitive chemoradiation. We discussed the curative goal of treatment. Discussed that chemotherapy would consist of weekly cisplatin, with side effects discussed, including but not limited to myelosuppression (infection, anemia, bleeding), fatigue, nausea, neuropathy, hearing loss/tinnitus, and nephropathy.    - Proceed as planned next week with week 4 cisplatin, goal to get to 7 weeks give 30mg/m2 dose  - With cisplatin, will have weekly IVF and provider visits  - monitor for worsening tinnitus  - Zyprexa nightly   - PRN zofran/compazine  - dex 8mg daily x3 days after chemo  - advised her to stop aspirin for pain given risk of NSAIDs and  kidney function with cisplatin. Ok for oxycodone as needed and we will refill if gets low  - reminded her about risk of constipation with oxycodone - she is taking miralax as needed and will start regularly if taking, I will also send senna to use if using oxycodone more  - baseline audiology evaluation not scheduled prior to cisplatin, ok to proceed whenever scheduled or delay until after tx    RTC for ongoing treatment    Faith Denson MD  [facility]

## 2025-04-17 ENCOUNTER — INFUSION (OUTPATIENT)
Dept: HEMATOLOGY/ONCOLOGY | Facility: CLINIC | Age: 75
End: 2025-04-17
Payer: MEDICARE

## 2025-04-17 ENCOUNTER — LAB (OUTPATIENT)
Dept: LAB | Facility: CLINIC | Age: 75
End: 2025-04-17
Payer: MEDICARE

## 2025-04-17 ENCOUNTER — HOSPITAL ENCOUNTER (OUTPATIENT)
Dept: RADIATION ONCOLOGY | Facility: CLINIC | Age: 75
Setting detail: RADIATION/ONCOLOGY SERIES
Discharge: HOME | End: 2025-04-17
Payer: MEDICARE

## 2025-04-17 ENCOUNTER — PATIENT OUTREACH (OUTPATIENT)
Dept: PRIMARY CARE | Facility: CLINIC | Age: 75
End: 2025-04-17
Payer: MEDICARE

## 2025-04-17 VITALS
HEART RATE: 67 BPM | RESPIRATION RATE: 16 BRPM | TEMPERATURE: 96.8 F | WEIGHT: 96.67 LBS | SYSTOLIC BLOOD PRESSURE: 113 MMHG | OXYGEN SATURATION: 98 % | DIASTOLIC BLOOD PRESSURE: 61 MMHG | BODY MASS INDEX: 19.62 KG/M2

## 2025-04-17 DIAGNOSIS — C01 CANCER OF BASE OF TONGUE (MULTI): ICD-10-CM

## 2025-04-17 DIAGNOSIS — Z51.0 ENCOUNTER FOR ANTINEOPLASTIC RADIATION THERAPY: ICD-10-CM

## 2025-04-17 DIAGNOSIS — C01 MALIGNANT NEOPLASM OF BASE OF TONGUE (MULTI): ICD-10-CM

## 2025-04-17 LAB
ALBUMIN SERPL BCP-MCNC: 3.4 G/DL (ref 3.4–5)
ALP SERPL-CCNC: 42 U/L (ref 33–136)
ALT SERPL W P-5'-P-CCNC: 24 U/L (ref 7–45)
ANION GAP SERPL CALC-SCNC: 11 MMOL/L (ref 10–20)
AST SERPL W P-5'-P-CCNC: 18 U/L (ref 9–39)
BASOPHILS # BLD AUTO: 0 X10*3/UL (ref 0–0.1)
BASOPHILS NFR BLD AUTO: 0 %
BILIRUB SERPL-MCNC: 0.3 MG/DL (ref 0–1.2)
BUN SERPL-MCNC: 24 MG/DL (ref 6–23)
CALCIUM SERPL-MCNC: 8.7 MG/DL (ref 8.6–10.3)
CHLORIDE SERPL-SCNC: 97 MMOL/L (ref 98–107)
CO2 SERPL-SCNC: 30 MMOL/L (ref 21–32)
CREAT SERPL-MCNC: 0.73 MG/DL (ref 0.5–1.05)
EGFRCR SERPLBLD CKD-EPI 2021: 87 ML/MIN/1.73M*2
EOSINOPHIL # BLD AUTO: 0.01 X10*3/UL (ref 0–0.4)
EOSINOPHIL NFR BLD AUTO: 0.2 %
ERYTHROCYTE [DISTWIDTH] IN BLOOD BY AUTOMATED COUNT: 12.9 % (ref 11.5–14.5)
GLUCOSE SERPL-MCNC: 86 MG/DL (ref 74–99)
HCT VFR BLD AUTO: 30.3 % (ref 36–46)
HGB BLD-MCNC: 9.9 G/DL (ref 12–16)
IMM GRANULOCYTES # BLD AUTO: 0 X10*3/UL (ref 0–0.5)
IMM GRANULOCYTES NFR BLD AUTO: 0 % (ref 0–0.9)
LYMPHOCYTES # BLD AUTO: 0.74 X10*3/UL (ref 0.8–3)
LYMPHOCYTES NFR BLD AUTO: 13.5 %
MAGNESIUM SERPL-MCNC: 1.63 MG/DL (ref 1.6–2.4)
MCH RBC QN AUTO: 31.3 PG (ref 26–34)
MCHC RBC AUTO-ENTMCNC: 32.7 G/DL (ref 32–36)
MCV RBC AUTO: 96 FL (ref 80–100)
MONOCYTES # BLD AUTO: 0.48 X10*3/UL (ref 0.05–0.8)
MONOCYTES NFR BLD AUTO: 8.7 %
NEUTROPHILS # BLD AUTO: 4.27 X10*3/UL (ref 1.6–5.5)
NEUTROPHILS NFR BLD AUTO: 77.6 %
NRBC BLD-RTO: ABNORMAL /100{WBCS}
PLATELET # BLD AUTO: 172 X10*3/UL (ref 150–450)
POTASSIUM SERPL-SCNC: 3.7 MMOL/L (ref 3.5–5.3)
PROT SERPL-MCNC: 6 G/DL (ref 6.4–8.2)
RAD ONC MSQ ACTUAL FRACTIONS DELIVERED: 17
RAD ONC MSQ ACTUAL SESSION DELIVERED DOSE: 212 CGRAY
RAD ONC MSQ ACTUAL TOTAL DOSE: 3604 CGRAY
RAD ONC MSQ ELAPSED DAYS: 22
RAD ONC MSQ LAST DATE: NORMAL
RAD ONC MSQ PRESCRIBED FRACTIONAL DOSE: 212 CGRAY
RAD ONC MSQ PRESCRIBED NUMBER OF FRACTIONS: 33
RAD ONC MSQ PRESCRIBED TECHNIQUE: NORMAL
RAD ONC MSQ PRESCRIBED TOTAL DOSE: 6996 CGRAY
RAD ONC MSQ PRESCRIPTION PATTERN COMMENT: NORMAL
RAD ONC MSQ START DATE: NORMAL
RAD ONC MSQ TREATMENT COURSE NUMBER: 1
RAD ONC MSQ TREATMENT SITE: NORMAL
RBC # BLD AUTO: 3.16 X10*6/UL (ref 4–5.2)
SODIUM SERPL-SCNC: 134 MMOL/L (ref 136–145)
WBC # BLD AUTO: 5.5 X10*3/UL (ref 4.4–11.3)

## 2025-04-17 PROCEDURE — 96360 HYDRATION IV INFUSION INIT: CPT | Mod: INF

## 2025-04-17 PROCEDURE — 83735 ASSAY OF MAGNESIUM: CPT

## 2025-04-17 PROCEDURE — 2500000004 HC RX 250 GENERAL PHARMACY W/ HCPCS (ALT 636 FOR OP/ED): Performed by: INTERNAL MEDICINE

## 2025-04-17 PROCEDURE — 84075 ASSAY ALKALINE PHOSPHATASE: CPT

## 2025-04-17 PROCEDURE — 36415 COLL VENOUS BLD VENIPUNCTURE: CPT

## 2025-04-17 PROCEDURE — 77386 HC INTENSITY-MODULATED RADIATION THERAPY (IMRT), COMPLEX: CPT | Performed by: RADIOLOGY

## 2025-04-17 PROCEDURE — 85025 COMPLETE CBC W/AUTO DIFF WBC: CPT

## 2025-04-17 RX ORDER — HEPARIN SODIUM,PORCINE/PF 10 UNIT/ML
50 SYRINGE (ML) INTRAVENOUS AS NEEDED
OUTPATIENT
Start: 2025-04-17

## 2025-04-17 RX ORDER — HEPARIN 100 UNIT/ML
500 SYRINGE INTRAVENOUS AS NEEDED
OUTPATIENT
Start: 2025-04-17

## 2025-04-17 RX ADMIN — SODIUM CHLORIDE 1000 ML: 9 INJECTION, SOLUTION INTRAVENOUS at 13:57

## 2025-04-17 ASSESSMENT — PAIN SCALES - GENERAL: PAINLEVEL_OUTOF10: 0-NO PAIN

## 2025-04-18 ENCOUNTER — HOSPITAL ENCOUNTER (OUTPATIENT)
Dept: RADIATION ONCOLOGY | Facility: CLINIC | Age: 75
Setting detail: RADIATION/ONCOLOGY SERIES
End: 2025-04-18
Payer: MEDICARE

## 2025-04-18 ENCOUNTER — HOSPITAL ENCOUNTER (OUTPATIENT)
Dept: RADIATION ONCOLOGY | Facility: CLINIC | Age: 75
Setting detail: RADIATION/ONCOLOGY SERIES
Discharge: HOME | End: 2025-04-18
Payer: MEDICARE

## 2025-04-18 DIAGNOSIS — C01 MALIGNANT NEOPLASM OF BASE OF TONGUE (MULTI): ICD-10-CM

## 2025-04-18 DIAGNOSIS — Z51.0 ENCOUNTER FOR ANTINEOPLASTIC RADIATION THERAPY: ICD-10-CM

## 2025-04-18 LAB
RAD ONC MSQ ACTUAL FRACTIONS DELIVERED: 18
RAD ONC MSQ ACTUAL SESSION DELIVERED DOSE: 212 CGRAY
RAD ONC MSQ ACTUAL TOTAL DOSE: 3816 CGRAY
RAD ONC MSQ ELAPSED DAYS: 23
RAD ONC MSQ LAST DATE: NORMAL
RAD ONC MSQ PRESCRIBED FRACTIONAL DOSE: 212 CGRAY
RAD ONC MSQ PRESCRIBED NUMBER OF FRACTIONS: 33
RAD ONC MSQ PRESCRIBED TECHNIQUE: NORMAL
RAD ONC MSQ PRESCRIBED TOTAL DOSE: 6996 CGRAY
RAD ONC MSQ PRESCRIPTION PATTERN COMMENT: NORMAL
RAD ONC MSQ START DATE: NORMAL
RAD ONC MSQ TREATMENT COURSE NUMBER: 1
RAD ONC MSQ TREATMENT SITE: NORMAL

## 2025-04-18 PROCEDURE — 77386 HC INTENSITY-MODULATED RADIATION THERAPY (IMRT), COMPLEX: CPT | Performed by: RADIOLOGY

## 2025-04-21 ENCOUNTER — HOSPITAL ENCOUNTER (OUTPATIENT)
Dept: RADIATION ONCOLOGY | Facility: CLINIC | Age: 75
Setting detail: RADIATION/ONCOLOGY SERIES
Discharge: HOME | End: 2025-04-21
Payer: MEDICARE

## 2025-04-21 ENCOUNTER — OFFICE VISIT (OUTPATIENT)
Dept: HEMATOLOGY/ONCOLOGY | Facility: CLINIC | Age: 75
End: 2025-04-21
Payer: MEDICARE

## 2025-04-21 ENCOUNTER — INFUSION (OUTPATIENT)
Dept: HEMATOLOGY/ONCOLOGY | Facility: CLINIC | Age: 75
End: 2025-04-21
Payer: MEDICARE

## 2025-04-21 ENCOUNTER — OFFICE VISIT (OUTPATIENT)
Dept: PALLIATIVE MEDICINE | Facility: CLINIC | Age: 75
End: 2025-04-21
Payer: MEDICARE

## 2025-04-21 VITALS
DIASTOLIC BLOOD PRESSURE: 65 MMHG | SYSTOLIC BLOOD PRESSURE: 101 MMHG | TEMPERATURE: 97 F | RESPIRATION RATE: 18 BRPM | WEIGHT: 93.2 LBS | OXYGEN SATURATION: 96 % | HEART RATE: 73 BPM | BODY MASS INDEX: 18.92 KG/M2

## 2025-04-21 DIAGNOSIS — C01 CANCER OF BASE OF TONGUE (MULTI): ICD-10-CM

## 2025-04-21 DIAGNOSIS — Z51.0 ENCOUNTER FOR ANTINEOPLASTIC RADIATION THERAPY: ICD-10-CM

## 2025-04-21 DIAGNOSIS — Z51.11 ENCOUNTER FOR ANTINEOPLASTIC CHEMOTHERAPY: ICD-10-CM

## 2025-04-21 DIAGNOSIS — Z51.5 PALLIATIVE CARE ENCOUNTER: ICD-10-CM

## 2025-04-21 DIAGNOSIS — G89.3 CANCER RELATED PAIN: Primary | ICD-10-CM

## 2025-04-21 DIAGNOSIS — E63.9 INADEQUATE ORAL NUTRITIONAL INTAKE: ICD-10-CM

## 2025-04-21 DIAGNOSIS — C01 MALIGNANT NEOPLASM OF BASE OF TONGUE (MULTI): ICD-10-CM

## 2025-04-21 DIAGNOSIS — R11.2 NAUSEA AND VOMITING, UNSPECIFIED VOMITING TYPE: ICD-10-CM

## 2025-04-21 DIAGNOSIS — R13.10 ODYNOPHAGIA: Primary | ICD-10-CM

## 2025-04-21 LAB
RAD ONC MSQ ACTUAL FRACTIONS DELIVERED: 19
RAD ONC MSQ ACTUAL SESSION DELIVERED DOSE: 212 CGRAY
RAD ONC MSQ ACTUAL TOTAL DOSE: 4028 CGRAY
RAD ONC MSQ ELAPSED DAYS: 26
RAD ONC MSQ LAST DATE: NORMAL
RAD ONC MSQ PRESCRIBED FRACTIONAL DOSE: 212 CGRAY
RAD ONC MSQ PRESCRIBED NUMBER OF FRACTIONS: 33
RAD ONC MSQ PRESCRIBED TECHNIQUE: NORMAL
RAD ONC MSQ PRESCRIBED TOTAL DOSE: 6996 CGRAY
RAD ONC MSQ PRESCRIPTION PATTERN COMMENT: NORMAL
RAD ONC MSQ START DATE: NORMAL
RAD ONC MSQ TREATMENT COURSE NUMBER: 1
RAD ONC MSQ TREATMENT SITE: NORMAL

## 2025-04-21 PROCEDURE — 2500000004 HC RX 250 GENERAL PHARMACY W/ HCPCS (ALT 636 FOR OP/ED): Mod: JZ | Performed by: INTERNAL MEDICINE

## 2025-04-21 PROCEDURE — 99213 OFFICE O/P EST LOW 20 MIN: CPT

## 2025-04-21 PROCEDURE — 99215 OFFICE O/P EST HI 40 MIN: CPT | Performed by: NURSE PRACTITIONER

## 2025-04-21 PROCEDURE — 1123F ACP DISCUSS/DSCN MKR DOCD: CPT

## 2025-04-21 PROCEDURE — 2500000004 HC RX 250 GENERAL PHARMACY W/ HCPCS (ALT 636 FOR OP/ED): Performed by: INTERNAL MEDICINE

## 2025-04-21 PROCEDURE — 1159F MED LIST DOCD IN RCRD: CPT | Performed by: NURSE PRACTITIONER

## 2025-04-21 PROCEDURE — 77386 HC INTENSITY-MODULATED RADIATION THERAPY (IMRT), COMPLEX: CPT | Performed by: RADIOLOGY

## 2025-04-21 PROCEDURE — 1123F ACP DISCUSS/DSCN MKR DOCD: CPT | Performed by: NURSE PRACTITIONER

## 2025-04-21 PROCEDURE — 96375 TX/PRO/DX INJ NEW DRUG ADDON: CPT | Mod: INF

## 2025-04-21 PROCEDURE — 96367 TX/PROPH/DG ADDL SEQ IV INF: CPT

## 2025-04-21 PROCEDURE — 96413 CHEMO IV INFUSION 1 HR: CPT

## 2025-04-21 PROCEDURE — 1126F AMNT PAIN NOTED NONE PRSNT: CPT | Performed by: NURSE PRACTITIONER

## 2025-04-21 PROCEDURE — 3078F DIAST BP <80 MM HG: CPT | Performed by: NURSE PRACTITIONER

## 2025-04-21 PROCEDURE — G2211 COMPLEX E/M VISIT ADD ON: HCPCS | Performed by: NURSE PRACTITIONER

## 2025-04-21 PROCEDURE — 3074F SYST BP LT 130 MM HG: CPT | Performed by: NURSE PRACTITIONER

## 2025-04-21 PROCEDURE — 2500000001 HC RX 250 WO HCPCS SELF ADMINISTERED DRUGS (ALT 637 FOR MEDICARE OP): Performed by: NURSE PRACTITIONER

## 2025-04-21 RX ORDER — FAMOTIDINE 10 MG/ML
20 INJECTION, SOLUTION INTRAVENOUS ONCE AS NEEDED
Status: DISCONTINUED | OUTPATIENT
Start: 2025-04-21 | End: 2025-04-21 | Stop reason: HOSPADM

## 2025-04-21 RX ORDER — PROCHLORPERAZINE EDISYLATE 5 MG/ML
10 INJECTION INTRAMUSCULAR; INTRAVENOUS EVERY 6 HOURS PRN
Status: DISCONTINUED | OUTPATIENT
Start: 2025-04-21 | End: 2025-04-21 | Stop reason: HOSPADM

## 2025-04-21 RX ORDER — OXYCODONE HYDROCHLORIDE 5 MG/1
5 TABLET ORAL ONCE
Refills: 0 | OUTPATIENT
Start: 2025-04-21 | End: 2025-04-21

## 2025-04-21 RX ORDER — PALONOSETRON 0.05 MG/ML
0.25 INJECTION, SOLUTION INTRAVENOUS ONCE
Status: COMPLETED | OUTPATIENT
Start: 2025-04-21 | End: 2025-04-21

## 2025-04-21 RX ORDER — ALBUTEROL SULFATE 0.83 MG/ML
3 SOLUTION RESPIRATORY (INHALATION) AS NEEDED
Status: DISCONTINUED | OUTPATIENT
Start: 2025-04-21 | End: 2025-04-21 | Stop reason: HOSPADM

## 2025-04-21 RX ORDER — OXYCODONE HYDROCHLORIDE 5 MG/1
5 TABLET ORAL ONCE
Status: COMPLETED | OUTPATIENT
Start: 2025-04-21 | End: 2025-04-21

## 2025-04-21 RX ORDER — OXYCODONE HYDROCHLORIDE 5 MG/1
5 TABLET ORAL ONCE
Refills: 0 | Status: CANCELLED | OUTPATIENT
Start: 2025-04-21

## 2025-04-21 RX ORDER — EPINEPHRINE 0.3 MG/.3ML
0.3 INJECTION SUBCUTANEOUS EVERY 5 MIN PRN
Status: DISCONTINUED | OUTPATIENT
Start: 2025-04-21 | End: 2025-04-21 | Stop reason: HOSPADM

## 2025-04-21 RX ORDER — HEPARIN SODIUM,PORCINE/PF 10 UNIT/ML
50 SYRINGE (ML) INTRAVENOUS AS NEEDED
Status: CANCELLED | OUTPATIENT
Start: 2025-04-21

## 2025-04-21 RX ORDER — HEPARIN 100 UNIT/ML
500 SYRINGE INTRAVENOUS AS NEEDED
Status: CANCELLED | OUTPATIENT
Start: 2025-04-21

## 2025-04-21 RX ORDER — DIPHENHYDRAMINE HYDROCHLORIDE 50 MG/ML
50 INJECTION, SOLUTION INTRAMUSCULAR; INTRAVENOUS AS NEEDED
Status: DISCONTINUED | OUTPATIENT
Start: 2025-04-21 | End: 2025-04-21 | Stop reason: HOSPADM

## 2025-04-21 RX ORDER — PROCHLORPERAZINE MALEATE 10 MG
10 TABLET ORAL EVERY 6 HOURS PRN
Status: DISCONTINUED | OUTPATIENT
Start: 2025-04-21 | End: 2025-04-21 | Stop reason: HOSPADM

## 2025-04-21 RX ADMIN — OXYCODONE HYDROCHLORIDE 5 MG: 5 TABLET ORAL at 10:59

## 2025-04-21 RX ADMIN — FOSAPREPITANT 150 MG: 150 INJECTION, POWDER, LYOPHILIZED, FOR SOLUTION INTRAVENOUS at 12:00

## 2025-04-21 RX ADMIN — CISPLATIN 40 MG: 1 INJECTION, SOLUTION INTRAVENOUS at 12:37

## 2025-04-21 RX ADMIN — DEXAMETHASONE SODIUM PHOSPHATE 12 MG: 10 INJECTION, SOLUTION INTRAMUSCULAR; INTRAVENOUS at 11:44

## 2025-04-21 RX ADMIN — POTASSIUM CHLORIDE 999 ML/HR: 2 INJECTION, SOLUTION, CONCENTRATE INTRAVENOUS at 10:41

## 2025-04-21 RX ADMIN — SODIUM CHLORIDE 1000 ML: 9 INJECTION, SOLUTION INTRAVENOUS at 13:41

## 2025-04-21 RX ADMIN — PALONOSETRON 0.25 MG: 0.05 INJECTION, SOLUTION INTRAVENOUS at 11:01

## 2025-04-21 ASSESSMENT — ENCOUNTER SYMPTOMS
COUGH: 0
DIFFICULTY URINATING: 0
SHORTNESS OF BREATH: 0
LEG SWELLING: 0
APPETITE CHANGE: 1
DIZZINESS: 0
HEADACHES: 0
DIARRHEA: 0
ABDOMINAL PAIN: 0
NAUSEA: 1
CONSTIPATION: 0
EYE PROBLEMS: 0
VOMITING: 0
ADENOPATHY: 0
BACK PAIN: 0
SORE THROAT: 1
FATIGUE: 1
ARTHRALGIAS: 0
SLEEP DISTURBANCE: 1
NECK PAIN: 1
UNEXPECTED WEIGHT CHANGE: 1

## 2025-04-21 ASSESSMENT — PAIN SCALES - GENERAL
PAINLEVEL_OUTOF10: 5 - MODERATE PAIN
PAINLEVEL_OUTOF10: 0-NO PAIN

## 2025-04-21 ASSESSMENT — PAIN DESCRIPTION - LOCATION: LOCATION: THROAT

## 2025-04-21 NOTE — PROGRESS NOTES
PT presents to infusion post NP visit; see provider note for assessment. PT received ordered medications without incident. AVS provided prior to discharge.

## 2025-04-21 NOTE — PROGRESS NOTES
"[facility] - Medical Oncology Follow-Up Visit    Patient ID: Annette Fernandez is a 74 y.o. female.  Referring Physician: Faith Denson MD  [id number] [place] [address]  Primary Care Provider: Parminder Russell MD      Chief Concern: Patient is here to followup and for ongoing toxicity checks for base of tongue/oropharyngeal cancer    HPI Patient here today with granddaughter.  States she is feeling okay today except for a sore throat.  Not able to sleep last night.  Woke at 0300 d/t throat pain - pain meds taken.  Currently taking oxycodone 5mg - 3x/day.  She rates her pain 3-4/10, up to 5/10 at it's worse.  Energy level decreasing.  No change in functional status.   Trying to keep up with intake.  Slight wt loss noted.  Oral intake decreased.  Not able to report what she ate yesterday.  Taking in two protein shakes/day.  Working on getting to 3.  Intermittent nausea.  Managed with PRN zofran.  Reports no multiple doses/day.  Denies emesis. Routine BM's every other day.  +Daily miralax.  Staying hydrated - added pedialyte.  Denies c/d.  +Dysgeusia - Tastes are off for everything.  Report sensitive oral mucosa.  Using BMX rinse.  Had stopped the glutamine rinse.   No fevers, chills, or CP.   No change to slight ringing in her ears.  Noticed mild \"a touch' of neuropathy - in her fingertips.  Goes away quickly - lasts minutes - will monitor.  Continues to smoke 5-6 cigarettes/day.  Has not started the nicotine patch yet.  Faint erythremia to neck 2/2 RT.  No other concerns.  Labs WNL.  Ready for treatment.      Diagnosis: Squamous cell carcinoma of the BOT  Stage:  Cancer Staging   Cancer of base of tongue (Multi)  Staging form: Pharynx - P16 Negative Oropharynx, AJCC 8th Edition  - Clinical: Stage BRIDGETTE (cT4a, cN1, cM0, p16-) - Signed by Rahel Del Angel MD on 3/3/2025     Current sites of disease: BOT and lymph nodes  Molecular and ancillary testing: p16 neg    Oncologic " History:  1/7/25 - CT neck with Irregular, heterogeneous, and possibly ulcerating mass centered at the base of tongue with involvement of extrinsic tongue musculature and lingual surface of the epiglottis; possible involvement of the  body of the hyoid. Oropharyngeal neoplasm is of high suspicion. No cervical lymphadenopathy by size criteria.  1.8 cm exophytic right posterior thyroid nodule along the right tracheoesophageal groove containing calcification.  2/4/25 - Met with Dr. Gamboa after noting discomfort in throat since August, some discomfort in L ear. DL with fungating lesion involving base of tongue and extending on the lateral pharyngeal wall on the left, involving lingual aspect of epiglottis.  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  2/28/25 - PET with hypermetabolic soft tissue mass at BOT, moderately hypermetabolic right level 2A lymph node  3/26/25 - C1 cisplatin with definitive radiation (30mg/m2 due to renal function)    Past Medical History:  07/31/2023: Aspiration into airway  No date: HLD (hyperlipidemia)  No date: HTN (hypertension)  No date: Mass of tongue  No date: Other specified health status      Comment:  No pertinent past medical history  No date: Stiffness of unspecified hand, not elsewhere classified      Comment:  Joint stiffness of hand     4 years ago - throat injury, every time she drank/swallow into lungs (black esophagus)    Past Surgical History:  06/14/2019: OTHER SURGICAL HISTORY      Comment:  Ovarian cystectomy  06/14/2019: OTHER SURGICAL HISTORY      Comment:  Cyst excision     Social Hx:   Annette Fernandez  reports that she has been smoking cigarettes. She has been exposed to tobacco smoke. She has quit using smokeless tobacco.  She  reports current alcohol use.  She  reports no history of drug use.  Smoking - working to cut back, down to 2-3 per day, previously 1/2ppd  Limiting amount each time    Lives on her  "own  Takes care of her cat    Family History   Problem Relation Name Age of Onset    Heart attack Father      Mom had NHL  Grandparent with colon cancer    Meds (Current):  Current Outpatient Medications   Medication Instructions    atorvastatin (LIPITOR) 80 mg, oral, Daily RT    DentaGeL 1.1 % gel apply with toothbrush OR flouoride trays    dexAMETHasone (DECADRON) 8 mg, oral, Daily, For 3 days per week starting the day after treatment.    magic mouthwash (lidocaine, diphenhydrAMINE, Maalox 1:1:1) 10 mL, Swish & Spit, Every 6 hours PRN    metoprolol tartrate (LOPRESSOR) 25 mg, oral, 2 times daily    multivitamin tablet 1 tablet, Daily    nicotine (Nicoderm CQ) 7 mg/24 hr patch 1 patch, transdermal, Every 24 hours    NON FORMULARY 1 each, Daily    OLANZapine (ZYPREXA) 5 mg, oral, Nightly    ondansetron (ZOFRAN) 8 mg, oral, Every 8 hours PRN    oxyCODONE (ROXICODONE) 5 mg, oral, Every 4 hours PRN    pantoprazole (PROTONIX) 40 mg, oral, Daily, Do not crush, chew, or split.    polyethylene glycol (GLYCOLAX, MIRALAX) 17 g, g-tube, Daily    prochlorperazine (COMPAZINE) 10 mg, oral, Every 6 hours PRN    thyroid (pork) (ARMOUR THYROID) 15 mg, oral, Daily        Allergies   Allergen Reactions    Wellbutrin [Bupropion Hcl] Other     Review of Systems   Constitutional:  Positive for appetite change, fatigue and unexpected weight change.   HENT:   Positive for mouth sores and sore throat. Negative for hearing loss.         \"Tiny bit\" of ear ringing.   Eyes:  Negative for eye problems.   Respiratory:  Negative for cough and shortness of breath.    Cardiovascular:  Negative for chest pain and leg swelling.   Gastrointestinal:  Positive for nausea. Negative for abdominal pain, constipation, diarrhea and vomiting.   Genitourinary:  Negative for difficulty urinating.    Musculoskeletal:  Positive for neck pain. Negative for arthralgias and back pain.   Skin:  Negative for rash.        Faint erythremia to anterior neck 2/2 RT.  "   Neurological:  Negative for dizziness and headaches.   Hematological:  Negative for adenopathy.   Psychiatric/Behavioral:  Positive for sleep disturbance.       Objective   BSA: 1.33 meters squared  /65 (BP Location: Left arm, Patient Position: Sitting, BP Cuff Size: Child)   Pulse 73   Temp 36.1 °C (97 °F) (Temporal)   Resp 18   Wt (!) 42.3 kg (93 lb 3.2 oz)   SpO2 96%   BMI 18.92 kg/m²     Wt Readings from Last 5 Encounters:   04/21/25 (!) 42.3 kg (93 lb 3.2 oz)   04/17/25 (!) 43.9 kg (96 lb 10.8 oz)   04/14/25 (!) 42.9 kg (94 lb 9.2 oz)   04/14/25 (!) 42.9 kg (94 lb 9.2 oz)   04/11/25 (!) 44.7 kg (98 lb 7 oz)       Performance Status:  (1) Restricted in physically strenuous activity, ambulatory and able to do work of light nature     Physical Exam  Vitals reviewed.   Constitutional:       General: She is not in acute distress.  HENT:      Head: Normocephalic and atraumatic.   Eyes:      Pupils: Pupils are equal, round, and reactive to light.   Cardiovascular:      Rate and Rhythm: Normal rate and regular rhythm.      Heart sounds: Normal heart sounds.   Pulmonary:      Effort: Pulmonary effort is normal.      Breath sounds: Normal breath sounds.   Abdominal:      General: Bowel sounds are normal. There is no distension.      Palpations: Abdomen is soft.      Comments: +PEG   Musculoskeletal:         General: No swelling. Normal range of motion.      Cervical back: Normal range of motion.   Skin:     General: Skin is warm and dry.      Findings: Erythema (to anterior neck 2/2 RT) present. No rash.   Neurological:      General: No focal deficit present.      Mental Status: She is alert and oriented to person, place, and time.   Psychiatric:         Mood and Affect: Mood normal.         Behavior: Behavior normal.          Results:  Labs:  Lab Results   Component Value Date    WBC 5.5 04/17/2025    HGB 9.9 (L) 04/17/2025    HCT 30.3 (L) 04/17/2025    MCV 96 04/17/2025     04/17/2025      Lab  Results   Component Value Date    NEUTROABS 4.27 04/17/2025      Lab Results   Component Value Date    GLUCOSE 86 04/17/2025    CALCIUM 8.7 04/17/2025     (L) 04/17/2025    K 3.7 04/17/2025    CO2 30 04/17/2025    CL 97 (L) 04/17/2025    BUN 24 (H) 04/17/2025    CREATININE 0.73 04/17/2025     Lab Results   Component Value Date    ALT 24 04/17/2025    AST 18 04/17/2025    ALKPHOS 42 04/17/2025    BILITOT 0.3 04/17/2025        Imaging:  No new imaging    Pathology:  No new pathology    Assessment/Plan      Annette Fernandez is a 74 y.o. 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    Discussed overall findings to date with patient and her daughter previously, as well as recommendations for treatment with concurrent/definitive chemoradiation. We discussed the curative goal of treatment. Discussed that chemotherapy would consist of weekly cisplatin, with side effects discussed, including but not limited to myelosuppression (infection, anemia, bleeding), fatigue, nausea, neuropathy, hearing loss/tinnitus, and nephropathy.    - Proceed as planned next week with week 4 cisplatin, goal to get to 7 weeks give 30mg/m2 dose  - With cisplatin, will have weekly IVF and provider visits  - monitor for worsening tinnitus  - Zyprexa nightly   - PRN zofran/compazine  - dex 8mg daily x3 days after chemo  - advised her to stop aspirin for pain given risk of NSAIDs and kidney function with cisplatin. Ok for oxycodone as needed and we will refill if gets low  - reminded her about risk of constipation with oxycodone - she is taking miralax as needed and will start regularly if taking, I will also send senna to use if using oxycodone more  - baseline audiology evaluation not scheduled prior to cisplatin, ok to proceed whenever scheduled or delay until after tx    # Mucositis  - recommended she resume Glutamine rinse, continue BMX rinse.     # Reduced nutritional intake  - recommended  she increase protein supplements 3/day.  Graze throughout the day.  Dietician referral in place.      # Odynophagia   - established with supportive onc.  Continue current oxycodone 5mg/q4H PRN.  Bowel regimen - routine daily miralax.   - PRN 5mg dose oxycodone admin during infusion visit      # Smoking cessation   - increased # of cigarettes/day from 2-3 to 5-6, has not started to wear the nicotine patch yet.  Encouraged her to use the patch, cut down on # of cigarettes/day.       RTC for ongoing treatment

## 2025-04-21 NOTE — PROGRESS NOTES
SUPPORTIVE AND PALLIATIVE ONCOLOGY FOLLOW-UP - OUTPATIENT      SERVICE DATE: 4/21/2025    Referred by:  ELLYN Ochoa  Medical Oncologist: Faith Denson MD   Radiation Oncologist: Rahel Del Angel MD  Primary Physician: Parminder Russell  [phone]    REASON FOR CONSULT/CHIEF CONSULT COMPLAINT: pain management and Introduction to Supportive and Palliative Oncology Services    Subjective   HISTORY OF PRESENT ILLNESS: Annette Fernandez is a 74 y.o. female who presents with a PMH of HLD, HTN, and BOT SCC diagnosed 02/2025. She was started on chemoradiation with Cisplatin 3/26.     Pain Assessment:  Pain Score: 2/10  Location: throat    Symptom Assessment:  Pain:somewhat aching and sharp pain in her throat. The pain is intermittent. It is worse with swallowing. The pain has increased over this past week. At its worst, the pain is a 7/10. She has been taking oxycodone 5 mg ~3 times per day which does provide relief. She is using BMX as well.   Headache: none  Dizziness:none  Lack of energy: somewhat   Difficulty sleeping: none  Worrying: a little  Anxiety: a little  Depression: a little  Pain in mouth/swallowing: very much  Dry mouth: none  Taste changes: none  Shortness of breath: none  Lack of appetite: somewhat she is taking in 2-2.5 cans of isosource per day. She sometimes can get in 3 cans which is the goal. She is sipping ensure by mouth as tolerated. She will eat full liquids like pudding and jello.   Nausea: a little using ondansetron every other day with relief.   Vomiting: none  Constipation: none  Diarrhea: none  Sore muscles: none  Numbness or tingling in hands/feet/other: none  Weight loss: a little  Other: a little      Information obtained from: chart review, interview of patient, and interview of family  ______________________________________________________________________     Oncology History   Cancer of base of tongue (Multi)   3/3/2025 Initial Diagnosis    Cancer of base of tongue (Multi)      3/3/2025 Cancer Staged    Staging form: Pharynx - P16 Negative Oropharynx, AJCC 8th Edition, Clinical: Stage BRIDGETTE (cT4a, cN1, cM0, p16-) - Signed by Rahel Del Angel MD on 3/3/2025     3/26/2025 -  Chemotherapy    CISplatin with Concurrent Radiation (Weekly), 7 Week Cycle         Past Medical History:   Diagnosis Date    Aspiration into airway 07/31/2023    HLD (hyperlipidemia)     HTN (hypertension)     Mass of tongue     Other specified health status     No pertinent past medical history    Stiffness of unspecified hand, not elsewhere classified     Joint stiffness of hand     Past Surgical History:   Procedure Laterality Date    OTHER SURGICAL HISTORY  06/14/2019    Ovarian cystectomy    OTHER SURGICAL HISTORY  06/14/2019    Cyst excision     Family History   Problem Relation Name Age of Onset    Heart attack Father          SOCIAL HISTORY  Children 2, Grandchildren 2, Support system friends and family, Employment worked as an  - retired, and Hobbies reading, art, and movies   Social History:  reports that she has been smoking cigarettes. She has been exposed to tobacco smoke. She has quit using smokeless tobacco. She reports current alcohol use. She reports that she does not use drugs.      REVIEW OF SYSTEMS  Review of systems negative unless noted in HPI.       Objective     Current Outpatient Medications   Medication Instructions    atorvastatin (LIPITOR) 80 mg, oral, Daily RT    DentaGeL 1.1 % gel apply with toothbrush OR flouoride trays    dexAMETHasone (DECADRON) 8 mg, oral, Daily, For 3 days per week starting the day after treatment.    magic mouthwash (lidocaine, diphenhydrAMINE, Maalox 1:1:1) 10 mL, Swish & Spit, Every 6 hours PRN    metoprolol tartrate (LOPRESSOR) 25 mg, oral, 2 times daily    multivitamin tablet 1 tablet, Daily    nicotine (Nicoderm CQ) 7 mg/24 hr patch 1 patch, transdermal, Every 24 hours    NON FORMULARY 1 each, Daily    OLANZapine (ZYPREXA) 5 mg, oral, Nightly     ondansetron (ZOFRAN) 8 mg, oral, Every 8 hours PRN    oxyCODONE (ROXICODONE) 5 mg, oral, Every 4 hours PRN    pantoprazole (PROTONIX) 40 mg, oral, Daily, Do not crush, chew, or split.    polyethylene glycol (GLYCOLAX, MIRALAX) 17 g, g-tube, Daily    prochlorperazine (COMPAZINE) 10 mg, oral, Every 6 hours PRN    thyroid (pork) (ARMOUR THYROID) 15 mg, oral, Daily       Allergies:   Allergies   Allergen Reactions    Wellbutrin [Bupropion Hcl] Other     Hospital Outpatient Visit on 04/21/2025   Component Date Value Ref Range Status    Treatment Site 04/21/2025 BOT   Final    Course Number 04/21/2025 1   Final    Prescribed Fractional Dose 04/21/2025 212  cGray Final    Prescribed Total Dose 04/21/2025 6,996  cGray Final    Actual Fractions Delivered 04/21/2025 19   Final    Prescription Pattern Comment 04/21/2025 w/ 59.4 to IR and 54.12 to LR   Final    Actual Session Delivered Dose 04/21/2025 212  cGray Final    Actual Total Dose 04/21/2025 4,028  cGray Final    Prescribed Technique 04/21/2025 VMAT   Final    Elapsed Days 04/21/2025 26   Final    Start Date 04/21/2025 3/26/2025   Final    Last Date 04/21/2025 4/21/2025   Final    Prescribed Number of Fractions 04/21/2025 33   Final   Hospital Outpatient Visit on 04/18/2025   Component Date Value Ref Range Status    Treatment Site 04/18/2025 BOT   Final    Course Number 04/18/2025 1   Final    Prescribed Fractional Dose 04/18/2025 212  cGray Final    Prescribed Total Dose 04/18/2025 6,996  cGray Final    Actual Fractions Delivered 04/18/2025 18   Final    Prescription Pattern Comment 04/18/2025 w/ 59.4 to IR and 54.12 to LR   Final    Actual Session Delivered Dose 04/18/2025 212  cGray Final    Actual Total Dose 04/18/2025 3,816  cGray Final    Prescribed Technique 04/18/2025 VMAT   Final    Elapsed Days 04/18/2025 23   Final    Start Date 04/18/2025 3/26/2025   Final    Last Date 04/18/2025 4/18/2025   Final    Prescribed Number of Fractions 04/18/2025 33   Final    Hospital Outpatient Visit on 04/17/2025   Component Date Value Ref Range Status    Treatment Site 04/17/2025 BOT   Final    Course Number 04/17/2025 1   Final    Prescribed Fractional Dose 04/17/2025 212  cGray Final    Prescribed Total Dose 04/17/2025 6,996  cGray Final    Actual Fractions Delivered 04/17/2025 17   Final    Prescription Pattern Comment 04/17/2025 w/ 59.4 to IR and 54.12 to LR   Final    Actual Session Delivered Dose 04/17/2025 212  cGray Final    Actual Total Dose 04/17/2025 3,604  cGray Final    Prescribed Technique 04/17/2025 VMAT   Final    Elapsed Days 04/17/2025 22   Final    Start Date 04/17/2025 3/26/2025   Final    Last Date 04/17/2025 4/17/2025   Final    Prescribed Number of Fractions 04/17/2025 33   Final   Lab on 04/17/2025   Component Date Value Ref Range Status    WBC 04/17/2025 5.5  4.4 - 11.3 x10*3/uL Final    nRBC 04/17/2025    Final    Not Measured    RBC 04/17/2025 3.16 (L)  4.00 - 5.20 x10*6/uL Final    Hemoglobin 04/17/2025 9.9 (L)  12.0 - 16.0 g/dL Final    Hematocrit 04/17/2025 30.3 (L)  36.0 - 46.0 % Final    MCV 04/17/2025 96  80 - 100 fL Final    MCH 04/17/2025 31.3  26.0 - 34.0 pg Final    MCHC 04/17/2025 32.7  32.0 - 36.0 g/dL Final    RDW 04/17/2025 12.9  11.5 - 14.5 % Final    Platelets 04/17/2025 172  150 - 450 x10*3/uL Final    Neutrophils % 04/17/2025 77.6  40.0 - 80.0 % Final    Immature Granulocytes %, Automated 04/17/2025 0.0  0.0 - 0.9 % Final    Immature Granulocyte Count (IG) includes promyelocytes, myelocytes and metamyelocytes but does not include bands. Percent differential counts (%) should be interpreted in the context of the absolute cell counts (cells/UL).    Lymphocytes % 04/17/2025 13.5  13.0 - 44.0 % Final    Monocytes % 04/17/2025 8.7  2.0 - 10.0 % Final    Eosinophils % 04/17/2025 0.2  0.0 - 6.0 % Final    Basophils % 04/17/2025 0.0  0.0 - 2.0 % Final    Neutrophils Absolute 04/17/2025 4.27  1.60 - 5.50 x10*3/uL Final    Percent differential  counts (%) should be interpreted in the context of the absolute cell counts (cells/uL).    Immature Granulocytes Absolute, Au* 04/17/2025 0.00  0.00 - 0.50 x10*3/uL Final    Lymphocytes Absolute 04/17/2025 0.74 (L)  0.80 - 3.00 x10*3/uL Final    Monocytes Absolute 04/17/2025 0.48  0.05 - 0.80 x10*3/uL Final    Eosinophils Absolute 04/17/2025 0.01  0.00 - 0.40 x10*3/uL Final    Basophils Absolute 04/17/2025 0.00  0.00 - 0.10 x10*3/uL Final    Glucose 04/17/2025 86  74 - 99 mg/dL Final    Sodium 04/17/2025 134 (L)  136 - 145 mmol/L Final    Potassium 04/17/2025 3.7  3.5 - 5.3 mmol/L Final    Chloride 04/17/2025 97 (L)  98 - 107 mmol/L Final    Bicarbonate 04/17/2025 30  21 - 32 mmol/L Final    Anion Gap 04/17/2025 11  10 - 20 mmol/L Final    Urea Nitrogen 04/17/2025 24 (H)  6 - 23 mg/dL Final    Creatinine 04/17/2025 0.73  0.50 - 1.05 mg/dL Final    eGFR 04/17/2025 87  >60 mL/min/1.73m*2 Final    Calculations of estimated GFR are performed using the 2021 CKD-EPI Study Refit equation without the race variable for the IDMS-Traceable creatinine methods.  https://jasn.asnjournals.org/content/early/2021/09/22/ASN.1482278787    Calcium 04/17/2025 8.7  8.6 - 10.3 mg/dL Final    Albumin 04/17/2025 3.4  3.4 - 5.0 g/dL Final    Alkaline Phosphatase 04/17/2025 42  33 - 136 U/L Final    Total Protein 04/17/2025 6.0 (L)  6.4 - 8.2 g/dL Final    AST 04/17/2025 18  9 - 39 U/L Final    Bilirubin, Total 04/17/2025 0.3  0.0 - 1.2 mg/dL Final    ALT 04/17/2025 24  7 - 45 U/L Final    Patients treated with Sulfasalazine may generate falsely decreased results for ALT.    Magnesium 04/17/2025 1.63  1.60 - 2.40 mg/dL Final   Hospital Outpatient Visit on 04/16/2025   Component Date Value Ref Range Status    Treatment Site 04/16/2025 BOT   Final    Course Number 04/16/2025 1   Final    Prescribed Fractional Dose 04/16/2025 212  cGray Final    Prescribed Total Dose 04/16/2025 6,996  cGray Final    Actual Fractions Delivered 04/16/2025 16    "Final    Prescription Pattern Comment 04/16/2025 w/ 59.4 to IR and 54.12 to LR   Final    Actual Session Delivered Dose 04/16/2025 212  cGray Final    Actual Total Dose 04/16/2025 3,392  cGray Final    Prescribed Technique 04/16/2025 VMAT   Final    Elapsed Days 04/16/2025 21   Final    Start Date 04/16/2025 3/26/2025   Final    Last Date 04/16/2025 4/16/2025   Final    Prescribed Number of Fractions 04/16/2025 33   Final   Hospital Outpatient Visit on 04/15/2025   Component Date Value Ref Range Status    Treatment Site 04/15/2025 BOT   Final    Course Number 04/15/2025 1   Final    Prescribed Fractional Dose 04/15/2025 212  cGray Final    Prescribed Total Dose 04/15/2025 6,996  cGray Final    Actual Fractions Delivered 04/15/2025 15   Final    Prescription Pattern Comment 04/15/2025 w/ 59.4 to IR and 54.12 to LR   Final    Actual Session Delivered Dose 04/15/2025 212  cGray Final    Actual Total Dose 04/15/2025 3,180  cGray Final    Prescribed Technique 04/15/2025 VMAT   Final    Elapsed Days 04/15/2025 20   Final    Start Date 04/15/2025 3/26/2025   Final    Last Date 04/15/2025 4/15/2025   Final    Prescribed Number of Fractions 04/15/2025 33   Final        PHYSICAL EXAMINATION  Vital Signs:       4/7/2025     2:00 PM 4/10/2025     9:11 AM 4/11/2025    10:04 AM 4/14/2025     8:21 AM 4/14/2025     1:00 PM 4/17/2025     1:43 PM 4/21/2025     8:30 AM   Vitals   Systolic  120 108 102  113 101   Diastolic  65 65 67  61 65   BP Location  Left arm    Left arm Left arm   Heart Rate  73 76 88  67 73   Temp  36.2 °C (97.2 °F) 36.4 °C (97.5 °F) 36.1 °C (97 °F)  36 °C (96.8 °F) 36.1 °C (97 °F)   Resp  16 18 18  16 18   Height 1.495 m (4' 10.86\")    1.495 m (4' 10.86\")     Weight (lb) 96.78 99.3 98.44 94.58 94.58 96.67 93.2   BMI 19.64 kg/m2 20.15 kg/m2 19.98 kg/m2 19.19 kg/m2 19.19 kg/m2 19.62 kg/m2 18.92 kg/m2   BSA (m2) 1.35 m2 1.37 m2 1.36 m2 1.33 m2 1.33 m2 1.35 m2 1.33 m2   Visit Report  Report  Report Report  Report "   ;Vital signs reviewed       Physical Exam  HENT:      Head: Normocephalic.   Eyes:      Pupils: Pupils are equal, round, and reactive to light.   Pulmonary:      Effort: Pulmonary effort is normal.   Musculoskeletal:         General: Normal range of motion.   Neurological:      Mental Status: She is alert and oriented to person, place, and time.   Psychiatric:         Mood and Affect: Mood normal.         Behavior: Behavior normal.         ASSESSMENT/PLAN    Pain  Pain is: cancer related pain  Type: somatic  Pain control: well-controlled  Home regimen:   - Continue Oxycodone 5 mg/5mL - taking 5 mg every 4-6 hours as needed for pain  - Tylenol has not been effective for pain  - Continue BMX up to 4 times per day as needed    Opioid Use  Medication Management:   - OARRS report reviewed with no aberrant behavior; consistent with  prescriptions/records and patient history  - MED 22.5.  Overdose Risk Score 070.   This has been discussed with patient.   - We will continue to closely monitor the patient for signs of prescription misuse including UDS, OARRS review and subjective reports at each visit.  - No concurrent benzodiazepine use   - I am a provider who either is or has consulted and collaborated with a provider certified in Hospice and Palliative Medicine and have conducted a face-face visit and examination for this patient.  - Routine Urine Drug Screen MED <80-  - Controlled Substance Agreement MED <80  - Specifically discussed that controlled substance prescriptions will only be provided by our group as outlined in the completed agreement  - Prescribed naloxone n/a  - Red Flags: none     Nausea   Intermittent nausea without vomiting related to chemotherapy   - Continue Ondansetron 8 mg every 8 hours as needed  - Continue Prochlorperazine 10 mg every 6 hours as needed  - Continue Olanzapine 5 mg nightly as prescribed by oncology    Constipation   At risk for constipation related to opioids,  currently not  constipated   Usual bowel pattern: daily   Current regimen:   - Continue Miralax 17 g daily  - If constipation develops, start Senna 10 mL BID  - If no BM within 48-72 hours, start MOM 8% every 6 hours as needed    Decreased appetite  Related to malignancy  Nutrition following  Weight loss 4 pounds in the last 4 days  Current regimen:    - Goal is 3 cans of isosource per day - reports getting in 2-3 cans per day  - Continue to drink ensure as tolerated  - Goal of 64 oz of fluids per day - majority through her peg tube    Advance Directives  Existence of Advance Directives:Yes, but NOT documented in medical record  Decision maker: HCPOA is daughter Daisy Zazueta  Code Status: Full code    Next Follow-Up Visit:  Return to clinic in 1 week     Signature and billing  Thank you for allowing us to participate in the care of this patient. Recommendations will be communicated back to the consulting service by way of shared electronic medical record or face-to-face.    Medical complexity was moderate level due to due to complexity of problems, extensive data review, and high risk of management/treatment.  Time was spent on the following: Prep Time, Time Directly with Patient/Family/Caregiver, Documentation Time. Total time spent: 20      DATA   Diagnostic tests and information reviewed for today's visit:  Most recent labs, Most recent imaging, Medications       Some elements copied from oncology note on 3/13/25, the elements have been updated and all reflect current decision making from today, 4/21/2025.      Plan of Care discussed with: Patient and Family/Significant Other: grand daughter      SIGNATURE: ELLYN Art-CNP    Contact information:  Supportive and Palliative Oncology  Monday-Friday 8 AM-5 PM  Phone:  [phone], press option #5, then option #1.   Or Epic Secure Chat

## 2025-04-22 ENCOUNTER — HOSPITAL ENCOUNTER (OUTPATIENT)
Dept: RADIATION ONCOLOGY | Facility: CLINIC | Age: 75
Setting detail: RADIATION/ONCOLOGY SERIES
Discharge: HOME | End: 2025-04-22
Payer: MEDICARE

## 2025-04-22 VITALS
WEIGHT: 97 LBS | RESPIRATION RATE: 18 BRPM | BODY MASS INDEX: 19.69 KG/M2 | HEART RATE: 78 BPM | SYSTOLIC BLOOD PRESSURE: 108 MMHG | DIASTOLIC BLOOD PRESSURE: 60 MMHG | OXYGEN SATURATION: 98 % | TEMPERATURE: 96.3 F

## 2025-04-22 DIAGNOSIS — Z51.0 ENCOUNTER FOR ANTINEOPLASTIC RADIATION THERAPY: ICD-10-CM

## 2025-04-22 DIAGNOSIS — C01 MALIGNANT NEOPLASM OF BASE OF TONGUE (MULTI): ICD-10-CM

## 2025-04-22 LAB
RAD ONC MSQ ACTUAL FRACTIONS DELIVERED: 20
RAD ONC MSQ ACTUAL SESSION DELIVERED DOSE: 212 CGRAY
RAD ONC MSQ ACTUAL TOTAL DOSE: 4240 CGRAY
RAD ONC MSQ ELAPSED DAYS: 27
RAD ONC MSQ LAST DATE: NORMAL
RAD ONC MSQ PRESCRIBED FRACTIONAL DOSE: 212 CGRAY
RAD ONC MSQ PRESCRIBED NUMBER OF FRACTIONS: 33
RAD ONC MSQ PRESCRIBED TECHNIQUE: NORMAL
RAD ONC MSQ PRESCRIBED TOTAL DOSE: 6996 CGRAY
RAD ONC MSQ PRESCRIPTION PATTERN COMMENT: NORMAL
RAD ONC MSQ START DATE: NORMAL
RAD ONC MSQ TREATMENT COURSE NUMBER: 1
RAD ONC MSQ TREATMENT SITE: NORMAL

## 2025-04-22 PROCEDURE — 77386 HC INTENSITY-MODULATED RADIATION THERAPY (IMRT), COMPLEX: CPT | Performed by: RADIOLOGY

## 2025-04-22 ASSESSMENT — PAIN SCALES - GENERAL: PAINLEVEL_OUTOF10: 3

## 2025-04-22 NOTE — PROGRESS NOTES
Radiation Oncology On Treatment Visit    Patient Name:  Annette Fernandez  MRN:  [id number]  :  1950    Referring Provider: Kirt Gamboa MD  Primary Care Provider: Parminder Russell MD  Care Team: Patient Care Team:  Parminder Russell MD as PCP - General  Parminder Russell MD as PCP [facility]  Gem Gustafson APRN-CNP as Nurse Practitioner (Otolaryngology)  Kirt Gamboa MD as Surgeon (Otolaryngology)  Stefani Obrien RN as Care Manager (Case Management)  Faith Denson MD as Consulting Physician (Hematology and Oncology)  Rahel Del Angel MD as Radiation Oncologist (Radiation Oncology)    Date of Service: 2025     Diagnosis:   Specialty Problems          Radiation Oncology Problems    Cancer of base of tongue (Multi)        Malignant neoplasm of base of tongue (Multi)         Treatment Summary:  Radiation Therapy    Treatment Period Technique Fraction Dose Fractions Total Dose   Course 1 3/26/2025-2025  (days elapsed: 27)         BOT 3/26/2025-2025 VMAT 212 / 212 cGy 20 /  4,240 / 6,996 cGy     SUBJECTIVE: tolerating treatment. Has sorethroat, changes in taste. Grade 1 dermatitis      OBJECTIVE:   Vital Signs:  /60 (BP Location: Right arm, Patient Position: Sitting, BP Cuff Size: Small adult)   Pulse 78   Temp 35.7 °C (96.3 °F) (Temporal)   Resp 18   Wt (!) 44 kg (97 lb)   SpO2 98%   BMI 19.69 kg/m²     Other Pertinent Findings:     Toxicity Assessment          3/28/2025    11:00 2025    11:00 2025    10:00 2025    09:42   Toxicity Assessment   Adverse Events Reviewed (WDL) Yes (Within Defined Limits) Yes (Within Defined Limits) Yes (Within Defined Limits) Yes (Within Defined Limits)   Treatment  and neck Head and neck Head and neck Head and neck   Anorexia Grade 0 Grade 0 Grade 0 Grade 0       up 3 lbs this week. On Regular diet   Dehydration Grade 0 Grade 0       weekly iv infusions received MG today. Grade 0       gets IVF Grade  0       IVF PRN   Dermatitis Radiation Grade 1 Grade 1       dry intact using aquaphor Grade 1       using aquaphor Grade 1       aquaphor.  Rash that comes and goes with itching.   Fatigue Grade 0 Grade 0 Grade 0 Grade 1       mild fatigue relieved with rest   Nausea Grade 0 Grade 0 Grade 0 Grade 1       intermittent mild nausea. Antiemetics PRN   Pain Grade 0 Grade 1       throat pain on occasion using oxycodone prn daily Grade 0 Grade 1       Painful swallowing 3/10. Taking oxycodone with relief   Vomiting Grade 0  Grade 0 Grade 0   Dysphagia Grade 0 Grade 0 Grade 1       struggles with bread Grade 1       difficulty swallowing dry foods   Esophagitis Grade 0  Grade 0 Grade 1   Mucositis Oral Grade 0 Grade 1       sore tongue noted/ BMX ordered Grade 1       irritation in spots; using BMX Grade 1       Mouth tenderness; using BMX and salt n' soda   Dry Mouth Grade 0 Grade 1       mild /using salt and soda rinse  Grade 1       Information on xylimelts and biotene provided.   Ear Pain Grade 0   Grade 1       intermittent ear ache   Tinnitus Grade 0   Grade 0   Oral Pain Grade 0   Grade 1       BMX   Edema Face Grade 0  Grade 0 Grade 0   Aspiration Grade 0  Grade 0 Grade 0   Hoarseness Grade 0  Grade 0 Grade 1   Voice Alteration Grade 0  Grade 0 Grade 1        Assessment / Plan:  The patient is tolerating radiation therapy as anticipated.  Continue per current treatment plan.

## 2025-04-23 ENCOUNTER — HOSPITAL ENCOUNTER (OUTPATIENT)
Dept: RADIATION ONCOLOGY | Facility: CLINIC | Age: 75
Setting detail: RADIATION/ONCOLOGY SERIES
Discharge: HOME | End: 2025-04-23
Payer: MEDICARE

## 2025-04-23 DIAGNOSIS — C01 MALIGNANT NEOPLASM OF BASE OF TONGUE (MULTI): ICD-10-CM

## 2025-04-23 DIAGNOSIS — Z51.0 ENCOUNTER FOR ANTINEOPLASTIC RADIATION THERAPY: ICD-10-CM

## 2025-04-23 LAB
RAD ONC MSQ ACTUAL FRACTIONS DELIVERED: 21
RAD ONC MSQ ACTUAL SESSION DELIVERED DOSE: 212 CGRAY
RAD ONC MSQ ACTUAL TOTAL DOSE: 4452 CGRAY
RAD ONC MSQ ELAPSED DAYS: 28
RAD ONC MSQ LAST DATE: NORMAL
RAD ONC MSQ PRESCRIBED FRACTIONAL DOSE: 212 CGRAY
RAD ONC MSQ PRESCRIBED NUMBER OF FRACTIONS: 33
RAD ONC MSQ PRESCRIBED TECHNIQUE: NORMAL
RAD ONC MSQ PRESCRIBED TOTAL DOSE: 6996 CGRAY
RAD ONC MSQ PRESCRIPTION PATTERN COMMENT: NORMAL
RAD ONC MSQ START DATE: NORMAL
RAD ONC MSQ TREATMENT COURSE NUMBER: 1
RAD ONC MSQ TREATMENT SITE: NORMAL

## 2025-04-23 PROCEDURE — 77386 HC INTENSITY-MODULATED RADIATION THERAPY (IMRT), COMPLEX: CPT | Performed by: RADIOLOGY

## 2025-04-23 PROCEDURE — 77336 RADIATION PHYSICS CONSULT: CPT | Performed by: RADIOLOGY

## 2025-04-24 ENCOUNTER — OFFICE VISIT (OUTPATIENT)
Dept: HEMATOLOGY/ONCOLOGY | Facility: CLINIC | Age: 75
End: 2025-04-24
Payer: MEDICARE

## 2025-04-24 ENCOUNTER — HOSPITAL ENCOUNTER (OUTPATIENT)
Dept: RADIATION ONCOLOGY | Facility: CLINIC | Age: 75
Setting detail: RADIATION/ONCOLOGY SERIES
Discharge: HOME | End: 2025-04-24
Payer: MEDICARE

## 2025-04-24 ENCOUNTER — INFUSION (OUTPATIENT)
Dept: HEMATOLOGY/ONCOLOGY | Facility: CLINIC | Age: 75
End: 2025-04-24
Payer: MEDICARE

## 2025-04-24 ENCOUNTER — LAB (OUTPATIENT)
Dept: LAB | Facility: CLINIC | Age: 75
End: 2025-04-24
Payer: MEDICARE

## 2025-04-24 ENCOUNTER — TELEPHONE (OUTPATIENT)
Dept: HEMATOLOGY/ONCOLOGY | Facility: CLINIC | Age: 75
End: 2025-04-24
Payer: MEDICARE

## 2025-04-24 VITALS
RESPIRATION RATE: 16 BRPM | WEIGHT: 96.6 LBS | HEART RATE: 72 BPM | DIASTOLIC BLOOD PRESSURE: 61 MMHG | BODY MASS INDEX: 19.61 KG/M2 | SYSTOLIC BLOOD PRESSURE: 120 MMHG | TEMPERATURE: 97.5 F | OXYGEN SATURATION: 97 %

## 2025-04-24 DIAGNOSIS — C01 MALIGNANT NEOPLASM OF BASE OF TONGUE (MULTI): ICD-10-CM

## 2025-04-24 DIAGNOSIS — G89.3 CANCER ASSOCIATED PAIN: ICD-10-CM

## 2025-04-24 DIAGNOSIS — C01 CANCER OF BASE OF TONGUE (MULTI): ICD-10-CM

## 2025-04-24 DIAGNOSIS — Z51.0 ENCOUNTER FOR ANTINEOPLASTIC RADIATION THERAPY: ICD-10-CM

## 2025-04-24 DIAGNOSIS — Z51.11 ENCOUNTER FOR ANTINEOPLASTIC CHEMOTHERAPY: Primary | ICD-10-CM

## 2025-04-24 DIAGNOSIS — D69.6 THROMBOCYTOPENIA (CMS-HCC): ICD-10-CM

## 2025-04-24 LAB
ALBUMIN SERPL BCP-MCNC: 3.6 G/DL (ref 3.4–5)
ALP SERPL-CCNC: 42 U/L (ref 33–136)
ALT SERPL W P-5'-P-CCNC: 25 U/L (ref 7–45)
ANION GAP SERPL CALC-SCNC: 11 MMOL/L (ref 10–20)
AST SERPL W P-5'-P-CCNC: 17 U/L (ref 9–39)
BASOPHILS # BLD AUTO: 0 X10*3/UL (ref 0–0.1)
BASOPHILS NFR BLD AUTO: 0 %
BILIRUB SERPL-MCNC: 0.3 MG/DL (ref 0–1.2)
BUN SERPL-MCNC: 25 MG/DL (ref 6–23)
CALCIUM SERPL-MCNC: 8.4 MG/DL (ref 8.6–10.3)
CHLORIDE SERPL-SCNC: 99 MMOL/L (ref 98–107)
CO2 SERPL-SCNC: 28 MMOL/L (ref 21–32)
CREAT SERPL-MCNC: 0.65 MG/DL (ref 0.5–1.05)
EGFRCR SERPLBLD CKD-EPI 2021: >90 ML/MIN/1.73M*2
EOSINOPHIL # BLD AUTO: 0.01 X10*3/UL (ref 0–0.4)
EOSINOPHIL NFR BLD AUTO: 0.2 %
ERYTHROCYTE [DISTWIDTH] IN BLOOD BY AUTOMATED COUNT: 13.2 % (ref 11.5–14.5)
GLUCOSE SERPL-MCNC: 89 MG/DL (ref 74–99)
HCT VFR BLD AUTO: 29.2 % (ref 36–46)
HGB BLD-MCNC: 9.6 G/DL (ref 12–16)
IMM GRANULOCYTES # BLD AUTO: 0 X10*3/UL (ref 0–0.5)
IMM GRANULOCYTES NFR BLD AUTO: 0 % (ref 0–0.9)
LYMPHOCYTES # BLD AUTO: 0.4 X10*3/UL (ref 0.8–3)
LYMPHOCYTES NFR BLD AUTO: 8.6 %
MAGNESIUM SERPL-MCNC: 1.71 MG/DL (ref 1.6–2.4)
MCH RBC QN AUTO: 31.2 PG (ref 26–34)
MCHC RBC AUTO-ENTMCNC: 32.9 G/DL (ref 32–36)
MCV RBC AUTO: 95 FL (ref 80–100)
MONOCYTES # BLD AUTO: 0.4 X10*3/UL (ref 0.05–0.8)
MONOCYTES NFR BLD AUTO: 8.6 %
NEUTROPHILS # BLD AUTO: 3.84 X10*3/UL (ref 1.6–5.5)
NEUTROPHILS NFR BLD AUTO: 82.6 %
NRBC BLD-RTO: ABNORMAL /100{WBCS}
PLATELET # BLD AUTO: 118 X10*3/UL (ref 150–450)
POTASSIUM SERPL-SCNC: 4.5 MMOL/L (ref 3.5–5.3)
PROT SERPL-MCNC: 5.9 G/DL (ref 6.4–8.2)
RAD ONC MSQ ACTUAL FRACTIONS DELIVERED: 22
RAD ONC MSQ ACTUAL SESSION DELIVERED DOSE: 212 CGRAY
RAD ONC MSQ ACTUAL TOTAL DOSE: 4664 CGRAY
RAD ONC MSQ ELAPSED DAYS: 29
RAD ONC MSQ LAST DATE: NORMAL
RAD ONC MSQ PRESCRIBED FRACTIONAL DOSE: 212 CGRAY
RAD ONC MSQ PRESCRIBED NUMBER OF FRACTIONS: 33
RAD ONC MSQ PRESCRIBED TECHNIQUE: NORMAL
RAD ONC MSQ PRESCRIBED TOTAL DOSE: 6996 CGRAY
RAD ONC MSQ PRESCRIPTION PATTERN COMMENT: NORMAL
RAD ONC MSQ START DATE: NORMAL
RAD ONC MSQ TREATMENT COURSE NUMBER: 1
RAD ONC MSQ TREATMENT SITE: NORMAL
RBC # BLD AUTO: 3.08 X10*6/UL (ref 4–5.2)
SODIUM SERPL-SCNC: 133 MMOL/L (ref 136–145)
WBC # BLD AUTO: 4.7 X10*3/UL (ref 4.4–11.3)

## 2025-04-24 PROCEDURE — 1123F ACP DISCUSS/DSCN MKR DOCD: CPT | Performed by: NURSE PRACTITIONER

## 2025-04-24 PROCEDURE — 83735 ASSAY OF MAGNESIUM: CPT

## 2025-04-24 PROCEDURE — 3074F SYST BP LT 130 MM HG: CPT | Performed by: NURSE PRACTITIONER

## 2025-04-24 PROCEDURE — 3078F DIAST BP <80 MM HG: CPT | Performed by: NURSE PRACTITIONER

## 2025-04-24 PROCEDURE — 80053 COMPREHEN METABOLIC PANEL: CPT

## 2025-04-24 PROCEDURE — 99214 OFFICE O/P EST MOD 30 MIN: CPT | Performed by: NURSE PRACTITIONER

## 2025-04-24 PROCEDURE — 77386 HC INTENSITY-MODULATED RADIATION THERAPY (IMRT), COMPLEX: CPT | Performed by: RADIOLOGY

## 2025-04-24 PROCEDURE — G2211 COMPLEX E/M VISIT ADD ON: HCPCS | Performed by: NURSE PRACTITIONER

## 2025-04-24 PROCEDURE — 2500000004 HC RX 250 GENERAL PHARMACY W/ HCPCS (ALT 636 FOR OP/ED): Mod: JZ | Performed by: INTERNAL MEDICINE

## 2025-04-24 PROCEDURE — 96360 HYDRATION IV INFUSION INIT: CPT | Mod: INF

## 2025-04-24 PROCEDURE — 1159F MED LIST DOCD IN RCRD: CPT | Performed by: NURSE PRACTITIONER

## 2025-04-24 PROCEDURE — 36415 COLL VENOUS BLD VENIPUNCTURE: CPT

## 2025-04-24 PROCEDURE — 85025 COMPLETE CBC W/AUTO DIFF WBC: CPT

## 2025-04-24 PROCEDURE — 1126F AMNT PAIN NOTED NONE PRSNT: CPT | Performed by: NURSE PRACTITIONER

## 2025-04-24 RX ORDER — HEPARIN SODIUM,PORCINE/PF 10 UNIT/ML
50 SYRINGE (ML) INTRAVENOUS AS NEEDED
OUTPATIENT
Start: 2025-04-24

## 2025-04-24 RX ORDER — HEPARIN 100 UNIT/ML
500 SYRINGE INTRAVENOUS AS NEEDED
OUTPATIENT
Start: 2025-04-24

## 2025-04-24 RX ADMIN — SODIUM CHLORIDE 1000 ML: 9 INJECTION, SOLUTION INTRAVENOUS at 10:59

## 2025-04-24 ASSESSMENT — PAIN SCALES - GENERAL: PAINLEVEL_OUTOF10: 0-NO PAIN

## 2025-04-24 ASSESSMENT — ENCOUNTER SYMPTOMS
HEADACHES: 0
UNEXPECTED WEIGHT CHANGE: 0
NECK PAIN: 1
ARTHRALGIAS: 0
BACK PAIN: 0
COUGH: 0
SHORTNESS OF BREATH: 0
FATIGUE: 1
SORE THROAT: 1
LEG SWELLING: 0
DIZZINESS: 0
CONSTIPATION: 0
ADENOPATHY: 0
EYE PROBLEMS: 0
VOMITING: 0
ABDOMINAL PAIN: 0
NAUSEA: 1
APPETITE CHANGE: 1
SLEEP DISTURBANCE: 0
DIARRHEA: 0
DIFFICULTY URINATING: 0

## 2025-04-24 NOTE — PROGRESS NOTES
[facility] - Medical Oncology Follow-Up Visit    Patient ID: Annette Fernandez is a 74 y.o. female.  Referring Physician: Faith Denson MD  [id number] [place] [address]  Primary Care Provider: Parminder Russell MD      Chief Concern: Patient is here to followup and for ongoing toxicity checks for base of tongue/oropharyngeal cancer    HPI Patient here today alone.  Reports her fatigue is tolerable.   No change in functional status.  Intake both orally and via peg.  Taking in 2 TF cartons, plus snack items - pudding.  Feels bloated if she attempts to take in 3 TF cartons/day.  Mild nausea - comes in waves.  Managed with PRN  antiemetics - one dose is enough.  Denies mucositis.  Continues with routine rinses.   Daily - to every other day BM's.   Denies v/c/d.  No fevers, chills, or CP.   Denies tinnitus.   Today her throat pain is okay.  Rates her pain 1-2/10.  Yesterday pain was 3/10.  Currently managed with PRN oxycodone - taken 3x/day.  This week no neuropathy.   No other concerns.  Labs WNL.  Ready for treatment.      Diagnosis: Squamous cell carcinoma of the BOT  Stage:  Cancer Staging   Cancer of base of tongue (Multi)  Staging form: Pharynx - P16 Negative Oropharynx, AJCC 8th Edition  - Clinical: Stage BRIDGETTE (cT4a, cN1, cM0, p16-) - Signed by Rahel Del Angel MD on 3/3/2025     Current sites of disease: BOT and lymph nodes  Molecular and ancillary testing: p16 neg    Oncologic History:  1/7/25 - CT neck with Irregular, heterogeneous, and possibly ulcerating mass centered at the base of tongue with involvement of extrinsic tongue musculature and lingual surface of the epiglottis; possible involvement of the  body of the hyoid. Oropharyngeal neoplasm is of high suspicion. No cervical lymphadenopathy by size criteria.  1.8 cm exophytic right posterior thyroid nodule along the right tracheoesophageal groove containing calcification.  2/4/25 - Met with Dr. Gamboa after noting  discomfort in throat since August, some discomfort in L ear. DL with fungating lesion involving base of tongue and extending on the lateral pharyngeal wall on the left, involving lingual aspect of epiglottis.  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  2/28/25 - PET with hypermetabolic soft tissue mass at BOT, moderately hypermetabolic right level 2A lymph node  3/26/25 - C1 cisplatin with definitive radiation (30mg/m2 due to renal function)    Past Medical History:  07/31/2023: Aspiration into airway  No date: HLD (hyperlipidemia)  No date: HTN (hypertension)  No date: Mass of tongue  No date: Other specified health status      Comment:  No pertinent past medical history  No date: Stiffness of unspecified hand, not elsewhere classified      Comment:  Joint stiffness of hand     4 years ago - throat injury, every time she drank/swallow into lungs (black esophagus)    Past Surgical History:  06/14/2019: OTHER SURGICAL HISTORY      Comment:  Ovarian cystectomy  06/14/2019: OTHER SURGICAL HISTORY      Comment:  Cyst excision     Social Hx:   Annette Fernandez  reports that she has been smoking cigarettes. She has been exposed to tobacco smoke. She has quit using smokeless tobacco.  She  reports current alcohol use.  She  reports no history of drug use.  Smoking - working to cut back, down to 2-3 per day, previously 1/2ppd  Limiting amount each time    Lives on her own  Takes care of her cat    Family History   Problem Relation Name Age of Onset    Heart attack Father      Mom had NHL  Grandparent with colon cancer    Meds (Current):  Current Outpatient Medications   Medication Instructions    atorvastatin (LIPITOR) 80 mg, oral, Daily RT    DentaGeL 1.1 % gel apply with toothbrush OR flouoride trays    dexAMETHasone (DECADRON) 8 mg, oral, Daily, For 3 days per week starting the day after treatment.    magic mouthwash (lidocaine, diphenhydrAMINE, Maalox 1:1:1)  10 mL, Swish & Spit, Every 6 hours PRN    metoprolol tartrate (LOPRESSOR) 25 mg, oral, 2 times daily    multivitamin tablet 1 tablet, Daily    nicotine (Nicoderm CQ) 7 mg/24 hr patch 1 patch, transdermal, Every 24 hours    NON FORMULARY 1 each, Daily    OLANZapine (ZYPREXA) 5 mg, oral, Nightly    ondansetron (ZOFRAN) 8 mg, oral, Every 8 hours PRN    oxyCODONE (ROXICODONE) 5 mg, oral, Every 4 hours PRN    pantoprazole (PROTONIX) 40 mg, oral, Daily, Do not crush, chew, or split.    polyethylene glycol (GLYCOLAX, MIRALAX) 17 g, g-tube, Daily    prochlorperazine (COMPAZINE) 10 mg, oral, Every 6 hours PRN    thyroid (pork) (ARMOUR THYROID) 15 mg, oral, Daily        Allergies   Allergen Reactions    Wellbutrin [Bupropion Hcl] Other     Review of Systems   Constitutional:  Positive for appetite change and fatigue. Negative for unexpected weight change.   HENT:   Positive for sore throat. Negative for hearing loss and mouth sores.    Eyes:  Negative for eye problems.   Respiratory:  Negative for cough and shortness of breath.    Cardiovascular:  Negative for chest pain and leg swelling.   Gastrointestinal:  Positive for nausea. Negative for abdominal pain, constipation, diarrhea and vomiting.   Genitourinary:  Negative for difficulty urinating.    Musculoskeletal:  Positive for neck pain. Negative for arthralgias and back pain.   Skin:  Negative for rash.        Faint erythremia to anterior neck 2/2 RT.    Neurological:  Negative for dizziness and headaches.   Hematological:  Negative for adenopathy.   Psychiatric/Behavioral:  Negative for sleep disturbance.       Objective   BSA: 1.35 meters squared  /61 (BP Location: Left arm, Patient Position: Sitting)   Pulse 72   Temp 36.4 °C (97.5 °F) (Temporal)   Resp 16   Wt (!) 43.8 kg (96 lb 9.6 oz)   SpO2 97%   BMI 19.61 kg/m²     Wt Readings from Last 5 Encounters:   04/24/25 (!) 43.8 kg (96 lb 9.6 oz)   04/22/25 (!) 44 kg (97 lb)   04/21/25 (!) 42.3 kg (93 lb 3.2  oz)   04/17/25 (!) 43.9 kg (96 lb 10.8 oz)   04/14/25 (!) 42.9 kg (94 lb 9.2 oz)       Performance Status:  (1) Restricted in physically strenuous activity, ambulatory and able to do work of light nature     Physical Exam  Vitals reviewed.   Constitutional:       General: She is not in acute distress.  HENT:      Head: Normocephalic and atraumatic.   Eyes:      Pupils: Pupils are equal, round, and reactive to light.   Cardiovascular:      Rate and Rhythm: Normal rate and regular rhythm.      Heart sounds: Normal heart sounds.   Pulmonary:      Effort: Pulmonary effort is normal.      Breath sounds: Normal breath sounds.   Abdominal:      General: Bowel sounds are normal. There is no distension.      Palpations: Abdomen is soft.      Comments: +PEG   Musculoskeletal:         General: No swelling. Normal range of motion.      Cervical back: Normal range of motion.   Skin:     General: Skin is warm and dry.      Findings: Erythema (to anterior neck 2/2 RT) present. No rash.   Neurological:      General: No focal deficit present.      Mental Status: She is alert and oriented to person, place, and time.   Psychiatric:         Mood and Affect: Mood normal.         Behavior: Behavior normal.          Results:  Labs:  Lab Results   Component Value Date    WBC 4.7 04/24/2025    HGB 9.6 (L) 04/24/2025    HCT 29.2 (L) 04/24/2025    MCV 95 04/24/2025     (L) 04/24/2025      Lab Results   Component Value Date    NEUTROABS 3.84 04/24/2025      Lab Results   Component Value Date    GLUCOSE 89 04/24/2025    CALCIUM 8.4 (L) 04/24/2025     (L) 04/24/2025    K 4.5 04/24/2025    CO2 28 04/24/2025    CL 99 04/24/2025    BUN 25 (H) 04/24/2025    CREATININE 0.65 04/24/2025     Lab Results   Component Value Date    ALT 25 04/24/2025    AST 17 04/24/2025    ALKPHOS 42 04/24/2025    BILITOT 0.3 04/24/2025        Imaging:  No new imaging    Pathology:  No new pathology    Assessment/Plan      Annette Fernandez is a 74 y.o. 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    Discussed overall findings to date with patient and her daughter previously, as well as recommendations for treatment with concurrent/definitive chemoradiation. We discussed the curative goal of treatment. Discussed that chemotherapy would consist of weekly cisplatin, with side effects discussed, including but not limited to myelosuppression (infection, anemia, bleeding), fatigue, nausea, neuropathy, hearing loss/tinnitus, and nephropathy.    - Proceed as planned next week with week 5 cisplatin, goal to get to 7 weeks give 30mg/m2 dose  - With cisplatin, will have weekly IVF and provider visits  - monitor for worsening tinnitus  - Zyprexa nightly   - PRN zofran/compazine  - dex 8mg daily x3 days after chemo  - advised her to stop aspirin for pain given risk of NSAIDs and kidney function with cisplatin. Ok for oxycodone as needed and we will refill if gets low  - reminded her about risk of constipation with oxycodone - she is taking miralax as needed and will start regularly if taking, I will also send senna to use if using oxycodone more  - baseline audiology evaluation not scheduled prior to cisplatin, ok to proceed whenever scheduled or delay until after tx    # Mucositis  - recommended she resume Glutamine rinse, continue BMX rinse.     # Reduced nutritional intake  - recommended she increase protein supplements 3/day.  Graze throughout the day.  Dietician referral in place.    - 4/24:  She is attempting to get 3 TF cartons/day.  Reports she feels bloated and not able to tolerate 3 total cartons.      # Odynophagia   - established with supportive onc.  Continue current oxycodone 5mg/q4H PRN.  Bowel regimen - routine daily miralax.     # Thrombocytopenia 118 4/24/25   - No clinical s/sx's bleeding.  Pt educated to bleeding precautions.       # Smoking cessation   - increased # of cigarettes/day from 2-3 to 5-6, has not  started to wear the nicotine patch yet.  Encouraged her to use the patch, cut down on # of cigarettes/day.       RTC for ongoing treatment

## 2025-04-24 NOTE — TELEPHONE ENCOUNTER
Contacted patient regarding lab machine at [facility] being down, requesting patient stop at [facility] labs for her blood work prior to get appointment today, triage number left

## 2025-04-25 ENCOUNTER — HOSPITAL ENCOUNTER (OUTPATIENT)
Dept: RADIATION ONCOLOGY | Facility: CLINIC | Age: 75
Setting detail: RADIATION/ONCOLOGY SERIES
Discharge: HOME | End: 2025-04-25
Payer: MEDICARE

## 2025-04-25 VITALS
SYSTOLIC BLOOD PRESSURE: 134 MMHG | TEMPERATURE: 96.1 F | OXYGEN SATURATION: 97 % | BODY MASS INDEX: 19.28 KG/M2 | HEART RATE: 76 BPM | RESPIRATION RATE: 18 BRPM | WEIGHT: 95.02 LBS | DIASTOLIC BLOOD PRESSURE: 67 MMHG

## 2025-04-25 DIAGNOSIS — Z51.0 ENCOUNTER FOR ANTINEOPLASTIC RADIATION THERAPY: ICD-10-CM

## 2025-04-25 DIAGNOSIS — C01 MALIGNANT NEOPLASM OF BASE OF TONGUE (MULTI): ICD-10-CM

## 2025-04-25 LAB
RAD ONC MSQ ACTUAL FRACTIONS DELIVERED: 23
RAD ONC MSQ ACTUAL SESSION DELIVERED DOSE: 212 CGRAY
RAD ONC MSQ ACTUAL TOTAL DOSE: 4876 CGRAY
RAD ONC MSQ ELAPSED DAYS: 30
RAD ONC MSQ LAST DATE: NORMAL
RAD ONC MSQ PRESCRIBED FRACTIONAL DOSE: 212 CGRAY
RAD ONC MSQ PRESCRIBED NUMBER OF FRACTIONS: 33
RAD ONC MSQ PRESCRIBED TECHNIQUE: NORMAL
RAD ONC MSQ PRESCRIBED TOTAL DOSE: 6996 CGRAY
RAD ONC MSQ PRESCRIPTION PATTERN COMMENT: NORMAL
RAD ONC MSQ START DATE: NORMAL
RAD ONC MSQ TREATMENT COURSE NUMBER: 1
RAD ONC MSQ TREATMENT SITE: NORMAL

## 2025-04-25 PROCEDURE — 77386 HC INTENSITY-MODULATED RADIATION THERAPY (IMRT), COMPLEX: CPT

## 2025-04-25 ASSESSMENT — PAIN SCALES - GENERAL: PAINLEVEL_OUTOF10: 0-NO PAIN

## 2025-04-25 NOTE — PROGRESS NOTES
Radiation Oncology On Treatment Visit    Patient Name:  Annette Fernandez  MRN:  [id number]  :  1950    Referring Provider: Kirt Gamboa MD  Primary Care Provider: Parminder Russell MD  Care Team: Patient Care Team:  Parminder Russell MD as PCP - General  Parminder Russell MD as PCP [facility]  ELLYN Corona-CNP as Nurse Practitioner (Otolaryngology)  Kirt Gamboa MD as Surgeon (Otolaryngology)  Stefani Obrien RN as Care Manager (Case Management)  Faith Denson MD as Consulting Physician (Hematology and Oncology)  Rahel Del Angel MD as Radiation Oncologist (Radiation Oncology)    Date of Service: 2025     Diagnosis:   Specialty Problems          Radiation Oncology Problems    Cancer of base of tongue (Multi)        Malignant neoplasm of base of tongue (Multi)         Treatment Summary:  Radiation Therapy    Treatment Period Technique Fraction Dose Fractions Total Dose   Course 1 3/26/2025-2025  (days elapsed: 30)         BOT 3/26/2025-2025 VMAT 212 / 212 cGy 23 / 33 4,876 / 6,996 cGy     SUBJECTIVE: tolerating treatment. Stable side effects      OBJECTIVE:   Vital Signs:  /67 (BP Location: Right arm, Patient Position: Sitting, BP Cuff Size: Child)   Pulse 76   Temp 35.6 °C (96.1 °F) (Temporal)   Resp 18   Wt (!) 43.1 kg (95 lb 0.3 oz)   SpO2 97%   BMI 19.28 kg/m²     Other Pertinent Findings:     Toxicity Assessment          3/28/2025    11:00 2025    11:00 2025    10:00 2025    09:42 2025    13:00   Toxicity Assessment   Adverse Events Reviewed (WDL) Yes (Within Defined Limits) Yes (Within Defined Limits) Yes (Within Defined Limits) Yes (Within Defined Limits) Yes (Within Defined Limits)   Treatment  and neck Head and neck Head and neck Head and neck Head and neck   Anorexia Grade 0 Grade 0 Grade 0 Grade 0       up 3 lbs this week. Peg tube and PO Grade 0       using peg and 2 cartons a day   Dehydration Grade 0 Grade 0        weekly iv infusions received MG today. Grade 0       gets IVF Grade 0       IVF PRN Grade 0       gets ivf on thursday   Dermatitis Radiation Grade 1 Grade 1       dry intact using aquaphor Grade 1       using aquaphor Grade 1       aquaphor.  Rash that comes and goes with itching. Grade 1       aquaphor   Fatigue Grade 0 Grade 0 Grade 0 Grade 1       mild fatigue relieved with rest Grade 1   Nausea Grade 0 Grade 0 Grade 0 Grade 1       intermittent mild nausea. Antiemetics PRN Grade 1   Pain Grade 0 Grade 1       throat pain on occasion using oxycodone prn daily Grade 0 Grade 1       Painful swallowing 3/10. Taking oxycodone with relief Grade 1       sore throat with swallowing   Vomiting Grade 0  Grade 0 Grade 0 Grade 0   Dysphagia Grade 0 Grade 0 Grade 1       struggles with bread Grade 1       difficulty swallowing dry foods Grade 1   Esophagitis Grade 0  Grade 0 Grade 1 Grade 1   Mucositis Oral Grade 0 Grade 1       sore tongue noted/ BMX ordered Grade 1       irritation in spots; using BMX Grade 1       Mouth tenderness; using BMX and salt n' soda Grade 1       using bmx and salt and soda   Dry Mouth Grade 0 Grade 1       mild /using salt and soda rinse  Grade 1       Information on xylimelts and biotene provided. Grade 1       using xylimelts and biotene   Ear Pain Grade 0   Grade 1       intermittent ear ache Grade 0   Tinnitus Grade 0   Grade 0 Grade 0   Oral Pain Grade 0   Grade 1       BMX Grade 0   Edema Face Grade 0  Grade 0 Grade 0 Grade 0   Aspiration Grade 0  Grade 0 Grade 0 Grade 0   Hoarseness Grade 0  Grade 0 Grade 1 Grade 1   Voice Alteration Grade 0  Grade 0 Grade 1 Grade 1        Assessment / Plan:  The patient is tolerating radiation therapy as anticipated.  Continue per current treatment plan.

## 2025-04-28 ENCOUNTER — INFUSION (OUTPATIENT)
Dept: HEMATOLOGY/ONCOLOGY | Facility: CLINIC | Age: 75
End: 2025-04-28
Payer: MEDICARE

## 2025-04-28 ENCOUNTER — NUTRITION (OUTPATIENT)
Dept: HEMATOLOGY/ONCOLOGY | Facility: CLINIC | Age: 75
End: 2025-04-28

## 2025-04-28 ENCOUNTER — HOSPITAL ENCOUNTER (OUTPATIENT)
Dept: RADIATION ONCOLOGY | Facility: CLINIC | Age: 75
Setting detail: RADIATION/ONCOLOGY SERIES
Discharge: HOME | End: 2025-04-28
Payer: MEDICARE

## 2025-04-28 ENCOUNTER — OFFICE VISIT (OUTPATIENT)
Dept: PALLIATIVE MEDICINE | Facility: CLINIC | Age: 75
End: 2025-04-28
Payer: MEDICARE

## 2025-04-28 VITALS
DIASTOLIC BLOOD PRESSURE: 59 MMHG | WEIGHT: 91.27 LBS | BODY MASS INDEX: 18.52 KG/M2 | TEMPERATURE: 97.5 F | RESPIRATION RATE: 18 BRPM | OXYGEN SATURATION: 97 % | HEART RATE: 100 BPM | SYSTOLIC BLOOD PRESSURE: 107 MMHG

## 2025-04-28 VITALS — WEIGHT: 91.27 LBS | HEIGHT: 59 IN | BODY MASS INDEX: 18.4 KG/M2

## 2025-04-28 DIAGNOSIS — Z51.0 ENCOUNTER FOR ANTINEOPLASTIC RADIATION THERAPY: ICD-10-CM

## 2025-04-28 DIAGNOSIS — Z51.5 PALLIATIVE CARE ENCOUNTER: ICD-10-CM

## 2025-04-28 DIAGNOSIS — C01 CANCER OF BASE OF TONGUE (MULTI): ICD-10-CM

## 2025-04-28 DIAGNOSIS — C01 MALIGNANT NEOPLASM OF BASE OF TONGUE (MULTI): ICD-10-CM

## 2025-04-28 DIAGNOSIS — G89.3 CANCER RELATED PAIN: Primary | ICD-10-CM

## 2025-04-28 DIAGNOSIS — R11.2 NAUSEA AND VOMITING, UNSPECIFIED VOMITING TYPE: ICD-10-CM

## 2025-04-28 LAB
RAD ONC MSQ ACTUAL FRACTIONS DELIVERED: 24
RAD ONC MSQ ACTUAL SESSION DELIVERED DOSE: 212 CGRAY
RAD ONC MSQ ACTUAL TOTAL DOSE: 5088 CGRAY
RAD ONC MSQ ELAPSED DAYS: 33
RAD ONC MSQ LAST DATE: NORMAL
RAD ONC MSQ PRESCRIBED FRACTIONAL DOSE: 212 CGRAY
RAD ONC MSQ PRESCRIBED NUMBER OF FRACTIONS: 33
RAD ONC MSQ PRESCRIBED TECHNIQUE: NORMAL
RAD ONC MSQ PRESCRIBED TOTAL DOSE: 6996 CGRAY
RAD ONC MSQ PRESCRIPTION PATTERN COMMENT: NORMAL
RAD ONC MSQ START DATE: NORMAL
RAD ONC MSQ TREATMENT COURSE NUMBER: 1
RAD ONC MSQ TREATMENT SITE: NORMAL

## 2025-04-28 PROCEDURE — 99213 OFFICE O/P EST LOW 20 MIN: CPT

## 2025-04-28 PROCEDURE — 96361 HYDRATE IV INFUSION ADD-ON: CPT | Mod: INF

## 2025-04-28 PROCEDURE — 2500000004 HC RX 250 GENERAL PHARMACY W/ HCPCS (ALT 636 FOR OP/ED): Mod: JZ | Performed by: INTERNAL MEDICINE

## 2025-04-28 PROCEDURE — 96413 CHEMO IV INFUSION 1 HR: CPT

## 2025-04-28 PROCEDURE — 2500000004 HC RX 250 GENERAL PHARMACY W/ HCPCS (ALT 636 FOR OP/ED): Performed by: INTERNAL MEDICINE

## 2025-04-28 PROCEDURE — 1123F ACP DISCUSS/DSCN MKR DOCD: CPT

## 2025-04-28 PROCEDURE — 96375 TX/PRO/DX INJ NEW DRUG ADDON: CPT | Mod: INF

## 2025-04-28 PROCEDURE — 96367 TX/PROPH/DG ADDL SEQ IV INF: CPT

## 2025-04-28 PROCEDURE — 77386 HC INTENSITY-MODULATED RADIATION THERAPY (IMRT), COMPLEX: CPT

## 2025-04-28 RX ORDER — ALBUTEROL SULFATE 0.83 MG/ML
3 SOLUTION RESPIRATORY (INHALATION) AS NEEDED
Status: DISCONTINUED | OUTPATIENT
Start: 2025-04-28 | End: 2025-04-28 | Stop reason: HOSPADM

## 2025-04-28 RX ORDER — PROCHLORPERAZINE EDISYLATE 5 MG/ML
10 INJECTION INTRAMUSCULAR; INTRAVENOUS EVERY 6 HOURS PRN
Status: DISCONTINUED | OUTPATIENT
Start: 2025-04-28 | End: 2025-04-28 | Stop reason: HOSPADM

## 2025-04-28 RX ORDER — PROCHLORPERAZINE MALEATE 10 MG
10 TABLET ORAL EVERY 6 HOURS PRN
Status: DISCONTINUED | OUTPATIENT
Start: 2025-04-28 | End: 2025-04-28 | Stop reason: HOSPADM

## 2025-04-28 RX ORDER — DIPHENHYDRAMINE HYDROCHLORIDE 50 MG/ML
50 INJECTION, SOLUTION INTRAMUSCULAR; INTRAVENOUS AS NEEDED
Status: DISCONTINUED | OUTPATIENT
Start: 2025-04-28 | End: 2025-04-28 | Stop reason: HOSPADM

## 2025-04-28 RX ORDER — PALONOSETRON 0.05 MG/ML
0.25 INJECTION, SOLUTION INTRAVENOUS ONCE
Status: COMPLETED | OUTPATIENT
Start: 2025-04-28 | End: 2025-04-28

## 2025-04-28 RX ORDER — EPINEPHRINE 0.3 MG/.3ML
0.3 INJECTION SUBCUTANEOUS EVERY 5 MIN PRN
Status: DISCONTINUED | OUTPATIENT
Start: 2025-04-28 | End: 2025-04-28 | Stop reason: HOSPADM

## 2025-04-28 RX ORDER — FAMOTIDINE 10 MG/ML
20 INJECTION, SOLUTION INTRAVENOUS ONCE AS NEEDED
Status: DISCONTINUED | OUTPATIENT
Start: 2025-04-28 | End: 2025-04-28 | Stop reason: HOSPADM

## 2025-04-28 RX ADMIN — CISPLATIN 40 MG: 1 INJECTION, SOLUTION INTRAVENOUS at 11:05

## 2025-04-28 RX ADMIN — POTASSIUM CHLORIDE 999 ML/HR: 2 INJECTION, SOLUTION, CONCENTRATE INTRAVENOUS at 08:54

## 2025-04-28 RX ADMIN — SODIUM CHLORIDE 1000 ML: 9 INJECTION, SOLUTION INTRAVENOUS at 12:13

## 2025-04-28 RX ADMIN — DEXAMETHASONE SODIUM PHOSPHATE 12 MG: 10 INJECTION, SOLUTION INTRAMUSCULAR; INTRAVENOUS at 10:01

## 2025-04-28 RX ADMIN — FOSAPREPITANT 150 MG: 150 INJECTION, POWDER, LYOPHILIZED, FOR SOLUTION INTRAVENOUS at 10:23

## 2025-04-28 RX ADMIN — PALONOSETRON 0.25 MG: 0.05 INJECTION, SOLUTION INTRAVENOUS at 09:50

## 2025-04-28 ASSESSMENT — PAIN SCALES - GENERAL: PAINLEVEL_OUTOF10: 0-NO PAIN

## 2025-04-28 NOTE — PROGRESS NOTES
SUPPORTIVE AND PALLIATIVE ONCOLOGY FOLLOW-UP - OUTPATIENT      SERVICE DATE: 4/28/2025    Referred by:  ELLYN Ochoa  Medical Oncologist: Faith Denson MD   Radiation Oncologist: Rahel Del Angel MD  Primary Physician: Parminder Russell  [phone]    REASON FOR CONSULT/CHIEF CONSULT COMPLAINT: pain management and Introduction to Supportive and Palliative Oncology Services    Subjective   HISTORY OF PRESENT ILLNESS: Annette Fernandez is a 74 y.o. female who presents with a PMH of HLD, HTN, and BOT SCC diagnosed 02/2025. She was started on chemoradiation with Cisplatin 3/26.     Pain Assessment:  Pain Score: 2/10  Location: throat    Symptom Assessment:  Pain:somewhat aching pain in her throat. The pain is worse with swallowing. It is intermittent. She has been taking oxycodone 2-3 times per day with good relief. At its worst, her pain has been a 3/10. She finds the oxycodone very effective at controlling her pain.    Headache: none  Dizziness:none  Lack of energy: somewhat - worse over this last week  Difficulty sleeping: a little was hoping she would sleep more  Worrying: a little  Anxiety: a little  Depression: a little  Pain in mouth/swallowing: very much  Dry mouth: none  Taste changes: somewhat eating less by mouth due to taste changes  Shortness of breath: none  Lack of appetite: somewhat she is taking in 2-2.5 cans of isosource per day. She sometimes can get in 3 cans which is the goal. She is not eating as much due to pain and taste changes.   Nausea: a little rare  Vomiting: none  Constipation: none  Diarrhea: none  Sore muscles: none  Numbness or tingling in hands/feet/other: none  Weight loss: a little  Other: a little      Information obtained from: chart review, interview of patient, and interview of family  ______________________________________________________________________     Oncology History   Cancer of base of tongue (Multi)   3/3/2025 Initial Diagnosis    Cancer of base of tongue  (Multi)     3/3/2025 Cancer Staged    Staging form: Pharynx - P16 Negative Oropharynx, AJCC 8th Edition, Clinical: Stage BRIDGETTE (cT4a, cN1, cM0, p16-) - Signed by Rahel Del Angel MD on 3/3/2025     3/26/2025 -  Chemotherapy    CISplatin with Concurrent Radiation (Weekly), 7 Week Cycle         Past Medical History:   Diagnosis Date    Aspiration into airway 07/31/2023    HLD (hyperlipidemia)     HTN (hypertension)     Mass of tongue     Other specified health status     No pertinent past medical history    Stiffness of unspecified hand, not elsewhere classified     Joint stiffness of hand     Past Surgical History:   Procedure Laterality Date    OTHER SURGICAL HISTORY  06/14/2019    Ovarian cystectomy    OTHER SURGICAL HISTORY  06/14/2019    Cyst excision     Family History   Problem Relation Name Age of Onset    Heart attack Father          SOCIAL HISTORY  Children 2, Grandchildren 2, Support system friends and family, Employment worked as an  - retired, and Hobbies reading, art, and movies   Social History:  reports that she has been smoking cigarettes. She has been exposed to tobacco smoke. She has quit using smokeless tobacco. She reports current alcohol use. She reports that she does not use drugs.      REVIEW OF SYSTEMS  Review of systems negative unless noted in HPI.       Objective     Current Outpatient Medications   Medication Instructions    atorvastatin (LIPITOR) 80 mg, oral, Daily RT    DentaGeL 1.1 % gel apply with toothbrush OR flouoride trays    dexAMETHasone (DECADRON) 8 mg, oral, Daily, For 3 days per week starting the day after treatment.    magic mouthwash (lidocaine, diphenhydrAMINE, Maalox 1:1:1) 10 mL, Swish & Spit, Every 6 hours PRN    metoprolol tartrate (LOPRESSOR) 25 mg, oral, 2 times daily    multivitamin tablet 1 tablet, Daily    nicotine (Nicoderm CQ) 7 mg/24 hr patch 1 patch, transdermal, Every 24 hours    NON FORMULARY 1 each, Daily    OLANZapine (ZYPREXA) 5 mg, oral, Nightly     ondansetron (ZOFRAN) 8 mg, oral, Every 8 hours PRN    oxyCODONE (ROXICODONE) 5 mg, oral, Every 4 hours PRN    pantoprazole (PROTONIX) 40 mg, oral, Daily, Do not crush, chew, or split.    polyethylene glycol (GLYCOLAX, MIRALAX) 17 g, g-tube, Daily    prochlorperazine (COMPAZINE) 10 mg, oral, Every 6 hours PRN    thyroid (pork) (ARMOUR THYROID) 15 mg, oral, Daily       Allergies:   Allergies   Allergen Reactions    Wellbutrin [Bupropion Hcl] Other     Hospital Outpatient Visit on 04/28/2025   Component Date Value Ref Range Status    Treatment Site 04/28/2025 BOT   Final    Course Number 04/28/2025 1   Final    Prescribed Fractional Dose 04/28/2025 212  cGray Final    Prescribed Total Dose 04/28/2025 6,996  cGray Final    Actual Fractions Delivered 04/28/2025 24   Final    Prescription Pattern Comment 04/28/2025 w/ 59.4 to IR and 54.12 to LR   Final    Actual Session Delivered Dose 04/28/2025 212  cGray Final    Actual Total Dose 04/28/2025 5,088  cGray Final    Prescribed Technique 04/28/2025 VMAT   Final    Elapsed Days 04/28/2025 33   Final    Start Date 04/28/2025 3/26/2025   Final    Last Date 04/28/2025 4/28/2025   Final    Prescribed Number of Fractions 04/28/2025 33   Final   Hospital Outpatient Visit on 04/25/2025   Component Date Value Ref Range Status    Treatment Site 04/25/2025 BOT   Final    Course Number 04/25/2025 1   Final    Prescribed Fractional Dose 04/25/2025 212  cGray Final    Prescribed Total Dose 04/25/2025 6,996  cGray Final    Actual Fractions Delivered 04/25/2025 23   Final    Prescription Pattern Comment 04/25/2025 w/ 59.4 to IR and 54.12 to LR   Final    Actual Session Delivered Dose 04/25/2025 212  cGray Final    Actual Total Dose 04/25/2025 4,876  cGray Final    Prescribed Technique 04/25/2025 VMAT   Final    Elapsed Days 04/25/2025 30   Final    Start Date 04/25/2025 3/26/2025   Final    Last Date 04/25/2025 4/25/2025   Final    Prescribed Number of Fractions 04/25/2025 33   Final    Hospital Outpatient Visit on 04/24/2025   Component Date Value Ref Range Status    Treatment Site 04/24/2025 BOT   Final    Course Number 04/24/2025 1   Final    Prescribed Fractional Dose 04/24/2025 212  cGray Final    Prescribed Total Dose 04/24/2025 6,996  cGray Final    Actual Fractions Delivered 04/24/2025 22   Final    Prescription Pattern Comment 04/24/2025 w/ 59.4 to IR and 54.12 to LR   Final    Actual Session Delivered Dose 04/24/2025 212  cGray Final    Actual Total Dose 04/24/2025 4,664  cGray Final    Prescribed Technique 04/24/2025 VMAT   Final    Elapsed Days 04/24/2025 29   Final    Start Date 04/24/2025 3/26/2025   Final    Last Date 04/24/2025 4/24/2025   Final    Prescribed Number of Fractions 04/24/2025 33   Final   Lab on 04/24/2025   Component Date Value Ref Range Status    WBC 04/24/2025 4.7  4.4 - 11.3 x10*3/uL Final    nRBC 04/24/2025    Final    Not Measured    RBC 04/24/2025 3.08 (L)  4.00 - 5.20 x10*6/uL Final    Hemoglobin 04/24/2025 9.6 (L)  12.0 - 16.0 g/dL Final    Hematocrit 04/24/2025 29.2 (L)  36.0 - 46.0 % Final    MCV 04/24/2025 95  80 - 100 fL Final    MCH 04/24/2025 31.2  26.0 - 34.0 pg Final    MCHC 04/24/2025 32.9  32.0 - 36.0 g/dL Final    RDW 04/24/2025 13.2  11.5 - 14.5 % Final    Platelets 04/24/2025 118 (L)  150 - 450 x10*3/uL Final    Neutrophils % 04/24/2025 82.6  40.0 - 80.0 % Final    Immature Granulocytes %, Automated 04/24/2025 0.0  0.0 - 0.9 % Final    Immature Granulocyte Count (IG) includes promyelocytes, myelocytes and metamyelocytes but does not include bands. Percent differential counts (%) should be interpreted in the context of the absolute cell counts (cells/UL).    Lymphocytes % 04/24/2025 8.6  13.0 - 44.0 % Final    Monocytes % 04/24/2025 8.6  2.0 - 10.0 % Final    Eosinophils % 04/24/2025 0.2  0.0 - 6.0 % Final    Basophils % 04/24/2025 0.0  0.0 - 2.0 % Final    Neutrophils Absolute 04/24/2025 3.84  1.60 - 5.50 x10*3/uL Final    Percent differential  counts (%) should be interpreted in the context of the absolute cell counts (cells/uL).    Immature Granulocytes Absolute, Au* 04/24/2025 0.00  0.00 - 0.50 x10*3/uL Final    Lymphocytes Absolute 04/24/2025 0.40 (L)  0.80 - 3.00 x10*3/uL Final    Monocytes Absolute 04/24/2025 0.40  0.05 - 0.80 x10*3/uL Final    Eosinophils Absolute 04/24/2025 0.01  0.00 - 0.40 x10*3/uL Final    Basophils Absolute 04/24/2025 0.00  0.00 - 0.10 x10*3/uL Final    Glucose 04/24/2025 89  74 - 99 mg/dL Final    Sodium 04/24/2025 133 (L)  136 - 145 mmol/L Final    Potassium 04/24/2025 4.5  3.5 - 5.3 mmol/L Final    Chloride 04/24/2025 99  98 - 107 mmol/L Final    Bicarbonate 04/24/2025 28  21 - 32 mmol/L Final    Anion Gap 04/24/2025 11  10 - 20 mmol/L Final    Urea Nitrogen 04/24/2025 25 (H)  6 - 23 mg/dL Final    Creatinine 04/24/2025 0.65  0.50 - 1.05 mg/dL Final    eGFR 04/24/2025 >90  >60 mL/min/1.73m*2 Final    Calculations of estimated GFR are performed using the 2021 CKD-EPI Study Refit equation without the race variable for the IDMS-Traceable creatinine methods.  https://jasn.asnjournals.org/content/early/2021/09/22/ASN.6184143695    Calcium 04/24/2025 8.4 (L)  8.6 - 10.3 mg/dL Final    Albumin 04/24/2025 3.6  3.4 - 5.0 g/dL Final    Alkaline Phosphatase 04/24/2025 42  33 - 136 U/L Final    Total Protein 04/24/2025 5.9 (L)  6.4 - 8.2 g/dL Final    AST 04/24/2025 17  9 - 39 U/L Final    Bilirubin, Total 04/24/2025 0.3  0.0 - 1.2 mg/dL Final    ALT 04/24/2025 25  7 - 45 U/L Final    Patients treated with Sulfasalazine may generate falsely decreased results for ALT.    Magnesium 04/24/2025 1.71  1.60 - 2.40 mg/dL Final   Hospital Outpatient Visit on 04/23/2025   Component Date Value Ref Range Status    Treatment Site 04/23/2025 BOT   Final    Course Number 04/23/2025 1   Final    Prescribed Fractional Dose 04/23/2025 212  cGray Final    Prescribed Total Dose 04/23/2025 6,996  cGray Final    Actual Fractions Delivered 04/23/2025 21    "Final    Prescription Pattern Comment 04/23/2025 w/ 59.4 to IR and 54.12 to LR   Final    Actual Session Delivered Dose 04/23/2025 212  cGray Final    Actual Total Dose 04/23/2025 4,452  cGray Final    Prescribed Technique 04/23/2025 VMAT   Final    Elapsed Days 04/23/2025 28   Final    Start Date 04/23/2025 3/26/2025   Final    Last Date 04/23/2025 4/23/2025   Final    Prescribed Number of Fractions 04/23/2025 33   Final   Hospital Outpatient Visit on 04/22/2025   Component Date Value Ref Range Status    Treatment Site 04/22/2025 BOT   Final    Course Number 04/22/2025 1   Final    Prescribed Fractional Dose 04/22/2025 212  cGray Final    Prescribed Total Dose 04/22/2025 6,996  cGray Final    Actual Fractions Delivered 04/22/2025 20   Final    Prescription Pattern Comment 04/22/2025 w/ 59.4 to IR and 54.12 to LR   Final    Actual Session Delivered Dose 04/22/2025 212  cGray Final    Actual Total Dose 04/22/2025 4,240  cGray Final    Prescribed Technique 04/22/2025 VMAT   Final    Elapsed Days 04/22/2025 27   Final    Start Date 04/22/2025 3/26/2025   Final    Last Date 04/22/2025 4/22/2025   Final    Prescribed Number of Fractions 04/22/2025 33   Final        PHYSICAL EXAMINATION  Vital Signs:       4/14/2025     1:00 PM 4/17/2025     1:43 PM 4/21/2025     8:30 AM 4/22/2025     9:39 AM 4/24/2025    10:07 AM 4/25/2025     1:25 PM 4/28/2025     8:28 AM   Vitals   Systolic  113 101 108 120 134 107   Diastolic  61 65 60 61 67 59   BP Location  Left arm Left arm Right arm Left arm Right arm    Heart Rate  67 73 78 72 76 100   Temp  36 °C (96.8 °F) 36.1 °C (97 °F) 35.7 °C (96.3 °F) 36.4 °C (97.5 °F) 35.6 °C (96.1 °F) 36.4 °C (97.5 °F)   Resp  16 18 18 16 18 18   Height 1.495 m (4' 10.86\")         Weight (lb) 94.58 96.67 93.2 97 96.6 95.02 91.27   BMI 19.19 kg/m2 19.62 kg/m2 18.92 kg/m2 19.69 kg/m2 19.61 kg/m2 19.28 kg/m2 18.52 kg/m2   BSA (m2) 1.33 m2 1.35 m2 1.33 m2 1.35 m2 1.35 m2 1.34 m2 1.31 m2   Visit Report Report  " Report    Report  Report     ;Vital signs reviewed       Physical Exam  HENT:      Head: Normocephalic.   Eyes:      Pupils: Pupils are equal, round, and reactive to light.   Pulmonary:      Effort: Pulmonary effort is normal.   Musculoskeletal:         General: Normal range of motion.   Neurological:      Mental Status: She is alert and oriented to person, place, and time.   Psychiatric:         Mood and Affect: Mood normal.         Behavior: Behavior normal.         ASSESSMENT/PLAN    Pain  Pain is: cancer related pain  Type: somatic  Pain control: well-controlled  Home regimen:   - Continue Oxycodone 5 mg/5mL - taking 5 mg every 4-6 hours as needed for pain  - Tylenol has not been effective for pain  - Continue BMX up to 4 times per day as needed    Opioid Use  Medication Management:   - OARRS report reviewed with no aberrant behavior; consistent with  prescriptions/records and patient history  - MED 22.5.  Overdose Risk Score 070.   This has been discussed with patient.   - We will continue to closely monitor the patient for signs of prescription misuse including UDS, OARRS review and subjective reports at each visit.  - No concurrent benzodiazepine use   - I am a provider who either is or has consulted and collaborated with a provider certified in Hospice and Palliative Medicine and have conducted a face-face visit and examination for this patient.  - Routine Urine Drug Screen MED <80-  - Controlled Substance Agreement MED <80  - Specifically discussed that controlled substance prescriptions will only be provided by our group as outlined in the completed agreement  - Prescribed naloxone n/a  - Red Flags: none     Nausea   Intermittent nausea without vomiting related to chemotherapy   - Continue Ondansetron 8 mg every 8 hours as needed  - Continue Prochlorperazine 10 mg every 6 hours as needed  - Continue Olanzapine 5 mg nightly as prescribed by oncology    Constipation   At risk for constipation related to  opioids,  currently not constipated   Usual bowel pattern: daily   Current regimen:   - Continue Miralax 17 g daily  - If constipation develops, start Senna 10 mL BID  - If no BM within 48-72 hours, start MOM 8% every 6 hours as needed    Decreased appetite  Related to malignancy  Nutrition following  Weight loss 4 pounds in the last 4 days  Current regimen:    - Goal is 3 cans of isosource per day - reports getting in 2-3 cans per day  - Continue to drink ensure as tolerated  - Goal of 64 oz of fluids per day - majority through her peg tube    Advance Directives  Existence of Advance Directives:Yes, but NOT documented in medical record  Decision maker: HCPOA is daughter Daisy Zazueta  Code Status: Full code    Next Follow-Up Visit:  Return to clinic in 2-3 week     Signature and billing  Thank you for allowing us to participate in the care of this patient. Recommendations will be communicated back to the consulting service by way of shared electronic medical record or face-to-face.    Medical complexity was moderate level due to due to complexity of problems, extensive data review, and high risk of management/treatment.  Time was spent on the following: Prep Time, Time Directly with Patient/Family/Caregiver, Documentation Time. Total time spent: 20      DATA   Diagnostic tests and information reviewed for today's visit:  Most recent labs, Most recent imaging, Medications       Some elements copied from oncology note on 3/13/25, the elements have been updated and all reflect current decision making from today, 4/28/2025.      Plan of Care discussed with: Patient and Family/Significant Other: grand daughter      SIGNATURE: ELLYN Atr-CNP    Contact information:  Supportive and Palliative Oncology  Monday-Friday 8 AM-5 PM  Phone:  [phone], press option #5, then option #1.   Or Epic Secure Chat

## 2025-04-28 NOTE — SIGNIFICANT EVENT
04/28/25 0843   Prechemo Checklist   Has the patient been in the hospital, ED, or urgent care since last date of service No   Chemo/Immuno Consent Completed and Signed Yes   Protocol/Indications Verified Yes   Confirmed to previous date/time of medication Yes   Compared to previous dose Yes   All medications are dated accurately Yes   Pregnancy Test Negative Not applicable   Parameters Met Yes   Provider Notified (!) No   BSA/Weight-Height Verified Yes   Dose Calculations Verified (current, total, cumulative) Yes

## 2025-04-28 NOTE — PROGRESS NOTES
"NUTRITION Follow-Up NOTE    Nutrition Assessment     Reason for Visit:  Annette Fernandez is a 74 y.o. female seen today during her infusion at [facility].  Patient receiving CISplatin with Concurrent Radiation (Weekly), 7 Week Cycle for diagnosis of base of tongue SCC.  PEG tube placed 2/21/2025.      Patient Active Problem List   Diagnosis    HTN (hypertension), benign    Hyperlipidemia    Acquired hypothyroidism    Peripheral edema    Thyroid nodule    Esophageal necrosis    Anemia    Encounter for subsequent annual wellness visit (AWV) in Medicare patient    Tobacco use disorder    Other primary ovarian failure    Contact dermatitis and eczema    Mass of tongue    Cancer of base of tongue (Multi)    Malignant neoplasm of base of tongue (Multi)    Oropharyngeal dysphagia    Encounter for antineoplastic chemotherapy    Cancer associated pain    Odynophagia    Inadequate oral nutritional intake    Thrombocytopenia (CMS-HCC)       Nutrition Significant Labs:  Lab Results   Component Value Date/Time    GLUCOSE 89 04/24/2025 1031     (L) 04/24/2025 1031    K 4.5 04/24/2025 1031    CL 99 04/24/2025 1031    CO2 28 04/24/2025 1031    ANIONGAP 11 04/24/2025 1031    BUN 25 (H) 04/24/2025 1031    CREATININE 0.65 04/24/2025 1031    EGFR >90 04/24/2025 1031    CALCIUM 8.4 (L) 04/24/2025 1031    ALBUMIN 3.6 04/24/2025 1031    ALKPHOS 42 04/24/2025 1031    PROT 5.9 (L) 04/24/2025 1031    AST 17 04/24/2025 1031    BILITOT 0.3 04/24/2025 1031    ALT 25 04/24/2025 1031    MG 1.71 04/24/2025 1031    PHOS 2.5 02/25/2025 0611     No results found for: \"VITD25\"      Anthropometrics:  Height: 149.5 cm (4' 10.86\")   Weight: (!) 41.4 kg (91 lb 4.3 oz)   BMI (Calculated): 18.52    IBW/kg (Dietitian Calculated): 40.9 kg Percent of IBW: 101 %          Weight History:   Daily Weight  04/28/25 : (!) 41.4 kg (91 lb 4.3 oz)  04/28/25 : (!) 41.4 kg (91 lb 4.3 oz)  04/25/25 : (!) 43.1 kg (95 lb 0.3 oz)  04/24/25 : (!) 43.8 kg (96 lb 9.6 " oz)  04/22/25 : (!) 44 kg (97 lb)  04/21/25 : (!) 42.3 kg (93 lb 3.2 oz)  04/17/25 : (!) 43.9 kg (96 lb 10.8 oz)  04/14/25 : (!) 42.9 kg (94 lb 9.2 oz)  04/14/25 : (!) 42.9 kg (94 lb 9.2 oz)  04/11/25 : (!) 44.7 kg (98 lb 7 oz)    Weight Change %:  Weight History / % Weight Change: additional 2 lb wt loss noted over the last week. 5 lb (5%) wt loss in the last month  Significant Weight Loss: Yes  Interpretation of Weight Loss: 5% in 1 month    Nutrition History:  Food & Nutrition History: Patient reported more soreness in her throat, more thicker phlegm.  She stated the weekends are rough, all she wants to do is sleep.  Tries to eat orally, but not specific on what she is eating or how much.  Food Allergies:    Food Intolerances:    Vitamin/mineral intake:       Herbal supplements:    Medication and Complementary/Alternative Medicine Use:    Dentition:    Sleep Habits:      Diet Recall:  Meal 1:    Snack 1:    Meal 2:    Snack 2:    Meal 3:    Snack 3:    Food Variety:    Oral Nutrition Supplement Use:          Fluid Intake: Still drinking water by mouth, stated that is harder to do as well recently.  Will do water flushes via PEG tube throughout the day.  Energy Intake: Poor < 50 %    Food Preparation:  Cooking:    Grocery Shopping:    Dining Out:      Enteral Nutrition History:   Enteral Nutrition Formula/Solution: Isosource 1.5  Method of TF administration: Bolus enteral nutrition feeding  TF infusion rate: per pt trying to get in 3 cartons per day (this provides 1125 calories, 51g protein, 573ml FW)  Feeding Tube Flush: 60ml pre/post bolus 3 times per day; will add extra water into formula as she stated it is thick and takes longer to go in then she likes. (provides 360 ml water plus little extra she adds to formula.)     Medications:  Current Outpatient Medications   Medication Instructions    atorvastatin (LIPITOR) 80 mg, oral, Daily RT    DentaGeL 1.1 % gel apply with toothbrush OR flouoride trays     dexAMETHasone (DECADRON) 8 mg, oral, Daily, For 3 days per week starting the day after treatment.    magic mouthwash (lidocaine, diphenhydrAMINE, Maalox 1:1:1) 10 mL, Swish & Spit, Every 6 hours PRN    metoprolol tartrate (LOPRESSOR) 25 mg, oral, 2 times daily    multivitamin tablet 1 tablet, Daily    nicotine (Nicoderm CQ) 7 mg/24 hr patch 1 patch, transdermal, Every 24 hours    NON FORMULARY 1 each, Daily    OLANZapine (ZYPREXA) 5 mg, oral, Nightly    ondansetron (ZOFRAN) 8 mg, oral, Every 8 hours PRN    oxyCODONE (ROXICODONE) 5 mg, oral, Every 4 hours PRN    pantoprazole (PROTONIX) 40 mg, oral, Daily, Do not crush, chew, or split.    polyethylene glycol (GLYCOLAX, MIRALAX) 17 g, g-tube, Daily    prochlorperazine (COMPAZINE) 10 mg, oral, Every 6 hours PRN    thyroid (pork) (ARMOUR THYROID) 15 mg, oral, Daily       Nutrition Focused Physical Exam Findings:    Subcutaneous Fat Loss:   Orbital Fat Pads: Mild-Moderate (slight dark circles and slight hollowing)  Buccal Fat Pads: Mild-Moderate (flat cheeks, minimal bounce)    Muscle Wasting:  Temporalis: Mild-Moderate (slight depression)    Physical Findings:  Skin: Positive  Positive Skin Findings:  (Neck reddened from radiation therapy.)  Digestive System Findings: Constipation, Diarrhea (per pt a little)  Mouth Findings: Dysphagia, Odynophagia, Dysgeusia       Estimated Needs:       Total Energy Estimated Needs in 24 hours (kCal): 1500 kCal  Method for Estimating Needs: 0179-5854 kcals/d (35-40 kcals/kg)  Total Protein Estimated Needs in 24 Hours (g): 52 g  Method for Estimating 24 Hour Protein Needs: 52-65 g protein/day (1.2-1.5g protein/kg)  Total Fluid Estimated Needs in 24 Hours (mL): 1500 mL  Method for Estimating 24 Hour Fluid Needs: or 1ml/kcal             Nutrition Diagnosis   Malnutrition Diagnosis  Patient has Malnutrition Diagnosis: Yes  Diagnosis Status: Active  Malnutrition Diagnosis: Moderate malnutrition related to chronic disease or condition  Related  to: decreased oral intake prior to PEG tube placement.  As Evidenced by: <75% of estimated energy needs for >= 1 month; >20% wt loss in past year    Nutrition Diagnosis  Patient has Nutrition Diagnosis: Yes  Diagnosis Status (1): Active  Nutrition Diagnosis 1: Increased nutrient needs  Related to (1): increased metabolic demand  As Evidenced by (1): chemoRT for diagnosis of BOT SCC.       Nutrition Interventions/Recommendations   Nutrition Prescription: Oral nutrition, Enteral nutrition Management of nutrition impact symptoms; adjust diet consistency as tolerated to continue swallow function as safe; PEG tube to meet 100% of nutritional needs; maintain weight    Nutrition Interventions:   Food and Nutrient Delivery: Meals & Snacks: Energy-modified diet, Fluid-modified diet, Protein-modified diet, Texture-Modified Diet  Goals: Oral intake as safe to swallow.  Enteral Nutrition: Management of enteral nutrition site care, Management of flushing of feeding tube, Management of schedule of enteral nutrition  Goals: Encouraged pt to increase as tolerated to goal of 4 cartons per day.   Flush with 60ml water before and after each feed. Additional water intake either PO or via PEG of 380ml per day.  Feeding Assistance Management: Mouth care management  Goals: Oral care; can use baking soda/ salt water swish; may try carbonating beverage (tonic water, carobonated water) swish and spit to aid in breaking up thick phlegm as needed.  Other:: Hydration  Goals: Encouraged adequate fluid intake.  Recommend extra 380ml water flush either via PEG tube or orally as tolerated. Discussed electrolyte replacements as needed throughout tx.     Coordination of Care: Collaboration and referral of nutrition care: Collaboration and Referral of Nutrition Care, Referral by nutrition professional to community agencies and programs  Goals: PEG tube feeding and supplies comes from [facility].     Nutrition Education:   Nutrition Education  Content: Content related nutrition education   Discussed PEG tube feedings and nutrition throughout cancer tx.    Encouraged to increase TF to goal of 4 cartons per day, and meet estimated hydration needs either PO or via PEG tube flushes.           Nutrition Monitoring and Evaluation   Food and Nutrient Intake  Monitoring and Evaluation Plan: Fluid intake, Enternal and parenternal nutrition intake determination  Fluid Intake: Estimated fluid intake  Criteria: Maintain hydration of at least 64 oz per day - coming from TF,  water flushes PEG tube, and PO fluid intake.  Enteral and Parenteral Nutrition Intake Determination: Enteral nutrition formula/solution, Enteral nutrition intake  Criteria: meet 100% of estimated nutritional needs via PEG tube feedings    Anthropometric measurements  Monitoring and Evaluation Plan: Weight  Body Weight: Measured body weight  Criteria: Maintain weight    Biochemical Data, Medical Tests and Procedures  Monitoring and Evaluation Plan: Electrolyte/renal panel  Criteria: labs WNL              Follow Up: Planned follow up visit: will continue to follow up throughout Johanna Roldan treatments.

## 2025-04-28 NOTE — SIGNIFICANT EVENT
04/28/25 0843   Prechemo Checklist   Has the patient been in the hospital, ED, or urgent care since last date of service No   Chemo/Immuno Consent Completed and Signed Yes   Protocol/Indications Verified Yes   Confirmed to previous date/time of medication Yes   Compared to previous dose Yes   All medications are dated accurately Yes   Pregnancy Test Negative Not applicable   Parameters Met (!) No  (ok to treat with CRCl)   Provider Notified (!) No   BSA/Weight-Height Verified Yes   Dose Calculations Verified (current, total, cumulative) Yes

## 2025-04-29 ENCOUNTER — HOSPITAL ENCOUNTER (OUTPATIENT)
Dept: RADIATION ONCOLOGY | Facility: CLINIC | Age: 75
Setting detail: RADIATION/ONCOLOGY SERIES
Discharge: HOME | End: 2025-04-29
Payer: MEDICARE

## 2025-04-29 ENCOUNTER — APPOINTMENT (OUTPATIENT)
Facility: CLINIC | Age: 75
End: 2025-04-29
Payer: MEDICARE

## 2025-04-29 DIAGNOSIS — Z51.0 ENCOUNTER FOR ANTINEOPLASTIC RADIATION THERAPY: ICD-10-CM

## 2025-04-29 DIAGNOSIS — C01 MALIGNANT NEOPLASM OF BASE OF TONGUE (MULTI): ICD-10-CM

## 2025-04-29 LAB
RAD ONC MSQ ACTUAL FRACTIONS DELIVERED: 25
RAD ONC MSQ ACTUAL SESSION DELIVERED DOSE: 212 CGRAY
RAD ONC MSQ ACTUAL TOTAL DOSE: 5300 CGRAY
RAD ONC MSQ ELAPSED DAYS: 34
RAD ONC MSQ LAST DATE: NORMAL
RAD ONC MSQ PRESCRIBED FRACTIONAL DOSE: 212 CGRAY
RAD ONC MSQ PRESCRIBED NUMBER OF FRACTIONS: 33
RAD ONC MSQ PRESCRIBED TECHNIQUE: NORMAL
RAD ONC MSQ PRESCRIBED TOTAL DOSE: 6996 CGRAY
RAD ONC MSQ PRESCRIPTION PATTERN COMMENT: NORMAL
RAD ONC MSQ START DATE: NORMAL
RAD ONC MSQ TREATMENT COURSE NUMBER: 1
RAD ONC MSQ TREATMENT SITE: NORMAL

## 2025-04-29 PROCEDURE — 77386 HC INTENSITY-MODULATED RADIATION THERAPY (IMRT), COMPLEX: CPT

## 2025-04-30 ENCOUNTER — APPOINTMENT (OUTPATIENT)
Dept: AUDIOLOGY | Facility: CLINIC | Age: 75
End: 2025-04-30
Payer: MEDICARE

## 2025-04-30 ENCOUNTER — HOSPITAL ENCOUNTER (OUTPATIENT)
Dept: RADIATION ONCOLOGY | Facility: CLINIC | Age: 75
Setting detail: RADIATION/ONCOLOGY SERIES
Discharge: HOME | End: 2025-04-30
Payer: MEDICARE

## 2025-04-30 DIAGNOSIS — C01 MALIGNANT NEOPLASM OF BASE OF TONGUE (MULTI): ICD-10-CM

## 2025-04-30 DIAGNOSIS — Z51.0 ENCOUNTER FOR ANTINEOPLASTIC RADIATION THERAPY: ICD-10-CM

## 2025-04-30 LAB
RAD ONC MSQ ACTUAL FRACTIONS DELIVERED: 26
RAD ONC MSQ ACTUAL SESSION DELIVERED DOSE: 212 CGRAY
RAD ONC MSQ ACTUAL TOTAL DOSE: 5512 CGRAY
RAD ONC MSQ ELAPSED DAYS: 35
RAD ONC MSQ LAST DATE: NORMAL
RAD ONC MSQ PRESCRIBED FRACTIONAL DOSE: 212 CGRAY
RAD ONC MSQ PRESCRIBED NUMBER OF FRACTIONS: 33
RAD ONC MSQ PRESCRIBED TECHNIQUE: NORMAL
RAD ONC MSQ PRESCRIBED TOTAL DOSE: 6996 CGRAY
RAD ONC MSQ PRESCRIPTION PATTERN COMMENT: NORMAL
RAD ONC MSQ START DATE: NORMAL
RAD ONC MSQ TREATMENT COURSE NUMBER: 1
RAD ONC MSQ TREATMENT SITE: NORMAL

## 2025-04-30 PROCEDURE — 77336 RADIATION PHYSICS CONSULT: CPT

## 2025-04-30 PROCEDURE — 77386 HC INTENSITY-MODULATED RADIATION THERAPY (IMRT), COMPLEX: CPT

## 2025-05-01 ENCOUNTER — HOSPITAL ENCOUNTER (OUTPATIENT)
Dept: RADIATION ONCOLOGY | Facility: CLINIC | Age: 75
Setting detail: RADIATION/ONCOLOGY SERIES
Discharge: HOME | End: 2025-05-01
Payer: MEDICARE

## 2025-05-01 ENCOUNTER — LAB (OUTPATIENT)
Dept: LAB | Facility: CLINIC | Age: 75
End: 2025-05-01
Payer: MEDICARE

## 2025-05-01 ENCOUNTER — INFUSION (OUTPATIENT)
Dept: HEMATOLOGY/ONCOLOGY | Facility: CLINIC | Age: 75
End: 2025-05-01
Payer: MEDICARE

## 2025-05-01 ENCOUNTER — APPOINTMENT (OUTPATIENT)
Dept: HEMATOLOGY/ONCOLOGY | Facility: CLINIC | Age: 75
End: 2025-05-01
Payer: MEDICARE

## 2025-05-01 ENCOUNTER — OFFICE VISIT (OUTPATIENT)
Dept: HEMATOLOGY/ONCOLOGY | Facility: CLINIC | Age: 75
End: 2025-05-01
Payer: MEDICARE

## 2025-05-01 VITALS
BODY MASS INDEX: 19.06 KG/M2 | OXYGEN SATURATION: 98 % | HEART RATE: 68 BPM | TEMPERATURE: 98.4 F | SYSTOLIC BLOOD PRESSURE: 124 MMHG | DIASTOLIC BLOOD PRESSURE: 66 MMHG | WEIGHT: 93.9 LBS | RESPIRATION RATE: 14 BRPM

## 2025-05-01 DIAGNOSIS — E83.42 HYPOMAGNESEMIA: ICD-10-CM

## 2025-05-01 DIAGNOSIS — C01 CANCER OF BASE OF TONGUE (MULTI): ICD-10-CM

## 2025-05-01 DIAGNOSIS — Z51.0 ENCOUNTER FOR ANTINEOPLASTIC RADIATION THERAPY: ICD-10-CM

## 2025-05-01 DIAGNOSIS — R22.42 LOCALIZED SWELLING OF LEFT LOWER EXTREMITY: Primary | ICD-10-CM

## 2025-05-01 DIAGNOSIS — R13.10 ODYNOPHAGIA: ICD-10-CM

## 2025-05-01 DIAGNOSIS — D69.6 THROMBOCYTOPENIA (CMS-HCC): ICD-10-CM

## 2025-05-01 DIAGNOSIS — C01 MALIGNANT NEOPLASM OF BASE OF TONGUE (MULTI): ICD-10-CM

## 2025-05-01 LAB
ALBUMIN SERPL BCP-MCNC: 3.6 G/DL (ref 3.4–5)
ALP SERPL-CCNC: 45 U/L (ref 33–136)
ALT SERPL W P-5'-P-CCNC: 26 U/L (ref 7–45)
ANION GAP SERPL CALC-SCNC: 10 MMOL/L (ref 10–20)
AST SERPL W P-5'-P-CCNC: 20 U/L (ref 9–39)
BASOPHILS # BLD AUTO: 0.01 X10*3/UL (ref 0–0.1)
BASOPHILS NFR BLD AUTO: 0.3 %
BILIRUB SERPL-MCNC: 0.4 MG/DL (ref 0–1.2)
BUN SERPL-MCNC: 23 MG/DL (ref 6–23)
CALCIUM SERPL-MCNC: 8.2 MG/DL (ref 8.6–10.3)
CHLORIDE SERPL-SCNC: 100 MMOL/L (ref 98–107)
CO2 SERPL-SCNC: 29 MMOL/L (ref 21–32)
CREAT SERPL-MCNC: 0.67 MG/DL (ref 0.5–1.05)
EGFRCR SERPLBLD CKD-EPI 2021: >90 ML/MIN/1.73M*2
EOSINOPHIL # BLD AUTO: 0 X10*3/UL (ref 0–0.4)
EOSINOPHIL NFR BLD AUTO: 0 %
ERYTHROCYTE [DISTWIDTH] IN BLOOD BY AUTOMATED COUNT: 13.6 % (ref 11.5–14.5)
GLUCOSE SERPL-MCNC: 75 MG/DL (ref 74–99)
HCT VFR BLD AUTO: 27.7 % (ref 36–46)
HGB BLD-MCNC: 9 G/DL (ref 12–16)
IMM GRANULOCYTES # BLD AUTO: 0.01 X10*3/UL (ref 0–0.5)
IMM GRANULOCYTES NFR BLD AUTO: 0.3 % (ref 0–0.9)
LYMPHOCYTES # BLD AUTO: 0.39 X10*3/UL (ref 0.8–3)
LYMPHOCYTES NFR BLD AUTO: 10 %
MAGNESIUM SERPL-MCNC: 1.44 MG/DL (ref 1.6–2.4)
MCH RBC QN AUTO: 30.5 PG (ref 26–34)
MCHC RBC AUTO-ENTMCNC: 32.5 G/DL (ref 32–36)
MCV RBC AUTO: 94 FL (ref 80–100)
MONOCYTES # BLD AUTO: 0.36 X10*3/UL (ref 0.05–0.8)
MONOCYTES NFR BLD AUTO: 9.3 %
NEUTROPHILS # BLD AUTO: 3.12 X10*3/UL (ref 1.6–5.5)
NEUTROPHILS NFR BLD AUTO: 80.1 %
NRBC BLD-RTO: ABNORMAL /100{WBCS}
PLATELET # BLD AUTO: 134 X10*3/UL (ref 150–450)
POTASSIUM SERPL-SCNC: 4.2 MMOL/L (ref 3.5–5.3)
PROT SERPL-MCNC: 5.8 G/DL (ref 6.4–8.2)
RAD ONC MSQ ACTUAL FRACTIONS DELIVERED: 27
RAD ONC MSQ ACTUAL SESSION DELIVERED DOSE: 212 CGRAY
RAD ONC MSQ ACTUAL TOTAL DOSE: 5724 CGRAY
RAD ONC MSQ ELAPSED DAYS: 36
RAD ONC MSQ LAST DATE: NORMAL
RAD ONC MSQ PRESCRIBED FRACTIONAL DOSE: 212 CGRAY
RAD ONC MSQ PRESCRIBED NUMBER OF FRACTIONS: 33
RAD ONC MSQ PRESCRIBED TECHNIQUE: NORMAL
RAD ONC MSQ PRESCRIBED TOTAL DOSE: 6996 CGRAY
RAD ONC MSQ PRESCRIPTION PATTERN COMMENT: NORMAL
RAD ONC MSQ START DATE: NORMAL
RAD ONC MSQ TREATMENT COURSE NUMBER: 1
RAD ONC MSQ TREATMENT SITE: NORMAL
RBC # BLD AUTO: 2.95 X10*6/UL (ref 4–5.2)
SODIUM SERPL-SCNC: 135 MMOL/L (ref 136–145)
WBC # BLD AUTO: 3.9 X10*3/UL (ref 4.4–11.3)

## 2025-05-01 PROCEDURE — 84075 ASSAY ALKALINE PHOSPHATASE: CPT

## 2025-05-01 PROCEDURE — 3074F SYST BP LT 130 MM HG: CPT | Performed by: NURSE PRACTITIONER

## 2025-05-01 PROCEDURE — 83735 ASSAY OF MAGNESIUM: CPT

## 2025-05-01 PROCEDURE — 1159F MED LIST DOCD IN RCRD: CPT | Performed by: NURSE PRACTITIONER

## 2025-05-01 PROCEDURE — G2211 COMPLEX E/M VISIT ADD ON: HCPCS | Performed by: NURSE PRACTITIONER

## 2025-05-01 PROCEDURE — 1123F ACP DISCUSS/DSCN MKR DOCD: CPT | Performed by: NURSE PRACTITIONER

## 2025-05-01 PROCEDURE — 36415 COLL VENOUS BLD VENIPUNCTURE: CPT

## 2025-05-01 PROCEDURE — 2500000004 HC RX 250 GENERAL PHARMACY W/ HCPCS (ALT 636 FOR OP/ED): Mod: JZ | Performed by: INTERNAL MEDICINE

## 2025-05-01 PROCEDURE — 85025 COMPLETE CBC W/AUTO DIFF WBC: CPT

## 2025-05-01 PROCEDURE — 1125F AMNT PAIN NOTED PAIN PRSNT: CPT | Performed by: NURSE PRACTITIONER

## 2025-05-01 PROCEDURE — 2500000004 HC RX 250 GENERAL PHARMACY W/ HCPCS (ALT 636 FOR OP/ED): Mod: JZ | Performed by: NURSE PRACTITIONER

## 2025-05-01 PROCEDURE — 99214 OFFICE O/P EST MOD 30 MIN: CPT | Performed by: NURSE PRACTITIONER

## 2025-05-01 PROCEDURE — 96368 THER/DIAG CONCURRENT INF: CPT

## 2025-05-01 PROCEDURE — 96360 HYDRATION IV INFUSION INIT: CPT | Mod: INF

## 2025-05-01 PROCEDURE — 99214 OFFICE O/P EST MOD 30 MIN: CPT | Mod: 25 | Performed by: NURSE PRACTITIONER

## 2025-05-01 PROCEDURE — 3078F DIAST BP <80 MM HG: CPT | Performed by: NURSE PRACTITIONER

## 2025-05-01 PROCEDURE — 77386 HC INTENSITY-MODULATED RADIATION THERAPY (IMRT), COMPLEX: CPT

## 2025-05-01 RX ORDER — HEPARIN 100 UNIT/ML
500 SYRINGE INTRAVENOUS AS NEEDED
OUTPATIENT
Start: 2025-05-01

## 2025-05-01 RX ORDER — MAGNESIUM SULFATE HEPTAHYDRATE 40 MG/ML
2 INJECTION, SOLUTION INTRAVENOUS ONCE
Status: CANCELLED | OUTPATIENT
Start: 2025-05-01 | End: 2025-05-01

## 2025-05-01 RX ORDER — HEPARIN SODIUM,PORCINE/PF 10 UNIT/ML
50 SYRINGE (ML) INTRAVENOUS AS NEEDED
OUTPATIENT
Start: 2025-05-01

## 2025-05-01 RX ORDER — MAGNESIUM SULFATE 1 G/100ML
1 INJECTION INTRAVENOUS ONCE
OUTPATIENT
Start: 2025-05-05

## 2025-05-01 RX ORDER — MAGNESIUM SULFATE HEPTAHYDRATE 40 MG/ML
2 INJECTION, SOLUTION INTRAVENOUS ONCE
Status: COMPLETED | OUTPATIENT
Start: 2025-05-01 | End: 2025-05-01

## 2025-05-01 RX ADMIN — SODIUM CHLORIDE 1000 ML: 9 INJECTION, SOLUTION INTRAVENOUS at 13:28

## 2025-05-01 RX ADMIN — MAGNESIUM SULFATE IN WATER 2 G: 40 INJECTION, SOLUTION INTRAVENOUS at 13:29

## 2025-05-01 ASSESSMENT — ENCOUNTER SYMPTOMS
COUGH: 0
SORE THROAT: 1
CONSTIPATION: 0
SHORTNESS OF BREATH: 0
DIFFICULTY URINATING: 0
DIARRHEA: 0
ABDOMINAL PAIN: 0
DIZZINESS: 0
VOMITING: 0
FATIGUE: 1
BACK PAIN: 0
NECK PAIN: 1
HEADACHES: 0
UNEXPECTED WEIGHT CHANGE: 0
NAUSEA: 1
ARTHRALGIAS: 0
SLEEP DISTURBANCE: 0
APPETITE CHANGE: 1
EYE PROBLEMS: 0
ADENOPATHY: 0

## 2025-05-01 ASSESSMENT — PAIN SCALES - GENERAL: PAINLEVEL_OUTOF10: 2

## 2025-05-01 NOTE — PROGRESS NOTES
[facility] - Medical Oncology Follow-Up Visit    Patient ID: Annette Fernandez is a 74 y.o. female.  Referring Physician: Umm Jalloh, APRN-CNP  [address]  [place] [id number]  Primary Care Provider: Parminder Russell MD      Chief Concern: Patient is here to followup and for ongoing toxicity checks for base of tongue/oropharyngeal cancer    HPI Patient here today with dtr Daisy. +Fatigued (g1) - daily naps.  No change in functional status.  Decreased oral intake d/t odynophagia.  Rates her pain 3-4/10.  Oxycodone taken 3x/day.    Yesterday tolerated 2.5 TF cartons. Intermittent nausea - with routine antiemetic.  Managed with zofran.  At most 2 tabs/day.    Denies v/c/d. Voiding fine.  Mild mucositis.  Intermittent right ear muffled sounds.  Not every day. Lasts seconds.  Denies fevers and CP.  New swelling to left foot.  Started a few days ago.  Denies injury or trauma.  Radiation dermatitis to left neck.  No other concerns.  Labs WNL.  Ready for treatment on Monday.      Diagnosis: Squamous cell carcinoma of the BOT  Stage:  Cancer Staging   Cancer of base of tongue (Multi)  Staging form: Pharynx - P16 Negative Oropharynx, AJCC 8th Edition  - Clinical: Stage BRIDGETTE (cT4a, cN1, cM0, p16-) - Signed by Rahel Del Angel MD on 3/3/2025     Current sites of disease: BOT and lymph nodes  Molecular and ancillary testing: p16 neg    Oncologic History:  1/7/25 - CT neck with Irregular, heterogeneous, and possibly ulcerating mass centered at the base of tongue with involvement of extrinsic tongue musculature and lingual surface of the epiglottis; possible involvement of the  body of the hyoid. Oropharyngeal neoplasm is of high suspicion. No cervical lymphadenopathy by size criteria.  1.8 cm exophytic right posterior thyroid nodule along the right tracheoesophageal groove containing calcification.  2/4/25 - Met with Dr. Gamboa after noting discomfort in throat since August, some  discomfort in L ear. DL with fungating lesion involving base of tongue and extending on the lateral pharyngeal wall on the left, involving lingual aspect of epiglottis.  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  2/28/25 - PET with hypermetabolic soft tissue mass at BOT, moderately hypermetabolic right level 2A lymph node  3/26/25 - C1 cisplatin with definitive radiation (30mg/m2 due to renal function)    Past Medical History:  07/31/2023: Aspiration into airway  No date: HLD (hyperlipidemia)  No date: HTN (hypertension)  No date: Mass of tongue  No date: Other specified health status      Comment:  No pertinent past medical history  No date: Stiffness of unspecified hand, not elsewhere classified      Comment:  Joint stiffness of hand     4 years ago - throat injury, every time she drank/swallow into lungs (black esophagus)    Past Surgical History:  06/14/2019: OTHER SURGICAL HISTORY      Comment:  Ovarian cystectomy  06/14/2019: OTHER SURGICAL HISTORY      Comment:  Cyst excision     Social Hx:   Annette Fernandez  reports that she has been smoking cigarettes. She has been exposed to tobacco smoke. She has quit using smokeless tobacco.  She  reports current alcohol use.  She  reports no history of drug use.  Smoking - working to cut back, down to 2-3 per day, previously 1/2ppd  Limiting amount each time    Lives on her own  Takes care of her cat    Family History   Problem Relation Name Age of Onset    Heart attack Father      Mom had NHL  Grandparent with colon cancer    Meds (Current):  Current Outpatient Medications   Medication Instructions    atorvastatin (LIPITOR) 80 mg, oral, Daily RT    DentaGeL 1.1 % gel apply with toothbrush OR flouoride trays    dexAMETHasone (DECADRON) 8 mg, oral, Daily, For 3 days per week starting the day after treatment.    magic mouthwash (lidocaine, diphenhydrAMINE, Maalox 1:1:1) 10 mL, Swish & Spit, Every 6 hours PRN     metoprolol tartrate (LOPRESSOR) 25 mg, oral, 2 times daily    multivitamin tablet 1 tablet, Daily    nicotine (Nicoderm CQ) 7 mg/24 hr patch 1 patch, transdermal, Every 24 hours    NON FORMULARY 1 each, Daily    OLANZapine (ZYPREXA) 5 mg, oral, Nightly    ondansetron (ZOFRAN) 8 mg, oral, Every 8 hours PRN    oxyCODONE (ROXICODONE) 5 mg, oral, Every 4 hours PRN    pantoprazole (PROTONIX) 40 mg, oral, Daily, Do not crush, chew, or split.    polyethylene glycol (GLYCOLAX, MIRALAX) 17 g, g-tube, Daily    prochlorperazine (COMPAZINE) 10 mg, oral, Every 6 hours PRN    silver sulfADIAZINE (Silvadene) 1 % cream Apply to affected area twice daily    thyroid (pork) (ARMOUR THYROID) 15 mg, oral, Daily        Allergies   Allergen Reactions    Wellbutrin [Bupropion Hcl] Other     Review of Systems   Constitutional:  Positive for appetite change and fatigue. Negative for unexpected weight change.   HENT:   Positive for sore throat and trouble swallowing. Negative for hearing loss and mouth sores.    Eyes:  Negative for eye problems.   Respiratory:  Negative for cough and shortness of breath.    Cardiovascular:  Positive for leg swelling. Negative for chest pain.   Gastrointestinal:  Positive for nausea. Negative for abdominal pain, constipation, diarrhea and vomiting.   Genitourinary:  Negative for difficulty urinating.    Musculoskeletal:  Positive for neck pain. Negative for arthralgias and back pain.   Skin:  Negative for rash.        Faint erythremia to anterior neck 2/2 RT.    Neurological:  Negative for dizziness and headaches.   Hematological:  Negative for adenopathy.   Psychiatric/Behavioral:  Negative for sleep disturbance.       Objective   BSA: 1.33 meters squared  /66 (BP Location: Left arm, Patient Position: Sitting)   Pulse 68   Temp 36.9 °C (98.4 °F) (Temporal)   Resp 14   Wt (!) 42.6 kg (93 lb 14.4 oz)   SpO2 98%   BMI 19.06 kg/m²     Wt Readings from Last 5 Encounters:   05/02/25 (!) 42 kg (92 lb 9.5  oz)   05/01/25 (!) 42.6 kg (93 lb 14.4 oz)   04/28/25 (!) 41.4 kg (91 lb 4.3 oz)   04/28/25 (!) 41.4 kg (91 lb 4.3 oz)   04/25/25 (!) 43.1 kg (95 lb 0.3 oz)       Performance Status:  (1) Restricted in physically strenuous activity, ambulatory and able to do work of light nature     Physical Exam  Vitals reviewed.   Constitutional:       General: She is not in acute distress.  HENT:      Head: Normocephalic and atraumatic.      Mouth/Throat:      Mouth: Mucous membranes are moist.   Eyes:      Pupils: Pupils are equal, round, and reactive to light.   Cardiovascular:      Rate and Rhythm: Normal rate and regular rhythm.      Heart sounds: Normal heart sounds.   Pulmonary:      Effort: Pulmonary effort is normal.      Breath sounds: Normal breath sounds.   Abdominal:      General: Bowel sounds are normal. There is no distension.      Palpations: Abdomen is soft.      Comments: +PEG   Musculoskeletal:         General: Swelling present. Normal range of motion.      Cervical back: Normal range of motion.      Comments: +1 swelling to right foot - ankle and dorsum area.     Skin:     General: Skin is warm and dry.      Findings: Erythema (to anterior neck 2/2 RT) present. No rash.   Neurological:      General: No focal deficit present.      Mental Status: She is alert and oriented to person, place, and time.   Psychiatric:         Mood and Affect: Mood normal.         Behavior: Behavior normal.          Results:  Labs:  Lab Results   Component Value Date    WBC 3.9 (L) 05/01/2025    HGB 9.0 (L) 05/01/2025    HCT 27.7 (L) 05/01/2025    MCV 94 05/01/2025     (L) 05/01/2025      Lab Results   Component Value Date    NEUTROABS 3.12 05/01/2025      Lab Results   Component Value Date    GLUCOSE 75 05/01/2025    CALCIUM 8.2 (L) 05/01/2025     (L) 05/01/2025    K 4.2 05/01/2025    CO2 29 05/01/2025     05/01/2025    BUN 23 05/01/2025    CREATININE 0.67 05/01/2025     Lab Results   Component Value Date    ALT 26  05/01/2025    AST 20 05/01/2025    ALKPHOS 45 05/01/2025    BILITOT 0.4 05/01/2025        Imaging:  No new imaging    Pathology:  No new pathology    Assessment/Plan      Annette Fernandez is a 74 y.o. 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    Discussed overall findings to date with patient and her daughter previously, as well as recommendations for treatment with concurrent/definitive chemoradiation. We discussed the curative goal of treatment. Discussed that chemotherapy would consist of weekly cisplatin, with side effects discussed, including but not limited to myelosuppression (infection, anemia, bleeding), fatigue, nausea, neuropathy, hearing loss/tinnitus, and nephropathy.    - Proceed as planned next week with week 5 cisplatin, goal to get to 7 weeks give 30mg/m2 dose  - With cisplatin, will have weekly IVF and provider visits  - monitor for worsening tinnitus  - Zyprexa nightly   - PRN zofran/compazine  - dex 8mg daily x3 days after chemo  - advised her to stop aspirin for pain given risk of NSAIDs and kidney function with cisplatin. Ok for oxycodone as needed and we will refill if gets low  - reminded her about risk of constipation with oxycodone - she is taking miralax as needed and will start regularly if taking, I will also send senna to use if using oxycodone more  - baseline audiology evaluation not scheduled prior to cisplatin, ok to proceed whenever scheduled or delay until after tx    # Mucositis (mild)  - encouraged continued rinse use - Glutamine & BMX     # Reduced nutritional intake  - recommended she increase protein supplements 3/day.  Graze throughout the day.  Dietician referral in place.    - 4/24:  She is attempting to get 3 TF cartons/day.  Reports she feels bloated and not able to tolerate 3 total cartons.    - 5/1:  Able to tolerate 2.5 TF cartons/day.      # Odynophagia   - established with supportive onc.  Continue current  oxycodone 5mg/q4H PRN.  Bowel regimen - routine daily miralax.   - 5/1:  Oral intake increasingly challenging.  Decreased from previous week.  Able to tolerate sips.  Main 3-4/10.  Continue PRN oxy admin.      # Thrombocytopenia 118 4/24/25   - improved 134 5/1/25   - No clinical s/sx's bleeding.  Pt educated to bleeding precautions.       # Hypomagnesia 1.44 5/1/25  - 2g IV Mg ordered.  IV access lost, will replace with 5/5/25 treatment.      # Smoking cessation   - increased # of cigarettes/day from 2-3 to 5-6, has not started to wear the nicotine patch yet.  Encouraged her to use the patch, cut down on # of cigarettes/day.     # Localized swelling to LLE - faint difference between RLE, swelling mainly to ankle/dorsum area.  STAT duplex ordered to r/o DVT.  Scheduled 5/2/25.  5/2:  Vascular duplex results (-) for DVT to LLE.        RTC for ongoing treatment

## 2025-05-02 ENCOUNTER — APPOINTMENT (OUTPATIENT)
Dept: RADIOLOGY | Facility: HOSPITAL | Age: 75
End: 2025-05-02
Payer: MEDICARE

## 2025-05-02 ENCOUNTER — HOSPITAL ENCOUNTER (OUTPATIENT)
Dept: RADIATION ONCOLOGY | Facility: CLINIC | Age: 75
Setting detail: RADIATION/ONCOLOGY SERIES
Discharge: HOME | End: 2025-05-02
Payer: MEDICARE

## 2025-05-02 ENCOUNTER — HOSPITAL ENCOUNTER (OUTPATIENT)
Dept: RADIOLOGY | Facility: HOSPITAL | Age: 75
Discharge: HOME | End: 2025-05-02
Payer: MEDICARE

## 2025-05-02 VITALS
OXYGEN SATURATION: 96 % | DIASTOLIC BLOOD PRESSURE: 71 MMHG | WEIGHT: 92.59 LBS | TEMPERATURE: 96.4 F | SYSTOLIC BLOOD PRESSURE: 147 MMHG | BODY MASS INDEX: 18.79 KG/M2 | RESPIRATION RATE: 18 BRPM | HEART RATE: 73 BPM

## 2025-05-02 DIAGNOSIS — C01 MALIGNANT NEOPLASM OF BASE OF TONGUE (MULTI): ICD-10-CM

## 2025-05-02 DIAGNOSIS — L58.9 RADIATION DERMATITIS: Primary | ICD-10-CM

## 2025-05-02 DIAGNOSIS — R22.42 LOCALIZED SWELLING OF LEFT LOWER EXTREMITY: ICD-10-CM

## 2025-05-02 DIAGNOSIS — Z51.0 ENCOUNTER FOR ANTINEOPLASTIC RADIATION THERAPY: ICD-10-CM

## 2025-05-02 PROBLEM — E83.42 HYPOMAGNESEMIA: Status: ACTIVE | Noted: 2025-05-02

## 2025-05-02 LAB
RAD ONC MSQ ACTUAL FRACTIONS DELIVERED: 28
RAD ONC MSQ ACTUAL SESSION DELIVERED DOSE: 212 CGRAY
RAD ONC MSQ ACTUAL TOTAL DOSE: 5936 CGRAY
RAD ONC MSQ ELAPSED DAYS: 37
RAD ONC MSQ LAST DATE: NORMAL
RAD ONC MSQ PRESCRIBED FRACTIONAL DOSE: 212 CGRAY
RAD ONC MSQ PRESCRIBED NUMBER OF FRACTIONS: 33
RAD ONC MSQ PRESCRIBED TECHNIQUE: NORMAL
RAD ONC MSQ PRESCRIBED TOTAL DOSE: 6996 CGRAY
RAD ONC MSQ PRESCRIPTION PATTERN COMMENT: NORMAL
RAD ONC MSQ START DATE: NORMAL
RAD ONC MSQ TREATMENT COURSE NUMBER: 1
RAD ONC MSQ TREATMENT SITE: NORMAL

## 2025-05-02 PROCEDURE — 77386 HC INTENSITY-MODULATED RADIATION THERAPY (IMRT), COMPLEX: CPT

## 2025-05-02 PROCEDURE — 93971 EXTREMITY STUDY: CPT

## 2025-05-02 PROCEDURE — 93971 EXTREMITY STUDY: CPT | Performed by: STUDENT IN AN ORGANIZED HEALTH CARE EDUCATION/TRAINING PROGRAM

## 2025-05-02 RX ORDER — SILVER SULFADIAZINE 10 G/1000G
CREAM TOPICAL
Qty: 400 G | Refills: 2 | Status: SHIPPED | OUTPATIENT
Start: 2025-05-02

## 2025-05-02 ASSESSMENT — ENCOUNTER SYMPTOMS
LEG SWELLING: 1
TROUBLE SWALLOWING: 1

## 2025-05-02 ASSESSMENT — PAIN SCALES - GENERAL: PAINLEVEL_OUTOF10: 9

## 2025-05-02 NOTE — PROGRESS NOTES
Radiation Oncology On Treatment Visit    Patient Name:  Annette Fernandez  MRN:  [id number]  :  1950    Referring Provider: Kirt Gamboa MD  Primary Care Provider: Parminder Russell MD  Care Team: Patient Care Team:  Parminder Russell MD as PCP - General  Parminder Russell MD as PCP - [facility] [place] [facility] [place]  ELLYN Corona-CNP as Nurse Practitioner (Otolaryngology)  Kirt Gamboa MD as Surgeon (Otolaryngology)  Stefani Obrien RN as Care Manager (Case Management)  Faith Denson MD as Consulting Physician (Hematology and Oncology)  Rahel Del Angel MD as Radiation Oncologist (Radiation Oncology)    Date of Service: 2025     Diagnosis:   Specialty Problems          Radiation Oncology Problems    Cancer of base of tongue (Multi)        Malignant neoplasm of base of tongue (Multi)         Treatment Summary:  Radiation Therapy    Treatment Period Technique Fraction Dose Fractions Total Dose   Course 1 3/26/2025-2025  (days elapsed: 37)         BOT 3/26/2025-2025 VMAT 212 / 212 cGy  / 33 5,936 / 6,996 cGy     SUBJECTIVE: some skin breakdown. Reports sorethroat but it's not terrible. Taking oxycodone. Reports dry mouth      OBJECTIVE:   Vital Signs:  /71 (BP Location: Right arm, Patient Position: Sitting, BP Cuff Size: Child)   Pulse 73   Temp 35.8 °C (96.4 °F) (Temporal)   Resp 18   Wt (!) 42 kg (92 lb 9.5 oz)   SpO2 96%   BMI 18.79 kg/m²     Other Pertinent Findings:     Toxicity Assessment          3/28/2025    11:00 2025    11:00 2025    10:00 2025    09:42 2025    13:00 2025    11:25   Toxicity Assessment   Adverse Events Reviewed (WDL) Yes (Within Defined Limits) Yes (Within Defined Limits) Yes (Within Defined Limits) Yes (Within Defined Limits) Yes (Within Defined Limits) Yes (Within Defined Limits)   Treatment  and neck Head and neck Head and neck Head and neck Head and neck Head and neck   Anorexia Grade 0 Grade 0 Grade 0 Grade 0        up 3 lbs this week. Peg tube and PO Grade 0       using peg and 2 cartons a day Grade 1       Peg tube 2-2.5 cartons a day.  Goal of 4 cartons. Liquids PO. Able to take pills PO. Down 3 lbs this week.   Dehydration Grade 0 Grade 0       weekly iv infusions received MG today. Grade 0       gets IVF Grade 0       IVF PRN Grade 0       gets ivf on thursday Grade 1       IVF's.  Extra water flushes thru PEG   Dermatitis Radiation Grade 1 Grade 1       dry intact using aquaphor Grade 1       using aquaphor Grade 1       aquaphor.  Rash that comes and goes with itching. Grade 1       aquaphor Grade 2       hydrocortisone cream for itching and aquaphor   Fatigue Grade 0 Grade 0 Grade 0 Grade 1       mild fatigue relieved with rest Grade 1 Grade 1   Nausea Grade 0 Grade 0 Grade 0 Grade 1       intermittent mild nausea. Antiemetics PRN Grade 1 Grade 1       antiemetics PRN with relief   Pain Grade 0 Grade 1       throat pain on occasion using oxycodone prn daily Grade 0 Grade 1       Painful swallowing 3/10. Taking oxycodone with relief Grade 1       sore throat with swallowing Grade 1       sore throat and right side of neck 8/10. Oxycodone PRN with relief   Vomiting Grade 0  Grade 0 Grade 0 Grade 0 Grade 0   Dysphagia Grade 0 Grade 0 Grade 1       struggles with bread Grade 1       difficulty swallowing dry foods Grade 1 Grade 3   Esophagitis Grade 0  Grade 0 Grade 1 Grade 1 Grade 1   Mucositis Oral Grade 0 Grade 1       sore tongue noted/ BMX ordered Grade 1       irritation in spots; using BMX Grade 1       Mouth tenderness; using BMX and salt n' soda Grade 1       using bmx and salt and soda Grade 1       tender mucosa. Salt n' soda and BMX   Dry Mouth Grade 0 Grade 1       mild /using salt and soda rinse  Grade 1       Information on xylimelts and biotene provided. Grade 1       using xylimelts and biotene Grade 1       xylimelts and biotene   Ear Pain Grade 0   Grade 1       intermittent ear ache Grade 0 Grade 0    Tinnitus Grade 0   Grade 0 Grade 0 Grade 0   Oral Pain Grade 0   Grade 1       BMX Grade 0 Grade 0   Edema Face Grade 0  Grade 0 Grade 0 Grade 0 Grade 0   Aspiration Grade 0  Grade 0 Grade 0 Grade 0 Grade 0   Hoarseness Grade 0  Grade 0 Grade 1 Grade 1 Grade 1   Voice Alteration Grade 0  Grade 0 Grade 1 Grade 1 Grade 0        Assessment / Plan:  The patient is tolerating radiation therapy as anticipated.  Continue per current treatment plan.

## 2025-05-05 ENCOUNTER — APPOINTMENT (OUTPATIENT)
Dept: AUDIOLOGY | Facility: CLINIC | Age: 75
End: 2025-05-05
Payer: MEDICARE

## 2025-05-05 ENCOUNTER — OFFICE VISIT (OUTPATIENT)
Dept: HEMATOLOGY/ONCOLOGY | Facility: CLINIC | Age: 75
End: 2025-05-05
Payer: MEDICARE

## 2025-05-05 ENCOUNTER — INFUSION (OUTPATIENT)
Dept: HEMATOLOGY/ONCOLOGY | Facility: CLINIC | Age: 75
End: 2025-05-05
Payer: MEDICARE

## 2025-05-05 ENCOUNTER — HOSPITAL ENCOUNTER (OUTPATIENT)
Dept: RADIATION ONCOLOGY | Facility: CLINIC | Age: 75
Setting detail: RADIATION/ONCOLOGY SERIES
Discharge: HOME | End: 2025-05-05
Payer: MEDICARE

## 2025-05-05 VITALS
DIASTOLIC BLOOD PRESSURE: 70 MMHG | HEART RATE: 98 BPM | OXYGEN SATURATION: 94 % | BODY MASS INDEX: 18.25 KG/M2 | WEIGHT: 89.9 LBS | TEMPERATURE: 99.1 F | SYSTOLIC BLOOD PRESSURE: 112 MMHG | RESPIRATION RATE: 18 BRPM

## 2025-05-05 DIAGNOSIS — C01 CANCER OF BASE OF TONGUE (MULTI): ICD-10-CM

## 2025-05-05 DIAGNOSIS — C01 MALIGNANT NEOPLASM OF BASE OF TONGUE (MULTI): ICD-10-CM

## 2025-05-05 DIAGNOSIS — Z51.11 ENCOUNTER FOR ANTINEOPLASTIC CHEMOTHERAPY: Primary | ICD-10-CM

## 2025-05-05 DIAGNOSIS — Z51.0 ENCOUNTER FOR ANTINEOPLASTIC RADIATION THERAPY: ICD-10-CM

## 2025-05-05 DIAGNOSIS — G89.3 CANCER ASSOCIATED PAIN: ICD-10-CM

## 2025-05-05 LAB
RAD ONC MSQ ACTUAL FRACTIONS DELIVERED: 29
RAD ONC MSQ ACTUAL SESSION DELIVERED DOSE: 212 CGRAY
RAD ONC MSQ ACTUAL TOTAL DOSE: 6148 CGRAY
RAD ONC MSQ ELAPSED DAYS: 40
RAD ONC MSQ LAST DATE: NORMAL
RAD ONC MSQ PRESCRIBED FRACTIONAL DOSE: 212 CGRAY
RAD ONC MSQ PRESCRIBED NUMBER OF FRACTIONS: 33
RAD ONC MSQ PRESCRIBED TECHNIQUE: NORMAL
RAD ONC MSQ PRESCRIBED TOTAL DOSE: 6996 CGRAY
RAD ONC MSQ PRESCRIPTION PATTERN COMMENT: NORMAL
RAD ONC MSQ START DATE: NORMAL
RAD ONC MSQ TREATMENT COURSE NUMBER: 1
RAD ONC MSQ TREATMENT SITE: NORMAL

## 2025-05-05 PROCEDURE — 2500000004 HC RX 250 GENERAL PHARMACY W/ HCPCS (ALT 636 FOR OP/ED): Mod: JZ | Performed by: NURSE PRACTITIONER

## 2025-05-05 PROCEDURE — 3078F DIAST BP <80 MM HG: CPT | Performed by: NURSE PRACTITIONER

## 2025-05-05 PROCEDURE — 96375 TX/PRO/DX INJ NEW DRUG ADDON: CPT | Mod: INF

## 2025-05-05 PROCEDURE — G2211 COMPLEX E/M VISIT ADD ON: HCPCS | Performed by: NURSE PRACTITIONER

## 2025-05-05 PROCEDURE — 96413 CHEMO IV INFUSION 1 HR: CPT

## 2025-05-05 PROCEDURE — 1123F ACP DISCUSS/DSCN MKR DOCD: CPT | Performed by: NURSE PRACTITIONER

## 2025-05-05 PROCEDURE — 1126F AMNT PAIN NOTED NONE PRSNT: CPT | Performed by: NURSE PRACTITIONER

## 2025-05-05 PROCEDURE — 96367 TX/PROPH/DG ADDL SEQ IV INF: CPT

## 2025-05-05 PROCEDURE — 99213 OFFICE O/P EST LOW 20 MIN: CPT | Mod: 25 | Performed by: NURSE PRACTITIONER

## 2025-05-05 PROCEDURE — 2500000004 HC RX 250 GENERAL PHARMACY W/ HCPCS (ALT 636 FOR OP/ED): Mod: JZ | Performed by: INTERNAL MEDICINE

## 2025-05-05 PROCEDURE — 99213 OFFICE O/P EST LOW 20 MIN: CPT | Performed by: NURSE PRACTITIONER

## 2025-05-05 PROCEDURE — 77386 HC INTENSITY-MODULATED RADIATION THERAPY (IMRT), COMPLEX: CPT

## 2025-05-05 PROCEDURE — 1159F MED LIST DOCD IN RCRD: CPT | Performed by: NURSE PRACTITIONER

## 2025-05-05 PROCEDURE — 3074F SYST BP LT 130 MM HG: CPT | Performed by: NURSE PRACTITIONER

## 2025-05-05 RX ORDER — EPINEPHRINE 0.3 MG/.3ML
0.3 INJECTION SUBCUTANEOUS EVERY 5 MIN PRN
Status: DISCONTINUED | OUTPATIENT
Start: 2025-05-05 | End: 2025-05-05 | Stop reason: HOSPADM

## 2025-05-05 RX ORDER — PROCHLORPERAZINE EDISYLATE 5 MG/ML
10 INJECTION INTRAMUSCULAR; INTRAVENOUS EVERY 6 HOURS PRN
Status: DISCONTINUED | OUTPATIENT
Start: 2025-05-05 | End: 2025-05-05 | Stop reason: HOSPADM

## 2025-05-05 RX ORDER — PROCHLORPERAZINE MALEATE 10 MG
10 TABLET ORAL EVERY 6 HOURS PRN
Status: DISCONTINUED | OUTPATIENT
Start: 2025-05-05 | End: 2025-05-05 | Stop reason: HOSPADM

## 2025-05-05 RX ORDER — FAMOTIDINE 10 MG/ML
20 INJECTION, SOLUTION INTRAVENOUS ONCE AS NEEDED
Status: DISCONTINUED | OUTPATIENT
Start: 2025-05-05 | End: 2025-05-05 | Stop reason: HOSPADM

## 2025-05-05 RX ORDER — HEPARIN 100 UNIT/ML
500 SYRINGE INTRAVENOUS AS NEEDED
Status: CANCELLED | OUTPATIENT
Start: 2025-05-05

## 2025-05-05 RX ORDER — PALONOSETRON 0.05 MG/ML
0.25 INJECTION, SOLUTION INTRAVENOUS ONCE
Status: COMPLETED | OUTPATIENT
Start: 2025-05-05 | End: 2025-05-05

## 2025-05-05 RX ORDER — OXYCODONE HCL 5 MG/5 ML
5 SOLUTION, ORAL ORAL EVERY 4 HOURS PRN
Qty: 420 ML | Refills: 0 | Status: SHIPPED | OUTPATIENT
Start: 2025-05-05 | End: 2025-05-19

## 2025-05-05 RX ORDER — DIPHENHYDRAMINE HYDROCHLORIDE 50 MG/ML
50 INJECTION, SOLUTION INTRAMUSCULAR; INTRAVENOUS AS NEEDED
Status: DISCONTINUED | OUTPATIENT
Start: 2025-05-05 | End: 2025-05-05 | Stop reason: HOSPADM

## 2025-05-05 RX ORDER — HEPARIN SODIUM,PORCINE/PF 10 UNIT/ML
50 SYRINGE (ML) INTRAVENOUS AS NEEDED
Status: CANCELLED | OUTPATIENT
Start: 2025-05-05

## 2025-05-05 RX ORDER — ALBUTEROL SULFATE 0.83 MG/ML
3 SOLUTION RESPIRATORY (INHALATION) AS NEEDED
Status: DISCONTINUED | OUTPATIENT
Start: 2025-05-05 | End: 2025-05-05 | Stop reason: HOSPADM

## 2025-05-05 RX ADMIN — SODIUM CHLORIDE 1000 ML: 9 INJECTION, SOLUTION INTRAVENOUS at 12:39

## 2025-05-05 RX ADMIN — CISPLATIN 40 MG: 1 INJECTION, SOLUTION INTRAVENOUS at 11:37

## 2025-05-05 RX ADMIN — DEXAMETHASONE SODIUM PHOSPHATE 12 MG: 10 INJECTION, SOLUTION INTRAMUSCULAR; INTRAVENOUS at 10:35

## 2025-05-05 RX ADMIN — FOSAPREPITANT 150 MG: 150 INJECTION, POWDER, LYOPHILIZED, FOR SOLUTION INTRAVENOUS at 10:55

## 2025-05-05 RX ADMIN — POTASSIUM CHLORIDE 999 ML/HR: 2 INJECTION, SOLUTION, CONCENTRATE INTRAVENOUS at 09:32

## 2025-05-05 RX ADMIN — PALONOSETRON 0.25 MG: 0.05 INJECTION, SOLUTION INTRAVENOUS at 10:34

## 2025-05-05 RX ADMIN — MAGNESIUM SULFATE HEPTAHYDRATE 1 G: 500 INJECTION, SOLUTION INTRAMUSCULAR; INTRAVENOUS at 12:40

## 2025-05-05 ASSESSMENT — ENCOUNTER SYMPTOMS
CONSTIPATION: 0
ARTHRALGIAS: 0
DIARRHEA: 0
APPETITE CHANGE: 1
ADENOPATHY: 0
FATIGUE: 1
VOMITING: 0
UNEXPECTED WEIGHT CHANGE: 0
HEADACHES: 0
NAUSEA: 1
TROUBLE SWALLOWING: 1
BACK PAIN: 0
SLEEP DISTURBANCE: 0
LEG SWELLING: 0
SHORTNESS OF BREATH: 0
DIZZINESS: 0
DIFFICULTY URINATING: 0
COUGH: 0
EYE PROBLEMS: 0
SORE THROAT: 1
NECK PAIN: 1
ABDOMINAL PAIN: 0

## 2025-05-05 ASSESSMENT — PAIN SCALES - GENERAL: PAINLEVEL_OUTOF10: 0-NO PAIN

## 2025-05-05 NOTE — PROGRESS NOTES
[facility] - Medical Oncology Follow-Up Visit    Patient ID: Annette Fernandez is a 74 y.o. female.  Referring Physician: Faith Denson MD  [id number] [place] [address]  Primary Care Provider: Parminder Russell MD      Chief Concern: Patient is here to followup and for ongoing toxicity checks for base of tongue/oropharyngeal cancer    HPI Patient here today alone.            Reports her fatigue is tolerable.   No change in functional status.  Intake both orally and via peg.  Taking in 2 TF cartons, plus snack items - pudding.  Feels bloated if she attempts to take in 3 TF cartons/day.  Mild nausea - comes in waves.  Managed with PRN  antiemetics - one dose is enough.  Denies mucositis.  Continues with routine rinses.   Daily - to every other day BM's.   Denies v/c/d.  No fevers, chills, or CP.   Denies tinnitus.   Today her throat pain is okay.  Rates her pain 1-2/10.  Yesterday pain was 3/10.  Currently managed with PRN oxycodone - taken 3x/day.  This week no neuropathy.   No other concerns.  Labs WNL.  Ready for treatment.      Diagnosis: Squamous cell carcinoma of the BOT  Stage:  Cancer Staging   Cancer of base of tongue (Multi)  Staging form: Pharynx - P16 Negative Oropharynx, AJCC 8th Edition  - Clinical: Stage BRIDGETTE (cT4a, cN1, cM0, p16-) - Signed by Rahel Del Angel MD on 3/3/2025     Current sites of disease: BOT and lymph nodes  Molecular and ancillary testing: p16 neg    Oncologic History:  1/7/25 - CT neck with Irregular, heterogeneous, and possibly ulcerating mass centered at the base of tongue with involvement of extrinsic tongue musculature and lingual surface of the epiglottis; possible involvement of the  body of the hyoid. Oropharyngeal neoplasm is of high suspicion. No cervical lymphadenopathy by size criteria.  1.8 cm exophytic right posterior thyroid nodule along the right tracheoesophageal groove containing calcification.  2/4/25 - Met with Dr. Gamboa after  noting discomfort in throat since August, some discomfort in L ear. DL with fungating lesion involving base of tongue and extending on the lateral pharyngeal wall on the left, involving lingual aspect of epiglottis.  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  2/28/25 - PET with hypermetabolic soft tissue mass at BOT, moderately hypermetabolic right level 2A lymph node  3/26/25 - C1 cisplatin with definitive radiation (30mg/m2 due to renal function)    Past Medical History:  07/31/2023: Aspiration into airway  No date: HLD (hyperlipidemia)  No date: HTN (hypertension)  No date: Mass of tongue  No date: Other specified health status      Comment:  No pertinent past medical history  No date: Stiffness of unspecified hand, not elsewhere classified      Comment:  Joint stiffness of hand     4 years ago - throat injury, every time she drank/swallow into lungs (black esophagus)    Past Surgical History:  06/14/2019: OTHER SURGICAL HISTORY      Comment:  Ovarian cystectomy  06/14/2019: OTHER SURGICAL HISTORY      Comment:  Cyst excision     Social Hx:   Annette Fernandez  reports that she has been smoking cigarettes. She has been exposed to tobacco smoke. She has quit using smokeless tobacco.  She  reports current alcohol use.  She  reports no history of drug use.  Smoking - working to cut back, down to 2-3 per day, previously 1/2ppd  Limiting amount each time    Lives on her own  Takes care of her cat    Family History   Problem Relation Name Age of Onset    Heart attack Father      Mom had NHL  Grandparent with colon cancer    Meds (Current):  Current Outpatient Medications   Medication Instructions    atorvastatin (LIPITOR) 80 mg, oral, Daily RT    DentaGeL 1.1 % gel apply with toothbrush OR flouoride trays    dexAMETHasone (DECADRON) 8 mg, oral, Daily, For 3 days per week starting the day after treatment.    magic mouthwash (lidocaine, diphenhydrAMINE, Maalox  1:1:1) 10 mL, Swish & Spit, Every 6 hours PRN    metoprolol tartrate (LOPRESSOR) 25 mg, oral, 2 times daily    multivitamin tablet 1 tablet, Daily    nicotine (Nicoderm CQ) 7 mg/24 hr patch 1 patch, transdermal, Every 24 hours    NON FORMULARY 1 each, Daily    OLANZapine (ZYPREXA) 5 mg, oral, Nightly    ondansetron (ZOFRAN) 8 mg, oral, Every 8 hours PRN    oxyCODONE (ROXICODONE) 5 mg, oral, Every 4 hours PRN    pantoprazole (PROTONIX) 40 mg, oral, Daily, Do not crush, chew, or split.    polyethylene glycol (GLYCOLAX, MIRALAX) 17 g, g-tube, Daily    prochlorperazine (COMPAZINE) 10 mg, oral, Every 6 hours PRN    silver sulfADIAZINE (Silvadene) 1 % cream Apply to affected area twice daily    thyroid (pork) (ARMOUR THYROID) 15 mg, oral, Daily        Allergies   Allergen Reactions    Wellbutrin [Bupropion Hcl] Other     Review of Systems   Constitutional:  Positive for appetite change and fatigue. Negative for unexpected weight change.   HENT:   Positive for sore throat. Negative for hearing loss and mouth sores.    Eyes:  Negative for eye problems.   Respiratory:  Negative for cough and shortness of breath.    Cardiovascular:  Negative for chest pain and leg swelling.   Gastrointestinal:  Positive for nausea. Negative for abdominal pain, constipation, diarrhea and vomiting.   Genitourinary:  Negative for difficulty urinating.    Musculoskeletal:  Positive for neck pain. Negative for arthralgias and back pain.   Skin:  Negative for rash.        Faint erythremia to anterior neck 2/2 RT.    Neurological:  Negative for dizziness and headaches.   Hematological:  Negative for adenopathy.   Psychiatric/Behavioral:  Negative for sleep disturbance.       Objective   BSA: There is no height or weight on file to calculate BSA.  There were no vitals taken for this visit.    Wt Readings from Last 5 Encounters:   05/02/25 (!) 42 kg (92 lb 9.5 oz)   05/01/25 (!) 42.6 kg (93 lb 14.4 oz)   04/28/25 (!) 41.4 kg (91 lb 4.3 oz)   04/28/25  (!) 41.4 kg (91 lb 4.3 oz)   04/25/25 (!) 43.1 kg (95 lb 0.3 oz)       Performance Status:  (1) Restricted in physically strenuous activity, ambulatory and able to do work of light nature     Physical Exam  Vitals reviewed.   Constitutional:       General: She is not in acute distress.  HENT:      Head: Normocephalic and atraumatic.   Eyes:      Pupils: Pupils are equal, round, and reactive to light.   Cardiovascular:      Rate and Rhythm: Normal rate and regular rhythm.      Heart sounds: Normal heart sounds.   Pulmonary:      Effort: Pulmonary effort is normal.      Breath sounds: Normal breath sounds.   Abdominal:      General: Bowel sounds are normal. There is no distension.      Palpations: Abdomen is soft.      Comments: +PEG   Musculoskeletal:         General: No swelling. Normal range of motion.      Cervical back: Normal range of motion.   Skin:     General: Skin is warm and dry.      Findings: Erythema (to anterior neck 2/2 RT) present. No rash.   Neurological:      General: No focal deficit present.      Mental Status: She is alert and oriented to person, place, and time.   Psychiatric:         Mood and Affect: Mood normal.         Behavior: Behavior normal.          Results:  Labs:  Lab Results   Component Value Date    WBC 3.9 (L) 05/01/2025    HGB 9.0 (L) 05/01/2025    HCT 27.7 (L) 05/01/2025    MCV 94 05/01/2025     (L) 05/01/2025      Lab Results   Component Value Date    NEUTROABS 3.12 05/01/2025      Lab Results   Component Value Date    GLUCOSE 75 05/01/2025    CALCIUM 8.2 (L) 05/01/2025     (L) 05/01/2025    K 4.2 05/01/2025    CO2 29 05/01/2025     05/01/2025    BUN 23 05/01/2025    CREATININE 0.67 05/01/2025     Lab Results   Component Value Date    ALT 26 05/01/2025    AST 20 05/01/2025    ALKPHOS 45 05/01/2025    BILITOT 0.4 05/01/2025        Imaging:  No new imaging    Pathology:  No new pathology    Assessment/Plan      Annette Fernandez is a 74 y.o. 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    Discussed overall findings to date with patient and her daughter previously, as well as recommendations for treatment with concurrent/definitive chemoradiation. We discussed the curative goal of treatment. Discussed that chemotherapy would consist of weekly cisplatin, with side effects discussed, including but not limited to myelosuppression (infection, anemia, bleeding), fatigue, nausea, neuropathy, hearing loss/tinnitus, and nephropathy.    - Proceed as planned next week with week 5 cisplatin, goal to get to 7 weeks give 30mg/m2 dose  - With cisplatin, will have weekly IVF and provider visits  - monitor for worsening tinnitus  - Zyprexa nightly   - PRN zofran/compazine  - dex 8mg daily x3 days after chemo  - advised her to stop aspirin for pain given risk of NSAIDs and kidney function with cisplatin. Ok for oxycodone as needed and we will refill if gets low  - reminded her about risk of constipation with oxycodone - she is taking miralax as needed and will start regularly if taking, I will also send senna to use if using oxycodone more  - baseline audiology evaluation not scheduled prior to cisplatin, ok to proceed whenever scheduled or delay until after tx    # Mucositis  - recommended she resume Glutamine rinse, continue BMX rinse.     # Reduced nutritional intake  - recommended she increase protein supplements 3/day.  Graze throughout the day.  Dietician referral in place.    - 4/24:  She is attempting to get 3 TF cartons/day.  Reports she feels bloated and not able to tolerate 3 total cartons.      # Odynophagia   - established with supportive onc.  Continue current oxycodone 5mg/q4H PRN.  Bowel regimen - routine daily miralax.     # Thrombocytopenia 118 4/24/25   - No clinical s/sx's bleeding.  Pt educated to bleeding precautions.       # Smoking cessation   - increased # of cigarettes/day from 2-3 to 5-6, has not started to  wear the nicotine patch yet.  Encouraged her to use the patch, cut down on # of cigarettes/day.       RTC for ongoing treatment

## 2025-05-05 NOTE — PROGRESS NOTES
[facility] - Medical Oncology Follow-Up Visit    Patient ID: Annette Fernandez is a 74 y.o. female.  Referring Physician: Faith Denson MD  [address] [place] [address]  Primary Care Provider: Parminder Russell MD      Chief Concern: Patient is here to followup and for ongoing toxicity checks for base of tongue/oropharyngeal cancer    HPI Patient here today alone.  Only concern is burning sensation to her neck.  Topical silvadene helping.  Nausea mild - off and on.  Maintaining 2.5 TF cartons/day.  Swelling to left LE has resolved.  No other concerns.  Labs WNL.  Ready for treatment today.        Diagnosis: Squamous cell carcinoma of the BOT  Stage:  Cancer Staging   Cancer of base of tongue (Multi)  Staging form: Pharynx - P16 Negative Oropharynx, AJCC 8th Edition  - Clinical: Stage BRIDGETTE (cT4a, cN1, cM0, p16-) - Signed by Rahel Del Angel MD on 3/3/2025     Current sites of disease: BOT and lymph nodes  Molecular and ancillary testing: p16 neg    Oncologic History:  1/7/25 - CT neck with Irregular, heterogeneous, and possibly ulcerating mass centered at the base of tongue with involvement of extrinsic tongue musculature and lingual surface of the epiglottis; possible involvement of the  body of the hyoid. Oropharyngeal neoplasm is of high suspicion. No cervical lymphadenopathy by size criteria.  1.8 cm exophytic right posterior thyroid nodule along the right tracheoesophageal groove containing calcification.  2/4/25 - Met with Dr. Gamboa after noting discomfort in throat since August, some discomfort in L ear. DL with fungating lesion involving base of tongue and extending on the lateral pharyngeal wall on the left, involving lingual aspect of epiglottis.  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  2/28/25 - PET with hypermetabolic soft tissue mass at BOT, moderately hypermetabolic right level 2A lymph node  3/26/25 -  C1 cisplatin with definitive radiation (30mg/m2 due to renal function)    Past Medical History:  07/31/2023: Aspiration into airway  No date: HLD (hyperlipidemia)  No date: HTN (hypertension)  No date: Mass of tongue  No date: Other specified health status      Comment:  No pertinent past medical history  No date: Stiffness of unspecified hand, not elsewhere classified      Comment:  Joint stiffness of hand     4 years ago - throat injury, every time she drank/swallow into lungs (black esophagus)    Past Surgical History:  06/14/2019: OTHER SURGICAL HISTORY      Comment:  Ovarian cystectomy  06/14/2019: OTHER SURGICAL HISTORY      Comment:  Cyst excision     Social Hx:   Annette Fernandez  reports that she has been smoking cigarettes. She has been exposed to tobacco smoke. She has quit using smokeless tobacco.  She  reports current alcohol use.  She  reports no history of drug use.  Smoking - working to cut back, down to 2-3 per day, previously 1/2ppd  Limiting amount each time    Lives on her own  Takes care of her cat    Family History   Problem Relation Name Age of Onset    Heart attack Father      Mom had NHL  Grandparent with colon cancer    Meds (Current):  Current Outpatient Medications   Medication Instructions    atorvastatin (LIPITOR) 80 mg, oral, Daily RT    DentaGeL 1.1 % gel apply with toothbrush OR flouoride trays    dexAMETHasone (DECADRON) 8 mg, oral, Daily, For 3 days per week starting the day after treatment.    magic mouthwash (lidocaine, diphenhydrAMINE, Maalox 1:1:1) 10 mL, Swish & Spit, Every 6 hours PRN    metoprolol tartrate (LOPRESSOR) 25 mg, oral, 2 times daily    multivitamin tablet 1 tablet, Daily    nicotine (Nicoderm CQ) 7 mg/24 hr patch 1 patch, transdermal, Every 24 hours    NON FORMULARY 1 each, Daily    OLANZapine (ZYPREXA) 5 mg, oral, Nightly    ondansetron (ZOFRAN) 8 mg, oral, Every 8 hours PRN    oxyCODONE (ROXICODONE) 5 mg, oral, Every 4 hours PRN    pantoprazole (PROTONIX) 40  mg, oral, Daily, Do not crush, chew, or split.    polyethylene glycol (GLYCOLAX, MIRALAX) 17 g, g-tube, Daily    prochlorperazine (COMPAZINE) 10 mg, oral, Every 6 hours PRN    silver sulfADIAZINE (Silvadene) 1 % cream Apply to affected area twice daily    thyroid (pork) (ARMOUR THYROID) 15 mg, oral, Daily        Allergies   Allergen Reactions    Wellbutrin [Bupropion Hcl] Other     Review of Systems   Constitutional:  Positive for appetite change and fatigue. Negative for unexpected weight change.   HENT:   Positive for sore throat and trouble swallowing. Negative for hearing loss and mouth sores.    Eyes:  Negative for eye problems.   Respiratory:  Negative for cough and shortness of breath.    Cardiovascular:  Negative for chest pain and leg swelling.   Gastrointestinal:  Positive for nausea. Negative for abdominal pain, constipation, diarrhea and vomiting.   Genitourinary:  Negative for difficulty urinating.    Musculoskeletal:  Positive for neck pain. Negative for arthralgias and back pain.   Skin:  Negative for rash.        Faint erythremia to anterior neck 2/2 RT.    Neurological:  Negative for dizziness and headaches.   Hematological:  Negative for adenopathy.   Psychiatric/Behavioral:  Negative for sleep disturbance.       Objective   BSA: 1.3 meters squared  /70 (BP Location: Left arm, Patient Position: Sitting)   Pulse 98   Temp 37.3 °C (99.1 °F) (Temporal)   Resp 18   Wt (!) 40.8 kg (89 lb 14.4 oz)   SpO2 94%   BMI 18.25 kg/m²     Wt Readings from Last 5 Encounters:   05/05/25 (!) 40.8 kg (89 lb 14.4 oz)   05/02/25 (!) 42 kg (92 lb 9.5 oz)   05/01/25 (!) 42.6 kg (93 lb 14.4 oz)   04/28/25 (!) 41.4 kg (91 lb 4.3 oz)   04/28/25 (!) 41.4 kg (91 lb 4.3 oz)       Performance Status:  (1) Restricted in physically strenuous activity, ambulatory and able to do work of light nature     Physical Exam  Vitals reviewed.   Constitutional:       General: She is not in acute distress.  HENT:      Head:  Normocephalic and atraumatic.      Mouth/Throat:      Mouth: Mucous membranes are moist.   Eyes:      Pupils: Pupils are equal, round, and reactive to light.   Cardiovascular:      Rate and Rhythm: Normal rate and regular rhythm.      Heart sounds: Normal heart sounds.   Pulmonary:      Effort: Pulmonary effort is normal.      Breath sounds: Normal breath sounds.   Abdominal:      General: Bowel sounds are normal. There is no distension.      Palpations: Abdomen is soft.      Comments: +PEG   Musculoskeletal:         General: Swelling present. Normal range of motion.      Cervical back: Normal range of motion.      Comments: +1 swelling to right foot - ankle and dorsum area.     Skin:     General: Skin is warm and dry.      Findings: Erythema (to anterior neck 2/2 RT) present. No rash.      Comments: Radiation dermatitis (g1) to right neck area.     Neurological:      General: No focal deficit present.      Mental Status: She is alert and oriented to person, place, and time.   Psychiatric:         Mood and Affect: Mood normal.         Behavior: Behavior normal.          Results:  Labs:  Lab Results   Component Value Date    WBC 3.9 (L) 2025    HGB 9.0 (L) 2025    HCT 27.7 (L) 2025    MCV 94 2025     (L) 2025      Lab Results   Component Value Date    NEUTROABS 3.12 2025      Lab Results   Component Value Date    GLUCOSE 75 2025    CALCIUM 8.2 (L) 2025     (L) 2025    K 4.2 2025    CO2 29 2025     2025    BUN 23 2025    CREATININE 0.67 2025     Lab Results   Component Value Date    ALT 26 2025    AST 20 2025    ALKPHOS 45 2025    BILITOT 0.4 2025        Imagin25: Vascular US LLE:  Negative study.  No deep venous thrombosis of the  left lower extremity.    Pathology:  No new pathology    Assessment/Plan      Annette Fernandez is a 74 y.o. female here for follow-up of stage BRIDGETTE squamous  cell carcinoma of the base of tongue, p16 neg.    Discussed overall findings to date with patient and her daughter previously, as well as recommendations for treatment with concurrent/definitive chemoradiation. We discussed the curative goal of treatment. Discussed that chemotherapy would consist of weekly cisplatin, with side effects discussed, including but not limited to myelosuppression (infection, anemia, bleeding), fatigue, nausea, neuropathy, hearing loss/tinnitus, and nephropathy.    - Proceed as planned next week with week 5 cisplatin, goal to get to 7 weeks give 30mg/m2 dose  - With cisplatin, will have weekly IVF and provider visits  - monitor for worsening tinnitus  - Zyprexa nightly   - PRN zofran/compazine  - dex 8mg daily x3 days after chemo  - advised her to stop aspirin for pain given risk of NSAIDs and kidney function with cisplatin. Ok for oxycodone as needed and we will refill if gets low  - reminded her about risk of constipation with oxycodone - she is taking miralax as needed and will start regularly if taking, I will also send senna to use if using oxycodone more  - baseline audiology evaluation not scheduled prior to cisplatin, ok to proceed whenever scheduled or delay until after tx    # Mucositis (mild)  - encouraged continued rinse use - Glutamine & BMX     # Reduced nutritional intake  - recommended she increase protein supplements 3/day.  Graze throughout the day.  Dietician referral in place.    - 4/24:  She is attempting to get 3 TF cartons/day.  Reports she feels bloated and not able to tolerate 3 total cartons.    - 5/1:  Able to tolerate 2.5 TF cartons/day.      # Odynophagia   - established with supportive onc.  Continue current oxycodone 5mg/q4H PRN.  Bowel regimen - routine daily miralax.   - 5/1:  Oral intake increasingly challenging.  Decreased from previous week.  Able to tolerate sips.  Main 3-4/10.  Continue PRN oxy admin.      # Thrombocytopenia 118 4/24/25   - improved  134 5/1/25   - No clinical s/sx's bleeding.  Pt educated to bleeding precautions.       # Hypomagnesia 1.44 5/1/25  - 2g IV Mg ordered.  IV access lost, will replace with 5/5/25 treatment.      # Smoking cessation   - increased # of cigarettes/day from 2-3 to 5-6, has not started to wear the nicotine patch yet.  Encouraged her to use the patch, cut down on # of cigarettes/day.     # Localized swelling to LLE - faint difference between RLE, swelling mainly to ankle/dorsum area.  STAT duplex ordered to r/o DVT.  Scheduled 5/2/25.  5/2:  Vascular duplex results (-) for DVT to LLE.    - 5/5:  Swelling has resolved.        RTC in 1 week.

## 2025-05-05 NOTE — SIGNIFICANT EVENT

## 2025-05-06 ENCOUNTER — HOSPITAL ENCOUNTER (OUTPATIENT)
Dept: RADIATION ONCOLOGY | Facility: CLINIC | Age: 75
Setting detail: RADIATION/ONCOLOGY SERIES
Discharge: HOME | End: 2025-05-06
Payer: MEDICARE

## 2025-05-06 DIAGNOSIS — Z51.0 ENCOUNTER FOR ANTINEOPLASTIC RADIATION THERAPY: ICD-10-CM

## 2025-05-06 DIAGNOSIS — C01 MALIGNANT NEOPLASM OF BASE OF TONGUE (MULTI): ICD-10-CM

## 2025-05-06 LAB
RAD ONC MSQ ACTUAL FRACTIONS DELIVERED: 30
RAD ONC MSQ ACTUAL SESSION DELIVERED DOSE: 212 CGRAY
RAD ONC MSQ ACTUAL TOTAL DOSE: 6360 CGRAY
RAD ONC MSQ ELAPSED DAYS: 41
RAD ONC MSQ LAST DATE: NORMAL
RAD ONC MSQ PRESCRIBED FRACTIONAL DOSE: 212 CGRAY
RAD ONC MSQ PRESCRIBED NUMBER OF FRACTIONS: 33
RAD ONC MSQ PRESCRIBED TECHNIQUE: NORMAL
RAD ONC MSQ PRESCRIBED TOTAL DOSE: 6996 CGRAY
RAD ONC MSQ PRESCRIPTION PATTERN COMMENT: NORMAL
RAD ONC MSQ START DATE: NORMAL
RAD ONC MSQ TREATMENT COURSE NUMBER: 1
RAD ONC MSQ TREATMENT SITE: NORMAL

## 2025-05-06 PROCEDURE — 77386 HC INTENSITY-MODULATED RADIATION THERAPY (IMRT), COMPLEX: CPT

## 2025-05-07 ENCOUNTER — HOSPITAL ENCOUNTER (OUTPATIENT)
Dept: RADIATION ONCOLOGY | Facility: CLINIC | Age: 75
Setting detail: RADIATION/ONCOLOGY SERIES
Discharge: HOME | End: 2025-05-07
Payer: MEDICARE

## 2025-05-07 ENCOUNTER — APPOINTMENT (OUTPATIENT)
Dept: PRIMARY CARE | Facility: CLINIC | Age: 75
End: 2025-05-07
Payer: MEDICARE

## 2025-05-07 DIAGNOSIS — C01 MALIGNANT NEOPLASM OF BASE OF TONGUE (MULTI): ICD-10-CM

## 2025-05-07 DIAGNOSIS — Z51.0 ENCOUNTER FOR ANTINEOPLASTIC RADIATION THERAPY: ICD-10-CM

## 2025-05-07 LAB
RAD ONC MSQ ACTUAL FRACTIONS DELIVERED: 31
RAD ONC MSQ ACTUAL SESSION DELIVERED DOSE: 212 CGRAY
RAD ONC MSQ ACTUAL TOTAL DOSE: 6572 CGRAY
RAD ONC MSQ ELAPSED DAYS: 42
RAD ONC MSQ LAST DATE: NORMAL
RAD ONC MSQ PRESCRIBED FRACTIONAL DOSE: 212 CGRAY
RAD ONC MSQ PRESCRIBED NUMBER OF FRACTIONS: 33
RAD ONC MSQ PRESCRIBED TECHNIQUE: NORMAL
RAD ONC MSQ PRESCRIBED TOTAL DOSE: 6996 CGRAY
RAD ONC MSQ PRESCRIPTION PATTERN COMMENT: NORMAL
RAD ONC MSQ START DATE: NORMAL
RAD ONC MSQ TREATMENT COURSE NUMBER: 1
RAD ONC MSQ TREATMENT SITE: NORMAL

## 2025-05-07 PROCEDURE — 77336 RADIATION PHYSICS CONSULT: CPT

## 2025-05-07 PROCEDURE — 77386 HC INTENSITY-MODULATED RADIATION THERAPY (IMRT), COMPLEX: CPT

## 2025-05-08 ENCOUNTER — INFUSION (OUTPATIENT)
Dept: HEMATOLOGY/ONCOLOGY | Facility: CLINIC | Age: 75
End: 2025-05-08
Payer: MEDICARE

## 2025-05-08 ENCOUNTER — HOSPITAL ENCOUNTER (OUTPATIENT)
Dept: RADIATION ONCOLOGY | Facility: CLINIC | Age: 75
Setting detail: RADIATION/ONCOLOGY SERIES
Discharge: HOME | End: 2025-05-08
Payer: MEDICARE

## 2025-05-08 VITALS
OXYGEN SATURATION: 95 % | DIASTOLIC BLOOD PRESSURE: 54 MMHG | RESPIRATION RATE: 18 BRPM | BODY MASS INDEX: 19.31 KG/M2 | HEART RATE: 78 BPM | SYSTOLIC BLOOD PRESSURE: 104 MMHG | TEMPERATURE: 98.4 F | WEIGHT: 95.13 LBS

## 2025-05-08 DIAGNOSIS — C01 CANCER OF BASE OF TONGUE (MULTI): ICD-10-CM

## 2025-05-08 DIAGNOSIS — C01 MALIGNANT NEOPLASM OF BASE OF TONGUE (MULTI): ICD-10-CM

## 2025-05-08 DIAGNOSIS — Z51.0 ENCOUNTER FOR ANTINEOPLASTIC RADIATION THERAPY: ICD-10-CM

## 2025-05-08 LAB
RAD ONC MSQ ACTUAL FRACTIONS DELIVERED: 32
RAD ONC MSQ ACTUAL SESSION DELIVERED DOSE: 212 CGRAY
RAD ONC MSQ ACTUAL TOTAL DOSE: 6784 CGRAY
RAD ONC MSQ ELAPSED DAYS: 43
RAD ONC MSQ LAST DATE: NORMAL
RAD ONC MSQ PRESCRIBED FRACTIONAL DOSE: 212 CGRAY
RAD ONC MSQ PRESCRIBED NUMBER OF FRACTIONS: 33
RAD ONC MSQ PRESCRIBED TECHNIQUE: NORMAL
RAD ONC MSQ PRESCRIBED TOTAL DOSE: 6996 CGRAY
RAD ONC MSQ PRESCRIPTION PATTERN COMMENT: NORMAL
RAD ONC MSQ START DATE: NORMAL
RAD ONC MSQ TREATMENT COURSE NUMBER: 1
RAD ONC MSQ TREATMENT SITE: NORMAL

## 2025-05-08 PROCEDURE — 77386 HC INTENSITY-MODULATED RADIATION THERAPY (IMRT), COMPLEX: CPT

## 2025-05-08 PROCEDURE — 2500000004 HC RX 250 GENERAL PHARMACY W/ HCPCS (ALT 636 FOR OP/ED): Mod: JZ | Performed by: INTERNAL MEDICINE

## 2025-05-08 PROCEDURE — 96360 HYDRATION IV INFUSION INIT: CPT

## 2025-05-08 PROCEDURE — 96361 HYDRATE IV INFUSION ADD-ON: CPT | Mod: INF

## 2025-05-08 RX ORDER — HEPARIN SODIUM,PORCINE/PF 10 UNIT/ML
50 SYRINGE (ML) INTRAVENOUS AS NEEDED
OUTPATIENT
Start: 2025-05-08

## 2025-05-08 RX ORDER — HEPARIN 100 UNIT/ML
500 SYRINGE INTRAVENOUS AS NEEDED
OUTPATIENT
Start: 2025-05-08

## 2025-05-08 RX ADMIN — SODIUM CHLORIDE 1000 ML: 0.9 INJECTION, SOLUTION INTRAVENOUS at 10:36

## 2025-05-08 ASSESSMENT — PAIN SCALES - GENERAL: PAINLEVEL_OUTOF10: 0-NO PAIN

## 2025-05-09 ENCOUNTER — HOSPITAL ENCOUNTER (OUTPATIENT)
Dept: RADIATION ONCOLOGY | Facility: CLINIC | Age: 75
Setting detail: RADIATION/ONCOLOGY SERIES
Discharge: HOME | End: 2025-05-09
Payer: MEDICARE

## 2025-05-09 ENCOUNTER — DOCUMENTATION (OUTPATIENT)
Dept: RADIATION ONCOLOGY | Facility: CLINIC | Age: 75
End: 2025-05-09

## 2025-05-09 VITALS
DIASTOLIC BLOOD PRESSURE: 67 MMHG | RESPIRATION RATE: 18 BRPM | BODY MASS INDEX: 18.97 KG/M2 | WEIGHT: 93.47 LBS | HEART RATE: 97 BPM | OXYGEN SATURATION: 97 % | TEMPERATURE: 97.3 F | SYSTOLIC BLOOD PRESSURE: 138 MMHG

## 2025-05-09 DIAGNOSIS — Z51.0 ENCOUNTER FOR ANTINEOPLASTIC RADIATION THERAPY: ICD-10-CM

## 2025-05-09 DIAGNOSIS — C01 MALIGNANT NEOPLASM OF BASE OF TONGUE (MULTI): ICD-10-CM

## 2025-05-09 LAB
RAD ONC MSQ ACTUAL FRACTIONS DELIVERED: 33
RAD ONC MSQ ACTUAL SESSION DELIVERED DOSE: 212 CGRAY
RAD ONC MSQ ACTUAL TOTAL DOSE: 6996 CGRAY
RAD ONC MSQ ELAPSED DAYS: 44
RAD ONC MSQ LAST DATE: NORMAL
RAD ONC MSQ PRESCRIBED FRACTIONAL DOSE: 212 CGRAY
RAD ONC MSQ PRESCRIBED NUMBER OF FRACTIONS: 33
RAD ONC MSQ PRESCRIBED TECHNIQUE: NORMAL
RAD ONC MSQ PRESCRIBED TOTAL DOSE: 6996 CGRAY
RAD ONC MSQ PRESCRIPTION PATTERN COMMENT: NORMAL
RAD ONC MSQ START DATE: NORMAL
RAD ONC MSQ TREATMENT COURSE NUMBER: 1
RAD ONC MSQ TREATMENT SITE: NORMAL

## 2025-05-09 PROCEDURE — 77386 HC INTENSITY-MODULATED RADIATION THERAPY (IMRT), COMPLEX: CPT

## 2025-05-09 ASSESSMENT — PAIN SCALES - GENERAL: PAINLEVEL_OUTOF10: 7

## 2025-05-09 NOTE — PROGRESS NOTES
Radiation Oncology On Treatment Visit    Patient Name:  Annette Fernandez  MRN:  [id number]  :  1950    Referring Provider: Kirt Gamboa MD  Primary Care Provider: Parminder Russell MD  Care Team: Patient Care Team:  Parminder Russell MD as PCP - General  Parminder Russell MD as PCP - [facility] Attributed Provider  ELLYN Corona-CNP as Nurse Practitioner (Otolaryngology)  Kirt Gamboa MD as Surgeon (Otolaryngology)  Stefani Obrien RN as Care Manager (Case Management)  Faith Denson MD as Consulting Physician (Hematology and Oncology)  Rahel Del Angel MD as Radiation Oncologist (Radiation Oncology)    Date of Service: 2025     Diagnosis:   Specialty Problems          Radiation Oncology Problems    Cancer of base of tongue (Multi)        Malignant neoplasm of base of tongue (Multi)         Treatment Summary:  Radiation Therapy    Treatment Period Technique Fraction Dose Fractions Total Dose   Course 1 3/26/2025-2025  (days elapsed: 44)         BOT 3/26/2025-2025 VMAT 212 / 212 cGy 33 / 33 6,996 / 6,996 cGy     SUBJECTIVE: tolerating radiation. Reports pain taking oxycodone for it. Has constipation. Instructed her to double her senna. Added tylenol. Taking silvadene      OBJECTIVE:   Vital Signs:  /67 (BP Location: Right arm, Patient Position: Sitting, BP Cuff Size: Child)   Pulse 97   Temp 36.3 °C (97.3 °F) (Temporal)   Resp 18   Wt (!) 42.4 kg (93 lb 7.6 oz)   SpO2 97%   BMI 18.97 kg/m²     Other Pertinent Findings:     Toxicity Assessment          2025    11:00 2025    10:00 2025    09:42 2025    13:00 2025    11:25 2025    10:00   Toxicity Assessment   Adverse Events Reviewed (WDL) Yes (Within Defined Limits) Yes (Within Defined Limits) Yes (Within Defined Limits) Yes (Within Defined Limits) Yes (Within Defined Limits) Yes (Within Defined Limits)   Treatment  and neck Head and neck Head and neck Head and neck Head and neck Head and  neck   Anorexia Grade 0 Grade 0 Grade 0       up 3 lbs this week. Peg tube and PO Grade 0       using peg and 2 cartons a day Grade 1       Peg tube 2-2.5 cartons a day.  Goal of 4 cartons. Liquids PO. Able to take pills PO. Down 3 lbs this week. Grade 1       2-2.5 cartons per day, goal of 4. Constipation today, no appetite this morning. down 1#   Dehydration Grade 0       weekly iv infusions received MG today. Grade 0       gets IVF Grade 0       IVF PRN Grade 0       gets ivf on thursday Grade 1       IVF's.  Extra water flushes thru PEG Grade 1   Dermatitis Radiation Grade 1       dry intact using aquaphor Grade 1       using aquaphor Grade 1       aquaphor.  Rash that comes and goes with itching. Grade 1       aquaphor Grade 2       hydrocortisone cream for itching and aquaphor Grade 2   Fatigue Grade 0 Grade 0 Grade 1       mild fatigue relieved with rest Grade 1 Grade 1 Grade 2   Nausea Grade 0 Grade 0 Grade 1       intermittent mild nausea. Antiemetics PRN Grade 1 Grade 1       antiemetics PRN with relief Grade 1       compazine and zofran prn   Pain Grade 1       throat pain on occasion using oxycodone prn daily Grade 0 Grade 1       Painful swallowing 3/10. Taking oxycodone with relief Grade 1       sore throat with swallowing Grade 1       sore throat and right side of neck 8/10. Oxycodone PRN with relief Grade 1       throat pain   Vomiting  Grade 0 Grade 0 Grade 0 Grade 0 Grade 0   Dysphagia Grade 0 Grade 1       struggles with bread Grade 1       difficulty swallowing dry foods Grade 1 Grade 3 Grade 2   Esophagitis  Grade 0 Grade 1 Grade 1 Grade 1 Grade 1   Mucositis Oral Grade 1       sore tongue noted/ BMX ordered Grade 1       irritation in spots; using BMX Grade 1       Mouth tenderness; using BMX and salt n' soda Grade 1       using bmx and salt and soda Grade 1       tender mucosa. Salt n' soda and BMX Grade 1   Dry Mouth Grade 1       mild /using salt and soda rinse  Grade 1       Information on  xylimelts and biotene provided. Grade 1       using xylimelts and biotene Grade 1       xylimelts and biotene Grade 1   Ear Pain   Grade 1       intermittent ear ache Grade 0 Grade 0 Grade 0   Tinnitus   Grade 0 Grade 0 Grade 0 Grade 0   Oral Pain   Grade 1       BMX Grade 0 Grade 0 Grade 1   Edema Face  Grade 0 Grade 0 Grade 0 Grade 0    Aspiration  Grade 0 Grade 0 Grade 0 Grade 0    Hoarseness  Grade 0 Grade 1 Grade 1 Grade 1 Grade 1   Voice Alteration  Grade 0 Grade 1 Grade 1 Grade 0 Grade 1       hurts to talk        Assessment / Plan:  The patient is tolerating radiation therapy as anticipated.  Continue per current treatment plan.   FU in 2 weeks

## 2025-05-12 ENCOUNTER — APPOINTMENT (OUTPATIENT)
Dept: HEMATOLOGY/ONCOLOGY | Facility: CLINIC | Age: 75
End: 2025-05-12
Payer: MEDICARE

## 2025-05-12 ENCOUNTER — TELEMEDICINE (OUTPATIENT)
Dept: HEMATOLOGY/ONCOLOGY | Facility: CLINIC | Age: 75
End: 2025-05-12
Payer: MEDICARE

## 2025-05-12 DIAGNOSIS — C01 MALIGNANT NEOPLASM OF BASE OF TONGUE (MULTI): ICD-10-CM

## 2025-05-12 DIAGNOSIS — E86.0 DEHYDRATION: ICD-10-CM

## 2025-05-12 DIAGNOSIS — C01 CANCER OF BASE OF TONGUE (MULTI): Primary | ICD-10-CM

## 2025-05-12 PROCEDURE — 99214 OFFICE O/P EST MOD 30 MIN: CPT | Performed by: NURSE PRACTITIONER

## 2025-05-12 PROCEDURE — G2211 COMPLEX E/M VISIT ADD ON: HCPCS | Performed by: NURSE PRACTITIONER

## 2025-05-12 ASSESSMENT — ENCOUNTER SYMPTOMS
ADENOPATHY: 0
DIARRHEA: 1
COUGH: 0
DIZZINESS: 0
EYE PROBLEMS: 0
SORE THROAT: 1
DIFFICULTY URINATING: 0
NECK PAIN: 1
ABDOMINAL PAIN: 0
CONSTIPATION: 0
BACK PAIN: 0
FATIGUE: 1
ARTHRALGIAS: 0
UNEXPECTED WEIGHT CHANGE: 0
SHORTNESS OF BREATH: 0
APPETITE CHANGE: 1
SLEEP DISTURBANCE: 0
LEG SWELLING: 0
TROUBLE SWALLOWING: 1
HEADACHES: 0
VOMITING: 0
NAUSEA: 0

## 2025-05-12 NOTE — PROGRESS NOTES
[facility] - Medical Oncology Follow-Up Visit    Patient ID: Annette Fernandez is a 74 y.o. female.  Referring Physician: Umm Jalloh, APRN-CNP  [address] [place] [address]  Primary Care Provider: Parminder Russell MD      Chief Concern: Patient is here to followup for base of tongue/oropharyngeal cancer    Virtual or Telephone Consent    An interactive audio and video telecommunication system which permits real time communications between the patient (at the originating site) and provider (at the distant site) was utilized to provide this telehealth service.   Verbal consent was requested and obtained from Annette Fernandez on this date, 05/12/25 for a telehealth visit and the patient's location was confirmed at the time of the visit.    HPI Virtual visit    Requested today's visit be changed to virtual.  Reports the last couple of days have been rough.  Was constipated on Friday.  Doubled up on her bowel meds, now having diarrhea.  No BM yet today.  Having 5-6 BM's/day.  Denies fever.  Reduced intake.  Only able to take in 1/2 TF carton yesterday and today.  Denies n/v.  Pain lessening.  Still having throat pain.  Declined IV hydration visit today.  Agreeable to come in tomorrow for fluids and labs.        Diagnosis: Squamous cell carcinoma of the BOT  Stage:  Cancer Staging   Cancer of base of tongue (Multi)  Staging form: Pharynx - P16 Negative Oropharynx, AJCC 8th Edition  - Clinical: Stage BRIDGETTE (cT4a, cN1, cM0, p16-) - Signed by Rahle Del Angel MD on 3/3/2025     Current sites of disease: BOT and lymph nodes  Molecular and ancillary testing: p16 neg    Oncologic History:  1/7/25 - CT neck with Irregular, heterogeneous, and possibly ulcerating mass centered at the base of tongue with involvement of extrinsic tongue musculature and lingual surface of the epiglottis; possible involvement of the  body of the hyoid. Oropharyngeal neoplasm is of high suspicion. No cervical  lymphadenopathy by size criteria.  1.8 cm exophytic right posterior thyroid nodule along the right tracheoesophageal groove containing calcification.  2/4/25 - Met with Dr. Gamboa after noting discomfort in throat since August, some discomfort in L ear. DL with fungating lesion involving base of tongue and extending on the lateral pharyngeal wall on the left, involving lingual aspect of epiglottis.  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  2/28/25 - PET with hypermetabolic soft tissue mass at BOT, moderately hypermetabolic right level 2A lymph node  3/26/25 - C1 cisplatin with definitive radiation (30mg/m2 due to renal function)    Past Medical History:  07/31/2023: Aspiration into airway  No date: HLD (hyperlipidemia)  No date: HTN (hypertension)  No date: Mass of tongue  No date: Other specified health status      Comment:  No pertinent past medical history  No date: Stiffness of unspecified hand, not elsewhere classified      Comment:  Joint stiffness of hand     4 years ago - throat injury, every time she drank/swallow into lungs (black esophagus)    Past Surgical History:  06/14/2019: OTHER SURGICAL HISTORY      Comment:  Ovarian cystectomy  06/14/2019: OTHER SURGICAL HISTORY      Comment:  Cyst excision     Social Hx:   Annette Fernandez  reports that she has been smoking cigarettes. She has been exposed to tobacco smoke. She has quit using smokeless tobacco.  She  reports current alcohol use.  She  reports no history of drug use.  Smoking - working to cut back, down to 2-3 per day, previously 1/2ppd  Limiting amount each time    Lives on her own  Takes care of her cat    Family History   Problem Relation Name Age of Onset    Heart attack Father      Mom had NHL  Grandparent with colon cancer    Meds (Current):  Current Outpatient Medications   Medication Instructions    atorvastatin (LIPITOR) 80 mg, oral, Daily RT    DentaGeL 1.1 % gel apply with  toothbrush OR flouoride trays    dexAMETHasone (DECADRON) 8 mg, oral, Daily, For 3 days per week starting the day after treatment.    magic mouthwash (lidocaine, diphenhydrAMINE, Maalox 1:1:1) 10 mL, Swish & Spit, Every 6 hours PRN    metoprolol tartrate (LOPRESSOR) 25 mg, oral, 2 times daily    multivitamin tablet 1 tablet, Daily    nicotine (Nicoderm CQ) 7 mg/24 hr patch 1 patch, transdermal, Every 24 hours    NON FORMULARY 1 each, Daily    OLANZapine (ZYPREXA) 5 mg, oral, Nightly    ondansetron (ZOFRAN) 8 mg, oral, Every 8 hours PRN    oxyCODONE (ROXICODONE) 5 mg, oral, Every 4 hours PRN    pantoprazole (PROTONIX) 40 mg, oral, Daily, Do not crush, chew, or split.    polyethylene glycol (GLYCOLAX, MIRALAX) 17 g, g-tube, Daily    prochlorperazine (COMPAZINE) 10 mg, oral, Every 6 hours PRN    silver sulfADIAZINE (Silvadene) 1 % cream Apply to affected area twice daily    thyroid (pork) (ARMOUR THYROID) 15 mg, oral, Daily        Allergies   Allergen Reactions    Wellbutrin [Bupropion Hcl] Other     Review of Systems   Constitutional:  Positive for appetite change and fatigue. Negative for unexpected weight change.   HENT:   Positive for sore throat and trouble swallowing. Negative for hearing loss and mouth sores.    Eyes:  Negative for eye problems.   Respiratory:  Negative for cough and shortness of breath.    Cardiovascular:  Negative for chest pain and leg swelling.   Gastrointestinal:  Positive for diarrhea. Negative for abdominal pain, constipation, nausea and vomiting.   Genitourinary:  Negative for difficulty urinating.    Musculoskeletal:  Positive for neck pain. Negative for arthralgias and back pain.   Skin:  Negative for rash.   Neurological:  Negative for dizziness and headaches.   Hematological:  Negative for adenopathy.   Psychiatric/Behavioral:  Negative for sleep disturbance.       Objective   BSA: There is no height or weight on file to calculate BSA.  There were no vitals taken for this  visit.    Wt Readings from Last 5 Encounters:   05/09/25 (!) 42.4 kg (93 lb 7.6 oz)   05/08/25 (!) 43.2 kg (95 lb 2.1 oz)   05/05/25 (!) 40.8 kg (89 lb 14.4 oz)   05/02/25 (!) 42 kg (92 lb 9.5 oz)   05/01/25 (!) 42.6 kg (93 lb 14.4 oz)       Performance Status:  (1) Restricted in physically strenuous activity, ambulatory and able to do work of light nature     Physical Exam  Constitutional:       General: She is not in acute distress.  HENT:      Head: Normocephalic and atraumatic.      Mouth/Throat:      Mouth: Mucous membranes are moist.   Eyes:      Pupils: Pupils are equal, round, and reactive to light.   Pulmonary:      Effort: Pulmonary effort is normal.   Abdominal:      General: There is no distension.      Comments: +PEG   Musculoskeletal:         General: No swelling.   Skin:     Findings: No erythema or rash.   Neurological:      General: No focal deficit present.      Mental Status: She is alert and oriented to person, place, and time.   Psychiatric:         Mood and Affect: Mood normal.         Behavior: Behavior normal.          Results:  Labs:  Lab Results   Component Value Date    WBC 3.9 (L) 05/01/2025    HGB 9.0 (L) 05/01/2025    HCT 27.7 (L) 05/01/2025    MCV 94 05/01/2025     (L) 05/01/2025      Lab Results   Component Value Date    NEUTROABS 3.12 05/01/2025      Lab Results   Component Value Date    GLUCOSE 75 05/01/2025    CALCIUM 8.2 (L) 05/01/2025     (L) 05/01/2025    K 4.2 05/01/2025    CO2 29 05/01/2025     05/01/2025    BUN 23 05/01/2025    CREATININE 0.67 05/01/2025     Lab Results   Component Value Date    ALT 26 05/01/2025    AST 20 05/01/2025    ALKPHOS 45 05/01/2025    BILITOT 0.4 05/01/2025        Imaging:  No new imaging.      Pathology:  No new pathology    Assessment/Plan      Annette Fernandez is a 74 y.o. 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    Discussed overall findings to date with patient and her  daughter previously, as well as recommendations for treatment with concurrent/definitive chemoradiation. We discussed the curative goal of treatment. Discussed that chemotherapy would consist of weekly cisplatin, with side effects discussed, including but not limited to myelosuppression (infection, anemia, bleeding), fatigue, nausea, neuropathy, hearing loss/tinnitus, and nephropathy.    - Proceed as planned next week with week 5 cisplatin, goal to get to 7 weeks give 30mg/m2 dose  - With cisplatin, will have weekly IVF and provider visits  - monitor for worsening tinnitus  - Zyprexa nightly   - PRN zofran/compazine  - dex 8mg daily x3 days after chemo  - advised her to stop aspirin for pain given risk of NSAIDs and kidney function with cisplatin. Ok for oxycodone as needed and we will refill if gets low  - reminded her about risk of constipation with oxycodone - she is taking miralax as needed and will start regularly if taking, I will also send senna to use if using oxycodone more  - baseline audiology evaluation not scheduled prior to cisplatin, ok to proceed whenever scheduled or delay until after tx    # Mucositis (mild)  - encouraged continued rinse use - Glutamine & BMX     # Reduced nutritional intake  - recommended she increase protein supplements 3/day.  Graze throughout the day.  Dietician referral in place.    - 4/24:  She is attempting to get 3 TF cartons/day.  Reports she feels bloated and not able to tolerate 3 total cartons.    - 5/1:  Able to tolerate 2.5 TF cartons/day.      # Odynophagia   - established with supportive onc.  Continue current oxycodone 5mg/q4H PRN.  Bowel regimen - routine daily miralax.   - 5/1:  Oral intake increasingly challenging.  Decreased from previous week.  Able to tolerate sips.  Main 3-4/10.  Continue PRN oxy admin.      # Thrombocytopenia 118 4/24/25   - improved 134 5/1/25   - No clinical s/sx's bleeding.  Pt educated to bleeding precautions.       # Hypomagnesia 1.44  5/1/25  - 2g IV Mg ordered.  IV access lost, will replace with 5/5/25 treatment.      # Smoking cessation   - increased # of cigarettes/day from 2-3 to 5-6, has not started to wear the nicotine patch yet.  Encouraged her to use the patch, cut down on # of cigarettes/day.     # Localized swelling to LLE - faint difference between RLE, swelling mainly to ankle/dorsum area.  STAT duplex ordered to r/o DVT.  Scheduled 5/2/25.  5/2:  Vascular duplex results (-) for DVT to LLE.    - 5/5:  Swelling has resolved.      # Diarrhea 2/2 laxative admin-  - Stop bowel meds, rest bowels today, declined IV hydration visit today.  Agreeable to come in tomorrow for fluids and labs.  Push fluids.  Start to increase TF gradually back to 2.5 cartons/day.        RTC in 1 week.

## 2025-05-13 ENCOUNTER — DOCUMENTATION (OUTPATIENT)
Dept: HEMATOLOGY/ONCOLOGY | Facility: HOSPITAL | Age: 75
End: 2025-05-13
Payer: MEDICARE

## 2025-05-13 ENCOUNTER — INFUSION (OUTPATIENT)
Dept: HEMATOLOGY/ONCOLOGY | Facility: CLINIC | Age: 75
End: 2025-05-13
Payer: MEDICARE

## 2025-05-13 VITALS
OXYGEN SATURATION: 96 % | WEIGHT: 89.51 LBS | HEART RATE: 90 BPM | BODY MASS INDEX: 18.17 KG/M2 | DIASTOLIC BLOOD PRESSURE: 69 MMHG | RESPIRATION RATE: 18 BRPM | TEMPERATURE: 98.1 F | SYSTOLIC BLOOD PRESSURE: 129 MMHG

## 2025-05-13 DIAGNOSIS — C01 MALIGNANT NEOPLASM OF BASE OF TONGUE (MULTI): ICD-10-CM

## 2025-05-13 DIAGNOSIS — R39.11 URINARY HESITANCY: Primary | ICD-10-CM

## 2025-05-13 DIAGNOSIS — E87.6 HYPOKALEMIA: ICD-10-CM

## 2025-05-13 DIAGNOSIS — C01 CANCER OF BASE OF TONGUE (MULTI): ICD-10-CM

## 2025-05-13 DIAGNOSIS — E83.42 HYPOMAGNESEMIA: Primary | ICD-10-CM

## 2025-05-13 DIAGNOSIS — R39.11 URINARY HESITANCY: ICD-10-CM

## 2025-05-13 LAB
ALBUMIN SERPL BCP-MCNC: 3.4 G/DL (ref 3.4–5)
ALP SERPL-CCNC: 43 U/L (ref 33–136)
ALT SERPL W P-5'-P-CCNC: 19 U/L (ref 7–45)
ANION GAP SERPL CALC-SCNC: 13 MMOL/L (ref 10–20)
AST SERPL W P-5'-P-CCNC: 19 U/L (ref 9–39)
BASOPHILS # BLD AUTO: 0.02 X10*3/UL (ref 0–0.1)
BASOPHILS NFR BLD AUTO: 0.5 %
BILIRUB SERPL-MCNC: 0.4 MG/DL (ref 0–1.2)
BUN SERPL-MCNC: 13 MG/DL (ref 6–23)
CALCIUM SERPL-MCNC: 6.3 MG/DL (ref 8.6–10.3)
CHLORIDE SERPL-SCNC: 90 MMOL/L (ref 98–107)
CO2 SERPL-SCNC: 27 MMOL/L (ref 21–32)
CREAT SERPL-MCNC: 0.57 MG/DL (ref 0.5–1.05)
EGFRCR SERPLBLD CKD-EPI 2021: >90 ML/MIN/1.73M*2
EOSINOPHIL # BLD AUTO: 0 X10*3/UL (ref 0–0.4)
EOSINOPHIL NFR BLD AUTO: 0 %
ERYTHROCYTE [DISTWIDTH] IN BLOOD BY AUTOMATED COUNT: 14.9 % (ref 11.5–14.5)
GLUCOSE SERPL-MCNC: 97 MG/DL (ref 74–99)
HCT VFR BLD AUTO: 29.9 % (ref 36–46)
HGB BLD-MCNC: 9.5 G/DL (ref 12–16)
IMM GRANULOCYTES # BLD AUTO: 0.09 X10*3/UL (ref 0–0.5)
IMM GRANULOCYTES NFR BLD AUTO: 2.5 % (ref 0–0.9)
LYMPHOCYTES # BLD AUTO: 0.5 X10*3/UL (ref 0.8–3)
LYMPHOCYTES NFR BLD AUTO: 13.7 %
MAGNESIUM SERPL-MCNC: 0.81 MG/DL (ref 1.6–2.4)
MCH RBC QN AUTO: 30.4 PG (ref 26–34)
MCHC RBC AUTO-ENTMCNC: 31.8 G/DL (ref 32–36)
MCV RBC AUTO: 96 FL (ref 80–100)
MONOCYTES # BLD AUTO: 0.37 X10*3/UL (ref 0.05–0.8)
MONOCYTES NFR BLD AUTO: 10.2 %
NEUTROPHILS # BLD AUTO: 2.66 X10*3/UL (ref 1.6–5.5)
NEUTROPHILS NFR BLD AUTO: 73.1 %
NRBC BLD-RTO: ABNORMAL /100{WBCS}
PLATELET # BLD AUTO: 89 X10*3/UL (ref 150–450)
POTASSIUM SERPL-SCNC: 3.3 MMOL/L (ref 3.5–5.3)
PROT SERPL-MCNC: 5.7 G/DL (ref 6.4–8.2)
RBC # BLD AUTO: 3.13 X10*6/UL (ref 4–5.2)
RBC MORPH BLD: NORMAL
SODIUM SERPL-SCNC: 127 MMOL/L (ref 136–145)
WBC # BLD AUTO: 3.6 X10*3/UL (ref 4.4–11.3)

## 2025-05-13 PROCEDURE — 83735 ASSAY OF MAGNESIUM: CPT

## 2025-05-13 PROCEDURE — 96360 HYDRATION IV INFUSION INIT: CPT | Mod: INF

## 2025-05-13 PROCEDURE — 85025 COMPLETE CBC W/AUTO DIFF WBC: CPT

## 2025-05-13 PROCEDURE — 80053 COMPREHEN METABOLIC PANEL: CPT

## 2025-05-13 PROCEDURE — 2500000004 HC RX 250 GENERAL PHARMACY W/ HCPCS (ALT 636 FOR OP/ED): Mod: JZ | Performed by: NURSE PRACTITIONER

## 2025-05-13 RX ORDER — MAGNESIUM SULFATE HEPTAHYDRATE 40 MG/ML
4 INJECTION, SOLUTION INTRAVENOUS ONCE
Status: CANCELLED | OUTPATIENT
Start: 2025-05-14

## 2025-05-13 RX ORDER — POTASSIUM CHLORIDE 1.5 G/1.58G
40 POWDER, FOR SOLUTION ORAL DAILY
Qty: 14 PACKET | Refills: 0 | Status: SHIPPED | OUTPATIENT
Start: 2025-05-13 | End: 2025-05-20

## 2025-05-13 RX ORDER — HEPARIN 100 UNIT/ML
500 SYRINGE INTRAVENOUS AS NEEDED
OUTPATIENT
Start: 2025-05-13

## 2025-05-13 RX ORDER — HEPARIN SODIUM,PORCINE/PF 10 UNIT/ML
50 SYRINGE (ML) INTRAVENOUS AS NEEDED
OUTPATIENT
Start: 2025-05-13

## 2025-05-13 RX ADMIN — SODIUM CHLORIDE 1000 ML: 0.9 INJECTION, SOLUTION INTRAVENOUS at 14:53

## 2025-05-13 RX ADMIN — SODIUM CHLORIDE 500 ML: 9 INJECTION, SOLUTION INTRAVENOUS at 16:01

## 2025-05-13 ASSESSMENT — PAIN SCALES - GENERAL: PAINLEVEL_OUTOF10: 7

## 2025-05-13 NOTE — PROGRESS NOTES
Critical Lab: Magnesium 0.8  RAD Jalloh made aware via secure chat. Acknowledged, pt will be getting IV replacement tomorrow.

## 2025-05-13 NOTE — PROGRESS NOTES
Patient aware to come back tomorrow 5/14 at 9 am for magnesium IV replacement. Patient is agreeable to plan- she was unable to provide adequate urine sample-will obtain tomorrow upon return. Patient was discharged in stable condition.

## 2025-05-13 NOTE — PROGRESS NOTES
Radiation Oncology Treatment Summary    Patient Name:  Annette Fernandez  MRN:  [id number]  :  1950    Referring Provider: No ref. provider found  Primary Care Provider: Parminder Russell MD    Brief History: Annette Fernandez is a 74 y.o. female with Cancer of base of tongue (Multi), Clinical: Stage BRIDGETTE (cT4a, cN1, cM0, p16-).  The patient completed radiotherapy as outlined below.    Radiation Treatment Summary:    Radiation Therapy    Treatment Period Technique Fraction Dose Fractions Total Dose   Course 1 3/26/2025-2025  (days elapsed: 44)         BOT 3/26/2025-2025 VMAT 212 / 212 cGy 33 / 33 6,996 / 6,996 cGy       Please see the patient's [facility] chart for further details regarding the radiation plan, including beam energy.    Concurrent Chemotherapy:  Treatment Plans       Name Type Plan Dates Plan Provider         Active    CISplatin with Concurrent Radiation (Weekly), 7 Week Cycle Oncology Treatment 3/20/2025 - Present Faith Denson MD                    CTCAE Toxicity Overview:   Toxicity Assessment          2025    11:00 2025    10:00 2025    09:42 2025    13:00 2025    11:25 2025    10:00   Toxicity Assessment   Adverse Events Reviewed (WDL) Yes (Within Defined Limits) Yes (Within Defined Limits) Yes (Within Defined Limits) Yes (Within Defined Limits) Yes (Within Defined Limits) Yes (Within Defined Limits)   Treatment  and neck Head and neck Head and neck Head and neck Head and neck Head and neck   Anorexia Grade 0 Grade 0 Grade 0       up 3 lbs this week. Peg tube and PO Grade 0       using peg and 2 cartons a day Grade 1       Peg tube 2-2.5 cartons a day.  Goal of 4 cartons. Liquids PO. Able to take pills PO. Down 3 lbs this week. Grade 1       2-2.5 cartons per day, goal of 4. Constipation today, no appetite this morning. down 1#   Dehydration Grade 0       weekly iv infusions received MG today. Grade 0       gets IVF Grade 0       IVF PRN Grade 0        gets ivf on thursday Grade 1       IVF's.  Extra water flushes thru PEG Grade 1   Dermatitis Radiation Grade 1       dry intact using aquaphor Grade 1       using aquaphor Grade 1       aquaphor.  Rash that comes and goes with itching. Grade 1       aquaphor Grade 2       hydrocortisone cream for itching and aquaphor Grade 2   Fatigue Grade 0 Grade 0 Grade 1       mild fatigue relieved with rest Grade 1 Grade 1 Grade 2   Nausea Grade 0 Grade 0 Grade 1       intermittent mild nausea. Antiemetics PRN Grade 1 Grade 1       antiemetics PRN with relief Grade 1       compazine and zofran prn   Pain Grade 1       throat pain on occasion using oxycodone prn daily Grade 0 Grade 1       Painful swallowing 3/10. Taking oxycodone with relief Grade 1       sore throat with swallowing Grade 1       sore throat and right side of neck 8/10. Oxycodone PRN with relief Grade 1       throat pain   Vomiting  Grade 0 Grade 0 Grade 0 Grade 0 Grade 0   Dysphagia Grade 0 Grade 1       struggles with bread Grade 1       difficulty swallowing dry foods Grade 1 Grade 3 Grade 2   Esophagitis  Grade 0 Grade 1 Grade 1 Grade 1 Grade 1   Mucositis Oral Grade 1       sore tongue noted/ BMX ordered Grade 1       irritation in spots; using BMX Grade 1       Mouth tenderness; using BMX and salt n' soda Grade 1       using bmx and salt and soda Grade 1       tender mucosa. Salt n' soda and BMX Grade 1   Dry Mouth Grade 1       mild /using salt and soda rinse  Grade 1       Information on xylimelts and biotene provided. Grade 1       using xylimelts and biotene Grade 1       xylimelts and biotene Grade 1   Ear Pain   Grade 1       intermittent ear ache Grade 0 Grade 0 Grade 0   Tinnitus   Grade 0 Grade 0 Grade 0 Grade 0   Oral Pain   Grade 1       BMX Grade 0 Grade 0 Grade 1   Edema Face  Grade 0 Grade 0 Grade 0 Grade 0    Aspiration  Grade 0 Grade 0 Grade 0 Grade 0    Hoarseness  Grade 0 Grade 1 Grade 1 Grade 1 Grade 1   Voice Alteration  Grade 0 Grade  1 Grade 1 Grade 0 Grade 1       hurts to talk     Patient Disposition: Follow up Dr. Del Angel 5/23/25.

## 2025-05-14 ENCOUNTER — INFUSION (OUTPATIENT)
Dept: HEMATOLOGY/ONCOLOGY | Facility: CLINIC | Age: 75
End: 2025-05-14
Payer: MEDICARE

## 2025-05-14 VITALS
SYSTOLIC BLOOD PRESSURE: 102 MMHG | WEIGHT: 90.28 LBS | DIASTOLIC BLOOD PRESSURE: 46 MMHG | BODY MASS INDEX: 18.32 KG/M2 | TEMPERATURE: 97.9 F | HEART RATE: 83 BPM | OXYGEN SATURATION: 97 % | RESPIRATION RATE: 18 BRPM

## 2025-05-14 DIAGNOSIS — E83.42 HYPOMAGNESEMIA: ICD-10-CM

## 2025-05-14 DIAGNOSIS — C01 CANCER OF BASE OF TONGUE (MULTI): ICD-10-CM

## 2025-05-14 DIAGNOSIS — R30.0 DYSURIA: ICD-10-CM

## 2025-05-14 DIAGNOSIS — R30.0 DYSURIA: Primary | ICD-10-CM

## 2025-05-14 PROCEDURE — 96366 THER/PROPH/DIAG IV INF ADDON: CPT | Mod: INF

## 2025-05-14 PROCEDURE — 96365 THER/PROPH/DIAG IV INF INIT: CPT | Mod: INF

## 2025-05-14 PROCEDURE — 81003 URINALYSIS AUTO W/O SCOPE: CPT

## 2025-05-14 PROCEDURE — 2500000004 HC RX 250 GENERAL PHARMACY W/ HCPCS (ALT 636 FOR OP/ED): Mod: JZ | Performed by: NURSE PRACTITIONER

## 2025-05-14 RX ORDER — MAGNESIUM SULFATE HEPTAHYDRATE 40 MG/ML
2 INJECTION, SOLUTION INTRAVENOUS
Status: COMPLETED | OUTPATIENT
Start: 2025-05-14 | End: 2025-05-14

## 2025-05-14 RX ADMIN — MAGNESIUM SULFATE IN WATER 2 G: 40 INJECTION, SOLUTION INTRAVENOUS at 09:24

## 2025-05-14 RX ADMIN — MAGNESIUM SULFATE IN WATER 2 G: 40 INJECTION, SOLUTION INTRAVENOUS at 10:18

## 2025-05-14 ASSESSMENT — PAIN SCALES - GENERAL: PAINLEVEL_OUTOF10: 6

## 2025-05-15 ENCOUNTER — LAB (OUTPATIENT)
Dept: LAB | Facility: CLINIC | Age: 75
End: 2025-05-15
Payer: MEDICARE

## 2025-05-15 DIAGNOSIS — E83.42 HYPOMAGNESEMIA: ICD-10-CM

## 2025-05-15 LAB
ALBUMIN SERPL BCP-MCNC: 3.4 G/DL (ref 3.4–5)
ALP SERPL-CCNC: 45 U/L (ref 33–136)
ALT SERPL W P-5'-P-CCNC: 17 U/L (ref 7–45)
ANION GAP SERPL CALC-SCNC: 11 MMOL/L (ref 10–20)
APPEARANCE UR: CLEAR
AST SERPL W P-5'-P-CCNC: 19 U/L (ref 9–39)
BILIRUB SERPL-MCNC: 0.4 MG/DL (ref 0–1.2)
BILIRUB UR STRIP.AUTO-MCNC: NEGATIVE MG/DL
BUN SERPL-MCNC: 15 MG/DL (ref 6–23)
CALCIUM SERPL-MCNC: 7 MG/DL (ref 8.6–10.3)
CHLORIDE SERPL-SCNC: 94 MMOL/L (ref 98–107)
CO2 SERPL-SCNC: 27 MMOL/L (ref 21–32)
COLOR UR: ABNORMAL
CREAT SERPL-MCNC: 0.66 MG/DL (ref 0.5–1.05)
EGFRCR SERPLBLD CKD-EPI 2021: >90 ML/MIN/1.73M*2
GLUCOSE SERPL-MCNC: 98 MG/DL (ref 74–99)
GLUCOSE UR STRIP.AUTO-MCNC: NORMAL MG/DL
KETONES UR STRIP.AUTO-MCNC: ABNORMAL MG/DL
LEUKOCYTE ESTERASE UR QL STRIP.AUTO: NEGATIVE
MAGNESIUM SERPL-MCNC: 1.87 MG/DL (ref 1.6–2.4)
NITRITE UR QL STRIP.AUTO: NEGATIVE
PH UR STRIP.AUTO: 6 [PH]
POTASSIUM SERPL-SCNC: 4 MMOL/L (ref 3.5–5.3)
PROT SERPL-MCNC: 5.9 G/DL (ref 6.4–8.2)
PROT UR STRIP.AUTO-MCNC: NEGATIVE MG/DL
RBC # UR STRIP.AUTO: NEGATIVE MG/DL
SODIUM SERPL-SCNC: 128 MMOL/L (ref 136–145)
SP GR UR STRIP.AUTO: 1.02
UROBILINOGEN UR STRIP.AUTO-MCNC: NORMAL MG/DL

## 2025-05-15 PROCEDURE — 83735 ASSAY OF MAGNESIUM: CPT

## 2025-05-15 PROCEDURE — 36415 COLL VENOUS BLD VENIPUNCTURE: CPT

## 2025-05-15 PROCEDURE — 80053 COMPREHEN METABOLIC PANEL: CPT

## 2025-05-16 ENCOUNTER — TELEPHONE (OUTPATIENT)
Dept: HEMATOLOGY/ONCOLOGY | Facility: CLINIC | Age: 75
End: 2025-05-16
Payer: MEDICARE

## 2025-05-16 ENCOUNTER — INFUSION (OUTPATIENT)
Dept: HEMATOLOGY/ONCOLOGY | Facility: CLINIC | Age: 75
End: 2025-05-16
Payer: MEDICARE

## 2025-05-16 ENCOUNTER — APPOINTMENT (OUTPATIENT)
Dept: RADIATION ONCOLOGY | Facility: CLINIC | Age: 75
End: 2025-05-16
Payer: MEDICARE

## 2025-05-16 VITALS
WEIGHT: 91.82 LBS | TEMPERATURE: 98.1 F | HEART RATE: 109 BPM | BODY MASS INDEX: 18.64 KG/M2 | OXYGEN SATURATION: 97 % | DIASTOLIC BLOOD PRESSURE: 61 MMHG | RESPIRATION RATE: 18 BRPM | SYSTOLIC BLOOD PRESSURE: 106 MMHG

## 2025-05-16 DIAGNOSIS — C01 CANCER OF BASE OF TONGUE (MULTI): ICD-10-CM

## 2025-05-16 PROCEDURE — 2500000004 HC RX 250 GENERAL PHARMACY W/ HCPCS (ALT 636 FOR OP/ED): Mod: JZ | Performed by: NURSE PRACTITIONER

## 2025-05-16 PROCEDURE — 96360 HYDRATION IV INFUSION INIT: CPT | Mod: INF

## 2025-05-16 RX ADMIN — SODIUM CHLORIDE 1000 ML: 0.9 INJECTION, SOLUTION INTRAVENOUS at 12:25

## 2025-05-16 ASSESSMENT — PAIN SCALES - GENERAL: PAINLEVEL_OUTOF10: 0-NO PAIN

## 2025-05-16 NOTE — PROGRESS NOTES
Patient tolerated hydration well.  Patient had labs completed yesterday and is aware to come back on Monday for more labs.  Patient had no adverse reactions at this visit.  Ambulated out of clinic in stable conditon

## 2025-05-16 NOTE — TELEPHONE ENCOUNTER
Wanted to ensure that patient understood that she needed to come in to get IVF today. Pt verbalized being grateful as she was slightly confused.

## 2025-05-16 NOTE — PROGRESS NOTES
ADULT AUDIOLOGY EVALUATION    Name:  Annette Fernandez  :  1950  Age:  74 y.o.  Date of Evaluation:  ***    IMPRESSIONS     Today's test results indicate normal hearing sensitivity in ***. Tympanometry indicates normal middle ear functioning ***. Word recognition is *** in both ears. DPOAEs are ***.    RECOMMENDATIONS     {medfu:12251}  Return for annual hearing evaluation or sooner should concerns arise.    Time: ***    HISTORY     Annette Fernandez is seen today for an audiologic evaluation at the referral of Faith Denson MD. Per chart review, patient has base of tongue/oropharyngeal cancer with treatment through cisplatin from 3/20/25-25.     Today, patient reports ***    Patient denied tinnitus, dizziness, aural fullness, recent ear infections, otalgia, otorrhea, history of otologic surgeries or history of loud noise exposure.       TEST RESULTS     Otoscopic Evaluation:  Right Ear: {otos:55435}  Left Ear: {otos:77544}    Tympanometry:   Right Ear: {tymp:84497}  Left Ear: {tymp:12545}    Ipsilateral Acoustic Reflexes:   Right Ear: {acreflex:61791}  Left Ear: {acreflex:68590}    Distortion Product Otoacoustic Emissions:  Right Ear: {OAE:47381}  Left Ear: {OAE:36219}      Pure Tone Audiometry (125-8000 Hz):     Right Ear: ***    Left Ear: ***    Extended High Frequency Audiometry:  Frequency (Hz) Right Ear Threshold (dB HL) Left Ear Threshold (dB HL)   9,000 *** ***   10,000 *** ***   11,200 *** ***   12,500 *** ***   14,000 *** ***   16,000 *** ***   18,000 *** ***   20,000 *** ***        Speech Audiometry:   Right Ear:    Speech Reception Threshold (SRT) was obtained at *** dBHL using {wordrecm:75258}.   Word Recognition scores were {DESC; EXCELLENT/GOOD/FAIR:84921} ({hearing discrim.:88001}) in quiet when words were presented at *** dBHL using recorded NU-6 word list ordered by difficulty.  Left Ear:    Speech Reception Threshold (SRT) was obtained at *** dBHL using {wordrecm:72795}.   Word  Recognition scores were {DESC; EXCELLENT/GOOD/FAIR:83744} ({hearing discrim.:04197}) in quiet when words were presented at *** dBHL using recorded NU-6 word list ordered by difficulty.       PATIENT EDUCATION     Patient was counseled in regard to findings. ***        Itzel Guerrier, Maynor, CCC-A  Clinical Audiologist    Degree of Hearing Sensitivity Decibel Range   Within Normal Limits (WNL) 0-25   Mild 26-40   Moderate 41-55   Moderately-Severe 56-70   Severe 71-90   Profound 91+      Key   CNT/DNT Could Not Test/Did Not Test   TM Tympanic Membrane   WNL Within Normal Limits   HA Hearing Aid   SNHL Sensorineural Hearing Loss   CHL Conductive Hearing Loss   NIHL Noise-Induced Hearing Loss   ECV Ear Canal Volume   MLV Monitored Live Voice     AUDIOGRAM

## 2025-05-19 ENCOUNTER — OFFICE VISIT (OUTPATIENT)
Dept: PALLIATIVE MEDICINE | Facility: CLINIC | Age: 75
End: 2025-05-19
Payer: MEDICARE

## 2025-05-19 ENCOUNTER — LAB (OUTPATIENT)
Dept: LAB | Facility: CLINIC | Age: 75
End: 2025-05-19
Payer: MEDICARE

## 2025-05-19 ENCOUNTER — OFFICE VISIT (OUTPATIENT)
Dept: HEMATOLOGY/ONCOLOGY | Facility: CLINIC | Age: 75
End: 2025-05-19
Payer: MEDICARE

## 2025-05-19 VITALS
BODY MASS INDEX: 17.76 KG/M2 | OXYGEN SATURATION: 95 % | WEIGHT: 87.5 LBS | HEART RATE: 107 BPM | DIASTOLIC BLOOD PRESSURE: 71 MMHG | SYSTOLIC BLOOD PRESSURE: 116 MMHG | TEMPERATURE: 98.1 F | RESPIRATION RATE: 18 BRPM

## 2025-05-19 DIAGNOSIS — R11.2 NAUSEA AND VOMITING, UNSPECIFIED VOMITING TYPE: ICD-10-CM

## 2025-05-19 DIAGNOSIS — R13.10 ODYNOPHAGIA: ICD-10-CM

## 2025-05-19 DIAGNOSIS — E83.42 HYPOMAGNESEMIA: ICD-10-CM

## 2025-05-19 DIAGNOSIS — E86.0 DEHYDRATION: ICD-10-CM

## 2025-05-19 DIAGNOSIS — C01 MALIGNANT NEOPLASM OF BASE OF TONGUE (MULTI): ICD-10-CM

## 2025-05-19 DIAGNOSIS — G89.3 CANCER ASSOCIATED PAIN: ICD-10-CM

## 2025-05-19 DIAGNOSIS — D69.6 THROMBOCYTOPENIA: ICD-10-CM

## 2025-05-19 DIAGNOSIS — E63.9 INADEQUATE ORAL NUTRITIONAL INTAKE: ICD-10-CM

## 2025-05-19 DIAGNOSIS — E87.1 HYPONATREMIA: Primary | ICD-10-CM

## 2025-05-19 DIAGNOSIS — Z51.5 PALLIATIVE CARE ENCOUNTER: Primary | ICD-10-CM

## 2025-05-19 DIAGNOSIS — C01 CANCER OF BASE OF TONGUE (MULTI): ICD-10-CM

## 2025-05-19 LAB
ALBUMIN SERPL BCP-MCNC: 3.4 G/DL (ref 3.4–5)
ALP SERPL-CCNC: 45 U/L (ref 33–136)
ALT SERPL W P-5'-P-CCNC: 16 U/L (ref 7–45)
ANION GAP SERPL CALC-SCNC: 12 MMOL/L (ref 10–20)
AST SERPL W P-5'-P-CCNC: 17 U/L (ref 9–39)
BASOPHILS # BLD AUTO: 0 X10*3/UL (ref 0–0.1)
BASOPHILS NFR BLD AUTO: 0 %
BILIRUB SERPL-MCNC: 0.4 MG/DL (ref 0–1.2)
BUN SERPL-MCNC: 11 MG/DL (ref 6–23)
CALCIUM SERPL-MCNC: 8.1 MG/DL (ref 8.6–10.3)
CHLORIDE SERPL-SCNC: 93 MMOL/L (ref 98–107)
CO2 SERPL-SCNC: 26 MMOL/L (ref 21–32)
CREAT SERPL-MCNC: 0.69 MG/DL (ref 0.5–1.05)
EGFRCR SERPLBLD CKD-EPI 2021: >90 ML/MIN/1.73M*2
EOSINOPHIL # BLD AUTO: 0.01 X10*3/UL (ref 0–0.4)
EOSINOPHIL NFR BLD AUTO: 0.4 %
ERYTHROCYTE [DISTWIDTH] IN BLOOD BY AUTOMATED COUNT: 15.5 % (ref 11.5–14.5)
GLUCOSE SERPL-MCNC: 97 MG/DL (ref 74–99)
HCT VFR BLD AUTO: 25.5 % (ref 36–46)
HGB BLD-MCNC: 8.6 G/DL (ref 12–16)
IMM GRANULOCYTES # BLD AUTO: 0 X10*3/UL (ref 0–0.5)
IMM GRANULOCYTES NFR BLD AUTO: 0 % (ref 0–0.9)
LYMPHOCYTES # BLD AUTO: 0.6 X10*3/UL (ref 0.8–3)
LYMPHOCYTES NFR BLD AUTO: 26.8 %
MAGNESIUM SERPL-MCNC: 1.02 MG/DL (ref 1.6–2.4)
MCH RBC QN AUTO: 30.8 PG (ref 26–34)
MCHC RBC AUTO-ENTMCNC: 33.7 G/DL (ref 32–36)
MCV RBC AUTO: 91 FL (ref 80–100)
MONOCYTES # BLD AUTO: 0.3 X10*3/UL (ref 0.05–0.8)
MONOCYTES NFR BLD AUTO: 13.4 %
NEUTROPHILS # BLD AUTO: 1.33 X10*3/UL (ref 1.6–5.5)
NEUTROPHILS NFR BLD AUTO: 59.4 %
NRBC BLD-RTO: ABNORMAL /100{WBCS}
PLATELET # BLD AUTO: 94 X10*3/UL (ref 150–450)
POTASSIUM SERPL-SCNC: 4.6 MMOL/L (ref 3.5–5.3)
PROT SERPL-MCNC: 6 G/DL (ref 6.4–8.2)
RBC # BLD AUTO: 2.79 X10*6/UL (ref 4–5.2)
SODIUM SERPL-SCNC: 126 MMOL/L (ref 136–145)
WBC # BLD AUTO: 2.2 X10*3/UL (ref 4.4–11.3)

## 2025-05-19 PROCEDURE — 99215 OFFICE O/P EST HI 40 MIN: CPT | Performed by: NURSE PRACTITIONER

## 2025-05-19 PROCEDURE — 99213 OFFICE O/P EST LOW 20 MIN: CPT

## 2025-05-19 PROCEDURE — 82565 ASSAY OF CREATININE: CPT

## 2025-05-19 PROCEDURE — 85025 COMPLETE CBC W/AUTO DIFF WBC: CPT

## 2025-05-19 PROCEDURE — 1125F AMNT PAIN NOTED PAIN PRSNT: CPT | Performed by: NURSE PRACTITIONER

## 2025-05-19 PROCEDURE — 3078F DIAST BP <80 MM HG: CPT | Performed by: NURSE PRACTITIONER

## 2025-05-19 PROCEDURE — 3074F SYST BP LT 130 MM HG: CPT | Performed by: NURSE PRACTITIONER

## 2025-05-19 PROCEDURE — 83735 ASSAY OF MAGNESIUM: CPT

## 2025-05-19 PROCEDURE — 36415 COLL VENOUS BLD VENIPUNCTURE: CPT

## 2025-05-19 PROCEDURE — G2211 COMPLEX E/M VISIT ADD ON: HCPCS | Performed by: NURSE PRACTITIONER

## 2025-05-19 RX ORDER — MAGNESIUM SULFATE HEPTAHYDRATE 40 MG/ML
4 INJECTION, SOLUTION INTRAVENOUS ONCE
Status: CANCELLED | OUTPATIENT
Start: 2025-05-20

## 2025-05-19 RX ORDER — OXYCODONE HCL 5 MG/5 ML
5 SOLUTION, ORAL ORAL EVERY 4 HOURS PRN
Qty: 420 ML | Refills: 0 | Status: SHIPPED | OUTPATIENT
Start: 2025-05-19 | End: 2025-06-02

## 2025-05-19 ASSESSMENT — ENCOUNTER SYMPTOMS
ABDOMINAL PAIN: 0
CONSTIPATION: 0
FATIGUE: 1
ADENOPATHY: 0
DIFFICULTY URINATING: 0
APPETITE CHANGE: 1
TROUBLE SWALLOWING: 1
SLEEP DISTURBANCE: 0
SHORTNESS OF BREATH: 0
LEG SWELLING: 0
ARTHRALGIAS: 0
NECK PAIN: 1
EYE PROBLEMS: 0
UNEXPECTED WEIGHT CHANGE: 0
SORE THROAT: 1
BACK PAIN: 0
DIZZINESS: 0
HEADACHES: 0
COUGH: 0

## 2025-05-19 ASSESSMENT — PAIN SCALES - GENERAL: PAINLEVEL_OUTOF10: 6

## 2025-05-19 NOTE — PROGRESS NOTES
SUPPORTIVE AND PALLIATIVE ONCOLOGY FOLLOW-UP - OUTPATIENT      SERVICE DATE: 5/19/2025    Referred by:  ELLYN Ochoa  Medical Oncologist: Faith Denson MD   Radiation Oncologist: Rahel Del Angel MD  Primary Physician: Parminder Russell  [phone]    REASON FOR CONSULT/CHIEF CONSULT COMPLAINT: pain management and Introduction to Supportive and Palliative Oncology Services    Subjective   HISTORY OF PRESENT ILLNESS: Annette Fernandez is a 74 y.o. female who presents with a PMH of HLD, HTN, and BOT SCC diagnosed 02/2025. She was started on chemoradiation with Cisplatin 3/26. Completed RT 5/9/25.    Pain Assessment:  Pain Score: 2/10  Location: throat    Symptom Assessment:  Pain:somewhat aching, burning pain in her throat. She states that the pain feels the same and has not improved or gotten any worse. She is taking oxycodone 2-3 times per day with good relief.   Headache: none  Dizziness:none  Lack of energy: somewhat   Difficulty sleeping: none   Worrying: a little  Anxiety: a little  Depression: a little  Pain in mouth/swallowing: very much  Dry mouth: none  Taste changes: somewhat   Shortness of breath: none  Lack of appetite: somewhat due to nausea and vomiting. She is taking in 2 tube feeds per day right now. This has been going on for the last several days.   Nausea: somewhat over the last several days.    Vomiting: a little vomiting 1-2 times per day over the last few days   Constipation: none  Diarrhea: none  Sore muscles: none  Numbness or tingling in hands/feet/other: none  Weight loss: a little  Other: a little      Information obtained from: chart review, interview of patient, and interview of family  ______________________________________________________________________     Oncology History   Cancer of base of tongue (Multi)   3/3/2025 Initial Diagnosis    Cancer of base of tongue (Multi)     3/3/2025 Cancer Staged    Staging form: Pharynx - P16 Negative Oropharynx, AJCC 8th Edition, Clinical:  Stage BRIDGETTE (cT4a, cN1, cM0, p16-) - Signed by Rahel Del Angel MD on 3/3/2025     3/26/2025 -  Chemotherapy    CISplatin with Concurrent Radiation (Weekly), 7 Week Cycle         Past Medical History:   Diagnosis Date    Aspiration into airway 07/31/2023    HLD (hyperlipidemia)     HTN (hypertension)     Mass of tongue     Other specified health status     No pertinent past medical history    Stiffness of unspecified hand, not elsewhere classified     Joint stiffness of hand     Past Surgical History:   Procedure Laterality Date    OTHER SURGICAL HISTORY  06/14/2019    Ovarian cystectomy    OTHER SURGICAL HISTORY  06/14/2019    Cyst excision     Family History   Problem Relation Name Age of Onset    Heart attack Father          SOCIAL HISTORY  Children 2, Grandchildren 2, Support system friends and family, Employment worked as an  - retired, and Hobbies reading, art, and movies   Social History:  reports that she has been smoking cigarettes. She has been exposed to tobacco smoke. She has quit using smokeless tobacco. She reports current alcohol use. She reports that she does not use drugs.      REVIEW OF SYSTEMS  Review of systems negative unless noted in HPI.       Objective     Current Outpatient Medications   Medication Instructions    atorvastatin (LIPITOR) 80 mg, oral, Daily RT    DentaGeL 1.1 % gel apply with toothbrush OR flouoride trays    dexAMETHasone (DECADRON) 8 mg, oral, Daily, For 3 days per week starting the day after treatment.    magic mouthwash (lidocaine, diphenhydrAMINE, Maalox 1:1:1) 10 mL, Swish & Spit, Every 6 hours PRN    metoprolol tartrate (LOPRESSOR) 25 mg, oral, 2 times daily    multivitamin tablet 1 tablet, Daily    nicotine (Nicoderm CQ) 7 mg/24 hr patch 1 patch, transdermal, Every 24 hours    NON FORMULARY 1 each, Daily    OLANZapine (ZYPREXA) 5 mg, oral, Nightly    ondansetron (ZOFRAN) 8 mg, oral, Every 8 hours PRN    oxyCODONE (ROXICODONE) 5 mg, oral, Every 4 hours PRN     pantoprazole (PROTONIX) 40 mg, oral, Daily, Do not crush, chew, or split.    polyethylene glycol (GLYCOLAX, MIRALAX) 17 g, g-tube, Daily    potassium chloride (Klor-Con) 20 mEq packet 40 mEq, oral, Daily    prochlorperazine (COMPAZINE) 10 mg, oral, Every 6 hours PRN    silver sulfADIAZINE (Silvadene) 1 % cream Apply to affected area twice daily    thyroid (pork) (ARMOUR THYROID) 15 mg, oral, Daily       Allergies:   Allergies   Allergen Reactions    Wellbutrin [Bupropion Hcl] Other     Lab on 05/19/2025   Component Date Value Ref Range Status    Glucose 05/19/2025 97  74 - 99 mg/dL Final    Sodium 05/19/2025 126 (L)  136 - 145 mmol/L Final    Potassium 05/19/2025 4.6  3.5 - 5.3 mmol/L Final    Chloride 05/19/2025 93 (L)  98 - 107 mmol/L Final    Bicarbonate 05/19/2025 26  21 - 32 mmol/L Final    Anion Gap 05/19/2025 12  10 - 20 mmol/L Final    Urea Nitrogen 05/19/2025 11  6 - 23 mg/dL Final    Creatinine 05/19/2025 0.69  0.50 - 1.05 mg/dL Final    eGFR 05/19/2025 >90  >60 mL/min/1.73m*2 Final    Calculations of estimated GFR are performed using the 2021 CKD-EPI Study Refit equation without the race variable for the IDMS-Traceable creatinine methods.  https://jasn.asnjournals.org/content/early/2021/09/22/ASN.1868324938    Calcium 05/19/2025 8.1 (L)  8.6 - 10.3 mg/dL Final    Albumin 05/19/2025 3.4  3.4 - 5.0 g/dL Final    Alkaline Phosphatase 05/19/2025 45  33 - 136 U/L Final    Total Protein 05/19/2025 6.0 (L)  6.4 - 8.2 g/dL Final    AST 05/19/2025 17  9 - 39 U/L Final    Bilirubin, Total 05/19/2025 0.4  0.0 - 1.2 mg/dL Final    ALT 05/19/2025 16  7 - 45 U/L Final    Patients treated with Sulfasalazine may generate falsely decreased results for ALT.    Magnesium 05/19/2025 1.02 (L)  1.60 - 2.40 mg/dL Final    WBC 05/19/2025 2.2 (L)  4.4 - 11.3 x10*3/uL Final    nRBC 05/19/2025    Final    Not Measured    RBC 05/19/2025 2.79 (L)  4.00 - 5.20 x10*6/uL Final    Hemoglobin 05/19/2025 8.6 (L)  12.0 - 16.0 g/dL Final     Hematocrit 05/19/2025 25.5 (L)  36.0 - 46.0 % Final    MCV 05/19/2025 91  80 - 100 fL Final    MCH 05/19/2025 30.8  26.0 - 34.0 pg Final    MCHC 05/19/2025 33.7  32.0 - 36.0 g/dL Final    RDW 05/19/2025 15.5 (H)  11.5 - 14.5 % Final    Platelets 05/19/2025 94 (L)  150 - 450 x10*3/uL Final    Neutrophils % 05/19/2025 59.4  40.0 - 80.0 % Final    Immature Granulocytes %, Automated 05/19/2025 0.0  0.0 - 0.9 % Final    Immature Granulocyte Count (IG) includes promyelocytes, myelocytes and metamyelocytes but does not include bands. Percent differential counts (%) should be interpreted in the context of the absolute cell counts (cells/UL).    Lymphocytes % 05/19/2025 26.8  13.0 - 44.0 % Final    Monocytes % 05/19/2025 13.4  2.0 - 10.0 % Final    Eosinophils % 05/19/2025 0.4  0.0 - 6.0 % Final    Basophils % 05/19/2025 0.0  0.0 - 2.0 % Final    Neutrophils Absolute 05/19/2025 1.33 (L)  1.60 - 5.50 x10*3/uL Final    Percent differential counts (%) should be interpreted in the context of the absolute cell counts (cells/uL).    Immature Granulocytes Absolute, Au* 05/19/2025 0.00  0.00 - 0.50 x10*3/uL Final    Lymphocytes Absolute 05/19/2025 0.60 (L)  0.80 - 3.00 x10*3/uL Final    Monocytes Absolute 05/19/2025 0.30  0.05 - 0.80 x10*3/uL Final    Eosinophils Absolute 05/19/2025 0.01  0.00 - 0.40 x10*3/uL Final    Basophils Absolute 05/19/2025 0.00  0.00 - 0.10 x10*3/uL Final   Lab on 05/15/2025   Component Date Value Ref Range Status    Glucose 05/15/2025 98  74 - 99 mg/dL Final    Sodium 05/15/2025 128 (L)  136 - 145 mmol/L Final    Potassium 05/15/2025 4.0  3.5 - 5.3 mmol/L Final    Chloride 05/15/2025 94 (L)  98 - 107 mmol/L Final    Bicarbonate 05/15/2025 27  21 - 32 mmol/L Final    Anion Gap 05/15/2025 11  10 - 20 mmol/L Final    Urea Nitrogen 05/15/2025 15  6 - 23 mg/dL Final    Creatinine 05/15/2025 0.66  0.50 - 1.05 mg/dL Final    eGFR 05/15/2025 >90  >60 mL/min/1.73m*2 Final    Calculations of estimated GFR are  performed using the 2021 CKD-EPI Study Refit equation without the race variable for the IDMS-Traceable creatinine methods.  https://jasn.asnjournals.org/content/early/2021/09/22/ASN.0729171710    Calcium 05/15/2025 7.0 (L)  8.6 - 10.3 mg/dL Final    Albumin 05/15/2025 3.4  3.4 - 5.0 g/dL Final    Alkaline Phosphatase 05/15/2025 45  33 - 136 U/L Final    Total Protein 05/15/2025 5.9 (L)  6.4 - 8.2 g/dL Final    AST 05/15/2025 19  9 - 39 U/L Final    Bilirubin, Total 05/15/2025 0.4  0.0 - 1.2 mg/dL Final    ALT 05/15/2025 17  7 - 45 U/L Final    Patients treated with Sulfasalazine may generate falsely decreased results for ALT.    Magnesium 05/15/2025 1.87  1.60 - 2.40 mg/dL Final   Infusion on 05/14/2025   Component Date Value Ref Range Status    Color, Urine 05/14/2025 Light-Yellow  Light-Yellow, Yellow, Dark-Yellow Final    Appearance, Urine 05/14/2025 Clear  Clear Final    Specific Gravity, Urine 05/14/2025 1.023  1.005 - 1.035 Final    pH, Urine 05/14/2025 6.0  5.0, 5.5, 6.0, 6.5, 7.0, 7.5, 8.0 Final    Protein, Urine 05/14/2025 NEGATIVE  NEGATIVE, 10 (TRACE), 20 (TRACE) mg/dL Final    Glucose, Urine 05/14/2025 Normal  Normal mg/dL Final    Blood, Urine 05/14/2025 NEGATIVE  NEGATIVE mg/dL Final    Ketones, Urine 05/14/2025 20 (1+) (A)  NEGATIVE mg/dL Final    Bilirubin, Urine 05/14/2025 NEGATIVE  NEGATIVE mg/dL Final    Urobilinogen, Urine 05/14/2025 Normal  Normal mg/dL Final    Nitrite, Urine 05/14/2025 NEGATIVE  NEGATIVE Final    Leukocyte Esterase, Urine 05/14/2025 NEGATIVE  NEGATIVE Final   Infusion on 05/13/2025   Component Date Value Ref Range Status    WBC 05/13/2025 3.6 (L)  4.4 - 11.3 x10*3/uL Final    nRBC 05/13/2025    Final    Not Measured    RBC 05/13/2025 3.13 (L)  4.00 - 5.20 x10*6/uL Final    Hemoglobin 05/13/2025 9.5 (L)  12.0 - 16.0 g/dL Final    Hematocrit 05/13/2025 29.9 (L)  36.0 - 46.0 % Final    MCV 05/13/2025 96  80 - 100 fL Final    MCH 05/13/2025 30.4  26.0 - 34.0 pg Final    MCHC  05/13/2025 31.8 (L)  32.0 - 36.0 g/dL Final    RDW 05/13/2025 14.9 (H)  11.5 - 14.5 % Final    Platelets 05/13/2025 89 (L)  150 - 450 x10*3/uL Final    Neutrophils % 05/13/2025 73.1  40.0 - 80.0 % Final    Immature Granulocytes %, Automated 05/13/2025 2.5 (H)  0.0 - 0.9 % Final    Immature Granulocyte Count (IG) includes promyelocytes, myelocytes and metamyelocytes but does not include bands. Percent differential counts (%) should be interpreted in the context of the absolute cell counts (cells/UL).    Lymphocytes % 05/13/2025 13.7  13.0 - 44.0 % Final    Monocytes % 05/13/2025 10.2  2.0 - 10.0 % Final    Eosinophils % 05/13/2025 0.0  0.0 - 6.0 % Final    Basophils % 05/13/2025 0.5  0.0 - 2.0 % Final    Neutrophils Absolute 05/13/2025 2.66  1.60 - 5.50 x10*3/uL Final    Percent differential counts (%) should be interpreted in the context of the absolute cell counts (cells/uL).    Immature Granulocytes Absolute, Au* 05/13/2025 0.09  0.00 - 0.50 x10*3/uL Final    Lymphocytes Absolute 05/13/2025 0.50 (L)  0.80 - 3.00 x10*3/uL Final    Monocytes Absolute 05/13/2025 0.37  0.05 - 0.80 x10*3/uL Final    Eosinophils Absolute 05/13/2025 0.00  0.00 - 0.40 x10*3/uL Final    Basophils Absolute 05/13/2025 0.02  0.00 - 0.10 x10*3/uL Final    Glucose 05/13/2025 97  74 - 99 mg/dL Final    Sodium 05/13/2025 127 (L)  136 - 145 mmol/L Final    Potassium 05/13/2025 3.3 (L)  3.5 - 5.3 mmol/L Final    Chloride 05/13/2025 90 (L)  98 - 107 mmol/L Final    Bicarbonate 05/13/2025 27  21 - 32 mmol/L Final    Anion Gap 05/13/2025 13  10 - 20 mmol/L Final    Urea Nitrogen 05/13/2025 13  6 - 23 mg/dL Final    Creatinine 05/13/2025 0.57  0.50 - 1.05 mg/dL Final    eGFR 05/13/2025 >90  >60 mL/min/1.73m*2 Final    Calculations of estimated GFR are performed using the 2021 CKD-EPI Study Refit equation without the race variable for the IDMS-Traceable creatinine methods.  https://jasn.asnjournals.org/content/early/2021/09/22/ASN.8524912811    Calcium  05/13/2025 6.3 (L)  8.6 - 10.3 mg/dL Final    Albumin 05/13/2025 3.4  3.4 - 5.0 g/dL Final    Alkaline Phosphatase 05/13/2025 43  33 - 136 U/L Final    Total Protein 05/13/2025 5.7 (L)  6.4 - 8.2 g/dL Final    AST 05/13/2025 19  9 - 39 U/L Final    Bilirubin, Total 05/13/2025 0.4  0.0 - 1.2 mg/dL Final    ALT 05/13/2025 19  7 - 45 U/L Final    Patients treated with Sulfasalazine may generate falsely decreased results for ALT.    Magnesium 05/13/2025 0.81 (LL)  1.60 - 2.40 mg/dL Final    RBC Morphology 05/13/2025 No significant RBC morphology present   Final        PHYSICAL EXAMINATION  Vital Signs:       5/8/2025    10:06 AM 5/9/2025     9:52 AM 5/13/2025     2:15 PM 5/13/2025     3:41 PM 5/14/2025     9:04 AM 5/16/2025    12:17 PM 5/19/2025     9:22 AM   Vitals   Systolic 104 138 106 129 102 106 116   Diastolic 54 67 65 69 46 61 71   BP Location  Right arm     Left arm   Heart Rate 78 97 112 90 83 109 107   Temp 36.9 °C (98.4 °F) 36.3 °C (97.3 °F) 38.3 °C (100.9 °F) 36.7 °C (98.1 °F) 36.6 °C (97.9 °F) 36.7 °C (98.1 °F) 36.7 °C (98.1 °F)   Resp 18 18 18 18 18 18 18   Weight (lb) 95.13 93.48 89.51  90.28 91.82 87.5   BMI 19.31 kg/m2 18.97 kg/m2 18.17 kg/m2  18.32 kg/m2 18.64 kg/m2 17.76 kg/m2   BSA (m2) 1.34 m2 1.33 m2 1.3 m2  1.3 m2 1.32 m2 1.28 m2   Visit Report       Report   ;Vital signs reviewed       Physical Exam  HENT:      Head: Normocephalic.   Eyes:      Pupils: Pupils are equal, round, and reactive to light.   Pulmonary:      Effort: Pulmonary effort is normal.   Musculoskeletal:         General: Normal range of motion.   Neurological:      Mental Status: She is alert and oriented to person, place, and time.   Psychiatric:         Mood and Affect: Mood normal.         Behavior: Behavior normal.         ASSESSMENT/PLAN    Pain  Pain is: cancer related pain  Type: somatic  Pain control: well-controlled  Home regimen:   - Continue Oxycodone 5 mg/5mL - taking 5 mg every 4-6 hours as needed for pain  - Tylenol  has not been effective for pain  - Continue BMX up to 4 times per day as needed    Opioid Use  Medication Management:   - OARRS report reviewed with no aberrant behavior; consistent with  prescriptions/records and patient history  - MED 22.5.  Overdose Risk Score 070.   This has been discussed with patient.   - We will continue to closely monitor the patient for signs of prescription misuse including UDS, OARRS review and subjective reports at each visit.  - No concurrent benzodiazepine use   - I am a provider who either is or has consulted and collaborated with a provider certified in Hospice and Palliative Medicine and have conducted a face-face visit and examination for this patient.  - Routine Urine Drug Screen MED <80-  - Controlled Substance Agreement MED <80  - Specifically discussed that controlled substance prescriptions will only be provided by our group as outlined in the completed agreement  - Prescribed naloxone n/a  - Red Flags: none     Nausea   Intermittent nausea without vomiting related to chemotherapy   - Continue Ondansetron 8 mg every 8 hours as needed  - Continue Prochlorperazine 10 mg every 6 hours as needed. Discussed alternating these two medications every 4 hours right now while she is nauseated. She can take zofran at 8 am, compazine @ noon, zofran @ 4 pm and so on.   - Continue Olanzapine 5 mg nightly as prescribed by oncology    Constipation   At risk for constipation related to opioids,  currently not constipated   Usual bowel pattern: daily   Current regimen:   - Continue Miralax 17 g daily. Hold for diarrhea.   - If constipation develops, start Senna 10 mL BID  - If no BM within 48-72 hours, start MOM 8% every 6 hours as needed    Decreased appetite  Related to malignancy  Nutrition following  Weight loss 4 pounds in the last 4 days  Current regimen:    - Goal is 3 cans of isosource per day - reports getting in 2-3 cans per day  - Continue to drink ensure as tolerated  - Goal of 64  oz of fluids per day - majority through her peg tube    Advance Directives  Existence of Advance Directives:Yes, but NOT documented in medical record  Decision maker: HCPOA is daughter Daisy Zazueta  Code Status: Full code    Next Follow-Up Visit:  Return to clinic in 2-3 week     Signature and billing  Thank you for allowing us to participate in the care of this patient. Recommendations will be communicated back to the consulting service by way of shared electronic medical record or face-to-face.    Medical complexity was moderate level due to due to complexity of problems, extensive data review, and high risk of management/treatment.  Time was spent on the following: Prep Time, Time Directly with Patient/Family/Caregiver, Documentation Time. Total time spent: 20      DATA   Diagnostic tests and information reviewed for today's visit:  Most recent labs, Most recent imaging, Medications       Some elements copied from oncology note on 4/28/25, the elements have been updated and all reflect current decision making from today, 5/19/2025.      Plan of Care discussed with: Patient and Family/Significant Other: grand daughter      SIGNATURE: ROB Art    Contact information:  Supportive and Palliative Oncology  Monday-Friday 8 AM-5 PM  Phone:  [phone], press option #5, then option #1.   Or Epic Secure Chat

## 2025-05-19 NOTE — PROGRESS NOTES
[facility] - Medical Oncology Follow-Up Visit    Patient ID: Annette Fernandez is a 74 y.o. female.  Referring Physician: Umm Jalloh, APRN-CNP  [address] [place] [address]  Primary Care Provider: Parminder Russell MD      Chief Concern: Patient is here to followup for base of tongue/oropharyngeal cancer    HPI  Present with her granddtr.  Feeling better today, but not back to her norm.  Slept the whole weekend.  Saturday only took in one TF carton, Sunday two.  Nothing yet today.   Increased secretions.  Worsening heartburn.  Currently taking pantoprazole daily.  Intermittent nausea with emesis.  Not able to report if she is taking the zyprexa.  Antiemetic PRN.   Diarrhea has resolved.  BM every other day.  Oxy taken 3x/day.  No fevers or CP.  No other concerns.  IV fluids tomorrow with labs,      Diagnosis: Squamous cell carcinoma of the BOT  Stage:  Cancer Staging   Cancer of base of tongue (Multi)  Staging form: Pharynx - P16 Negative Oropharynx, AJCC 8th Edition  - Clinical: Stage BRIDGETTE (cT4a, cN1, cM0, p16-) - Signed by Rahel Del Angel MD on 3/3/2025     Current sites of disease: BOT and lymph nodes  Molecular and ancillary testing: p16 neg    Oncologic History:  1/7/25 - CT neck with Irregular, heterogeneous, and possibly ulcerating mass centered at the base of tongue with involvement of extrinsic tongue musculature and lingual surface of the epiglottis; possible involvement of the  body of the hyoid. Oropharyngeal neoplasm is of high suspicion. No cervical lymphadenopathy by size criteria.  1.8 cm exophytic right posterior thyroid nodule along the right tracheoesophageal groove containing calcification.  2/4/25 - Met with Dr. Gamboa after noting discomfort in throat since August, some discomfort in L ear. DL with fungating lesion involving base of tongue and extending on the lateral pharyngeal wall on the left, involving lingual aspect of epiglottis.  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  2/28/25 - PET with hypermetabolic soft tissue mass at BOT, moderately hypermetabolic right level 2A lymph node  3/26/25 - C1 cisplatin with definitive radiation (30mg/m2 due to renal function)    Past Medical History:  07/31/2023: Aspiration into airway  No date: HLD (hyperlipidemia)  No date: HTN (hypertension)  No date: Mass of tongue  No date: Other specified health status      Comment:  No pertinent past medical history  No date: Stiffness of unspecified hand, not elsewhere classified      Comment:  Joint stiffness of hand     4 years ago - throat injury, every time she drank/swallow into lungs (black esophagus)    Past Surgical History:  06/14/2019: OTHER SURGICAL HISTORY      Comment:  Ovarian cystectomy  06/14/2019: OTHER SURGICAL HISTORY      Comment:  Cyst excision     Social Hx:   Annette Fernandez  reports that she has been smoking cigarettes. She has been exposed to tobacco smoke. She has quit using smokeless tobacco.  She  reports current alcohol use.  She  reports no history of drug use.  Smoking - working to cut back, down to 2-3 per day, previously 1/2ppd  Limiting amount each time    Lives on her own  Takes care of her cat    Family History   Problem Relation Name Age of Onset    Heart attack Father      Mom had NHL  Grandparent with colon cancer    Meds (Current):  Current Outpatient Medications   Medication Instructions    atorvastatin (LIPITOR) 80 mg, oral, Daily RT    DentaGeL 1.1 % gel apply with toothbrush OR flouoride trays    dexAMETHasone (DECADRON) 8 mg, oral, Daily, For 3 days per week starting the day after treatment.    magic mouthwash (lidocaine, diphenhydrAMINE, Maalox 1:1:1) 10 mL, Swish & Spit, Every 6 hours PRN    metoprolol tartrate (LOPRESSOR) 25 mg, oral, 2 times daily    multivitamin tablet 1 tablet, Daily    nicotine (Nicoderm CQ) 7 mg/24 hr patch 1 patch, transdermal, Every 24 hours     NON FORMULARY 1 each, Daily    OLANZapine (ZYPREXA) 5 mg, oral, Nightly    ondansetron (ZOFRAN) 8 mg, oral, Every 8 hours PRN    oxyCODONE (ROXICODONE) 5 mg, oral, Every 4 hours PRN    pantoprazole (PROTONIX) 40 mg, oral, Daily, Do not crush, chew, or split.    polyethylene glycol (GLYCOLAX, MIRALAX) 17 g, g-tube, Daily    potassium chloride (Klor-Con) 20 mEq packet 40 mEq, oral, Daily    prochlorperazine (COMPAZINE) 10 mg, oral, Every 6 hours PRN    silver sulfADIAZINE (Silvadene) 1 % cream Apply to affected area twice daily    thyroid (pork) (ARMOUR THYROID) 15 mg, oral, Daily        Allergies   Allergen Reactions    Wellbutrin [Bupropion Hcl] Other     Review of Systems   Constitutional:  Positive for appetite change and fatigue. Negative for unexpected weight change.   HENT:   Positive for sore throat and trouble swallowing. Negative for hearing loss and mouth sores.    Eyes:  Negative for eye problems.   Respiratory:  Negative for cough and shortness of breath.    Cardiovascular:  Negative for chest pain and leg swelling.   Gastrointestinal:  Positive for nausea and vomiting. Negative for abdominal pain, constipation and diarrhea.   Genitourinary:  Negative for difficulty urinating.    Musculoskeletal:  Positive for neck pain. Negative for arthralgias and back pain.   Skin:  Negative for rash.   Neurological:  Negative for dizziness and headaches.   Hematological:  Negative for adenopathy.   Psychiatric/Behavioral:  Negative for sleep disturbance.       Objective   BSA: 1.28 meters squared  /71 (BP Location: Left arm, Patient Position: Sitting, BP Cuff Size: Small adult)   Pulse 107   Temp 36.7 °C (98.1 °F) (Temporal)   Resp 18   Wt (!) 39.7 kg (87 lb 8 oz)   SpO2 95%   BMI 17.76 kg/m²     Wt Readings from Last 5 Encounters:   05/20/25 (!) 39.4 kg (86 lb 12 oz)   05/19/25 (!) 39.7 kg (87 lb 8 oz)   05/16/25 (!) 41.7 kg (91 lb 13.2 oz)   05/14/25 (!) 40.9 kg (90 lb 4.5 oz)   05/13/25 (!) 40.6 kg (89  lb 8.1 oz)       Performance Status:  (1) Restricted in physically strenuous activity, ambulatory and able to do work of light nature     Physical Exam  Vitals reviewed.   Constitutional:       General: She is not in acute distress.  HENT:      Head: Normocephalic and atraumatic.      Mouth/Throat:      Mouth: Mucous membranes are moist.   Eyes:      Pupils: Pupils are equal, round, and reactive to light.   Cardiovascular:      Rate and Rhythm: Regular rhythm. Tachycardia present.   Pulmonary:      Effort: Pulmonary effort is normal.      Breath sounds: Normal breath sounds.   Abdominal:      General: Bowel sounds are normal. There is no distension.      Palpations: Abdomen is soft.      Comments: +PEG   Musculoskeletal:         General: No swelling. Normal range of motion.      Cervical back: Normal range of motion.   Skin:     Findings: Erythema (to anterior neck) present. No rash.   Neurological:      General: No focal deficit present.      Mental Status: She is alert and oriented to person, place, and time.   Psychiatric:         Mood and Affect: Mood normal.         Behavior: Behavior normal.          Results:  Labs:  Lab Results   Component Value Date    WBC 2.2 (L) 05/19/2025    HGB 8.6 (L) 05/19/2025    HCT 25.5 (L) 05/19/2025    MCV 91 05/19/2025    PLT 94 (L) 05/19/2025      Lab Results   Component Value Date    NEUTROABS 1.33 (L) 05/19/2025      Lab Results   Component Value Date    GLUCOSE 101 (H) 05/20/2025    CALCIUM 8.2 (L) 05/20/2025     (L) 05/20/2025    K 4.6 05/20/2025    CO2 25 05/20/2025    CL 97 (L) 05/20/2025    BUN 11 05/20/2025    CREATININE 0.66 05/20/2025     Lab Results   Component Value Date    ALT 16 05/19/2025    AST 17 05/19/2025    ALKPHOS 45 05/19/2025    BILITOT 0.4 05/19/2025        Imaging:  No new imaging.      Pathology:  No new pathology    Assessment/Plan      Annette Fernandez is a 74 y.o. female here for follow-up of stage BRIDGETTE squamous cell carcinoma of the base of  tongue, p16 neg.    Discussed overall findings to date with patient and her daughter previously, as well as recommendations for treatment with concurrent/definitive chemoradiation. We discussed the curative goal of treatment. Discussed that chemotherapy would consist of weekly cisplatin, with side effects discussed, including but not limited to myelosuppression (infection, anemia, bleeding), fatigue, nausea, neuropathy, hearing loss/tinnitus, and nephropathy.    - Completed 7 weeks - 30mg/m2 dose  - With cisplatin, will have weekly IVF and provider visits  - monitor for worsening tinnitus  - Zyprexa nightly   - PRN zofran/compazine  - dex 8mg daily x3 days after chemo  - advised her to stop aspirin for pain given risk of NSAIDs and kidney function with cisplatin. Ok for oxycodone as needed and we will refill if gets low  - reminded her about risk of constipation with oxycodone - she is taking miralax as needed and will start regularly if taking, I will also send senna to use if using oxycodone more  - baseline audiology evaluation not scheduled prior to cisplatin, ok to proceed whenever scheduled or delay until after tx    # Mucositis (mild)  - encouraged continued rinse use - Glutamine & BMX     # Reduced nutritional intake  - recommended she increase protein supplements 3/day.  Graze throughout the day.  Dietician referral in place.    - 4/24:  She is attempting to get 3 TF cartons/day.  Reports she feels bloated and not able to tolerate 3 total cartons.    - 5/1:  Able to tolerate 2.5 TF cartons/day.    - 5/19:  Recommended she resume routine TF's, start with 1/2 carton QID.  Goal to resume 2.5 TF cartons/day.      # Odynophagia   - established with supportive onc.  Continue current oxycodone 5mg/q4H PRN.  Bowel regimen - routine daily miralax.   - 5/1:  Oral intake increasingly challenging.  Decreased from previous week.  Able to tolerate sips.  Main 3-4/10.  Continue PRN oxy admin.    - 5/19:  Continue PRN  oxycodone.  Sips as tolerated.      # Heartburn  - increase pantoprazole to BID    # Thrombocytopenia 94 5/19/25   - No clinical s/sx's bleeding.  Pt educated to bleeding precautions.       # Hypomagnesia   - Repeat labs with IV replacement, swallowing pills is currently very difficult for pt, oral mag will likely clog peg.       # Smoking cessation   - increased # of cigarettes/day from 2-3 to 5-6, has not started to wear the nicotine patch yet.  Encouraged her to use the patch, cut down on # of cigarettes/day.     # Localized swelling to LLE - faint difference between RLE, swelling mainly to ankle/dorsum area.  STAT duplex ordered to r/o DVT.  Scheduled 5/2/25.  5/2:  Vascular duplex results (-) for DVT to LLE.    - 5/5:  Swelling has resolved.      # Diarrhea 2/2 laxative admin-  - Stop bowel meds, rest bowels today, declined IV hydration visit today.  Agreeable to come in tomorrow for fluids and labs.  Push fluids.  Start to increase TF gradually back to 2.5 cartons/day.    - 5/19:  diarrhea has resolved.    # Hyponatremia 126 5/19/25  - swallowing pills is currently very difficult for pt.  Tabs will likely clog peg.   Instructed pt to maintain free-water flushes and add 1 tsp table salt to equivalent of 120ml water TID.    Repeat labs 5/20/25.      - RTC 5/23/25 for IV fluids and labs.    - RTC 5/29/25 with visit, labs, and IV fluids.   - RTC 6/2/25 with IV fluids and labs.

## 2025-05-20 ENCOUNTER — PATIENT OUTREACH (OUTPATIENT)
Dept: PRIMARY CARE | Facility: CLINIC | Age: 75
End: 2025-05-20
Payer: MEDICARE

## 2025-05-20 ENCOUNTER — INFUSION (OUTPATIENT)
Dept: HEMATOLOGY/ONCOLOGY | Facility: CLINIC | Age: 75
End: 2025-05-20
Payer: MEDICARE

## 2025-05-20 ENCOUNTER — APPOINTMENT (OUTPATIENT)
Dept: AUDIOLOGY | Facility: CLINIC | Age: 75
End: 2025-05-20
Payer: MEDICARE

## 2025-05-20 VITALS
TEMPERATURE: 99.1 F | DIASTOLIC BLOOD PRESSURE: 67 MMHG | HEART RATE: 108 BPM | RESPIRATION RATE: 18 BRPM | OXYGEN SATURATION: 97 % | BODY MASS INDEX: 17.61 KG/M2 | WEIGHT: 86.75 LBS | SYSTOLIC BLOOD PRESSURE: 102 MMHG

## 2025-05-20 DIAGNOSIS — E87.1 HYPONATREMIA: ICD-10-CM

## 2025-05-20 DIAGNOSIS — I10 HTN (HYPERTENSION), BENIGN: ICD-10-CM

## 2025-05-20 DIAGNOSIS — C01 MALIGNANT NEOPLASM OF BASE OF TONGUE (MULTI): ICD-10-CM

## 2025-05-20 DIAGNOSIS — E86.0 DEHYDRATION: ICD-10-CM

## 2025-05-20 DIAGNOSIS — C01 CANCER OF BASE OF TONGUE (MULTI): ICD-10-CM

## 2025-05-20 LAB
ANION GAP SERPL CALC-SCNC: 10 MMOL/L (ref 10–20)
ANION GAP SERPL CALC-SCNC: 10 MMOL/L (ref 10–20)
BUN SERPL-MCNC: 11 MG/DL (ref 6–23)
BUN SERPL-MCNC: 12 MG/DL (ref 6–23)
CALCIUM SERPL-MCNC: 8.2 MG/DL (ref 8.6–10.3)
CALCIUM SERPL-MCNC: 8.5 MG/DL (ref 8.6–10.3)
CHLORIDE SERPL-SCNC: 94 MMOL/L (ref 98–107)
CHLORIDE SERPL-SCNC: 97 MMOL/L (ref 98–107)
CO2 SERPL-SCNC: 25 MMOL/L (ref 21–32)
CO2 SERPL-SCNC: 27 MMOL/L (ref 21–32)
CREAT SERPL-MCNC: 0.66 MG/DL (ref 0.5–1.05)
CREAT SERPL-MCNC: 0.75 MG/DL (ref 0.5–1.05)
EGFRCR SERPLBLD CKD-EPI 2021: 84 ML/MIN/1.73M*2
EGFRCR SERPLBLD CKD-EPI 2021: >90 ML/MIN/1.73M*2
GLUCOSE SERPL-MCNC: 101 MG/DL (ref 74–99)
GLUCOSE SERPL-MCNC: 112 MG/DL (ref 74–99)
POTASSIUM SERPL-SCNC: 4.6 MMOL/L (ref 3.5–5.3)
POTASSIUM SERPL-SCNC: 5 MMOL/L (ref 3.5–5.3)
SODIUM SERPL-SCNC: 126 MMOL/L (ref 136–145)
SODIUM SERPL-SCNC: 127 MMOL/L (ref 136–145)

## 2025-05-20 PROCEDURE — 96366 THER/PROPH/DIAG IV INF ADDON: CPT | Mod: INF

## 2025-05-20 PROCEDURE — 2500000004 HC RX 250 GENERAL PHARMACY W/ HCPCS (ALT 636 FOR OP/ED): Mod: JZ | Performed by: NURSE PRACTITIONER

## 2025-05-20 PROCEDURE — 80048 BASIC METABOLIC PNL TOTAL CA: CPT

## 2025-05-20 PROCEDURE — 84100 ASSAY OF PHOSPHORUS: CPT

## 2025-05-20 PROCEDURE — 96365 THER/PROPH/DIAG IV INF INIT: CPT | Mod: INF

## 2025-05-20 RX ORDER — MAGNESIUM SULFATE HEPTAHYDRATE 40 MG/ML
2 INJECTION, SOLUTION INTRAVENOUS
Status: COMPLETED | OUTPATIENT
Start: 2025-05-20 | End: 2025-05-20

## 2025-05-20 RX ORDER — HEPARIN SODIUM,PORCINE/PF 10 UNIT/ML
50 SYRINGE (ML) INTRAVENOUS AS NEEDED
OUTPATIENT
Start: 2025-05-20

## 2025-05-20 RX ORDER — HEPARIN 100 UNIT/ML
500 SYRINGE INTRAVENOUS AS NEEDED
OUTPATIENT
Start: 2025-05-20

## 2025-05-20 RX ADMIN — MAGNESIUM SULFATE IN WATER 2 G: 40 INJECTION, SOLUTION INTRAVENOUS at 12:22

## 2025-05-20 RX ADMIN — MAGNESIUM SULFATE IN WATER 2 G: 40 INJECTION, SOLUTION INTRAVENOUS at 11:39

## 2025-05-20 RX ADMIN — SODIUM CHLORIDE 1000 ML: 0.9 INJECTION, SOLUTION INTRAVENOUS at 11:39

## 2025-05-20 ASSESSMENT — ENCOUNTER SYMPTOMS
VOMITING: 1
DIARRHEA: 0
NAUSEA: 1

## 2025-05-20 ASSESSMENT — PAIN SCALES - GENERAL: PAINLEVEL_OUTOF10: 6

## 2025-05-21 LAB — PHOSPHATE SERPL-MCNC: 3.7 MG/DL (ref 2.5–4.9)

## 2025-05-21 RX ORDER — METOPROLOL TARTRATE 25 MG/1
25 TABLET, FILM COATED ORAL 2 TIMES DAILY
Qty: 90 TABLET | Refills: 3 | Status: SHIPPED | OUTPATIENT
Start: 2025-05-21

## 2025-05-21 NOTE — TELEPHONE ENCOUNTER
Rx Refill Request     Name: Annette Ingramdarrianles  :  1950     Date of last appointment:  3/7/2025   Date of next appointment:  2025   Best number to reach patient:  [phone]      soft diet for 1 week

## 2025-05-23 ENCOUNTER — APPOINTMENT (OUTPATIENT)
Dept: RADIATION ONCOLOGY | Facility: CLINIC | Age: 75
End: 2025-05-23
Payer: MEDICARE

## 2025-05-23 ENCOUNTER — INFUSION (OUTPATIENT)
Dept: HEMATOLOGY/ONCOLOGY | Facility: CLINIC | Age: 75
End: 2025-05-23
Payer: MEDICARE

## 2025-05-23 VITALS
TEMPERATURE: 96.6 F | WEIGHT: 89.29 LBS | RESPIRATION RATE: 18 BRPM | HEART RATE: 104 BPM | OXYGEN SATURATION: 99 % | BODY MASS INDEX: 18.12 KG/M2 | SYSTOLIC BLOOD PRESSURE: 113 MMHG | DIASTOLIC BLOOD PRESSURE: 61 MMHG

## 2025-05-23 DIAGNOSIS — E83.42 HYPOMAGNESEMIA: ICD-10-CM

## 2025-05-23 DIAGNOSIS — E87.1 HYPONATREMIA: ICD-10-CM

## 2025-05-23 DIAGNOSIS — C01 MALIGNANT NEOPLASM OF BASE OF TONGUE (MULTI): Primary | ICD-10-CM

## 2025-05-23 DIAGNOSIS — C01 MALIGNANT NEOPLASM OF BASE OF TONGUE (MULTI): ICD-10-CM

## 2025-05-23 DIAGNOSIS — E86.0 DEHYDRATION: ICD-10-CM

## 2025-05-23 DIAGNOSIS — D69.6 THROMBOCYTOPENIA: ICD-10-CM

## 2025-05-23 LAB
ALBUMIN SERPL BCP-MCNC: 3 G/DL (ref 3.4–5)
ALP SERPL-CCNC: 43 U/L (ref 33–136)
ALT SERPL W P-5'-P-CCNC: 15 U/L (ref 7–45)
ANION GAP SERPL CALC-SCNC: 9 MMOL/L (ref 10–20)
AST SERPL W P-5'-P-CCNC: 24 U/L (ref 9–39)
BASOPHILS # BLD AUTO: 0.01 X10*3/UL (ref 0–0.1)
BASOPHILS NFR BLD AUTO: 0.4 %
BILIRUB SERPL-MCNC: 0.3 MG/DL (ref 0–1.2)
BUN SERPL-MCNC: 12 MG/DL (ref 6–23)
CALCIUM SERPL-MCNC: 8 MG/DL (ref 8.6–10.3)
CHLORIDE SERPL-SCNC: 98 MMOL/L (ref 98–107)
CO2 SERPL-SCNC: 27 MMOL/L (ref 21–32)
CREAT SERPL-MCNC: 0.69 MG/DL (ref 0.5–1.05)
EGFRCR SERPLBLD CKD-EPI 2021: >90 ML/MIN/1.73M*2
EOSINOPHIL # BLD AUTO: 0.01 X10*3/UL (ref 0–0.4)
EOSINOPHIL NFR BLD AUTO: 0.4 %
ERYTHROCYTE [DISTWIDTH] IN BLOOD BY AUTOMATED COUNT: 17.1 % (ref 11.5–14.5)
GLUCOSE SERPL-MCNC: 105 MG/DL (ref 74–99)
HCT VFR BLD AUTO: 25.3 % (ref 36–46)
HGB BLD-MCNC: 8.1 G/DL (ref 12–16)
IMM GRANULOCYTES # BLD AUTO: 0.01 X10*3/UL (ref 0–0.5)
IMM GRANULOCYTES NFR BLD AUTO: 0.4 % (ref 0–0.9)
LYMPHOCYTES # BLD AUTO: 0.84 X10*3/UL (ref 0.8–3)
LYMPHOCYTES NFR BLD AUTO: 34 %
MAGNESIUM SERPL-MCNC: 1.13 MG/DL (ref 1.6–2.4)
MCH RBC QN AUTO: 30.6 PG (ref 26–34)
MCHC RBC AUTO-ENTMCNC: 32 G/DL (ref 32–36)
MCV RBC AUTO: 96 FL (ref 80–100)
MONOCYTES # BLD AUTO: 0.35 X10*3/UL (ref 0.05–0.8)
MONOCYTES NFR BLD AUTO: 14.2 %
NEUTROPHILS # BLD AUTO: 1.25 X10*3/UL (ref 1.6–5.5)
NEUTROPHILS NFR BLD AUTO: 50.6 %
NRBC BLD-RTO: ABNORMAL /100{WBCS}
PLATELET # BLD AUTO: 116 X10*3/UL (ref 150–450)
POTASSIUM SERPL-SCNC: 4.2 MMOL/L (ref 3.5–5.3)
PROT SERPL-MCNC: 5.5 G/DL (ref 6.4–8.2)
RBC # BLD AUTO: 2.65 X10*6/UL (ref 4–5.2)
RBC MORPH BLD: NORMAL
SODIUM SERPL-SCNC: 130 MMOL/L (ref 136–145)
WBC # BLD AUTO: 2.5 X10*3/UL (ref 4.4–11.3)

## 2025-05-23 PROCEDURE — 80053 COMPREHEN METABOLIC PANEL: CPT

## 2025-05-23 PROCEDURE — 85025 COMPLETE CBC W/AUTO DIFF WBC: CPT

## 2025-05-23 PROCEDURE — 96360 HYDRATION IV INFUSION INIT: CPT | Mod: INF

## 2025-05-23 PROCEDURE — 2500000004 HC RX 250 GENERAL PHARMACY W/ HCPCS (ALT 636 FOR OP/ED): Mod: JZ | Performed by: INTERNAL MEDICINE

## 2025-05-23 PROCEDURE — 83735 ASSAY OF MAGNESIUM: CPT

## 2025-05-23 PROCEDURE — 96365 THER/PROPH/DIAG IV INF INIT: CPT | Mod: INF

## 2025-05-23 PROCEDURE — 2500000004 HC RX 250 GENERAL PHARMACY W/ HCPCS (ALT 636 FOR OP/ED): Mod: JZ | Performed by: NURSE PRACTITIONER

## 2025-05-23 RX ORDER — MAGNESIUM SULFATE HEPTAHYDRATE 40 MG/ML
2 INJECTION, SOLUTION INTRAVENOUS ONCE
Status: CANCELLED | OUTPATIENT
Start: 2025-05-23 | End: 2025-05-23

## 2025-05-23 RX ORDER — MAGNESIUM SULFATE HEPTAHYDRATE 40 MG/ML
2 INJECTION, SOLUTION INTRAVENOUS ONCE
Status: COMPLETED | OUTPATIENT
Start: 2025-05-23 | End: 2025-05-23

## 2025-05-23 RX ADMIN — MAGNESIUM SULFATE IN WATER 2 G: 40 INJECTION, SOLUTION INTRAVENOUS at 14:04

## 2025-05-23 RX ADMIN — SODIUM CHLORIDE 1000 ML: 0.9 INJECTION, SOLUTION INTRAVENOUS at 12:46

## 2025-05-23 ASSESSMENT — PAIN SCALES - GENERAL: PAINLEVEL_OUTOF10: 0-NO PAIN

## 2025-05-27 ENCOUNTER — HOSPITAL ENCOUNTER (OUTPATIENT)
Dept: RADIATION ONCOLOGY | Facility: CLINIC | Age: 75
Setting detail: RADIATION/ONCOLOGY SERIES
Discharge: HOME | End: 2025-05-27
Payer: MEDICARE

## 2025-05-27 VITALS
HEART RATE: 104 BPM | BODY MASS INDEX: 18.12 KG/M2 | SYSTOLIC BLOOD PRESSURE: 133 MMHG | WEIGHT: 89.29 LBS | OXYGEN SATURATION: 98 % | TEMPERATURE: 97.9 F | RESPIRATION RATE: 18 BRPM | DIASTOLIC BLOOD PRESSURE: 63 MMHG

## 2025-05-27 DIAGNOSIS — C01 MALIGNANT NEOPLASM OF BASE OF TONGUE (MULTI): Primary | ICD-10-CM

## 2025-05-27 DIAGNOSIS — R13.12 OROPHARYNGEAL DYSPHAGIA: ICD-10-CM

## 2025-05-27 PROCEDURE — 99211 OFF/OP EST MAY X REQ PHY/QHP: CPT | Performed by: RADIOLOGY

## 2025-05-27 ASSESSMENT — ENCOUNTER SYMPTOMS
DEPRESSION: 0
HEMATOLOGIC/LYMPHATIC NEGATIVE: 1
CHEST TIGHTNESS: 0
FATIGUE: 1
TROUBLE SWALLOWING: 1
DIARRHEA: 0
EXTREMITY WEAKNESS: 1
UNEXPECTED WEIGHT CHANGE: 0
NAUSEA: 1
DIAPHORESIS: 0
DIZZINESS: 0
RECTAL PAIN: 0
EYES NEGATIVE: 1
SORE THROAT: 0
APPETITE CHANGE: 1
VOICE CHANGE: 0
VOMITING: 0
CHILLS: 0
LIGHT-HEADEDNESS: 1
SEIZURES: 0
ENDOCRINE NEGATIVE: 1
PALPITATIONS: 0
CONSTIPATION: 0
WHEEZING: 0
NERVOUS/ANXIOUS: 0
DECREASED CONCENTRATION: 0
SLEEP DISTURBANCE: 1
LEG SWELLING: 1
CONFUSION: 0
HEADACHES: 0
NUMBNESS: 0
SPEECH DIFFICULTY: 0
FEVER: 0
ABDOMINAL DISTENTION: 0
HEMOPTYSIS: 0
COUGH: 1
ABDOMINAL PAIN: 0
BLOOD IN STOOL: 0
SHORTNESS OF BREATH: 0

## 2025-05-27 ASSESSMENT — PAIN SCALES - GENERAL: PAINLEVEL_OUTOF10: 0-NO PAIN

## 2025-05-27 ASSESSMENT — PATIENT HEALTH QUESTIONNAIRE - PHQ9
SUM OF ALL RESPONSES TO PHQ9 QUESTIONS 1 AND 2: 0
2. FEELING DOWN, DEPRESSED OR HOPELESS: NOT AT ALL
1. LITTLE INTEREST OR PLEASURE IN DOING THINGS: NOT AT ALL

## 2025-05-27 ASSESSMENT — COLUMBIA-SUICIDE SEVERITY RATING SCALE - C-SSRS
2. HAVE YOU ACTUALLY HAD ANY THOUGHTS OF KILLING YOURSELF?: NO
6. HAVE YOU EVER DONE ANYTHING, STARTED TO DO ANYTHING, OR PREPARED TO DO ANYTHING TO END YOUR LIFE?: NO
1. IN THE PAST MONTH, HAVE YOU WISHED YOU WERE DEAD OR WISHED YOU COULD GO TO SLEEP AND NOT WAKE UP?: NO

## 2025-05-27 NOTE — PROGRESS NOTES
Radiation Oncology Follow-Up    Patient Name:  Annette Fernandez  MRN:  [id number]  :  1950    Referring Provider: Kirt Gamboa MD  Primary Care Provider: Parminder Russell MD  Care Team: Patient Care Team:  Parminder Russell MD as PCP - General  Parminder Russell MD as PCP - [facility]ributed [facility]  Gem Gustafson APRN-CNP as Nurse Practitioner (Otolaryngology)  Kirt Gamboa MD as Surgeon (Otolaryngology)  Stefani Obrien RN as Care Manager (Case Management)  Faith Denson MD as Consulting Physician (Hematology and Oncology)  Rahel Del Angel MD as Radiation Oncologist (Radiation Oncology)    Date of Service: 2025     SUMMARY:  74 y.o. female with SCC of BOT, p16(-), > 10 PPD smoking history, [id number], Stage BRIDGETTE. PEG tube in place     SUBJECTIVE  History of Present Illness:   Annette Fernandez is a 74 y.o. female who is presenting today for follow-up. Prior radiation treatment as follows:    Radiation Treatments       Active   No active radiation treatments to show.     Completed   BOT (Started on 3/26/2025)   Most recent fraction: 212 cGy given on 2025   Total given: 6,996 cGy / 6,996 cGy  (33 of 33 fractions)   Elapsed Days: 44   Technique: VMAT                     SUBJECTIVE:    Today, the patient reports feeling well overall. They continue to recover from radiation side effects. She is not doing food PO only liquids. Instructed pt to advance diet. Has dry mouth and changes in taste. She has continue cough and thick secretions        Pain: The patient's current pain level was assessed.  They report currently having a pain of 0 out of 10.  They feel their pain is under control with the use of pain medications.     Review of Systems:  Review of Systems   Constitutional:  Positive for appetite change (Coughing with attempts to eat more solid food) and fatigue. Negative for chills, diaphoresis, fever and unexpected weight change (maintainwed weight at 89# with tube feedings.).   HENT:    Positive for trouble swallowing (coughing, dry mouth with thick secretions that have lessened since the end of treatment. Oxycodone prn for throat pain.). Negative for mouth sores, nosebleeds, sore throat, tinnitus and voice change.    Eyes: Negative.    Respiratory:  Positive for cough. Negative for chest tightness, hemoptysis, shortness of breath and wheezing.    Cardiovascular:  Positive for leg swelling (left ankle edema noted/ neg for DVT). Negative for chest pain and palpitations.   Gastrointestinal:  Positive for nausea (comes and goes/using antiemetics prn /effective). Negative for abdominal distention, abdominal pain, blood in stool, constipation, diarrhea, rectal pain and vomiting.        Peg tube intact, clear without edema. Patient notes an odor, state flushing and working.     Endocrine: Negative.    Genitourinary: Negative.     Musculoskeletal:  Negative for gait problem.   Skin: Negative.         Skin intact to right neck treatment area   Neurological:  Positive for extremity weakness and light-headedness (on occasion / getting weekly IV fluids on Thursday). Negative for dizziness, gait problem, headaches, numbness, seizures and speech difficulty.   Hematological: Negative.    Psychiatric/Behavioral:  Positive for sleep disturbance (irregular sleeping pattern and hours.). Negative for confusion, decreased concentration, depression and suicidal ideas. The patient is not nervous/anxious.      Performance Status:   ECOG 1         OBJECTIVE  Vital Signs:  /63 (BP Location: Right arm, Patient Position: Sitting, BP Cuff Size: Small adult)   Pulse 104   Temp 36.6 °C (97.9 °F) (Temporal)   Resp 18   Wt (!) 40.5 kg (89 lb 4.6 oz)   SpO2 98%   BMI 18.12 kg/m²    Physical Exam:  Constitutional: Awake, alert and oriented. No acute distress. Appears stated age.  Eyes: Conjunctivae are clear without exudates or hemorrhage. Eyelids are normal in appearance without swelling or lesions.  ENMT: mucous  membranes moist, no apparent injury, no lesions seen  Neck: Neck supple, no apparent injury, trachea midline  Resp/Thorax: Patent airways,  good chest expansion. Thorax symmetric  Cardiovascular: The external chest is normal without lifts, heaves, or thrills. PMI is not visible.  GI: Nondistended, soft, non-tender.  /Gyn: Deferred  MSK: No tenderness noted on palpation of the spine. No ROM limitations.  Extremities: normal extremities, no cyanosis, erythema or edema.  Neurological: alert and oriented x3. Sensory and motor function appear intact. No gait abnormality observed.  Psych: Appropriate mood and behavior.  Skin: Warm and dry, no new lesions or rashes.             Labs:  Lab Results   Component Value Date    WBC 2.5 (L) 05/23/2025    HGB 8.1 (L) 05/23/2025    HCT 25.3 (L) 05/23/2025     (L) 05/23/2025    CHOL 148 12/24/2024    TRIG 103 12/24/2024    HDL 65.6 12/24/2024    ALT 15 05/23/2025    AST 24 05/23/2025     (L) 05/23/2025    K 4.2 05/23/2025    CL 98 05/23/2025    CREATININE 0.69 05/23/2025    BUN 12 05/23/2025    CO2 27 05/23/2025    TSH 0.52 03/21/2025    INR 1.0 01/18/2021         ASSESSMENT:  Annette Fernandez is a 74 y.o. female with Cancer of base of tongue (Multi), Clinical: Stage BRIDGETTE (cT4a, cN1, cM0, p16-).      The patient continues to recover from radiation side effects and has no evidence of recurrence     PLAN:    The patient will follow-up with radiation oncology as needed  Follow-up with ENT as directed  Surveillance PET/CT ordered 3 months from now  Starting 6 months post radiation, TSH with reflex T4 every 6 months  Post-radiation MBSS/FEES about 2-3 months after treatment has concluded (sooner if needed)   Instructed pt to taper off oxycodone      NCCN Guidelines were applicable to guide this patients treatment plan.  Rahel Del Angel MD       Future Appointments   Date Time Provider Department [place]   5/29/2025  9:00 AM INF 05 YVETTE [id number] [place]   5/29/2025  2:00 PM  Umm Jalloh, ELLYN-CNP [id number] [place]   6/2/2025  9:00 AM INF 10 YVETTE [id number] [place]   6/5/2025 12:30 PM INF 04 YVETTE [id number] [place]   6/6/2025 10:40 AM Parminder Russell MD [id number] [place]   6/10/2025  3:45 PM Kirt Gamboa MD [id number] [place]

## 2025-05-27 NOTE — PROGRESS NOTES
Radiation Oncology Nursing Note    Pain: The patient's current pain level was assessed.  They report currently having a pain of 0 out of 10.  They feel their pain is under control with the use of pain medications.    Review of Systems:  Review of Systems   Constitutional:  Positive for appetite change (Coughing with attempts to eat more solid food) and fatigue. Negative for chills, diaphoresis, fever and unexpected weight change (maintainwed weight at 89# with tube feedings.).   HENT:   Positive for trouble swallowing (coughing, dry mouth with thick secretions that have lessened since the end of treatment. Oxycodone prn for throat pain.). Negative for mouth sores, nosebleeds, sore throat, tinnitus and voice change.    Eyes: Negative.    Respiratory:  Positive for cough. Negative for chest tightness, hemoptysis, shortness of breath and wheezing.    Cardiovascular:  Positive for leg swelling (left ankle edema noted/ neg for DVT). Negative for chest pain and palpitations.   Gastrointestinal:  Positive for nausea (comes and goes/using antiemetics prn /effective). Negative for abdominal distention, abdominal pain, blood in stool, constipation, diarrhea, rectal pain and vomiting.        Peg tube intact, clear without edema. Patient notes an odor, state flushing and working.     Endocrine: Negative.    Genitourinary: Negative.     Musculoskeletal:  Negative for gait problem.   Skin: Negative.         Skin intact to right neck treatment area   Neurological:  Positive for extremity weakness and light-headedness (on occasion / getting weekly IV fluids on Thursday). Negative for dizziness, gait problem, headaches, numbness, seizures and speech difficulty.   Hematological: Negative.    Psychiatric/Behavioral:  Positive for sleep disturbance (irregular sleeping pattern and hours.). Negative for confusion, decreased concentration, depression and suicidal ideas. The patient is not nervous/anxious.

## 2025-05-29 ENCOUNTER — OFFICE VISIT (OUTPATIENT)
Dept: HEMATOLOGY/ONCOLOGY | Facility: CLINIC | Age: 75
End: 2025-05-29
Payer: MEDICARE

## 2025-05-29 ENCOUNTER — INFUSION (OUTPATIENT)
Dept: HEMATOLOGY/ONCOLOGY | Facility: CLINIC | Age: 75
End: 2025-05-29
Payer: MEDICARE

## 2025-05-29 ENCOUNTER — APPOINTMENT (OUTPATIENT)
Dept: HEMATOLOGY/ONCOLOGY | Facility: CLINIC | Age: 75
End: 2025-05-29
Payer: MEDICARE

## 2025-05-29 VITALS
HEART RATE: 89 BPM | BODY MASS INDEX: 17.92 KG/M2 | WEIGHT: 88.3 LBS | SYSTOLIC BLOOD PRESSURE: 115 MMHG | DIASTOLIC BLOOD PRESSURE: 68 MMHG | OXYGEN SATURATION: 97 % | RESPIRATION RATE: 18 BRPM | TEMPERATURE: 97.3 F

## 2025-05-29 DIAGNOSIS — C01 CANCER OF BASE OF TONGUE (MULTI): ICD-10-CM

## 2025-05-29 DIAGNOSIS — E87.1 HYPONATREMIA: ICD-10-CM

## 2025-05-29 DIAGNOSIS — E83.42 HYPOMAGNESEMIA: ICD-10-CM

## 2025-05-29 DIAGNOSIS — C01 MALIGNANT NEOPLASM OF BASE OF TONGUE (MULTI): ICD-10-CM

## 2025-05-29 DIAGNOSIS — E86.0 DEHYDRATION: ICD-10-CM

## 2025-05-29 DIAGNOSIS — E83.42 HYPOMAGNESEMIA: Primary | ICD-10-CM

## 2025-05-29 LAB
BASOPHILS # BLD AUTO: 0.01 X10*3/UL (ref 0–0.1)
BASOPHILS NFR BLD AUTO: 0.2 %
EOSINOPHIL # BLD AUTO: 0.01 X10*3/UL (ref 0–0.4)
EOSINOPHIL NFR BLD AUTO: 0.2 %
ERYTHROCYTE [DISTWIDTH] IN BLOOD BY AUTOMATED COUNT: 18.6 % (ref 11.5–14.5)
HCT VFR BLD AUTO: 27.7 % (ref 36–46)
HGB BLD-MCNC: 9 G/DL (ref 12–16)
IMM GRANULOCYTES # BLD AUTO: 0.03 X10*3/UL (ref 0–0.5)
IMM GRANULOCYTES NFR BLD AUTO: 0.7 % (ref 0–0.9)
LYMPHOCYTES # BLD AUTO: 1.49 X10*3/UL (ref 0.8–3)
LYMPHOCYTES NFR BLD AUTO: 33.1 %
MCH RBC QN AUTO: 31.1 PG (ref 26–34)
MCHC RBC AUTO-ENTMCNC: 32.5 G/DL (ref 32–36)
MCV RBC AUTO: 96 FL (ref 80–100)
MONOCYTES # BLD AUTO: 0.64 X10*3/UL (ref 0.05–0.8)
MONOCYTES NFR BLD AUTO: 14.2 %
NEUTROPHILS # BLD AUTO: 2.32 X10*3/UL (ref 1.6–5.5)
NEUTROPHILS NFR BLD AUTO: 51.6 %
NRBC BLD-RTO: ABNORMAL /100{WBCS}
PLATELET # BLD AUTO: 255 X10*3/UL (ref 150–450)
RBC # BLD AUTO: 2.89 X10*6/UL (ref 4–5.2)
WBC # BLD AUTO: 4.5 X10*3/UL (ref 4.4–11.3)

## 2025-05-29 PROCEDURE — 1159F MED LIST DOCD IN RCRD: CPT | Performed by: NURSE PRACTITIONER

## 2025-05-29 PROCEDURE — 99214 OFFICE O/P EST MOD 30 MIN: CPT | Performed by: NURSE PRACTITIONER

## 2025-05-29 PROCEDURE — 3078F DIAST BP <80 MM HG: CPT | Performed by: NURSE PRACTITIONER

## 2025-05-29 PROCEDURE — 85025 COMPLETE CBC W/AUTO DIFF WBC: CPT

## 2025-05-29 PROCEDURE — 2500000004 HC RX 250 GENERAL PHARMACY W/ HCPCS (ALT 636 FOR OP/ED): Performed by: NURSE PRACTITIONER

## 2025-05-29 PROCEDURE — 83735 ASSAY OF MAGNESIUM: CPT

## 2025-05-29 PROCEDURE — 3074F SYST BP LT 130 MM HG: CPT | Performed by: NURSE PRACTITIONER

## 2025-05-29 PROCEDURE — 96360 HYDRATION IV INFUSION INIT: CPT | Mod: INF

## 2025-05-29 PROCEDURE — 1125F AMNT PAIN NOTED PAIN PRSNT: CPT | Performed by: NURSE PRACTITIONER

## 2025-05-29 PROCEDURE — G2211 COMPLEX E/M VISIT ADD ON: HCPCS | Performed by: NURSE PRACTITIONER

## 2025-05-29 PROCEDURE — 80053 COMPREHEN METABOLIC PANEL: CPT

## 2025-05-29 RX ORDER — HEPARIN SODIUM,PORCINE/PF 10 UNIT/ML
50 SYRINGE (ML) INTRAVENOUS AS NEEDED
OUTPATIENT
Start: 2025-05-29

## 2025-05-29 RX ORDER — HEPARIN 100 UNIT/ML
500 SYRINGE INTRAVENOUS AS NEEDED
OUTPATIENT
Start: 2025-05-29

## 2025-05-29 RX ADMIN — SODIUM CHLORIDE 1000 ML: 0.9 INJECTION, SOLUTION INTRAVENOUS at 15:51

## 2025-05-29 ASSESSMENT — ENCOUNTER SYMPTOMS
SLEEP DISTURBANCE: 0
VOMITING: 1
ABDOMINAL PAIN: 0
DIFFICULTY URINATING: 0
SORE THROAT: 1
UNEXPECTED WEIGHT CHANGE: 0
ADENOPATHY: 0
DIARRHEA: 0
NECK PAIN: 1
APPETITE CHANGE: 1
SHORTNESS OF BREATH: 0
DIZZINESS: 0
EYE PROBLEMS: 0
HEADACHES: 0
ARTHRALGIAS: 0
TROUBLE SWALLOWING: 1
COUGH: 0
NAUSEA: 1
FATIGUE: 1
LEG SWELLING: 0
CONSTIPATION: 0
BACK PAIN: 0

## 2025-05-29 ASSESSMENT — PAIN SCALES - GENERAL: PAINLEVEL_OUTOF10: 1

## 2025-05-29 NOTE — PATIENT INSTRUCTIONS
Take Zofran or Compazine medication routinely in the morning.      Try four 1/2 tube feed cartons/day. Goal is 2.5 cartons/day.    Labs prior to infusions on 6/2/25 and 6/5/25.

## 2025-05-29 NOTE — PROGRESS NOTES
[facility] - Medical Oncology Follow-Up Visit    Patient ID: Annette Fernandez is a 74 y.o. female.  Referring Physician: mUm Jalloh, APRN-CNP  [address] [place] [address]  Primary Care Provider: Parminder Russell MD      Chief Concern: Patient is here to followup for base of tongue/oropharyngeal cancer    HPI  Present with her granddtr for follow-up visit.    Pt not able to to tolerate recommended 4 - 1/2 cartons/day.   Unclear how much TF she is taking in.  Intermittent emesis following bolus feedings.  So far today one supplement.  States she will plan second one once she gets home.  Able to take sips water, ice tea.  Unclear if she is taking antiemetics.  States she is having routine BM's with last BM this morning.  Voiding fine.  Mucous lessening.   IV fluids today.      Diagnosis: Squamous cell carcinoma of the BOT  Stage:  Cancer Staging   Cancer of base of tongue (Multi)  Staging form: Pharynx - P16 Negative Oropharynx, AJCC 8th Edition  - Clinical: Stage BRIDGETTE (cT4a, cN1, cM0, p16-) - Signed by Rahel Del Angel MD on 3/3/2025     Current sites of disease: BOT and lymph nodes  Molecular and ancillary testing: p16 neg    Oncologic History:  1/7/25 - CT neck with Irregular, heterogeneous, and possibly ulcerating mass centered at the base of tongue with involvement of extrinsic tongue musculature and lingual surface of the epiglottis; possible involvement of the  body of the hyoid. Oropharyngeal neoplasm is of high suspicion. No cervical lymphadenopathy by size criteria.  1.8 cm exophytic right posterior thyroid nodule along the right tracheoesophageal groove containing calcification.  2/4/25 - Met with Dr. Gamboa after noting discomfort in throat since August, some discomfort in L ear. DL with fungating lesion involving base of tongue and extending on the lateral pharyngeal wall on the left, involving lingual aspect of epiglottis.  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  2/28/25 - PET with hypermetabolic soft tissue mass at BOT, moderately hypermetabolic right level 2A lymph node  3/26/25 - C1 cisplatin with definitive radiation (30mg/m2 due to renal function)    Past Medical History:  07/31/2023: Aspiration into airway  No date: HLD (hyperlipidemia)  No date: HTN (hypertension)  No date: Mass of tongue  No date: Other specified health status      Comment:  No pertinent past medical history  No date: Stiffness of unspecified hand, not elsewhere classified      Comment:  Joint stiffness of hand     4 years ago - throat injury, every time she drank/swallow into lungs (black esophagus)    Past Surgical History:  06/14/2019: OTHER SURGICAL HISTORY      Comment:  Ovarian cystectomy  06/14/2019: OTHER SURGICAL HISTORY      Comment:  Cyst excision     Social Hx:   Annette Fernandez  reports that she has been smoking cigarettes. She has been exposed to tobacco smoke. She has quit using smokeless tobacco.  She  reports current alcohol use.  She  reports no history of drug use.  Smoking - working to cut back, down to 2-3 per day, previously 1/2ppd  Limiting amount each time    Lives on her own  Takes care of her cat    Family History   Problem Relation Name Age of Onset    Heart attack Father      Mom had NHL  Grandparent with colon cancer    Meds (Current):  Current Outpatient Medications   Medication Instructions    atorvastatin (LIPITOR) 80 mg, oral, Daily RT    DentaGeL 1.1 % gel apply with toothbrush OR flouoride trays    dexAMETHasone (DECADRON) 8 mg, oral, Daily, For 3 days per week starting the day after treatment.    magic mouthwash (lidocaine, diphenhydrAMINE, Maalox 1:1:1) 10 mL, Swish & Spit, Every 6 hours PRN    metoprolol tartrate (LOPRESSOR) 25 mg, oral, 2 times daily    multivitamin tablet 1 tablet, Daily    nicotine (Nicoderm CQ) 7 mg/24 hr patch 1 patch, transdermal, Every 24 hours    NON FORMULARY 1 each,  Daily    OLANZapine (ZYPREXA) 5 mg, oral, Nightly    ondansetron (ZOFRAN) 8 mg, oral, Every 8 hours PRN    oxyCODONE (ROXICODONE) 5 mg, oral, Every 4 hours PRN    pantoprazole (PROTONIX) 40 mg, oral, Daily, Do not crush, chew, or split.    polyethylene glycol (GLYCOLAX, MIRALAX) 17 g, g-tube, Daily    prochlorperazine (COMPAZINE) 10 mg, oral, Every 6 hours PRN    silver sulfADIAZINE (Silvadene) 1 % cream Apply to affected area twice daily    thyroid (pork) (ARMOUR THYROID) 15 mg, oral, Daily        Allergies   Allergen Reactions    Wellbutrin [Bupropion Hcl] Other     Review of Systems   Constitutional:  Positive for appetite change and fatigue. Negative for unexpected weight change.   HENT:   Positive for sore throat and trouble swallowing. Negative for hearing loss and mouth sores.    Eyes:  Negative for eye problems.   Respiratory:  Negative for cough and shortness of breath.    Cardiovascular:  Negative for chest pain and leg swelling.   Gastrointestinal:  Positive for nausea and vomiting. Negative for abdominal pain, constipation and diarrhea.   Genitourinary:  Negative for difficulty urinating.    Musculoskeletal:  Positive for neck pain. Negative for arthralgias and back pain.   Skin:  Negative for rash.   Neurological:  Negative for dizziness and headaches.   Hematological:  Negative for adenopathy.   Psychiatric/Behavioral:  Negative for sleep disturbance.       Objective   BSA: 1.29 meters squared  /68   Pulse 89   Temp 36.3 °C (97.3 °F)   Resp 18   Wt (!) 40.1 kg (88 lb 4.8 oz)   SpO2 97%   BMI 17.92 kg/m²     Wt Readings from Last 5 Encounters:   05/29/25 (!) 40.1 kg (88 lb 4.8 oz)   05/27/25 (!) 40.5 kg (89 lb 4.6 oz)   05/23/25 (!) 40.5 kg (89 lb 4.6 oz)   05/20/25 (!) 39.4 kg (86 lb 12 oz)   05/19/25 (!) 39.7 kg (87 lb 8 oz)       Performance Status:  (1) Restricted in physically strenuous activity, ambulatory and able to do work of light nature     Physical Exam  Vitals reviewed.    Constitutional:       General: She is not in acute distress.  HENT:      Head: Normocephalic and atraumatic.      Mouth/Throat:      Mouth: Mucous membranes are moist.   Eyes:      Pupils: Pupils are equal, round, and reactive to light.   Cardiovascular:      Rate and Rhythm: Regular rhythm. Tachycardia present.   Pulmonary:      Effort: Pulmonary effort is normal.      Breath sounds: Normal breath sounds.   Abdominal:      General: Bowel sounds are normal. There is no distension.      Palpations: Abdomen is soft.      Comments: +PEG   Musculoskeletal:         General: No swelling. Normal range of motion.      Cervical back: Normal range of motion.   Skin:     Findings: Erythema (to anterior neck) present. No rash.   Neurological:      General: No focal deficit present.      Mental Status: She is alert and oriented to person, place, and time.   Psychiatric:         Mood and Affect: Mood normal.         Behavior: Behavior normal.          Results:  Labs:  Lab Results   Component Value Date    WBC 4.5 05/29/2025    HGB 9.0 (L) 05/29/2025    HCT 27.7 (L) 05/29/2025    MCV 96 05/29/2025     05/29/2025      Lab Results   Component Value Date    NEUTROABS 2.32 05/29/2025      Lab Results   Component Value Date    GLUCOSE 106 (H) 05/29/2025    CALCIUM 9.2 05/29/2025     (L) 05/29/2025    K 4.3 05/29/2025    CO2 29 05/29/2025    CL 93 (L) 05/29/2025    BUN 15 05/29/2025    CREATININE 0.72 05/29/2025     Lab Results   Component Value Date    ALT 16 05/29/2025    AST 28 05/29/2025    ALKPHOS 57 05/29/2025    BILITOT 0.3 05/29/2025    Mg 1.41 5/29/25     Imaging:  No new imaging.      Pathology:  No new pathology    Assessment/Plan      Annette Fernandez is a 74 y.o. 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    Discussed overall findings to date with patient and her daughter previously, as well as recommendations for treatment with concurrent/definitive  chemoradiation. We discussed the curative goal of treatment. Discussed that chemotherapy would consist of weekly cisplatin, with side effects discussed, including but not limited to myelosuppression (infection, anemia, bleeding), fatigue, nausea, neuropathy, hearing loss/tinnitus, and nephropathy.    - Completed 7 weeks - 30mg/m2 dose  - With cisplatin, will have weekly IVF and provider visits  - monitor for worsening tinnitus  - Zyprexa nightly   - PRN zofran/compazine  - dex 8mg daily x3 days after chemo  - advised her to stop aspirin for pain given risk of NSAIDs and kidney function with cisplatin. Ok for oxycodone as needed and we will refill if gets low  - reminded her about risk of constipation with oxycodone - she is taking miralax as needed and will start regularly if taking, I will also send senna to use if using oxycodone more  - baseline audiology evaluation not scheduled prior to cisplatin, ok to proceed whenever scheduled or delay until after tx    # Mucositis   - encouraged continued rinse use - Glutamine & BMX   - improving     # Reduced nutritional intake  - recommended she increase protein supplements 3/day.  Graze throughout the day.  Dietician referral in place.    - 4/24:  She is attempting to get 3 TF cartons/day.  Reports she feels bloated and not able to tolerate 3 total cartons.    - 5/1:  Able to tolerate 2.5 TF cartons/day.    - 5/19:  Recommended she resume routine TF's, start with 1/2 carton QID.  Goal to resume 2.5 TF cartons/day.    - 5/29:  Pt not able to tolerate 4 1/2 cartons.  Reports intermittent emesis following bolus feedings.  Pt states she is sitting upright follow feedings.  Recommended pt briefly walk within the home following feedings.       # Odynophagia   - established with supportive onc.  Continue current oxycodone 5mg/q4H PRN.  Bowel regimen - routine daily miralax.   - 5/1:  Oral intake increasingly challenging.  Decreased from previous week.  Able to tolerate sips.   Main 3-4/10.  Continue PRN oxy admin.    - 5/19:  Continue PRN oxycodone.  Sips as tolerated.      # Heartburn  - increase pantoprazole to BID    # Thrombocytopenia 94 5/19/25   - No clinical s/sx's bleeding.  Pt educated to bleeding precautions.     - resolved    # Hypomagnesia   - Repeat labs with IV replacement, swallowing pills is currently very difficult for pt, oral mag will likely clog peg.     - 5/29:  Mg 1.41 - will plan 2g IV Mg for 6/2/25 infusion.  Ongoing repeat labs and infusion visits until Mg level is back WNL.      # Smoking cessation   - increased # of cigarettes/day from 2-3 to 5-6, has not started to wear the nicotine patch yet.  Encouraged her to use the patch, cut down on # of cigarettes/day.     # Localized swelling to LLE - faint difference between RLE, swelling mainly to ankle/dorsum area.  STAT duplex ordered to r/o DVT.  Scheduled 5/2/25.  5/2:  Vascular duplex results (-) for DVT to LLE.    - 5/5:  Swelling has resolved.      # Diarrhea 2/2 laxative admin-  - Stop bowel meds, rest bowels today, declined IV hydration visit today.  Agreeable to come in tomorrow for fluids and labs.  Push fluids.  Start to increase TF gradually back to 2.5 cartons/day.    - 5/19:  diarrhea has resolved.    # Hyponatremia 126 5/19/25  - swallowing pills is currently very difficult for pt.  Tabs will likely clog peg.   Instructed pt to maintain free-water flushes and add 1 tsp table salt to equivalent of 120ml water TID.      - 5/29:  Improved 130 5/29/25.  Pt instructed to continue adding table salt to free-water flushes.      - RTC 6/2/25 with IV fluids, IV Mg and labs.    - RTC 6/5, 6/9, 6/12, and 6/16 for IV fluids and labs.

## 2025-05-30 LAB
ALBUMIN SERPL BCP-MCNC: 3.4 G/DL (ref 3.4–5)
ALP SERPL-CCNC: 57 U/L (ref 33–136)
ALT SERPL W P-5'-P-CCNC: 16 U/L (ref 7–45)
ANION GAP SERPL CALC-SCNC: 12 MMOL/L (ref 10–20)
AST SERPL W P-5'-P-CCNC: 28 U/L (ref 9–39)
BILIRUB SERPL-MCNC: 0.3 MG/DL (ref 0–1.2)
BUN SERPL-MCNC: 15 MG/DL (ref 6–23)
CALCIUM SERPL-MCNC: 9.2 MG/DL (ref 8.6–10.6)
CHLORIDE SERPL-SCNC: 93 MMOL/L (ref 98–107)
CO2 SERPL-SCNC: 29 MMOL/L (ref 21–32)
CREAT SERPL-MCNC: 0.72 MG/DL (ref 0.5–1.05)
EGFRCR SERPLBLD CKD-EPI 2021: 88 ML/MIN/1.73M*2
GLUCOSE SERPL-MCNC: 106 MG/DL (ref 74–99)
MAGNESIUM SERPL-MCNC: 1.41 MG/DL (ref 1.6–2.4)
POTASSIUM SERPL-SCNC: 4.3 MMOL/L (ref 3.5–5.3)
PROT SERPL-MCNC: 6.6 G/DL (ref 6.4–8.2)
SODIUM SERPL-SCNC: 130 MMOL/L (ref 136–145)

## 2025-05-30 RX ORDER — MAGNESIUM SULFATE HEPTAHYDRATE 40 MG/ML
2 INJECTION, SOLUTION INTRAVENOUS ONCE
OUTPATIENT
Start: 2025-06-05

## 2025-05-30 RX ORDER — MAGNESIUM SULFATE HEPTAHYDRATE 40 MG/ML
2 INJECTION, SOLUTION INTRAVENOUS ONCE
OUTPATIENT
Start: 2025-06-12

## 2025-05-30 RX ORDER — MAGNESIUM SULFATE HEPTAHYDRATE 40 MG/ML
2 INJECTION, SOLUTION INTRAVENOUS ONCE
OUTPATIENT
Start: 2025-06-16

## 2025-05-30 RX ORDER — MAGNESIUM SULFATE HEPTAHYDRATE 40 MG/ML
2 INJECTION, SOLUTION INTRAVENOUS ONCE
OUTPATIENT
Start: 2025-06-09

## 2025-05-30 RX ORDER — MAGNESIUM SULFATE HEPTAHYDRATE 40 MG/ML
2 INJECTION, SOLUTION INTRAVENOUS ONCE
OUTPATIENT
Start: 2025-06-02

## 2025-06-02 ENCOUNTER — LAB (OUTPATIENT)
Dept: LAB | Facility: CLINIC | Age: 75
End: 2025-06-02
Payer: MEDICARE

## 2025-06-02 ENCOUNTER — INFUSION (OUTPATIENT)
Dept: HEMATOLOGY/ONCOLOGY | Facility: CLINIC | Age: 75
End: 2025-06-02
Payer: MEDICARE

## 2025-06-02 VITALS
SYSTOLIC BLOOD PRESSURE: 127 MMHG | OXYGEN SATURATION: 92 % | WEIGHT: 88.18 LBS | DIASTOLIC BLOOD PRESSURE: 72 MMHG | HEART RATE: 98 BPM | TEMPERATURE: 98.8 F | BODY MASS INDEX: 17.9 KG/M2 | RESPIRATION RATE: 18 BRPM

## 2025-06-02 DIAGNOSIS — C01 MALIGNANT NEOPLASM OF BASE OF TONGUE (MULTI): ICD-10-CM

## 2025-06-02 DIAGNOSIS — E87.1 HYPONATREMIA: ICD-10-CM

## 2025-06-02 DIAGNOSIS — C01 CANCER OF BASE OF TONGUE (MULTI): ICD-10-CM

## 2025-06-02 DIAGNOSIS — E86.0 DEHYDRATION: ICD-10-CM

## 2025-06-02 DIAGNOSIS — E83.42 HYPOMAGNESEMIA: ICD-10-CM

## 2025-06-02 LAB
ALBUMIN SERPL BCP-MCNC: 3.2 G/DL (ref 3.4–5)
ALP SERPL-CCNC: 52 U/L (ref 33–136)
ALT SERPL W P-5'-P-CCNC: 11 U/L (ref 7–45)
ANION GAP SERPL CALC-SCNC: 11 MMOL/L (ref 10–20)
AST SERPL W P-5'-P-CCNC: 14 U/L (ref 9–39)
BASOPHILS # BLD AUTO: 0.01 X10*3/UL (ref 0–0.1)
BASOPHILS NFR BLD AUTO: 0.2 %
BILIRUB SERPL-MCNC: 0.3 MG/DL (ref 0–1.2)
BUN SERPL-MCNC: 14 MG/DL (ref 6–23)
CALCIUM SERPL-MCNC: 8.5 MG/DL (ref 8.6–10.3)
CHLORIDE SERPL-SCNC: 97 MMOL/L (ref 98–107)
CO2 SERPL-SCNC: 27 MMOL/L (ref 21–32)
CREAT SERPL-MCNC: 0.68 MG/DL (ref 0.5–1.05)
EGFRCR SERPLBLD CKD-EPI 2021: >90 ML/MIN/1.73M*2
EOSINOPHIL # BLD AUTO: 0.02 X10*3/UL (ref 0–0.4)
EOSINOPHIL NFR BLD AUTO: 0.3 %
ERYTHROCYTE [DISTWIDTH] IN BLOOD BY AUTOMATED COUNT: 19.3 % (ref 11.5–14.5)
GLUCOSE SERPL-MCNC: 123 MG/DL (ref 74–99)
HCT VFR BLD AUTO: 26.7 % (ref 36–46)
HGB BLD-MCNC: 8.7 G/DL (ref 12–16)
IMM GRANULOCYTES # BLD AUTO: 0.03 X10*3/UL (ref 0–0.5)
IMM GRANULOCYTES NFR BLD AUTO: 0.5 % (ref 0–0.9)
LYMPHOCYTES # BLD AUTO: 0.89 X10*3/UL (ref 0.8–3)
LYMPHOCYTES NFR BLD AUTO: 13.9 %
MAGNESIUM SERPL-MCNC: 1.34 MG/DL (ref 1.6–2.4)
MCH RBC QN AUTO: 31.5 PG (ref 26–34)
MCHC RBC AUTO-ENTMCNC: 32.6 G/DL (ref 32–36)
MCV RBC AUTO: 97 FL (ref 80–100)
MONOCYTES # BLD AUTO: 0.9 X10*3/UL (ref 0.05–0.8)
MONOCYTES NFR BLD AUTO: 14 %
NEUTROPHILS # BLD AUTO: 4.57 X10*3/UL (ref 1.6–5.5)
NEUTROPHILS NFR BLD AUTO: 71.1 %
NRBC BLD-RTO: ABNORMAL /100{WBCS}
PLATELET # BLD AUTO: 348 X10*3/UL (ref 150–450)
POTASSIUM SERPL-SCNC: 3.7 MMOL/L (ref 3.5–5.3)
PROT SERPL-MCNC: 6 G/DL (ref 6.4–8.2)
RBC # BLD AUTO: 2.76 X10*6/UL (ref 4–5.2)
SODIUM SERPL-SCNC: 131 MMOL/L (ref 136–145)
WBC # BLD AUTO: 6.4 X10*3/UL (ref 4.4–11.3)

## 2025-06-02 PROCEDURE — 85025 COMPLETE CBC W/AUTO DIFF WBC: CPT

## 2025-06-02 PROCEDURE — 82565 ASSAY OF CREATININE: CPT

## 2025-06-02 PROCEDURE — 36415 COLL VENOUS BLD VENIPUNCTURE: CPT

## 2025-06-02 PROCEDURE — 83735 ASSAY OF MAGNESIUM: CPT

## 2025-06-02 PROCEDURE — 2500000004 HC RX 250 GENERAL PHARMACY W/ HCPCS (ALT 636 FOR OP/ED): Performed by: NURSE PRACTITIONER

## 2025-06-02 PROCEDURE — 96365 THER/PROPH/DIAG IV INF INIT: CPT | Mod: INF

## 2025-06-02 RX ORDER — MAGNESIUM SULFATE HEPTAHYDRATE 40 MG/ML
2 INJECTION, SOLUTION INTRAVENOUS ONCE
Status: COMPLETED | OUTPATIENT
Start: 2025-06-02 | End: 2025-06-02

## 2025-06-02 RX ADMIN — MAGNESIUM SULFATE IN WATER 2 G: 40 INJECTION, SOLUTION INTRAVENOUS at 09:46

## 2025-06-02 RX ADMIN — SODIUM CHLORIDE 1000 ML: 0.9 INJECTION, SOLUTION INTRAVENOUS at 09:46

## 2025-06-02 ASSESSMENT — PAIN SCALES - GENERAL: PAINLEVEL_OUTOF10: 0-NO PAIN

## 2025-06-05 ENCOUNTER — LAB (OUTPATIENT)
Dept: LAB | Facility: CLINIC | Age: 75
End: 2025-06-05
Payer: MEDICARE

## 2025-06-05 ENCOUNTER — OFFICE VISIT (OUTPATIENT)
Dept: HEMATOLOGY/ONCOLOGY | Facility: CLINIC | Age: 75
End: 2025-06-05
Payer: MEDICARE

## 2025-06-05 ENCOUNTER — INFUSION (OUTPATIENT)
Dept: HEMATOLOGY/ONCOLOGY | Facility: CLINIC | Age: 75
End: 2025-06-05
Payer: MEDICARE

## 2025-06-05 VITALS
TEMPERATURE: 97.2 F | RESPIRATION RATE: 16 BRPM | DIASTOLIC BLOOD PRESSURE: 70 MMHG | SYSTOLIC BLOOD PRESSURE: 126 MMHG | HEART RATE: 93 BPM | OXYGEN SATURATION: 98 % | WEIGHT: 86.4 LBS | BODY MASS INDEX: 17.53 KG/M2

## 2025-06-05 DIAGNOSIS — C01 CANCER OF BASE OF TONGUE (MULTI): ICD-10-CM

## 2025-06-05 DIAGNOSIS — E87.1 HYPONATREMIA: ICD-10-CM

## 2025-06-05 DIAGNOSIS — E83.42 HYPOMAGNESEMIA: Primary | ICD-10-CM

## 2025-06-05 DIAGNOSIS — C01 MALIGNANT NEOPLASM OF BASE OF TONGUE (MULTI): ICD-10-CM

## 2025-06-05 DIAGNOSIS — E86.0 DEHYDRATION: ICD-10-CM

## 2025-06-05 DIAGNOSIS — E83.42 HYPOMAGNESEMIA: ICD-10-CM

## 2025-06-05 LAB
ANION GAP SERPL CALC-SCNC: 8 MMOL/L (ref 10–20)
BASOPHILS # BLD AUTO: 0.01 X10*3/UL (ref 0–0.1)
BASOPHILS NFR BLD AUTO: 0.2 %
BUN SERPL-MCNC: 14 MG/DL (ref 6–23)
CALCIUM SERPL-MCNC: 8.5 MG/DL (ref 8.6–10.3)
CHLORIDE SERPL-SCNC: 105 MMOL/L (ref 98–107)
CO2 SERPL-SCNC: 28 MMOL/L (ref 21–32)
CREAT SERPL-MCNC: 0.68 MG/DL (ref 0.5–1.05)
EGFRCR SERPLBLD CKD-EPI 2021: >90 ML/MIN/1.73M*2
EOSINOPHIL # BLD AUTO: 0.02 X10*3/UL (ref 0–0.4)
EOSINOPHIL NFR BLD AUTO: 0.4 %
ERYTHROCYTE [DISTWIDTH] IN BLOOD BY AUTOMATED COUNT: 19.6 % (ref 11.5–14.5)
GLUCOSE SERPL-MCNC: 111 MG/DL (ref 74–99)
HCT VFR BLD AUTO: 26.5 % (ref 36–46)
HGB BLD-MCNC: 8.5 G/DL (ref 12–16)
IMM GRANULOCYTES # BLD AUTO: 0.05 X10*3/UL (ref 0–0.5)
IMM GRANULOCYTES NFR BLD AUTO: 1 % (ref 0–0.9)
LYMPHOCYTES # BLD AUTO: 1.46 X10*3/UL (ref 0.8–3)
LYMPHOCYTES NFR BLD AUTO: 30.3 %
MAGNESIUM SERPL-MCNC: 1.52 MG/DL (ref 1.6–2.4)
MCH RBC QN AUTO: 31.3 PG (ref 26–34)
MCHC RBC AUTO-ENTMCNC: 32.1 G/DL (ref 32–36)
MCV RBC AUTO: 97 FL (ref 80–100)
MONOCYTES # BLD AUTO: 0.65 X10*3/UL (ref 0.05–0.8)
MONOCYTES NFR BLD AUTO: 13.5 %
NEUTROPHILS # BLD AUTO: 2.63 X10*3/UL (ref 1.6–5.5)
NEUTROPHILS NFR BLD AUTO: 54.6 %
NRBC BLD-RTO: ABNORMAL /100{WBCS}
PLATELET # BLD AUTO: 508 X10*3/UL (ref 150–450)
POTASSIUM SERPL-SCNC: 3.7 MMOL/L (ref 3.5–5.3)
RBC # BLD AUTO: 2.72 X10*6/UL (ref 4–5.2)
SODIUM SERPL-SCNC: 137 MMOL/L (ref 136–145)
WBC # BLD AUTO: 4.8 X10*3/UL (ref 4.4–11.3)

## 2025-06-05 PROCEDURE — 1126F AMNT PAIN NOTED NONE PRSNT: CPT | Performed by: NURSE PRACTITIONER

## 2025-06-05 PROCEDURE — 83735 ASSAY OF MAGNESIUM: CPT

## 2025-06-05 PROCEDURE — 96365 THER/PROPH/DIAG IV INF INIT: CPT | Mod: INF

## 2025-06-05 PROCEDURE — 2500000004 HC RX 250 GENERAL PHARMACY W/ HCPCS (ALT 636 FOR OP/ED): Performed by: NURSE PRACTITIONER

## 2025-06-05 PROCEDURE — 3078F DIAST BP <80 MM HG: CPT | Performed by: NURSE PRACTITIONER

## 2025-06-05 PROCEDURE — 99214 OFFICE O/P EST MOD 30 MIN: CPT | Performed by: NURSE PRACTITIONER

## 2025-06-05 PROCEDURE — 1159F MED LIST DOCD IN RCRD: CPT | Performed by: NURSE PRACTITIONER

## 2025-06-05 PROCEDURE — 3074F SYST BP LT 130 MM HG: CPT | Performed by: NURSE PRACTITIONER

## 2025-06-05 PROCEDURE — 80048 BASIC METABOLIC PNL TOTAL CA: CPT

## 2025-06-05 PROCEDURE — 36415 COLL VENOUS BLD VENIPUNCTURE: CPT

## 2025-06-05 PROCEDURE — 85025 COMPLETE CBC W/AUTO DIFF WBC: CPT | Performed by: NURSE PRACTITIONER

## 2025-06-05 RX ORDER — MAGNESIUM SULFATE HEPTAHYDRATE 40 MG/ML
2 INJECTION, SOLUTION INTRAVENOUS ONCE
Status: COMPLETED | OUTPATIENT
Start: 2025-06-05 | End: 2025-06-05

## 2025-06-05 RX ORDER — ONDANSETRON 8 MG/1
8 TABLET, FILM COATED ORAL EVERY 8 HOURS PRN
Qty: 30 TABLET | Refills: 5 | Status: SHIPPED | OUTPATIENT
Start: 2025-06-05

## 2025-06-05 RX ORDER — HEPARIN SODIUM,PORCINE/PF 10 UNIT/ML
50 SYRINGE (ML) INTRAVENOUS AS NEEDED
OUTPATIENT
Start: 2025-06-05

## 2025-06-05 RX ORDER — HEPARIN 100 UNIT/ML
500 SYRINGE INTRAVENOUS AS NEEDED
OUTPATIENT
Start: 2025-06-05

## 2025-06-05 RX ADMIN — SODIUM CHLORIDE 1000 ML: 0.9 INJECTION, SOLUTION INTRAVENOUS at 13:49

## 2025-06-05 RX ADMIN — MAGNESIUM SULFATE IN WATER 2 G: 40 INJECTION, SOLUTION INTRAVENOUS at 13:49

## 2025-06-05 ASSESSMENT — ENCOUNTER SYMPTOMS
APPETITE CHANGE: 1
LEG SWELLING: 0
SORE THROAT: 1
ADENOPATHY: 0
SHORTNESS OF BREATH: 0
DIZZINESS: 0
VOMITING: 1
TROUBLE SWALLOWING: 1
ABDOMINAL PAIN: 0
NECK PAIN: 1
ARTHRALGIAS: 0
COUGH: 0
DIARRHEA: 0
SLEEP DISTURBANCE: 0
HEADACHES: 0
FATIGUE: 1
NAUSEA: 1
EYE PROBLEMS: 0
DIFFICULTY URINATING: 0
UNEXPECTED WEIGHT CHANGE: 0
BACK PAIN: 0
CONSTIPATION: 0

## 2025-06-05 ASSESSMENT — PAIN SCALES - GENERAL: PAINLEVEL_OUTOF10: 0-NO PAIN

## 2025-06-05 NOTE — PROGRESS NOTES
[facility] - Medical Oncology Follow-Up Visit    Patient ID: Annette Fernandez is a 74 y.o. female.  Referring Physician: Umm Jalloh, APRN-CNP  [address] [place] [address]  Primary Care Provider: Parminder Russell MD      Chief Concern: Patient is here to followup for base of tongue/oropharyngeal cancer    HPI  Present with her granddtr for follow-up visit.  Able to take in 3 TF supplements/day.  Tastes are off.  Everything tastes like chalk.  Tolerating sips.  Plans to try tapioca pudding today.  Zofran taken each morning. +Queasiness.  Emesis with phlegm buildup.  Last episode on Tues. BM - daily.  Voiding fine.  Reports left shoulder/arm pain.  Started on Tuesday.  Believes she slept on it wrong or her cat was laying on her.  Better, now able to move her arm.  Not completely resolved.  Taking ilya and back ASA.  Oxycodone last taken on Monday.   No c/o pain at time of visit.      Diagnosis: Squamous cell carcinoma of the BOT  Stage:  Cancer Staging   Cancer of base of tongue (Multi)  Staging form: Pharynx - P16 Negative Oropharynx, AJCC 8th Edition  - Clinical: Stage BRIDGETTE (cT4a, cN1, cM0, p16-) - Signed by Rahel Del Angel MD on 3/3/2025     Current sites of disease: BOT and lymph nodes  Molecular and ancillary testing: p16 neg    Oncologic History:  1/7/25 - CT neck with Irregular, heterogeneous, and possibly ulcerating mass centered at the base of tongue with involvement of extrinsic tongue musculature and lingual surface of the epiglottis; possible involvement of the  body of the hyoid. Oropharyngeal neoplasm is of high suspicion. No cervical lymphadenopathy by size criteria.  1.8 cm exophytic right posterior thyroid nodule along the right tracheoesophageal groove containing calcification.  2/4/25 - Met with Dr. Gamboa after noting discomfort in throat since August, some discomfort in L ear. DL with fungating lesion involving base of tongue and extending on the  lateral pharyngeal wall on the left, involving lingual aspect of epiglottis.  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  2/28/25 - PET with hypermetabolic soft tissue mass at BOT, moderately hypermetabolic right level 2A lymph node  3/26/25 - C1 cisplatin with definitive radiation (30mg/m2 due to renal function)    Past Medical History:  07/31/2023: Aspiration into airway  No date: HLD (hyperlipidemia)  No date: HTN (hypertension)  No date: Mass of tongue  No date: Other specified health status      Comment:  No pertinent past medical history  No date: Stiffness of unspecified hand, not elsewhere classified      Comment:  Joint stiffness of hand     4 years ago - throat injury, every time she drank/swallow into lungs (black esophagus)    Past Surgical History:  06/14/2019: OTHER SURGICAL HISTORY      Comment:  Ovarian cystectomy  06/14/2019: OTHER SURGICAL HISTORY      Comment:  Cyst excision     Social Hx:   Annette Fernandez  reports that she has been smoking cigarettes. She has been exposed to tobacco smoke. She has quit using smokeless tobacco.  She  reports current alcohol use.  She  reports no history of drug use.  Smoking - working to cut back, down to 2-3 per day, previously 1/2ppd  Limiting amount each time    Lives on her own  Takes care of her cat    Family History   Problem Relation Name Age of Onset    Heart attack Father      Mom had NHL  Grandparent with colon cancer    Meds (Current):  Current Outpatient Medications   Medication Instructions    DentaGeL 1.1 % gel apply with toothbrush OR flouoride trays    magic mouthwash (lidocaine, diphenhydrAMINE, Maalox 1:1:1) 10 mL, Swish & Spit, Every 6 hours PRN    magnesium glycinate 100 mg tablet 1 tablet, oral, 2 times daily    metoprolol tartrate (LOPRESSOR) 25 mg, oral, 2 times daily    multivitamin tablet 1 tablet, Daily    nicotine (Nicoderm CQ) 7 mg/24 hr patch 1 patch, transdermal, Every 24  hours    NON FORMULARY 1 each, Daily    OLANZapine (ZYPREXA) 5 mg, oral, Nightly    ondansetron (ZOFRAN) 8 mg, oral, Every 8 hours PRN    pantoprazole (PROTONIX) 40 mg, oral, Daily, Do not crush, chew, or split.    polyethylene glycol (GLYCOLAX, MIRALAX) 17 g, g-tube, Daily    prochlorperazine (COMPAZINE) 10 mg, oral, Every 6 hours PRN    silver sulfADIAZINE (Silvadene) 1 % cream Apply to affected area twice daily    thyroid (pork) (ARMOUR THYROID) 15 mg, oral, Daily        Allergies   Allergen Reactions    Wellbutrin [Bupropion Hcl] Other     Review of Systems   Constitutional:  Positive for appetite change and fatigue. Negative for unexpected weight change.   HENT:   Positive for sore throat and trouble swallowing. Negative for hearing loss and mouth sores.    Eyes:  Negative for eye problems.   Respiratory:  Negative for cough and shortness of breath.    Cardiovascular:  Negative for chest pain and leg swelling.   Gastrointestinal:  Positive for nausea and vomiting. Negative for abdominal pain, constipation and diarrhea.   Genitourinary:  Negative for difficulty urinating.    Musculoskeletal:  Positive for myalgias and neck pain. Negative for arthralgias and back pain.        Left upper back/shoulder/arm pain - arm weakness.     Skin:  Negative for rash.   Neurological:  Negative for dizziness and headaches.   Hematological:  Negative for adenopathy.   Psychiatric/Behavioral:  Negative for sleep disturbance.       Objective   BSA: 1.28 meters squared  /70 (BP Location: Left arm, Patient Position: Sitting)   Pulse 93   Temp 36.2 °C (97.2 °F) (Temporal)   Resp 16   Wt (!) 39.2 kg (86 lb 6.4 oz)   SpO2 98%   BMI 17.53 kg/m²     Wt Readings from Last 5 Encounters:   06/06/25 (!) 40.8 kg (90 lb)   06/05/25 (!) 39.2 kg (86 lb 6.4 oz)   06/02/25 (!) 40 kg (88 lb 2.9 oz)   05/29/25 (!) 40.1 kg (88 lb 4.8 oz)   05/27/25 (!) 40.5 kg (89 lb 4.6 oz)       Performance Status:  (1) Restricted in physically  strenuous activity, ambulatory and able to do work of light nature     Physical Exam  Vitals reviewed.   Constitutional:       General: She is not in acute distress.  HENT:      Head: Normocephalic and atraumatic.      Mouth/Throat:      Mouth: Mucous membranes are moist.   Eyes:      Pupils: Pupils are equal, round, and reactive to light.   Cardiovascular:      Rate and Rhythm: Normal rate and regular rhythm.   Pulmonary:      Effort: Pulmonary effort is normal.      Breath sounds: Normal breath sounds.   Abdominal:      General: Bowel sounds are normal. There is no distension.      Palpations: Abdomen is soft.      Comments: +PEG   Musculoskeletal:         General: No swelling. Normal range of motion.      Cervical back: Normal range of motion.      Comments: Hand grasp equal, no arm drift, able to push down on pt's left arm - d/t discomfort, arms raised outward equal.     Skin:     Findings: No erythema (to anterior neck) or rash.   Neurological:      General: No focal deficit present.      Mental Status: She is alert and oriented to person, place, and time.   Psychiatric:         Mood and Affect: Mood normal.         Behavior: Behavior normal.          Results:  Labs:  Lab Results   Component Value Date    WBC 4.8 06/05/2025    HGB 8.5 (L) 06/05/2025    HCT 26.5 (L) 06/05/2025    MCV 97 06/05/2025     (H) 06/05/2025      Lab Results   Component Value Date    NEUTROABS 2.63 06/05/2025      Lab Results   Component Value Date    GLUCOSE 111 (H) 06/05/2025    CALCIUM 8.5 (L) 06/05/2025     06/05/2025    K 3.7 06/05/2025    CO2 28 06/05/2025     06/05/2025    BUN 14 06/05/2025    CREATININE 0.68 06/05/2025     Lab Results   Component Value Date    ALT 11 06/02/2025    AST 14 06/02/2025    ALKPHOS 52 06/02/2025    BILITOT 0.3 06/02/2025    Mg 1.41 5/29/25     Imaging:  No new imaging.      Pathology:  No new pathology    Assessment/Plan      Annette Fernandez is a 74 y.o. 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    Discussed overall findings to date with patient and her daughter previously, as well as recommendations for treatment with concurrent/definitive chemoradiation. We discussed the curative goal of treatment. Discussed that chemotherapy would consist of weekly cisplatin, with side effects discussed, including but not limited to myelosuppression (infection, anemia, bleeding), fatigue, nausea, neuropathy, hearing loss/tinnitus, and nephropathy.    - Completed 7 weeks - 30mg/m2 dose  - With cisplatin, will have weekly IVF and provider visits  - monitor for worsening tinnitus  - Zyprexa nightly   - PRN zofran/compazine  - dex 8mg daily x3 days after chemo  - advised her to stop aspirin for pain given risk of NSAIDs and kidney function with cisplatin. Ok for oxycodone as needed and we will refill if gets low  - reminded her about risk of constipation with oxycodone - she is taking miralax as needed and will start regularly if taking, I will also send senna to use if using oxycodone more  - baseline audiology evaluation not scheduled prior to cisplatin, ok to proceed whenever scheduled or delay until after tx    # Mucositis   - encouraged continued rinse use - Glutamine & BMX   - resolved    # Reduced nutritional intake  - recommended she increase protein supplements 3/day.  Graze throughout the day.  Dietician referral in place.    - 4/24:  She is attempting to get 3 TF cartons/day.  Reports she feels bloated and not able to tolerate 3 total cartons.    - 5/1:  Able to tolerate 2.5 TF cartons/day.    - 5/19:  Recommended she resume routine TF's, start with 1/2 carton QID.  Goal to resume 2.5 TF cartons/day.    - 5/29:  Pt not able to tolerate 4 1/2 cartons.  Reports intermittent emesis following bolus feedings.  Pt states she is sitting upright follow feedings.  Recommended pt briefly walk within the home following feedings.     - 6/5:  Reports she is able  to tolerate 3 TF bolus feedings/day.  Encouraged continued admin.      # Odynophagia   - established with supportive onc.  Continue current oxycodone 5mg/q4H PRN.  Bowel regimen - routine daily miralax.   - 5/1:  Oral intake increasingly challenging.  Decreased from previous week.  Able to tolerate sips.  Main 3-4/10.  Continue PRN oxy admin.    - 5/19:  Continue PRN oxycodone.  Sips as tolerated.    - 6/5:  Pain improved.  Continue PRN oxycodone as needed.      # Heartburn  - increase pantoprazole to BID  - improved    # Thrombocytopenia 94 5/19/25   - No clinical s/sx's bleeding.  Pt educated to bleeding precautions.     - resolved    # Hypomagnesia   - Repeat labs with IV replacement, swallowing pills is currently very difficult for pt, oral mag will likely clog peg.     - 5/29:  Mg 1.41 - will plan 2g IV Mg for 6/2/25 infusion.  Ongoing repeat labs and infusion visits until Mg level is back WNL.    - Repeat labs until Mg level back WNL.  6/5 Mg 1.52 - 2g IV Mg today.  Rx sent for Magnesium Glycinate 100mg tab BID.  Rx sent, pt to pick-up OTC tab if not covered.      # Smoking cessation   - increased # of cigarettes/day from 2-3 to 5-6, has not started to wear the nicotine patch yet.  Encouraged her to use the patch, cut down on # of cigarettes/day.     # Localized swelling to LLE - faint difference between RLE, swelling mainly to ankle/dorsum area.  STAT duplex ordered to r/o DVT.  Scheduled 5/2/25.  5/2:  Vascular duplex results (-) for DVT to LLE.    - 5/5:  Swelling has resolved.      # Diarrhea 2/2 laxative admin-  - Stop bowel meds, rest bowels today, declined IV hydration visit today.  Agreeable to come in tomorrow for fluids and labs.  Push fluids.  Start to increase TF gradually back to 2.5 cartons/day.    - 5/19:  diarrhea has resolved.    # Hyponatremia 126 5/19/25  - swallowing pills is currently very difficult for pt.  Tabs will likely clog peg.   Instructed pt to maintain free-water flushes and add  1 tsp table salt to equivalent of 120ml water TID.      - 5/29:  Improved 130 5/29/25.  Pt instructed to continue adding table salt to free-water flushes.    - 6/5:  Na 137 6/5/25 - Pt can stop adding salt to water flushes.  Repeat labs 6/9/25.      # Left upper back/shoulder/arm pain/weakness  - ?musculoskeletal - Believes she slept on her arm wrong.  No focal deficits.  Red flag symptoms discussed.  Improved - monitor for continued improvement.      - RTC  6/9, 6/12, and 6/16 for IV fluids, labs, and electrolyte replacement as needed.

## 2025-06-06 ENCOUNTER — APPOINTMENT (OUTPATIENT)
Dept: PRIMARY CARE | Facility: CLINIC | Age: 75
End: 2025-06-06
Payer: MEDICARE

## 2025-06-06 VITALS
TEMPERATURE: 97.4 F | HEART RATE: 90 BPM | SYSTOLIC BLOOD PRESSURE: 114 MMHG | WEIGHT: 90 LBS | HEIGHT: 58 IN | DIASTOLIC BLOOD PRESSURE: 62 MMHG | OXYGEN SATURATION: 96 % | RESPIRATION RATE: 14 BRPM | BODY MASS INDEX: 18.89 KG/M2

## 2025-06-06 DIAGNOSIS — I10 HTN (HYPERTENSION), BENIGN: Primary | ICD-10-CM

## 2025-06-06 DIAGNOSIS — C01 MALIGNANT NEOPLASM OF BASE OF TONGUE (MULTI): ICD-10-CM

## 2025-06-06 DIAGNOSIS — Z15.89 GENETIC SUSCEPTIBILITY TO DISEASE: ICD-10-CM

## 2025-06-06 DIAGNOSIS — E78.2 MIXED HYPERLIPIDEMIA: ICD-10-CM

## 2025-06-06 DIAGNOSIS — C01 CANCER OF BASE OF TONGUE (MULTI): ICD-10-CM

## 2025-06-06 DIAGNOSIS — Z83.2 FAMILY HISTORY OF SARCOIDOSIS: ICD-10-CM

## 2025-06-06 PROCEDURE — 3008F BODY MASS INDEX DOCD: CPT | Performed by: FAMILY MEDICINE

## 2025-06-06 PROCEDURE — 3074F SYST BP LT 130 MM HG: CPT | Performed by: FAMILY MEDICINE

## 2025-06-06 PROCEDURE — 99214 OFFICE O/P EST MOD 30 MIN: CPT | Performed by: FAMILY MEDICINE

## 2025-06-06 PROCEDURE — 1159F MED LIST DOCD IN RCRD: CPT | Performed by: FAMILY MEDICINE

## 2025-06-06 PROCEDURE — G2211 COMPLEX E/M VISIT ADD ON: HCPCS | Performed by: FAMILY MEDICINE

## 2025-06-06 PROCEDURE — 3078F DIAST BP <80 MM HG: CPT | Performed by: FAMILY MEDICINE

## 2025-06-06 PROCEDURE — 1160F RVW MEDS BY RX/DR IN RCRD: CPT | Performed by: FAMILY MEDICINE

## 2025-06-06 ASSESSMENT — PATIENT HEALTH QUESTIONNAIRE - PHQ9
2. FEELING DOWN, DEPRESSED OR HOPELESS: NOT AT ALL
1. LITTLE INTEREST OR PLEASURE IN DOING THINGS: NOT AT ALL
SUM OF ALL RESPONSES TO PHQ9 QUESTIONS 1 AND 2: 0

## 2025-06-06 ASSESSMENT — ENCOUNTER SYMPTOMS
DEPRESSION: 0
LOSS OF SENSATION IN FEET: 0
MYALGIAS: 1
OCCASIONAL FEELINGS OF UNSTEADINESS: 0

## 2025-06-06 NOTE — PROGRESS NOTES
"Subjective    Patient ID: Annette Fernandez is a 74 y.o. female who presents for Hypertension, Hyperlipidemia, Malignant neoplasm of base of tongue, and Genetic Evaluation (HLA-B27 and any other autoimmune).   Hypertension: The patient is here for an evaluation of elevated blood pressure. The patient is trying to follow a low-salt diet. She is adherent to a low salt diet. Blood pressure is well controlled at home. The patient has not been hospitalized for this in the last 6 months. The patient is compliant with medications. Patient denies any side effects to the medications.     Hyperlipidemia: The patient is presenting today for a follow up of hyperlipidemia. The patient has not been hospitalized for this in the last 6 months. The patient is compliant with medications. Patient denies any side effects to the medications.     Malignant neoplasm of base of the tongue: Patient is currently followed by PIERRE Jalloh with oncology. Patient states she is doing okay. She is done with radiation. She has not stopped smoking yet. States she has no pain at this time.     Patient states overall she is doing well. She is worried about her daughter who she states was recently diagnosed with an autoimmune disease. They are wanting her to get blood work done she said was HLA-B27. Patient is unaware of any connective tissue disorders with herself.     Patient states she sprained her right shoulder. She states it happened this past Tuesday, and she awoke from her nap to pain in the shoulder. She isnt sure if she slept on it wrong or not. She states it is getting better, but she still has noticed weakness in the arm.     The patient denies having the following symptoms: chest pain, chest pressure, fever, chills, N/V/D, constipation, dizziness, headaches, SOB.    Objective   Vitals:  /62   Pulse 90   Temp 36.3 °C (97.4 °F)   Resp 14   Ht 1.473 m (4' 10\")   Wt (!) 40.8 kg (90 lb)   SpO2 96%   BMI 18.81 kg/m²     Physical " Exam  Vitals reviewed.   Constitutional:       Appearance: Normal appearance.   Neck:      Vascular: No carotid bruit.   Cardiovascular:      Rate and Rhythm: Normal rate and regular rhythm.      Pulses: Normal pulses.      Heart sounds: Normal heart sounds.   Pulmonary:      Effort: Pulmonary effort is normal. No respiratory distress.      Breath sounds: Normal breath sounds. No wheezing.   Abdominal:      General: There is no distension.      Palpations: Abdomen is soft. There is no mass.      Tenderness: There is no abdominal tenderness. There is no right CVA tenderness, left CVA tenderness, guarding or rebound.   Musculoskeletal:      Cervical back: Normal range of motion and neck supple. No rigidity.      Right lower leg: No edema.      Left lower leg: No edema.   Lymphadenopathy:      Cervical: No cervical adenopathy.   Neurological:      Mental Status: She is alert.            Labs reviewed from :  06/05/2025  CMP, CBC, Lipid WNL.       Assessment/Plan   Problem List Items Addressed This Visit       HTN (hypertension), benign - Primary    This condition  is well controlled, continue with current medications.           Relevant Orders    CBC and Auto Differential    Comprehensive Metabolic Panel    Magnesium    Follow Up In [facility] - PCP - Medicare Annual    Hyperlipidemia    This condition  is well controlled, continue with current medications.           Relevant Orders    Lipid Panel    Cancer of base of tongue (Multi)    Malignant neoplasm of base of tongue (Multi)    This condition  is well controlled, follow up as scheduled with Oncology.             Other Visit Diagnoses         Family history of sarcoidosis        Relevant Orders    HLAB27 Typing        The patient's daughter is being evaluated for connective tissue disease and her doctors advised that her family members need to be checked for HLA-B27.    Follow up in: 6 month(s) for chronic issues or sooner if needed without labs.      Scribe Attestation  By signing my name below, I, Pablo Angulo   attest that this documentation has been prepared under the direction and in the presence of Parminder Russell MD.   I, Parminder Russell MD, personally performed the services described in the documentation as scribed by Itzel Wheeler in my presence and confirm that it is both accurate and complete.

## 2025-06-09 ENCOUNTER — INFUSION (OUTPATIENT)
Dept: HEMATOLOGY/ONCOLOGY | Facility: CLINIC | Age: 75
End: 2025-06-09
Payer: MEDICARE

## 2025-06-09 VITALS
TEMPERATURE: 96.8 F | BODY MASS INDEX: 17.55 KG/M2 | RESPIRATION RATE: 18 BRPM | OXYGEN SATURATION: 97 % | HEART RATE: 104 BPM | SYSTOLIC BLOOD PRESSURE: 121 MMHG | DIASTOLIC BLOOD PRESSURE: 69 MMHG | WEIGHT: 84 LBS

## 2025-06-09 DIAGNOSIS — E83.42 HYPOMAGNESEMIA: ICD-10-CM

## 2025-06-09 DIAGNOSIS — E83.42 HYPOMAGNESEMIA: Primary | ICD-10-CM

## 2025-06-09 DIAGNOSIS — C01 CANCER OF BASE OF TONGUE (MULTI): ICD-10-CM

## 2025-06-09 LAB
ALBUMIN SERPL BCP-MCNC: 3.4 G/DL (ref 3.4–5)
ALP SERPL-CCNC: 51 U/L (ref 33–136)
ALT SERPL W P-5'-P-CCNC: 10 U/L (ref 7–45)
ANION GAP SERPL CALC-SCNC: 12 MMOL/L (ref 10–20)
AST SERPL W P-5'-P-CCNC: 16 U/L (ref 9–39)
BASOPHILS # BLD AUTO: 0.03 X10*3/UL (ref 0–0.1)
BASOPHILS NFR BLD AUTO: 0.3 %
BILIRUB SERPL-MCNC: 0.3 MG/DL (ref 0–1.2)
BUN SERPL-MCNC: 17 MG/DL (ref 6–23)
CALCIUM SERPL-MCNC: 9.1 MG/DL (ref 8.6–10.3)
CHLORIDE SERPL-SCNC: 96 MMOL/L (ref 98–107)
CO2 SERPL-SCNC: 26 MMOL/L (ref 21–32)
CREAT SERPL-MCNC: 0.84 MG/DL (ref 0.5–1.05)
EGFRCR SERPLBLD CKD-EPI 2021: 73 ML/MIN/1.73M*2
EOSINOPHIL # BLD AUTO: 0.07 X10*3/UL (ref 0–0.4)
EOSINOPHIL NFR BLD AUTO: 0.8 %
ERYTHROCYTE [DISTWIDTH] IN BLOOD BY AUTOMATED COUNT: 19.3 % (ref 11.5–14.5)
GLUCOSE SERPL-MCNC: 100 MG/DL (ref 74–99)
HCT VFR BLD AUTO: 26.5 % (ref 36–46)
HGB BLD-MCNC: 8.4 G/DL (ref 12–16)
IMM GRANULOCYTES # BLD AUTO: 0.05 X10*3/UL (ref 0–0.5)
IMM GRANULOCYTES NFR BLD AUTO: 0.5 % (ref 0–0.9)
LYMPHOCYTES # BLD AUTO: 1.51 X10*3/UL (ref 0.8–3)
LYMPHOCYTES NFR BLD AUTO: 16.6 %
MAGNESIUM SERPL-MCNC: 1.87 MG/DL (ref 1.6–2.4)
MCH RBC QN AUTO: 31.1 PG (ref 26–34)
MCHC RBC AUTO-ENTMCNC: 31.7 G/DL (ref 32–36)
MCV RBC AUTO: 98 FL (ref 80–100)
MONOCYTES # BLD AUTO: 0.75 X10*3/UL (ref 0.05–0.8)
MONOCYTES NFR BLD AUTO: 8.2 %
NEUTROPHILS # BLD AUTO: 6.7 X10*3/UL (ref 1.6–5.5)
NEUTROPHILS NFR BLD AUTO: 73.6 %
NRBC BLD-RTO: ABNORMAL /100{WBCS}
PLATELET # BLD AUTO: 473 X10*3/UL (ref 150–450)
POTASSIUM SERPL-SCNC: 4.3 MMOL/L (ref 3.5–5.3)
PROT SERPL-MCNC: 6.4 G/DL (ref 6.4–8.2)
RBC # BLD AUTO: 2.7 X10*6/UL (ref 4–5.2)
SODIUM SERPL-SCNC: 130 MMOL/L (ref 136–145)
WBC # BLD AUTO: 9.1 X10*3/UL (ref 4.4–11.3)

## 2025-06-09 PROCEDURE — 96360 HYDRATION IV INFUSION INIT: CPT | Mod: INF

## 2025-06-09 PROCEDURE — 80053 COMPREHEN METABOLIC PANEL: CPT

## 2025-06-09 PROCEDURE — 85025 COMPLETE CBC W/AUTO DIFF WBC: CPT

## 2025-06-09 PROCEDURE — 83735 ASSAY OF MAGNESIUM: CPT

## 2025-06-09 PROCEDURE — 2500000004 HC RX 250 GENERAL PHARMACY W/ HCPCS (ALT 636 FOR OP/ED): Performed by: NURSE PRACTITIONER

## 2025-06-09 RX ORDER — HEPARIN SODIUM,PORCINE/PF 10 UNIT/ML
50 SYRINGE (ML) INTRAVENOUS AS NEEDED
Status: CANCELLED | OUTPATIENT
Start: 2025-06-09

## 2025-06-09 RX ORDER — HEPARIN 100 UNIT/ML
500 SYRINGE INTRAVENOUS AS NEEDED
Status: CANCELLED | OUTPATIENT
Start: 2025-06-09

## 2025-06-09 RX ADMIN — SODIUM CHLORIDE 1000 ML: 0.9 INJECTION, SOLUTION INTRAVENOUS at 09:27

## 2025-06-09 ASSESSMENT — PAIN SCALES - GENERAL: PAINLEVEL_OUTOF10: 0-NO PAIN

## 2025-06-09 NOTE — PROGRESS NOTES
History of Present Illness    Annette Fernandez was seen in the February 2025 at the request of Dr. Parminder Russell for the evaluation of a base of tongue mass.  This patient has been complaining of discomfort in her throat since probably August 2024.  She also had some limitations in swallowing.  She had lost more than 20 pounds over the previous year.  She had a CT scan of her neck which was done on January 7, 2025 that I personally reviewed.  It did show this lesion involving the base of the tongue.  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  She was presented at our tumor board and the recommendation was for a combination of chemotherapy and radiation.  This was completed on May 9, 2025.  She is scheduled to undergo a PET scan at some point.  Her main complaint has to do with the lack of taste.  She has been able to swallow fairly well.  She has not been able to stop smoking completely.      Physical Exam    Palpation of the parotid, neck, and thyroid field fails to show any worrisome masses or adenopathies.  Examination of the oral cavity and oropharynx is negative.    A flexible laryngoscopy was carried out. Under topical Xylocaine and Tacho-Synephrine the scope was introduced through the nostril. The nasopharynx, base of tongue, hypopharynx, and larynx are visualized.  The vocal cords are normally mobile.  There is a small necrotic crater in the area of the left base of tongue at the junction with the epiglottis.  This is very small.    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  There has been a very good response.  I only see a small residual crater at the junction between the base of tongue and the epiglottis on the left side.  She will be getting a PET scan in the near future.  She was encouraged to stop smoking.    I will see her in 2  months.

## 2025-06-10 ENCOUNTER — APPOINTMENT (OUTPATIENT)
Facility: CLINIC | Age: 75
End: 2025-06-10
Payer: MEDICARE

## 2025-06-10 ENCOUNTER — OFFICE VISIT (OUTPATIENT)
Facility: CLINIC | Age: 75
End: 2025-06-10
Payer: MEDICARE

## 2025-06-10 VITALS — HEIGHT: 55 IN | WEIGHT: 86.6 LBS | BODY MASS INDEX: 20.04 KG/M2

## 2025-06-10 DIAGNOSIS — C10.9 MALIGNANT NEOPLASM OF OROPHARYNX: Primary | ICD-10-CM

## 2025-06-10 DIAGNOSIS — E03.9 ACQUIRED HYPOTHYROIDISM: ICD-10-CM

## 2025-06-10 PROCEDURE — 31575 DIAGNOSTIC LARYNGOSCOPY: CPT | Performed by: OTOLARYNGOLOGY

## 2025-06-10 PROCEDURE — 1126F AMNT PAIN NOTED NONE PRSNT: CPT | Performed by: OTOLARYNGOLOGY

## 2025-06-10 PROCEDURE — 1160F RVW MEDS BY RX/DR IN RCRD: CPT | Performed by: OTOLARYNGOLOGY

## 2025-06-10 PROCEDURE — 1159F MED LIST DOCD IN RCRD: CPT | Performed by: OTOLARYNGOLOGY

## 2025-06-10 PROCEDURE — 99213 OFFICE O/P EST LOW 20 MIN: CPT | Performed by: OTOLARYNGOLOGY

## 2025-06-10 PROCEDURE — 3008F BODY MASS INDEX DOCD: CPT | Performed by: OTOLARYNGOLOGY

## 2025-06-10 ASSESSMENT — PAIN SCALES - GENERAL: PAINLEVEL_OUTOF10: 0-NO PAIN

## 2025-06-12 ENCOUNTER — INFUSION (OUTPATIENT)
Dept: HEMATOLOGY/ONCOLOGY | Facility: CLINIC | Age: 75
End: 2025-06-12
Payer: MEDICARE

## 2025-06-12 ENCOUNTER — LAB (OUTPATIENT)
Dept: LAB | Facility: CLINIC | Age: 75
End: 2025-06-12
Payer: MEDICARE

## 2025-06-12 VITALS
DIASTOLIC BLOOD PRESSURE: 71 MMHG | SYSTOLIC BLOOD PRESSURE: 118 MMHG | RESPIRATION RATE: 18 BRPM | TEMPERATURE: 97.4 F | OXYGEN SATURATION: 97 % | HEART RATE: 97 BPM

## 2025-06-12 DIAGNOSIS — E83.42 HYPOMAGNESEMIA: ICD-10-CM

## 2025-06-12 DIAGNOSIS — C01 CANCER OF BASE OF TONGUE (MULTI): ICD-10-CM

## 2025-06-12 DIAGNOSIS — C01 CANCER OF BASE OF TONGUE (MULTI): Primary | ICD-10-CM

## 2025-06-12 LAB
ALBUMIN SERPL BCP-MCNC: 3.6 G/DL (ref 3.4–5)
ALP SERPL-CCNC: 50 U/L (ref 33–136)
ALT SERPL W P-5'-P-CCNC: 9 U/L (ref 7–45)
ANION GAP SERPL CALC-SCNC: 12 MMOL/L (ref 10–20)
AST SERPL W P-5'-P-CCNC: 16 U/L (ref 9–39)
BASOPHILS # BLD AUTO: 0.01 X10*3/UL (ref 0–0.1)
BASOPHILS NFR BLD AUTO: 0.1 %
BILIRUB SERPL-MCNC: 0.3 MG/DL (ref 0–1.2)
BUN SERPL-MCNC: 21 MG/DL (ref 6–23)
CALCIUM SERPL-MCNC: 9.7 MG/DL (ref 8.6–10.3)
CHLORIDE SERPL-SCNC: 94 MMOL/L (ref 98–107)
CO2 SERPL-SCNC: 30 MMOL/L (ref 21–32)
CREAT SERPL-MCNC: 1.05 MG/DL (ref 0.5–1.05)
EGFRCR SERPLBLD CKD-EPI 2021: 56 ML/MIN/1.73M*2
EOSINOPHIL # BLD AUTO: 0.03 X10*3/UL (ref 0–0.4)
EOSINOPHIL NFR BLD AUTO: 0.4 %
ERYTHROCYTE [DISTWIDTH] IN BLOOD BY AUTOMATED COUNT: 19 % (ref 11.5–14.5)
GLUCOSE SERPL-MCNC: 135 MG/DL (ref 74–99)
HCT VFR BLD AUTO: 28.1 % (ref 36–46)
HGB BLD-MCNC: 9 G/DL (ref 12–16)
HLA-B27 QL NAA+PROBE: NEGATIVE
IMM GRANULOCYTES # BLD AUTO: 0.06 X10*3/UL (ref 0–0.5)
IMM GRANULOCYTES NFR BLD AUTO: 0.8 % (ref 0–0.9)
LYMPHOCYTES # BLD AUTO: 1.64 X10*3/UL (ref 0.8–3)
LYMPHOCYTES NFR BLD AUTO: 22.7 %
MAGNESIUM SERPL-MCNC: 1.9 MG/DL (ref 1.6–2.4)
MCH RBC QN AUTO: 31.5 PG (ref 26–34)
MCHC RBC AUTO-ENTMCNC: 32 G/DL (ref 32–36)
MCV RBC AUTO: 98 FL (ref 80–100)
MONOCYTES # BLD AUTO: 0.67 X10*3/UL (ref 0.05–0.8)
MONOCYTES NFR BLD AUTO: 9.3 %
NEUTROPHILS # BLD AUTO: 4.81 X10*3/UL (ref 1.6–5.5)
NEUTROPHILS NFR BLD AUTO: 66.7 %
PLATELET # BLD AUTO: 421 X10*3/UL (ref 150–450)
POTASSIUM SERPL-SCNC: 5.2 MMOL/L (ref 3.5–5.3)
PROT SERPL-MCNC: 6.5 G/DL (ref 6.4–8.2)
QUEST HLAB27 TYPING RESULTS REVIEWED BY:: NORMAL
RBC # BLD AUTO: 2.86 X10*6/UL (ref 4–5.2)
SODIUM SERPL-SCNC: 131 MMOL/L (ref 136–145)
WBC # BLD AUTO: 7.2 X10*3/UL (ref 4.4–11.3)

## 2025-06-12 PROCEDURE — 83735 ASSAY OF MAGNESIUM: CPT

## 2025-06-12 PROCEDURE — 85025 COMPLETE CBC W/AUTO DIFF WBC: CPT

## 2025-06-12 PROCEDURE — 2500000004 HC RX 250 GENERAL PHARMACY W/ HCPCS (ALT 636 FOR OP/ED): Performed by: NURSE PRACTITIONER

## 2025-06-12 PROCEDURE — 96360 HYDRATION IV INFUSION INIT: CPT | Mod: INF

## 2025-06-12 PROCEDURE — 80053 COMPREHEN METABOLIC PANEL: CPT

## 2025-06-12 PROCEDURE — 36415 COLL VENOUS BLD VENIPUNCTURE: CPT

## 2025-06-12 RX ORDER — HEPARIN 100 UNIT/ML
500 SYRINGE INTRAVENOUS AS NEEDED
OUTPATIENT
Start: 2025-06-12

## 2025-06-12 RX ORDER — HEPARIN SODIUM,PORCINE/PF 10 UNIT/ML
50 SYRINGE (ML) INTRAVENOUS AS NEEDED
OUTPATIENT
Start: 2025-06-12

## 2025-06-12 RX ADMIN — SODIUM CHLORIDE 1000 ML: 0.9 INJECTION, SOLUTION INTRAVENOUS at 13:56

## 2025-06-12 ASSESSMENT — PAIN SCALES - GENERAL: PAINLEVEL_OUTOF10: 0-NO PAIN

## 2025-06-16 ENCOUNTER — LAB (OUTPATIENT)
Dept: LAB | Facility: CLINIC | Age: 75
End: 2025-06-16
Payer: MEDICARE

## 2025-06-16 ENCOUNTER — INFUSION (OUTPATIENT)
Dept: HEMATOLOGY/ONCOLOGY | Facility: CLINIC | Age: 75
End: 2025-06-16
Payer: MEDICARE

## 2025-06-16 ENCOUNTER — OFFICE VISIT (OUTPATIENT)
Dept: HEMATOLOGY/ONCOLOGY | Facility: CLINIC | Age: 75
End: 2025-06-16
Payer: MEDICARE

## 2025-06-16 ENCOUNTER — OFFICE VISIT (OUTPATIENT)
Dept: PALLIATIVE MEDICINE | Facility: CLINIC | Age: 75
End: 2025-06-16
Payer: MEDICARE

## 2025-06-16 VITALS
DIASTOLIC BLOOD PRESSURE: 67 MMHG | SYSTOLIC BLOOD PRESSURE: 121 MMHG | WEIGHT: 84.9 LBS | RESPIRATION RATE: 18 BRPM | BODY MASS INDEX: 21.99 KG/M2 | OXYGEN SATURATION: 98 % | HEART RATE: 80 BPM | TEMPERATURE: 97.5 F

## 2025-06-16 DIAGNOSIS — C01 CANCER OF BASE OF TONGUE (MULTI): ICD-10-CM

## 2025-06-16 DIAGNOSIS — R43.2 DYSGEUSIA: ICD-10-CM

## 2025-06-16 DIAGNOSIS — E87.1 HYPONATREMIA: Primary | ICD-10-CM

## 2025-06-16 DIAGNOSIS — G89.3 CANCER RELATED PAIN: Primary | ICD-10-CM

## 2025-06-16 DIAGNOSIS — C01 MALIGNANT NEOPLASM OF BASE OF TONGUE (MULTI): ICD-10-CM

## 2025-06-16 DIAGNOSIS — Z51.5 PALLIATIVE CARE ENCOUNTER: ICD-10-CM

## 2025-06-16 LAB
ALBUMIN SERPL BCP-MCNC: 3.7 G/DL (ref 3.4–5)
ALP SERPL-CCNC: 49 U/L (ref 33–136)
ALT SERPL W P-5'-P-CCNC: 10 U/L (ref 7–45)
ANION GAP SERPL CALC-SCNC: 13 MMOL/L (ref 10–20)
AST SERPL W P-5'-P-CCNC: 18 U/L (ref 9–39)
BASOPHILS # BLD AUTO: 0.03 X10*3/UL (ref 0–0.1)
BASOPHILS NFR BLD AUTO: 0.3 %
BILIRUB SERPL-MCNC: 0.3 MG/DL (ref 0–1.2)
BUN SERPL-MCNC: 25 MG/DL (ref 6–23)
CALCIUM SERPL-MCNC: 9.3 MG/DL (ref 8.6–10.3)
CHLORIDE SERPL-SCNC: 96 MMOL/L (ref 98–107)
CO2 SERPL-SCNC: 27 MMOL/L (ref 21–32)
CREAT SERPL-MCNC: 1.01 MG/DL (ref 0.5–1.05)
EGFRCR SERPLBLD CKD-EPI 2021: 59 ML/MIN/1.73M*2
EOSINOPHIL # BLD AUTO: 0.08 X10*3/UL (ref 0–0.4)
EOSINOPHIL NFR BLD AUTO: 0.8 %
ERYTHROCYTE [DISTWIDTH] IN BLOOD BY AUTOMATED COUNT: 18.5 % (ref 11.5–14.5)
GLUCOSE SERPL-MCNC: 104 MG/DL (ref 74–99)
HCT VFR BLD AUTO: 28.9 % (ref 36–46)
HGB BLD-MCNC: 8.9 G/DL (ref 12–16)
IMM GRANULOCYTES # BLD AUTO: 0.07 X10*3/UL (ref 0–0.5)
IMM GRANULOCYTES NFR BLD AUTO: 0.7 % (ref 0–0.9)
LYMPHOCYTES # BLD AUTO: 1.74 X10*3/UL (ref 0.8–3)
LYMPHOCYTES NFR BLD AUTO: 17.7 %
MAGNESIUM SERPL-MCNC: 1.75 MG/DL (ref 1.6–2.4)
MCH RBC QN AUTO: 31.6 PG (ref 26–34)
MCHC RBC AUTO-ENTMCNC: 30.8 G/DL (ref 32–36)
MCV RBC AUTO: 103 FL (ref 80–100)
MONOCYTES # BLD AUTO: 0.86 X10*3/UL (ref 0.05–0.8)
MONOCYTES NFR BLD AUTO: 8.7 %
NEUTROPHILS # BLD AUTO: 7.06 X10*3/UL (ref 1.6–5.5)
NEUTROPHILS NFR BLD AUTO: 71.8 %
NRBC BLD-RTO: ABNORMAL /100{WBCS}
PLATELET # BLD AUTO: 366 X10*3/UL (ref 150–450)
POTASSIUM SERPL-SCNC: 4.3 MMOL/L (ref 3.5–5.3)
PROT SERPL-MCNC: 6.4 G/DL (ref 6.4–8.2)
RBC # BLD AUTO: 2.82 X10*6/UL (ref 4–5.2)
SODIUM SERPL-SCNC: 132 MMOL/L (ref 136–145)
WBC # BLD AUTO: 9.8 X10*3/UL (ref 4.4–11.3)

## 2025-06-16 PROCEDURE — 85025 COMPLETE CBC W/AUTO DIFF WBC: CPT | Performed by: NURSE PRACTITIONER

## 2025-06-16 PROCEDURE — 3078F DIAST BP <80 MM HG: CPT | Performed by: NURSE PRACTITIONER

## 2025-06-16 PROCEDURE — 2500000004 HC RX 250 GENERAL PHARMACY W/ HCPCS (ALT 636 FOR OP/ED): Performed by: NURSE PRACTITIONER

## 2025-06-16 PROCEDURE — 1126F AMNT PAIN NOTED NONE PRSNT: CPT | Performed by: NURSE PRACTITIONER

## 2025-06-16 PROCEDURE — 99215 OFFICE O/P EST HI 40 MIN: CPT | Performed by: NURSE PRACTITIONER

## 2025-06-16 PROCEDURE — 80053 COMPREHEN METABOLIC PANEL: CPT | Performed by: NURSE PRACTITIONER

## 2025-06-16 PROCEDURE — 3074F SYST BP LT 130 MM HG: CPT | Performed by: NURSE PRACTITIONER

## 2025-06-16 PROCEDURE — 96360 HYDRATION IV INFUSION INIT: CPT | Mod: INF

## 2025-06-16 PROCEDURE — 1159F MED LIST DOCD IN RCRD: CPT | Performed by: NURSE PRACTITIONER

## 2025-06-16 PROCEDURE — 99213 OFFICE O/P EST LOW 20 MIN: CPT

## 2025-06-16 PROCEDURE — G2211 COMPLEX E/M VISIT ADD ON: HCPCS | Performed by: NURSE PRACTITIONER

## 2025-06-16 PROCEDURE — 83735 ASSAY OF MAGNESIUM: CPT | Performed by: NURSE PRACTITIONER

## 2025-06-16 PROCEDURE — 99213 OFFICE O/P EST LOW 20 MIN: CPT | Mod: 25

## 2025-06-16 RX ORDER — HEPARIN 100 UNIT/ML
500 SYRINGE INTRAVENOUS AS NEEDED
OUTPATIENT
Start: 2025-06-16

## 2025-06-16 RX ORDER — HEPARIN SODIUM,PORCINE/PF 10 UNIT/ML
50 SYRINGE (ML) INTRAVENOUS AS NEEDED
OUTPATIENT
Start: 2025-06-16

## 2025-06-16 RX ADMIN — SODIUM CHLORIDE 1000 ML: 0.9 INJECTION, SOLUTION INTRAVENOUS at 10:11

## 2025-06-16 ASSESSMENT — ENCOUNTER SYMPTOMS
APPETITE CHANGE: 1
BACK PAIN: 0
NECK PAIN: 0
SLEEP DISTURBANCE: 0
COUGH: 0
LEG SWELLING: 0
UNEXPECTED WEIGHT CHANGE: 0
VOMITING: 0
TROUBLE SWALLOWING: 1
DIFFICULTY URINATING: 0
MYALGIAS: 0
EYE PROBLEMS: 0
ABDOMINAL PAIN: 0
SORE THROAT: 0
CONSTIPATION: 0
ARTHRALGIAS: 0
HEADACHES: 0
DIARRHEA: 0
NAUSEA: 1
SHORTNESS OF BREATH: 0
FATIGUE: 1
DIZZINESS: 0
ADENOPATHY: 0

## 2025-06-16 ASSESSMENT — PAIN SCALES - GENERAL: PAINLEVEL_OUTOF10: 0-NO PAIN

## 2025-06-16 NOTE — PROGRESS NOTES
SUPPORTIVE AND PALLIATIVE ONCOLOGY FOLLOW-UP - OUTPATIENT      SERVICE DATE: 6/16/2025    Referred by:  ELLYN Ochoa  Medical Oncologist: Faith Denson MD   Radiation Oncologist: Rahel Del Angel MD  Primary Physician: Parminder Russell  [phone]    REASON FOR CONSULT/CHIEF CONSULT COMPLAINT: pain management and Introduction to Supportive and Palliative Oncology Services    Subjective   HISTORY OF PRESENT ILLNESS: Annette Fernandez is a 74 y.o. female who presents with a PMH of HLD, HTN, and BOT SCC diagnosed 02/2025. She was started on chemoradiation with Cisplatin 3/26. Completed RT 5/9/25.    Pain Assessment:  Pain Score: 2/10  Location: throat    Symptom Assessment:  Pain:somewhat reports soreness in her throat. Mainly in the evening. She has been taking oxycodone 1-2 times per week, but reports mainly taking this at bedtime to help her sleep.   Headache: none  Dizziness:none  Lack of energy: somewhat continues to feel very fatigued by minimal activity.   Difficulty sleeping: none having trouble falling asleep some nights because she is sleeping a lot during the day  Worrying: a little  Anxiety: a little  Depression: a little  Pain in mouth/swallowing: very much  Dry mouth: none  Taste changes: somewhat reports nothing tastes like it should  Shortness of breath: none  Lack of appetite: somewhat she now feels hungry but is restricted on oral intake due to taste changes. She is drinking fluids by mouth now. She has been taking in 2-3 tube feeds per day.   Nausea: none took zofran 1-2 times over the last week  Vomiting: none  Constipation: none  Diarrhea: none  Sore muscles: none  Numbness or tingling in hands/feet/other: none  Weight loss: a little  Other: a little      Information obtained from: chart review, interview of patient, and interview of family  ______________________________________________________________________     Oncology History   Cancer of base of tongue (Multi)   3/3/2025 Initial  Diagnosis    Cancer of base of tongue (Multi)     3/3/2025 Cancer Staged    Staging form: Pharynx - P16 Negative Oropharynx, AJCC 8th Edition, Clinical: Stage BRIDGETTE (cT4a, cN1, cM0, p16-) - Signed by Rahel Del Angel MD on 3/3/2025     3/26/2025 -  Chemotherapy    CISplatin with Concurrent Radiation (Weekly), 7 Week Cycle         Past Medical History:   Diagnosis Date    Aspiration into airway 07/31/2023    HLD (hyperlipidemia)     HTN (hypertension)     Mass of tongue     Other specified health status     No pertinent past medical history    Stiffness of unspecified hand, not elsewhere classified     Joint stiffness of hand     Past Surgical History:   Procedure Laterality Date    OTHER SURGICAL HISTORY  06/14/2019    Ovarian cystectomy    OTHER SURGICAL HISTORY  06/14/2019    Cyst excision     Family History   Problem Relation Name Age of Onset    Heart attack Father          SOCIAL HISTORY  Children 2, Grandchildren 2, Support system friends and family, Employment worked as an  - retired, and Hobbies reading, art, and movies   Social History:  reports that she has been smoking cigarettes. She has been exposed to tobacco smoke. She has quit using smokeless tobacco. She reports current alcohol use. She reports that she does not use drugs.      REVIEW OF SYSTEMS  Review of systems negative unless noted in HPI.       Objective     Current Outpatient Medications   Medication Instructions    DentaGeL 1.1 % gel apply with toothbrush OR flouoride trays    magic mouthwash (lidocaine, diphenhydrAMINE, Maalox 1:1:1) 10 mL, Swish & Spit, Every 6 hours PRN    magnesium glycinate 100 mg tablet 1 tablet, oral, 2 times daily    metoprolol tartrate (LOPRESSOR) 25 mg, oral, 2 times daily    multivitamin tablet 1 tablet, Daily    nicotine (Nicoderm CQ) 7 mg/24 hr patch 1 patch, transdermal, Every 24 hours    NON FORMULARY 1 each, Daily    OLANZapine (ZYPREXA) 5 mg, oral, Nightly    ondansetron (ZOFRAN) 8 mg, oral, Every 8  hours PRN    pantoprazole (PROTONIX) 40 mg, oral, Daily, Do not crush, chew, or split.    polyethylene glycol (GLYCOLAX, MIRALAX) 17 g, g-tube, Daily    prochlorperazine (COMPAZINE) 10 mg, oral, Every 6 hours PRN    silver sulfADIAZINE (Silvadene) 1 % cream Apply to affected area twice daily    thyroid (pork) (ARMOUR THYROID) 15 mg, oral, Daily       Allergies:   Allergies   Allergen Reactions    Wellbutrin [Bupropion Hcl] Other     Infusion on 06/16/2025   Component Date Value Ref Range Status    Magnesium 06/16/2025 1.75  1.60 - 2.40 mg/dL Final    WBC 06/16/2025 9.8  4.4 - 11.3 x10*3/uL Final    nRBC 06/16/2025    Final    Not Measured    RBC 06/16/2025 2.82 (L)  4.00 - 5.20 x10*6/uL Final    Hemoglobin 06/16/2025 8.9 (L)  12.0 - 16.0 g/dL Final    Hematocrit 06/16/2025 28.9 (L)  36.0 - 46.0 % Final    MCV 06/16/2025 103 (H)  80 - 100 fL Final    MCH 06/16/2025 31.6  26.0 - 34.0 pg Final    MCHC 06/16/2025 30.8 (L)  32.0 - 36.0 g/dL Final    RDW 06/16/2025 18.5 (H)  11.5 - 14.5 % Final    Platelets 06/16/2025 366  150 - 450 x10*3/uL Final    Neutrophils % 06/16/2025 71.8  40.0 - 80.0 % Final    Immature Granulocytes %, Automated 06/16/2025 0.7  0.0 - 0.9 % Final    Immature Granulocyte Count (IG) includes promyelocytes, myelocytes and metamyelocytes but does not include bands. Percent differential counts (%) should be interpreted in the context of the absolute cell counts (cells/UL).    Lymphocytes % 06/16/2025 17.7  13.0 - 44.0 % Final    Monocytes % 06/16/2025 8.7  2.0 - 10.0 % Final    Eosinophils % 06/16/2025 0.8  0.0 - 6.0 % Final    Basophils % 06/16/2025 0.3  0.0 - 2.0 % Final    Neutrophils Absolute 06/16/2025 7.06 (H)  1.60 - 5.50 x10*3/uL Final    Percent differential counts (%) should be interpreted in the context of the absolute cell counts (cells/uL).    Immature Granulocytes Absolute, Au* 06/16/2025 0.07  0.00 - 0.50 x10*3/uL Final    Lymphocytes Absolute 06/16/2025 1.74  0.80 - 3.00 x10*3/uL Final     Monocytes Absolute 06/16/2025 0.86 (H)  0.05 - 0.80 x10*3/uL Final    Eosinophils Absolute 06/16/2025 0.08  0.00 - 0.40 x10*3/uL Final    Basophils Absolute 06/16/2025 0.03  0.00 - 0.10 x10*3/uL Final    Glucose 06/16/2025 104 (H)  74 - 99 mg/dL Final    Sodium 06/16/2025 132 (L)  136 - 145 mmol/L Final    Potassium 06/16/2025 4.3  3.5 - 5.3 mmol/L Final    Chloride 06/16/2025 96 (L)  98 - 107 mmol/L Final    Bicarbonate 06/16/2025 27  21 - 32 mmol/L Final    Anion Gap 06/16/2025 13  10 - 20 mmol/L Final    Urea Nitrogen 06/16/2025 25 (H)  6 - 23 mg/dL Final    Creatinine 06/16/2025 1.01  0.50 - 1.05 mg/dL Final    eGFR 06/16/2025 59 (L)  >60 mL/min/1.73m*2 Final    Calculations of estimated GFR are performed using the 2021 CKD-EPI Study Refit equation without the race variable for the IDMS-Traceable creatinine methods.  https://jasn.asnjournals.org/content/early/2021/09/22/ASN.5090228718    Calcium 06/16/2025 9.3  8.6 - 10.3 mg/dL Final    Albumin 06/16/2025 3.7  3.4 - 5.0 g/dL Final    Alkaline Phosphatase 06/16/2025 49  33 - 136 U/L Final    Total Protein 06/16/2025 6.4  6.4 - 8.2 g/dL Final    AST 06/16/2025 18  9 - 39 U/L Final    Bilirubin, Total 06/16/2025 0.3  0.0 - 1.2 mg/dL Final    ALT 06/16/2025 10  7 - 45 U/L Final    Patients treated with Sulfasalazine may generate falsely decreased results for ALT.   Lab on 06/12/2025   Component Date Value Ref Range Status    WBC 06/12/2025 7.2  4.4 - 11.3 x10*3/uL Final    RBC 06/12/2025 2.86 (L)  4.00 - 5.20 x10*6/uL Final    Hemoglobin 06/12/2025 9.0 (L)  12.0 - 16.0 g/dL Final    Hematocrit 06/12/2025 28.1 (L)  36.0 - 46.0 % Final    MCV 06/12/2025 98  80 - 100 fL Final    MCH 06/12/2025 31.5  26.0 - 34.0 pg Final    MCHC 06/12/2025 32.0  32.0 - 36.0 g/dL Final    RDW 06/12/2025 19.0 (H)  11.5 - 14.5 % Final    Platelets 06/12/2025 421  150 - 450 x10*3/uL Final    Neutrophils % 06/12/2025 66.7  40.0 - 80.0 % Final    Immature Granulocytes %, Automated  06/12/2025 0.8  0.0 - 0.9 % Final    Immature Granulocyte Count (IG) includes promyelocytes, myelocytes and metamyelocytes but does not include bands. Percent differential counts (%) should be interpreted in the context of the absolute cell counts (cells/UL).    Lymphocytes % 06/12/2025 22.7  13.0 - 44.0 % Final    Monocytes % 06/12/2025 9.3  2.0 - 10.0 % Final    Eosinophils % 06/12/2025 0.4  0.0 - 6.0 % Final    Basophils % 06/12/2025 0.1  0.0 - 2.0 % Final    Neutrophils Absolute 06/12/2025 4.81  1.60 - 5.50 x10*3/uL Final    Percent differential counts (%) should be interpreted in the context of the absolute cell counts (cells/uL).    Immature Granulocytes Absolute, Au* 06/12/2025 0.06  0.00 - 0.50 x10*3/uL Final    Lymphocytes Absolute 06/12/2025 1.64  0.80 - 3.00 x10*3/uL Final    Monocytes Absolute 06/12/2025 0.67  0.05 - 0.80 x10*3/uL Final    Eosinophils Absolute 06/12/2025 0.03  0.00 - 0.40 x10*3/uL Final    Basophils Absolute 06/12/2025 0.01  0.00 - 0.10 x10*3/uL Final    Glucose 06/12/2025 135 (H)  74 - 99 mg/dL Final    Sodium 06/12/2025 131 (L)  136 - 145 mmol/L Final    Potassium 06/12/2025 5.2  3.5 - 5.3 mmol/L Final    Chloride 06/12/2025 94 (L)  98 - 107 mmol/L Final    Bicarbonate 06/12/2025 30  21 - 32 mmol/L Final    Anion Gap 06/12/2025 12  10 - 20 mmol/L Final    Urea Nitrogen 06/12/2025 21  6 - 23 mg/dL Final    Creatinine 06/12/2025 1.05  0.50 - 1.05 mg/dL Final    eGFR 06/12/2025 56 (L)  >60 mL/min/1.73m*2 Final    Calculations of estimated GFR are performed using the 2021 CKD-EPI Study Refit equation without the race variable for the IDMS-Traceable creatinine methods.  https://jasn.asnjournals.org/content/early/2021/09/22/ASN.2013857549    Calcium 06/12/2025 9.7  8.6 - 10.3 mg/dL Final    Albumin 06/12/2025 3.6  3.4 - 5.0 g/dL Final    Alkaline Phosphatase 06/12/2025 50  33 - 136 U/L Final    Total Protein 06/12/2025 6.5  6.4 - 8.2 g/dL Final    AST 06/12/2025 16  9 - 39 U/L Final     "Bilirubin, Total 06/12/2025 0.3  0.0 - 1.2 mg/dL Final    ALT 06/12/2025 9  7 - 45 U/L Final    Patients treated with Sulfasalazine may generate falsely decreased results for ALT.    Magnesium 06/12/2025 1.90  1.60 - 2.40 mg/dL Final        PHYSICAL EXAMINATION  Vital Signs:       5/29/2025     2:41 PM 6/2/2025     9:18 AM 6/5/2025    12:24 PM 6/6/2025    10:22 AM 6/9/2025     9:06 AM 6/10/2025     3:36 PM 6/12/2025     1:47 PM   Vitals   Systolic 115 127 126 114 121  118   Diastolic 68 72 70 62 69  71   BP Location   Left arm       Heart Rate 89 98 93 90 104  97   Temp 36.3 °C (97.3 °F) 37.1 °C (98.8 °F) 36.2 °C (97.2 °F) 36.3 °C (97.4 °F) 36 °C (96.8 °F)  36.3 °C (97.4 °F)   Resp 18 18 16 14 18  18   Height    1.473 m (4' 10\")  1.323 m (4' 4.1\")    Weight (lb) 88.3 88.18 86.4 90 84 86.6    BMI 17.92 kg/m2 17.9 kg/m2 17.53 kg/m2 18.81 kg/m2 17.55 kg/m2 22.43 kg/m2    BSA (m2) 1.29 m2 1.29 m2 1.28 m2 1.29 m2 1.25 m2 1.2 m2    Visit Report Report  Report Report  Report    ;Vital signs reviewed       Physical Exam  HENT:      Head: Normocephalic.   Eyes:      Pupils: Pupils are equal, round, and reactive to light.   Pulmonary:      Effort: Pulmonary effort is normal.   Musculoskeletal:         General: Normal range of motion.   Neurological:      Mental Status: She is alert and oriented to person, place, and time.   Psychiatric:         Mood and Affect: Mood normal.         Behavior: Behavior normal.         ASSESSMENT/PLAN    Pain  Pain is: cancer related pain  Type: somatic  Pain control: well-controlled  Home regimen:   - Continue Oxycodone 5 mg/5mL - taking 5 mg every 4-6 hours as needed for pain  - Tylenol has not been effective for pain  - Continue BMX up to 4 times per day as needed    Opioid Use  Medication Management:   - OARRS report reviewed with no aberrant behavior; consistent with  prescriptions/records and patient history  - MED 22.5.  Overdose Risk Score 070.   This has been discussed with patient. "   - We will continue to closely monitor the patient for signs of prescription misuse including UDS, OARRS review and subjective reports at each visit.  - No concurrent benzodiazepine use   - I am a provider who either is or has consulted and collaborated with a provider certified in Hospice and Palliative Medicine and have conducted a face-face visit and examination for this patient.  - Routine Urine Drug Screen MED <80-  - Controlled Substance Agreement MED <80  - Specifically discussed that controlled substance prescriptions will only be provided by our group as outlined in the completed agreement  - Prescribed naloxone n/a  - Red Flags: none     Nausea   Intermittent nausea without vomiting related to chemotherapy   - Continue Ondansetron 8 mg every 8 hours as needed  - Continue Prochlorperazine 10 mg every 6 hours as needed.  - Continue Olanzapine 5 mg nightly as prescribed by oncology    Constipation   At risk for constipation related to opioids,  currently not constipated   Usual bowel pattern: daily   Current regimen:   - Continue Miralax 17 g daily. Hold for diarrhea.   - If constipation develops, start Senna 10 mL BID  - If no BM within 48-72 hours, start MOM 8% every 6 hours as needed    Decreased appetite  Related to malignancy  Nutrition following  Weight loss 4 pounds in the last 4 days  Current regimen:    - Encouraged lemon heads, lemon sorbet, or biotene for assistance with taste changes  - Goal is 3 cans of isosource per day - reports getting in 2-3 cans per day  - Continue to drink ensure as tolerated  - Goal of 64 oz of fluids per day - majority through her peg tube    Fatigue  - Encouraged activity daily to assist with fatigue  - Could trial Ginseng     Advance Directives  Existence of Advance Directives:Yes, but NOT documented in medical record  Decision maker: RUSSELL is daughter Daisy Zazueta  Code Status: Full code    Next Follow-Up Visit:  Return to clinic in 1 month     Signature and  billing  Thank you for allowing us to participate in the care of this patient. Recommendations will be communicated back to the consulting service by way of shared electronic medical record or face-to-face.    Medical complexity was moderate level due to due to complexity of problems, extensive data review, and high risk of management/treatment.  Time was spent on the following: Prep Time, Time Directly with Patient/Family/Caregiver, Documentation Time. Total time spent: 20      DATA   Diagnostic tests and information reviewed for today's visit:  Most recent labs, Most recent imaging, Medications       Some elements copied from oncology note on 5/19/25, the elements have been updated and all reflect current decision making from today, 6/16/2025.      Plan of Care discussed with: Patient and Family/Significant Other: grand daughter      SIGNATURE: ROB Art    Contact information:  Supportive and Palliative Oncology  Monday-Friday 8 AM-5 PM  Phone:  [phone], press option #5, then option #1.   Or Epic Secure Chat

## 2025-06-16 NOTE — PROGRESS NOTES
[facility] - Medical Oncology Follow-Up Visit    Patient ID: Annette Fernandez is a 74 y.o. female.  Referring Physician: Umm Jalloh, APRN-CNP  [address] [place] [address]  Primary Care Provider: Parminder Russell MD      Chief Concern: Patient is here to followup for base of tongue/oropharyngeal cancer    HPI  Present with her granddtr for follow-up visit.    Continues with 2-3 cartons TF/day.  Sips throughout the day.  Keeping up with water flushes.  Nausea has lessened over the last couple of days.  Routine morning zofran and zyprexa at bedtime.  Routine BM's. Voiding fine. PRN oxycodone taken 1-2x in the evening.  Left arm pain/weakness back to her norm.  Denies fevers.  Labs WNL.      Diagnosis: Squamous cell carcinoma of the BOT  Stage:  Cancer Staging   Cancer of base of tongue (Multi)  Staging form: Pharynx - P16 Negative Oropharynx, AJCC 8th Edition  - Clinical: Stage BRIDGETTE (cT4a, cN1, cM0, p16-) - Signed by Rahel Del Angel MD on 3/3/2025     Current sites of disease: BOT and lymph nodes  Molecular and ancillary testing: p16 neg    Oncologic History:  1/7/25 - CT neck with Irregular, heterogeneous, and possibly ulcerating mass centered at the base of tongue with involvement of extrinsic tongue musculature and lingual surface of the epiglottis; possible involvement of the  body of the hyoid. Oropharyngeal neoplasm is of high suspicion. No cervical lymphadenopathy by size criteria.  1.8 cm exophytic right posterior thyroid nodule along the right tracheoesophageal groove containing calcification.  2/4/25 - Met with Dr. Gamboa after noting discomfort in throat since August, some discomfort in L ear. DL with fungating lesion involving base of tongue and extending on the lateral pharyngeal wall on the left, involving lingual aspect of epiglottis.  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  2/28/25 - PET with  hypermetabolic soft tissue mass at BOT, moderately hypermetabolic right level 2A lymph node  3/26/25 - C1 cisplatin with definitive radiation (30mg/m2 due to renal function)    Past Medical History:  07/31/2023: Aspiration into airway  No date: HLD (hyperlipidemia)  No date: HTN (hypertension)  No date: Mass of tongue  No date: Other specified health status      Comment:  No pertinent past medical history  No date: Stiffness of unspecified hand, not elsewhere classified      Comment:  Joint stiffness of hand     4 years ago - throat injury, every time she drank/swallow into lungs (black esophagus)    Past Surgical History:  06/14/2019: OTHER SURGICAL HISTORY      Comment:  Ovarian cystectomy  06/14/2019: OTHER SURGICAL HISTORY      Comment:  Cyst excision     Social Hx:   Annette Fernandez  reports that she has been smoking cigarettes. She has been exposed to tobacco smoke. She has quit using smokeless tobacco.  She  reports current alcohol use.  She  reports no history of drug use.  Smoking - working to cut back, down to 2-3 per day, previously 1/2ppd  Limiting amount each time    Lives on her own  Takes care of her cat    Family History   Problem Relation Name Age of Onset    Heart attack Father      Mom had NHL  Grandparent with colon cancer    Meds (Current):  Current Outpatient Medications   Medication Instructions    DentaGeL 1.1 % gel apply with toothbrush OR flouoride trays    magic mouthwash (lidocaine, diphenhydrAMINE, Maalox 1:1:1) 10 mL, Swish & Spit, Every 6 hours PRN    magnesium glycinate 100 mg tablet 1 tablet, oral, 2 times daily    metoprolol tartrate (LOPRESSOR) 25 mg, oral, 2 times daily    multivitamin tablet 1 tablet, Daily    nicotine (Nicoderm CQ) 7 mg/24 hr patch 1 patch, transdermal, Every 24 hours    NON FORMULARY 1 each, Daily    OLANZapine (ZYPREXA) 5 mg, oral, Nightly    ondansetron (ZOFRAN) 8 mg, oral, Every 8 hours PRN    pantoprazole (PROTONIX) 40 mg, oral, Daily, Do not crush, chew,  or split.    polyethylene glycol (GLYCOLAX, MIRALAX) 17 g, g-tube, Daily    prochlorperazine (COMPAZINE) 10 mg, oral, Every 6 hours PRN    silver sulfADIAZINE (Silvadene) 1 % cream Apply to affected area twice daily    thyroid (pork) (ARMOUR THYROID) 15 mg, oral, Daily        Allergies   Allergen Reactions    Wellbutrin [Bupropion Hcl] Other     Review of Systems   Constitutional:  Positive for appetite change and fatigue. Negative for unexpected weight change.   HENT:   Positive for trouble swallowing. Negative for hearing loss, mouth sores and sore throat.    Eyes:  Negative for eye problems.   Respiratory:  Negative for cough and shortness of breath.    Cardiovascular:  Negative for chest pain and leg swelling.   Gastrointestinal:  Positive for nausea. Negative for abdominal pain, constipation, diarrhea and vomiting.   Genitourinary:  Negative for difficulty urinating.    Musculoskeletal:  Negative for arthralgias, back pain, myalgias and neck pain.   Skin:  Negative for rash.   Neurological:  Negative for dizziness and headaches.   Hematological:  Negative for adenopathy.   Psychiatric/Behavioral:  Negative for sleep disturbance.       Objective   BSA: 1.19 meters squared  /67 (BP Location: Left arm, Patient Position: Sitting, BP Cuff Size: Child)   Pulse 80   Temp 36.4 °C (97.5 °F) (Temporal)   Resp 18   Wt (!) 38.5 kg (84 lb 14.4 oz)   SpO2 98%   BMI 21.99 kg/m²     Wt Readings from Last 5 Encounters:   06/16/25 (!) 38.5 kg (84 lb 14.4 oz)   06/10/25 (!) 39.3 kg (86 lb 9.6 oz)   06/09/25 (!) 38.1 kg (83 lb 15.9 oz)   06/06/25 (!) 40.8 kg (90 lb)   06/05/25 (!) 39.2 kg (86 lb 6.4 oz)       Performance Status:  (1) Restricted in physically strenuous activity, ambulatory and able to do work of light nature     Physical Exam  Vitals reviewed.   Constitutional:       General: She is not in acute distress.  HENT:      Head: Normocephalic and atraumatic.      Mouth/Throat:      Mouth: Mucous membranes are  moist.   Eyes:      Pupils: Pupils are equal, round, and reactive to light.   Cardiovascular:      Rate and Rhythm: Normal rate and regular rhythm.   Pulmonary:      Effort: Pulmonary effort is normal.      Breath sounds: Normal breath sounds.   Abdominal:      General: Bowel sounds are normal. There is no distension.      Palpations: Abdomen is soft.      Comments: +PEG   Musculoskeletal:         General: No swelling. Normal range of motion.      Cervical back: Normal range of motion.   Skin:     Findings: No erythema or rash.   Neurological:      General: No focal deficit present.      Mental Status: She is alert and oriented to person, place, and time.   Psychiatric:         Mood and Affect: Mood normal.         Behavior: Behavior normal.          Results:  Labs:  Lab Results   Component Value Date    WBC 9.8 06/16/2025    HGB 8.9 (L) 06/16/2025    HCT 28.9 (L) 06/16/2025     (H) 06/16/2025     06/16/2025      Lab Results   Component Value Date    NEUTROABS 7.06 (H) 06/16/2025      Lab Results   Component Value Date    GLUCOSE 104 (H) 06/16/2025    CALCIUM 9.3 06/16/2025     (L) 06/16/2025    K 4.3 06/16/2025    CO2 27 06/16/2025    CL 96 (L) 06/16/2025    BUN 25 (H) 06/16/2025    CREATININE 1.01 06/16/2025     Lab Results   Component Value Date    ALT 10 06/16/2025    AST 18 06/16/2025    ALKPHOS 49 06/16/2025    BILITOT 0.3 06/16/2025    Mg 1.41 5/29/25     Imaging:  No new imaging.      Pathology:  No new pathology    Assessment/Plan      Annette Fernandez is a 74 y.o. 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    Discussed overall findings to date with patient and her daughter previously, as well as recommendations for treatment with concurrent/definitive chemoradiation. We discussed the curative goal of treatment. Discussed that chemotherapy would consist of weekly cisplatin, with side effects discussed, including but not limited to  myelosuppression (infection, anemia, bleeding), fatigue, nausea, neuropathy, hearing loss/tinnitus, and nephropathy.    - Completed 7 weeks - 30mg/m2 dose  - With cisplatin, will have weekly IVF and provider visits  - monitor for worsening tinnitus  - Zyprexa nightly   - PRN zofran/compazine  - dex 8mg daily x3 days after chemo  - advised her to stop aspirin for pain given risk of NSAIDs and kidney function with cisplatin. Ok for oxycodone as needed and we will refill if gets low  - reminded her about risk of constipation with oxycodone - she is taking miralax as needed and will start regularly if taking, I will also send senna to use if using oxycodone more  - baseline audiology evaluation not scheduled prior to cisplatin, ok to proceed whenever scheduled or delay until after tx    # Mucositis   - encouraged continued rinse use - Glutamine & BMX   - resolved    # Reduced nutritional intake  - recommended she increase protein supplements 3/day.  Graze throughout the day.  Dietician referral in place.    - 4/24:  She is attempting to get 3 TF cartons/day.  Reports she feels bloated and not able to tolerate 3 total cartons.    - 5/1:  Able to tolerate 2.5 TF cartons/day.    - 5/19:  Recommended she resume routine TF's, start with 1/2 carton QID.  Goal to resume 2.5 TF cartons/day.    - 5/29:  Pt not able to tolerate 4 1/2 cartons.  Reports intermittent emesis following bolus feedings.  Pt states she is sitting upright follow feedings.  Recommended pt briefly walk within the home following feedings.     - 6/5:  Reports she is able to tolerate 3 TF bolus feedings/day.  Encouraged continued admin.      # Odynophagia   - established with supportive onc.  Continue current oxycodone 5mg/q4H PRN.  Bowel regimen - routine daily miralax.   - 5/1:  Oral intake increasingly challenging.  Decreased from previous week.  Able to tolerate sips.  Main 3-4/10.  Continue PRN oxy admin.    - 5/19:  Continue PRN oxycodone.  Sips as  tolerated.    - 6/5:  Pain improved.  Continue PRN oxycodone as needed.      # Heartburn  - increase pantoprazole to BID  - improved    # Thrombocytopenia 94 5/19/25   - No clinical s/sx's bleeding.  Pt educated to bleeding precautions.     - resolved    # Hypomagnesia   - Repeat labs with IV replacement, swallowing pills is currently very difficult for pt, oral mag will likely clog peg.     - 5/29:  Mg 1.41 - will plan 2g IV Mg for 6/2/25 infusion.  Ongoing repeat labs and infusion visits until Mg level is back WNL.    - Repeat labs until Mg level back WNL.  6/5 Mg 1.52 - 2g IV Mg today.  Rx sent for Magnesium Glycinate 100mg tab BID.  Rx sent, pt to pick-up OTC tab if not covered.    - Mag 1.90 6/12/25; 1.75 6/16/25    # Smoking cessation   - increased # of cigarettes/day from 2-3 to 5-6, has not started to wear the nicotine patch yet.  Encouraged her to use the patch, cut down on # of cigarettes/day.     # Localized swelling to LLE - faint difference between RLE, swelling mainly to ankle/dorsum area.  STAT duplex ordered to r/o DVT.  Scheduled 5/2/25.  5/2:  Vascular duplex results (-) for DVT to LLE.    - 5/5:  resolved     # Diarrhea 2/2 laxative admin-  - Stop bowel meds, rest bowels today, declined IV hydration visit today.  Agreeable to come in tomorrow for fluids and labs.  Push fluids.  Start to increase TF gradually back to 2.5 cartons/day.    - 5/19:  diarrhea has resolved.    # Hyponatremia 126 5/19/25  - swallowing pills is currently very difficult for pt.  Tabs will likely clog peg.   Instructed pt to maintain free-water flushes and add 1 tsp table salt to equivalent of 120ml water TID.      - 5/29:  Improved 130 5/29/25.  Pt instructed to continue adding table salt to free-water flushes.    - 6/5:  Na 137 6/5/25 - Pt can stop adding salt to water flushes.  Repeat labs 6/9/25.    - 6/16 Na 132, repeat lab next week    # Left upper back/shoulder/arm pain/weakness  - ?musculoskeletal - Believes she  slept on her arm wrong.  No focal deficits.  Red flag symptoms discussed.  Improved - monitor for continued improvement.    - resolved    - Repeat CMP next week.  RTC 7/3 for Dr. Denson visit and labs.  Pt instructed to call for any changes.

## 2025-06-18 ENCOUNTER — TELEPHONE (OUTPATIENT)
Dept: HEMATOLOGY/ONCOLOGY | Facility: HOSPITAL | Age: 75
End: 2025-06-18
Payer: MEDICARE

## 2025-06-23 ENCOUNTER — LAB (OUTPATIENT)
Dept: LAB | Facility: CLINIC | Age: 75
End: 2025-06-23
Payer: MEDICARE

## 2025-06-23 DIAGNOSIS — E87.1 HYPONATREMIA: ICD-10-CM

## 2025-06-23 PROCEDURE — 36415 COLL VENOUS BLD VENIPUNCTURE: CPT

## 2025-06-23 PROCEDURE — 80053 COMPREHEN METABOLIC PANEL: CPT

## 2025-06-24 LAB
ALBUMIN SERPL BCP-MCNC: 3.7 G/DL (ref 3.4–5)
ALP SERPL-CCNC: 47 U/L (ref 33–136)
ALT SERPL W P-5'-P-CCNC: 12 U/L (ref 7–45)
ANION GAP SERPL CALC-SCNC: 12 MMOL/L (ref 10–20)
AST SERPL W P-5'-P-CCNC: 18 U/L (ref 9–39)
BILIRUB SERPL-MCNC: 0.3 MG/DL (ref 0–1.2)
BUN SERPL-MCNC: 20 MG/DL (ref 6–23)
CALCIUM SERPL-MCNC: 9.4 MG/DL (ref 8.6–10.6)
CHLORIDE SERPL-SCNC: 93 MMOL/L (ref 98–107)
CO2 SERPL-SCNC: 28 MMOL/L (ref 21–32)
CREAT SERPL-MCNC: 0.85 MG/DL (ref 0.5–1.05)
EGFRCR SERPLBLD CKD-EPI 2021: 72 ML/MIN/1.73M*2
GLUCOSE SERPL-MCNC: 96 MG/DL (ref 74–99)
POTASSIUM SERPL-SCNC: 4.4 MMOL/L (ref 3.5–5.3)
PROT SERPL-MCNC: 6.5 G/DL (ref 6.4–8.2)
SODIUM SERPL-SCNC: 129 MMOL/L (ref 136–145)

## 2025-07-03 ENCOUNTER — LAB (OUTPATIENT)
Dept: LAB | Facility: CLINIC | Age: 75
End: 2025-07-03
Payer: MEDICARE

## 2025-07-03 ENCOUNTER — OFFICE VISIT (OUTPATIENT)
Dept: HEMATOLOGY/ONCOLOGY | Facility: CLINIC | Age: 75
End: 2025-07-03
Payer: MEDICARE

## 2025-07-03 VITALS
SYSTOLIC BLOOD PRESSURE: 129 MMHG | RESPIRATION RATE: 16 BRPM | TEMPERATURE: 98.2 F | BODY MASS INDEX: 23.26 KG/M2 | WEIGHT: 89.8 LBS | DIASTOLIC BLOOD PRESSURE: 63 MMHG | HEART RATE: 85 BPM | OXYGEN SATURATION: 94 %

## 2025-07-03 DIAGNOSIS — E03.9 HYPOTHYROIDISM (ACQUIRED): ICD-10-CM

## 2025-07-03 DIAGNOSIS — C01 MALIGNANT NEOPLASM OF BASE OF TONGUE (MULTI): ICD-10-CM

## 2025-07-03 DIAGNOSIS — E87.1 HYPONATREMIA: ICD-10-CM

## 2025-07-03 LAB
ALBUMIN SERPL BCP-MCNC: 3.4 G/DL (ref 3.4–5)
ALP SERPL-CCNC: 42 U/L (ref 33–136)
ALT SERPL W P-5'-P-CCNC: 8 U/L (ref 7–45)
ANION GAP SERPL CALC-SCNC: 11 MMOL/L (ref 10–20)
AST SERPL W P-5'-P-CCNC: 15 U/L (ref 9–39)
BASOPHILS # BLD AUTO: 0.04 X10*3/UL (ref 0–0.1)
BASOPHILS NFR BLD AUTO: 0.6 %
BILIRUB SERPL-MCNC: 0.2 MG/DL (ref 0–1.2)
BUN SERPL-MCNC: 15 MG/DL (ref 6–23)
CALCIUM SERPL-MCNC: 8.9 MG/DL (ref 8.6–10.3)
CHLORIDE SERPL-SCNC: 109 MMOL/L (ref 98–107)
CO2 SERPL-SCNC: 28 MMOL/L (ref 21–32)
CREAT SERPL-MCNC: 0.69 MG/DL (ref 0.5–1.05)
EGFRCR SERPLBLD CKD-EPI 2021: >90 ML/MIN/1.73M*2
EOSINOPHIL # BLD AUTO: 0.72 X10*3/UL (ref 0–0.4)
EOSINOPHIL NFR BLD AUTO: 10.7 %
ERYTHROCYTE [DISTWIDTH] IN BLOOD BY AUTOMATED COUNT: 16.3 % (ref 11.5–14.5)
GLUCOSE SERPL-MCNC: 86 MG/DL (ref 74–99)
HCT VFR BLD AUTO: 28 % (ref 36–46)
HGB BLD-MCNC: 8.8 G/DL (ref 12–16)
IMM GRANULOCYTES # BLD AUTO: 0.01 X10*3/UL (ref 0–0.5)
IMM GRANULOCYTES NFR BLD AUTO: 0.1 % (ref 0–0.9)
LYMPHOCYTES # BLD AUTO: 1.05 X10*3/UL (ref 0.8–3)
LYMPHOCYTES NFR BLD AUTO: 15.6 %
MAGNESIUM SERPL-MCNC: 1.6 MG/DL (ref 1.6–2.4)
MCH RBC QN AUTO: 32 PG (ref 26–34)
MCHC RBC AUTO-ENTMCNC: 31.4 G/DL (ref 32–36)
MCV RBC AUTO: 102 FL (ref 80–100)
MONOCYTES # BLD AUTO: 0.53 X10*3/UL (ref 0.05–0.8)
MONOCYTES NFR BLD AUTO: 7.9 %
NEUTROPHILS # BLD AUTO: 4.38 X10*3/UL (ref 1.6–5.5)
NEUTROPHILS NFR BLD AUTO: 65.1 %
NRBC BLD-RTO: ABNORMAL /100{WBCS}
PLATELET # BLD AUTO: 271 X10*3/UL (ref 150–450)
POTASSIUM SERPL-SCNC: 4 MMOL/L (ref 3.5–5.3)
PROT SERPL-MCNC: 5.8 G/DL (ref 6.4–8.2)
RBC # BLD AUTO: 2.75 X10*6/UL (ref 4–5.2)
SODIUM SERPL-SCNC: 144 MMOL/L (ref 136–145)
WBC # BLD AUTO: 6.7 X10*3/UL (ref 4.4–11.3)

## 2025-07-03 PROCEDURE — 83735 ASSAY OF MAGNESIUM: CPT

## 2025-07-03 PROCEDURE — 1126F AMNT PAIN NOTED NONE PRSNT: CPT | Performed by: INTERNAL MEDICINE

## 2025-07-03 PROCEDURE — 99213 OFFICE O/P EST LOW 20 MIN: CPT | Mod: GC | Performed by: INTERNAL MEDICINE

## 2025-07-03 PROCEDURE — 99213 OFFICE O/P EST LOW 20 MIN: CPT | Performed by: INTERNAL MEDICINE

## 2025-07-03 PROCEDURE — G2211 COMPLEX E/M VISIT ADD ON: HCPCS | Performed by: INTERNAL MEDICINE

## 2025-07-03 PROCEDURE — 85025 COMPLETE CBC W/AUTO DIFF WBC: CPT

## 2025-07-03 PROCEDURE — 3074F SYST BP LT 130 MM HG: CPT | Performed by: INTERNAL MEDICINE

## 2025-07-03 PROCEDURE — 84075 ASSAY ALKALINE PHOSPHATASE: CPT

## 2025-07-03 PROCEDURE — 36415 COLL VENOUS BLD VENIPUNCTURE: CPT

## 2025-07-03 PROCEDURE — 3078F DIAST BP <80 MM HG: CPT | Performed by: INTERNAL MEDICINE

## 2025-07-03 ASSESSMENT — ENCOUNTER SYMPTOMS
ARTHRALGIAS: 0
HEADACHES: 0
DIZZINESS: 0
LEG SWELLING: 0
TROUBLE SWALLOWING: 1
APPETITE CHANGE: 1
DIFFICULTY URINATING: 0
CONSTIPATION: 0
BACK PAIN: 0
MYALGIAS: 0
VOMITING: 0
COUGH: 0
UNEXPECTED WEIGHT CHANGE: 0
FATIGUE: 1
ADENOPATHY: 0
ABDOMINAL PAIN: 0
NECK PAIN: 0
SLEEP DISTURBANCE: 0
SHORTNESS OF BREATH: 0
EYE PROBLEMS: 0
DIARRHEA: 0
NAUSEA: 1
SORE THROAT: 0

## 2025-07-03 ASSESSMENT — PAIN SCALES - GENERAL: PAINLEVEL_OUTOF10: 0-NO PAIN

## 2025-07-03 NOTE — PROGRESS NOTES
[facility] - Medical Oncology Follow-Up Visit    Patient ID: Annette Fernandez is a 74 y.o. female.  Referring Physician: Umm Jalloh, APRN-CNP  [address] [place] [address]  Primary Care Provider: Parminder Russell MD      Chief Concern: Patient is here to followup for base of tongue/oropharyngeal cancer    HPI    Patient reports that she is doing generally well. She continues to have some hoarseness and some occasional soreness in her throat. She takes aspirin to control her pain which she feels takes care of the pain. She still feels that everything tastes like chalk but she is able to taste her coffee now which she feels is an improvement. She is using protein shakes for nutrition and is doing well with that, has gained some weight. Her energy is low and she says she cannot push herself to do more than 1 thing in an hour. She sleeps well but is often sleeping more during the day rather than at night. Nausea is well-controlled with zofran and zyprexa which she is taking nightly. Having regular bowel movements. She has noticed some more bruising on her arms in the last few days. Not taking any blood thinners but is taking OTC aspirin 500 mg daily for her pain.    Denies fevers, chills, pain, shortness of breath, leg swelling, hearing changes.     Diagnosis: Squamous cell carcinoma of the BOT  Stage:  Cancer Staging   Cancer of base of tongue (Multi)  Staging form: Pharynx - P16 Negative Oropharynx, AJCC 8th Edition  - Clinical: Stage BRIDGETTE (cT4a, cN1, cM0, p16-) - Signed by Rahel Del Angel MD on 3/3/2025     Current sites of disease: BOT and lymph nodes  Molecular and ancillary testing: p16 neg    Oncologic History:  1/7/25 - CT neck with Irregular, heterogeneous, and possibly ulcerating mass centered at the base of tongue with involvement of extrinsic tongue musculature and lingual surface of the epiglottis; possible involvement of the  body of the hyoid. Oropharyngeal  neoplasm is of high suspicion. No cervical lymphadenopathy by size criteria.  1.8 cm exophytic right posterior thyroid nodule along the right tracheoesophageal groove containing calcification.  2/4/25 - Met with Dr. Gamboa after noting discomfort in throat since August, some discomfort in L ear. DL with fungating lesion involving base of tongue and extending on the lateral pharyngeal wall on the left, involving lingual aspect of epiglottis.  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  2/28/25 - PET with hypermetabolic soft tissue mass at BOT, moderately hypermetabolic right level 2A lymph node  3/26/25 - C1 cisplatin with definitive radiation (30mg/m2 due to renal function)    Past Medical History:  07/31/2023: Aspiration into airway  No date: HLD (hyperlipidemia)  No date: HTN (hypertension)  No date: Mass of tongue  No date: Other specified health status      Comment:  No pertinent past medical history  No date: Stiffness of unspecified hand, not elsewhere classified      Comment:  Joint stiffness of hand     4 years ago - throat injury, every time she drank/swallow into lungs (black esophagus)    Past Surgical History:  06/14/2019: OTHER SURGICAL HISTORY      Comment:  Ovarian cystectomy  06/14/2019: OTHER SURGICAL HISTORY      Comment:  Cyst excision     Social Hx:   Annette Fernandez  reports that she has been smoking cigarettes. She has a 3.8 pack-year smoking history. She has been exposed to tobacco smoke. She has quit using smokeless tobacco.  She  reports that she does not currently use alcohol.  She  reports no history of drug use.  Smoking - working to cut back, down to 2-3 per day, previously 1/2ppd  Limiting amount each time    Lives on her own  Takes care of her cat    Family History   Problem Relation Name Age of Onset    Heart attack Father      Mom had NHL  Grandparent with colon cancer    Meds (Current):  Current Outpatient Medications    Medication Instructions    DentaGeL 1.1 % gel apply with toothbrush OR flouoride trays    magic mouthwash (lidocaine, diphenhydrAMINE, Maalox 1:1:1) 10 mL, Swish & Spit, Every 6 hours PRN    magnesium glycinate 100 mg tablet 1 tablet, oral, 2 times daily    metoprolol tartrate (LOPRESSOR) 25 mg, oral, 2 times daily    multivitamin tablet 1 tablet, Daily    nicotine (Nicoderm CQ) 7 mg/24 hr patch 1 patch, transdermal, Every 24 hours    NON FORMULARY 1 each, Daily    OLANZapine (ZYPREXA) 5 mg, oral, Nightly    ondansetron (ZOFRAN) 8 mg, oral, Every 8 hours PRN    pantoprazole (PROTONIX) 40 mg, oral, Daily, Do not crush, chew, or split.    polyethylene glycol (GLYCOLAX, MIRALAX) 17 g, g-tube, Daily    prochlorperazine (COMPAZINE) 10 mg, oral, Every 6 hours PRN    silver sulfADIAZINE (Silvadene) 1 % cream Apply to affected area twice daily    thyroid (pork) (ARMOUR THYROID) 15 mg, oral, Daily        Allergies   Allergen Reactions    Wellbutrin [Bupropion Hcl] Other     Review of Systems   Constitutional:  Positive for appetite change and fatigue. Negative for unexpected weight change.   HENT:   Positive for trouble swallowing. Negative for hearing loss, mouth sores and sore throat.    Eyes:  Negative for eye problems.   Respiratory:  Negative for cough and shortness of breath.    Cardiovascular:  Negative for chest pain and leg swelling.   Gastrointestinal:  Positive for nausea. Negative for abdominal pain, constipation, diarrhea and vomiting.   Genitourinary:  Negative for difficulty urinating.    Musculoskeletal:  Negative for arthralgias, back pain, myalgias and neck pain.   Skin:  Negative for rash.   Neurological:  Negative for dizziness and headaches.   Hematological:  Negative for adenopathy.   Psychiatric/Behavioral:  Negative for sleep disturbance.       Objective   BSA: 1.22 meters squared  /63 (BP Location: Left arm, Patient Position: Sitting)   Pulse 85   Temp 36.8 °C (98.2 °F) (Temporal)   Resp  16   Wt (!) 40.7 kg (89 lb 12.8 oz)   SpO2 94%   BMI 23.26 kg/m²     Wt Readings from Last 5 Encounters:   07/03/25 (!) 40.7 kg (89 lb 12.8 oz)   06/16/25 (!) 38.5 kg (84 lb 14.4 oz)   06/10/25 (!) 39.3 kg (86 lb 9.6 oz)   06/09/25 (!) 38.1 kg (83 lb 15.9 oz)   06/06/25 (!) 40.8 kg (90 lb)       Performance Status:  (1) Restricted in physically strenuous activity, ambulatory and able to do work of light nature     Physical Exam  General: well-appearing, no acute distress, resting comfortably in bed  HEENT: normocephalic, atraumatic  Cardio: regular rate and rhythm, normal S1 and S2, no murmurs  Pulm: clear to auscultation bilaterally, no wheezes, crackles, or rhonchi, breathing comfortably on room air  Abd: normal, active bowel sounds; soft, non-tender, non-distended, +PEG  Extremities: no peripheral edema, scars, or lesions, bruises on upper extremities  Neuro: alert and oriented to person, place, and time, CN II-XII grossly intact  Psych: mood and affect are appropriate    Results:  Labs:  Lab Results   Component Value Date    WBC 6.7 07/03/2025    HGB 8.8 (L) 07/03/2025    HCT 28.0 (L) 07/03/2025     (H) 07/03/2025     07/03/2025      Lab Results   Component Value Date    NEUTROABS 4.38 07/03/2025      Lab Results   Component Value Date    GLUCOSE 86 07/03/2025    CALCIUM 8.9 07/03/2025     07/03/2025    K 4.0 07/03/2025    CO2 28 07/03/2025     (H) 07/03/2025    BUN 15 07/03/2025    CREATININE 0.69 07/03/2025     Lab Results   Component Value Date    ALT 8 07/03/2025    AST 15 07/03/2025    ALKPHOS 42 07/03/2025    BILITOT 0.2 07/03/2025    Mg 1.6 7/3/25     Imaging:  No new imaging.      Pathology:  No new pathology    Assessment/Plan    Annette Fernandez is a 74 y.o. 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 S/p 7 weeks cisplatin and concurrent radiation. Recently seen by ENT with just a small residual crater at the junction  between BOT and epiglottis on left side, good response.    #Stage BRIDGETTE SCC of BOT  - completed 7 weeks cisplatin - 30mg/m2 dose  - monitor for worsening tinnitus  - Zyprexa nightly, counseled patient to decrease use with the goal of discontinuing nightly zyprexa use  - PRN zofran/compazine  - advised her that aspirin can cause bruising and that she is on a high dose of aspirin  - baseline audiology evaluation not scheduled prior to cisplatin, ok to proceed whenever scheduled or delay until after tx  - patient to continue follow-up with ENT and rad onc, she was counseled to follow-up with med onc if she has any new concerns/symptoms or if directed to by ENT/rad onc    #Hyponatremia 126 5/19/25, improved to 144 7/3/25  - swallowing pills is currently very difficult for pt.  Tabs will likely clog peg.   Instructed pt to maintain free-water flushes and add 1 tsp table salt to equivalent of 120ml water TID.      - 5/29:  Improved 130 5/29/25.  Pt instructed to continue adding table salt to free-water flushes.    - 6/5:  Na 137 6/5/25 - Pt can stop adding salt to water flushes.  Repeat labs 6/9/25.    - 6/16 Na 132, repeat lab next week  - 7/3 Na 144    #Mucositis, resolved  - continue rinse use - Glutamine & BMX     #Reduced nutritional intake, improving  - recommended she increase protein supplements 3/day.  Graze throughout the day.  Dietician referral in place.    - 4/24:  She is attempting to get 3 TF cartons/day.  Reports she feels bloated and not able to tolerate 3 total cartons.    - 5/1:  Able to tolerate 2.5 TF cartons/day.    - 5/19:  Recommended she resume routine TF's, start with 1/2 carton QID.  Goal to resume 2.5 TF cartons/day.    - 5/29:  Pt not able to tolerate 4 1/2 cartons.  Reports intermittent emesis following bolus feedings.  Pt states she is sitting upright follow feedings.  Recommended pt briefly walk within the home following feedings.     - 6/5:  Reports she is able to tolerate 3 TF bolus  feedings/day.  Encouraged continued admin.    - 7/3: using protein shakes, improved weight today    #Odynophagia   - established with supportive onc.  Continue current oxycodone 5mg/q4H PRN.  Bowel regimen - routine daily miralax.   - 5/1:  Oral intake increasingly challenging.  Decreased from previous week.  Able to tolerate sips.  Main 3-4/10.  Continue PRN oxy admin.    - 5/19:  Continue PRN oxycodone.  Sips as tolerated.    - 6/5:  Pain improved.  Continue PRN oxycodone as needed.      #Heartburn, improved  - increase pantoprazole to BID    #Thrombocytopenia 94 5/19/25, resolved - 271 07/03  - No clinical s/sx's bleeding.  Pt educated to bleeding precautions.       #Hypomagnesia   - Repeat labs with IV replacement, swallowing pills is currently very difficult for pt, oral mag will likely clog peg.     - 5/29:  Mg 1.41 - will plan 2g IV Mg for 6/2/25 infusion.  Ongoing repeat labs and infusion visits until Mg level is back WNL.    - Repeat labs until Mg level back WNL.  6/5 Mg 1.52 - 2g IV Mg today.  Rx sent for Magnesium Glycinate 100mg tab BID.  Rx sent, pt to pick-up OTC tab if not covered.    - Mag 1.90 6/12/25; 1.75 6/16/25, 1.6 7/3/25    Patient to follow-up with med onc as needed. Instructed to continue follow-up with rad onc and ENT    Patient seen and discussed with attending, Dr. Janiya Powell MD  Internal Medicine PGY2

## 2025-07-25 ENCOUNTER — TELEPHONE (OUTPATIENT)
Dept: PRIMARY CARE | Facility: CLINIC | Age: 75
End: 2025-07-25

## 2025-07-28 ENCOUNTER — HOSPITAL ENCOUNTER (OUTPATIENT)
Dept: RADIOLOGY | Facility: HOSPITAL | Age: 75
Discharge: HOME | End: 2025-07-28
Payer: MEDICARE

## 2025-07-28 DIAGNOSIS — R13.12 OROPHARYNGEAL DYSPHAGIA: ICD-10-CM

## 2025-07-28 PROCEDURE — 74230 X-RAY XM SWLNG FUNCJ C+: CPT

## 2025-07-28 PROCEDURE — 92611 MOTION FLUOROSCOPY/SWALLOW: CPT | Mod: GN

## 2025-07-28 PROCEDURE — 2500000005 HC RX 250 GENERAL PHARMACY W/O HCPCS: Performed by: RADIOLOGY

## 2025-07-28 PROCEDURE — 2500000001 HC RX 250 WO HCPCS SELF ADMINISTERED DRUGS (ALT 637 FOR MEDICARE OP): Performed by: RADIOLOGY

## 2025-07-28 PROCEDURE — 74230 X-RAY XM SWLNG FUNCJ C+: CPT | Performed by: RADIOLOGY

## 2025-07-28 RX ADMIN — BARIUM SULFATE 20 ML: 400 SUSPENSION ORAL at 09:50

## 2025-07-28 RX ADMIN — BARIUM SULFATE 30 ML: 400 PASTE ORAL at 09:48

## 2025-07-28 RX ADMIN — BARIUM SULFATE 700 MG: 700 TABLET ORAL at 09:50

## 2025-07-28 RX ADMIN — ANTACID/ANTIFLATULENT 1 PACKET: 380; 550; 10; 10 GRANULE, EFFERVESCENT ORAL at 09:47

## 2025-07-28 RX ADMIN — BARIUM SULFATE 120 ML: 0.81 POWDER, FOR SUSPENSION ORAL at 09:49

## 2025-07-28 NOTE — PROCEDURES
Outpatient  Modified Barium Swallow Study     Patient Name: Annette Fernandez  MRN: [id number]  : 1950  Room/bed info not found   Today's Date: 25  Time in: 09:10  Time out: 09:35   Total: 25 min     Modified Barium Swallow Study completed. Informed verbal consent obtained prior to completion of exam. The study was completed per protocol with various liquid barium consistencies, pudding, solids and a 13mm barium tablet.  A 1.9 cm or .75 inch (outer diameter) ring was placed on the chin in the lateral view and on the lateral, left side of the neck in the a-p view in order to complete objective measurements during swallowing. The anatomic structures and function of the oropharynx, larynx, hypopharynx and cervical esophagus were evaluated.    Reason for referral: Pt s/p radiation for base of tongue CA. Now finished with radiation. Reported poor intake due to taste changed. 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, HTN, Hyperlipidemia, hypothyroidism, esophageal necrosis, smoker  Respiratory status: room air    Pain: Pain Scale: 0-10  Ratin     Location: N/A  Type: N/A  Action Taken: None needed   Current diet: Regular/thin    SPEECH RECOMMENDATIONS:  Diet recommendations: - Regular (IDDSI Level 7)  - Thin liquids (IDDSI Level 0)  Swallow Strategies: - Alternate consistencies  - Add moisture to dry foods  - Upright for all PO intake  - Medications crushed in puree (verify with MD)    Plan:  SLP Plan: No treatment needs identified at this time   Discussed POC: Patient  Discussed Risks/Benefits: Yes  Patient/Caregiver Agreeable: Yes    Short term goals: N/A   Education: SLP provided education to Patient regarding MBSS impressions, recommendations and POC at this time. Verbal understanding and agreement given on all accounts. Pt was educated on possible cricopharyngeal bar noted, which appeared to be where pill got stuck. Pt reported she is able mange pills by crushing  or cutting and taking in puree. She did not endorse any globus sensation with solids. Pt educated that if these symptoms start to present, can be reassessed for need for intervention.     Additional medical consult suggested: - No new disciplines indicated  Repeat study/discharge plan: Repeat study as clinically indicated per MD or SLP         MBSImP Physiological Component  ORAL PHASE:  Lip Closure - Interlabial escape/no progression to anterior lip   Tongue Control During Bolus Hold - Cohesive bolus between tongue to palatal seal   Bolus prep/mastication - Timely and efficient mastication skills   Bolus transport/lingual motion - Brisk tongue motion for A-P movement of the bolus   Oral residue - Trace residue lining oral structures     PHARYNGEAL PHASE:  Initiation of pharyngeal swallow - Bolus head at vallecular pit   Soft palate elevation - No bolus between soft palate/pharyngeal wall   Laryngeal elevation - Complete superior movement of thyroid cartilage with contact of arytenoids to epiglottic petiole   Anterior hyoid excursion - Complete anterior movement   Epiglottic movement - Complete inversion    Laryngeal vestibule closure - Complete - no air/contrast in laryngeal vestibule   Pharyngeal stripping wave - Present, however, diminished   Pharyngeal contraction (A/P view) - Complete  Pharyngoesophageal segment opening - Partial distension/partial duration with partial obstruction of flow of bolus   Tongue base retraction - Trace column of contrast or air between tongue base and pharyngeal wall   Pharyngeal residue - Collection of residue within or on the pharyngeal structures   Laryngeal penetration - not present during study   Aspiration - Not present during study   Response to aspiration- N/A  Strategies attempted- effervescent wash needed to clear barium tablet, successful.     ESOPHAGEAL PHASE:  Esophageal clearance - Esophageal retention - mild       SLP Impression statement:   Pt presents with  functional oral and pharyngeal phases of the swallow. Deficits are minimal and do not impact integrity of the swallow. Swallowing physiology is detailed above. Impairments most impacting swallowing safety and efficiency include esophageal stasis which was mild.       Scales and Outcome Measures:   Rosenbek's Penetration Aspiration Scale (rated based on worst swallow per consistency)  Thin Liquids: 1. NO ASPIRATION & NO PENETRATION - no aspiration, contrast does not enter airway  [place] Thick Liquids: 1. NO ASPIRATION & NO PENETRATION - no aspiration, contrast does not enter airway  Puree: 1. NO ASPIRATION & NO PENETRATION - no aspiration, contrast does not enter airway  Soft Solids: 1. NO ASPIRATION & NO PENETRATION - no aspiration, contrast does not enter airway  Solids: 1. NO ASPIRATION & NO PENETRATION - no aspiration, contrast does not enter airway        Functional Oral Intake Scale  Functional Oral Intake Scale: Level 7        total oral diet with no restrictions

## 2025-08-26 ENCOUNTER — APPOINTMENT (OUTPATIENT)
Facility: CLINIC | Age: 75
End: 2025-08-26
Payer: MEDICARE

## 2025-08-26 VITALS — WEIGHT: 82 LBS | BODY MASS INDEX: 16.1 KG/M2 | HEIGHT: 60 IN

## 2025-08-26 DIAGNOSIS — C10.9 MALIGNANT NEOPLASM OF OROPHARYNX: Primary | ICD-10-CM

## 2025-08-26 DIAGNOSIS — R13.12 OROPHARYNGEAL DYSPHAGIA: ICD-10-CM

## 2025-08-26 DIAGNOSIS — E03.9 ACQUIRED HYPOTHYROIDISM: ICD-10-CM

## 2025-08-26 PROCEDURE — 99213 OFFICE O/P EST LOW 20 MIN: CPT | Performed by: OTOLARYNGOLOGY

## 2025-08-26 PROCEDURE — 1160F RVW MEDS BY RX/DR IN RCRD: CPT | Performed by: OTOLARYNGOLOGY

## 2025-08-26 PROCEDURE — 31575 DIAGNOSTIC LARYNGOSCOPY: CPT | Performed by: OTOLARYNGOLOGY

## 2025-08-26 PROCEDURE — 1159F MED LIST DOCD IN RCRD: CPT | Performed by: OTOLARYNGOLOGY

## 2025-08-26 PROCEDURE — 3008F BODY MASS INDEX DOCD: CPT | Performed by: OTOLARYNGOLOGY

## 2025-09-02 ENCOUNTER — APPOINTMENT (OUTPATIENT)
Dept: RADIOLOGY | Facility: CLINIC | Age: 75
End: 2025-09-02
Payer: MEDICARE

## 2025-09-04 ENCOUNTER — HOSPITAL ENCOUNTER (OUTPATIENT)
Dept: RADIOLOGY | Facility: CLINIC | Age: 75
Discharge: HOME | End: 2025-09-04
Payer: MEDICARE

## 2025-09-04 DIAGNOSIS — C01 MALIGNANT NEOPLASM OF BASE OF TONGUE (MULTI): ICD-10-CM

## 2025-09-04 PROCEDURE — 3430000001 HC RX 343 DIAGNOSTIC RADIOPHARMACEUTICALS: Performed by: RADIOLOGY

## 2025-09-04 PROCEDURE — 78815 PET IMAGE W/CT SKULL-THIGH: CPT | Mod: PS

## 2025-09-04 PROCEDURE — A9552 F18 FDG: HCPCS | Performed by: RADIOLOGY

## 2025-09-04 RX ORDER — FLUDEOXYGLUCOSE F 18 200 MCI/ML
12.5 INJECTION, SOLUTION INTRAVENOUS
Status: COMPLETED | OUTPATIENT
Start: 2025-09-04 | End: 2025-09-04

## 2025-09-04 RX ADMIN — FLUDEOXYGLUCOSE F 18 12.5 MILLICURIE: 200 INJECTION, SOLUTION INTRAVENOUS at 10:14

## 2025-09-05 ENCOUNTER — LAB (OUTPATIENT)
Dept: LAB | Facility: HOSPITAL | Age: 75
End: 2025-09-05
Payer: MEDICARE

## 2025-09-05 DIAGNOSIS — C01 MALIGNANT NEOPLASM OF BASE OF TONGUE (MULTI): Primary | ICD-10-CM

## 2025-09-05 LAB
CREAT SERPL-MCNC: 0.68 MG/DL (ref 0.5–1.05)
EGFRCR SERPLBLD CKD-EPI 2021: >90 ML/MIN/1.73M*2

## 2025-11-11 ENCOUNTER — APPOINTMENT (OUTPATIENT)
Facility: CLINIC | Age: 75
End: 2025-11-11
Payer: MEDICARE

## 2025-12-05 ENCOUNTER — APPOINTMENT (OUTPATIENT)
Dept: PRIMARY CARE | Facility: CLINIC | Age: 75
End: 2025-12-05
Payer: MEDICARE

## (undated) DEVICE — Device

## (undated) DEVICE — VALVE, DEFENDO, 5 PCS BUTTON SET, SINGLE USE

## (undated) DEVICE — BAG, DRAINAGE, URINARY, W/ANTI-REFLUX CHAMBER, INFECTION CON

## (undated) DEVICE — TUBE, GASTROSTOMY, PONSKY, NON-BALLOON REPLACE, 20 FR

## (undated) DEVICE — ADAPTER, WATER BOTTLE, SMART CAP, 140/160/180 SERIES

## (undated) DEVICE — MANIFOLD, 4 PORT NEPTUNE STANDARD

## (undated) DEVICE — VALVE, SUCTION

## (undated) DEVICE — COVER, CART, 45 X 27 X 48 IN, CLEAR

## (undated) DEVICE — PEG KIT, SAFETY, PULL, 20FR W/ENFIT

## (undated) DEVICE — COVER, TABLE, 54X90

## (undated) DEVICE — DRESSING, GAUZE, 16 PLY, 4 X 4 IN, STERILE

## (undated) DEVICE — NEEDLE, HYPODERMIC, 25 G X 1.5 IN, A BEVEL, STERILE

## (undated) DEVICE — REST, HEAD, BAGEL, 9 IN

## (undated) DEVICE — VALVE, BIOPSY, BRONCHOSCOPE, DISPOSABLE, STERILE

## (undated) DEVICE — BOWL, UTILITY, 32 OZ, PLASTIC, STERILE

## (undated) DEVICE — TUBING, SUCTION, CONNECTING, STERILE 0.25 X 120 IN., LF